# Patient Record
Sex: FEMALE | Race: WHITE | NOT HISPANIC OR LATINO | Employment: FULL TIME | URBAN - METROPOLITAN AREA
[De-identification: names, ages, dates, MRNs, and addresses within clinical notes are randomized per-mention and may not be internally consistent; named-entity substitution may affect disease eponyms.]

---

## 2018-05-18 ENCOUNTER — HOSPITAL ENCOUNTER (EMERGENCY)
Facility: HOSPITAL | Age: 28
Discharge: HOME | End: 2018-05-18
Attending: EMERGENCY MEDICINE | Admitting: EMERGENCY MEDICINE

## 2018-05-18 ENCOUNTER — APPOINTMENT (OUTPATIENT)
Dept: GENERAL RADIOLOGY | Facility: HOSPITAL | Age: 28
End: 2018-05-18

## 2018-05-18 VITALS
DIASTOLIC BLOOD PRESSURE: 77 MMHG | HEIGHT: 66 IN | RESPIRATION RATE: 20 BRPM | OXYGEN SATURATION: 97 % | HEART RATE: 96 BPM | SYSTOLIC BLOOD PRESSURE: 147 MMHG | TEMPERATURE: 97.9 F | WEIGHT: 243 LBS | BODY MASS INDEX: 39.05 KG/M2

## 2018-05-18 DIAGNOSIS — S16.1XXA STRAIN OF NECK MUSCLE, INITIAL ENCOUNTER: ICD-10-CM
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
## 2018-05-18 PROCEDURE — 71045 X-RAY EXAM CHEST 1 VIEW: CPT

## 2018-05-18 PROCEDURE — 72072 X-RAY EXAM THORAC SPINE 3VWS: CPT

## 2018-05-18 PROCEDURE — 72050 X-RAY EXAM NECK SPINE 4/5VWS: CPT

## 2018-05-18 PROCEDURE — 99283 EMERGENCY DEPT VISIT LOW MDM: CPT

## 2018-05-18 RX ORDER — CYCLOBENZAPRINE HCL 10 MG
10 TABLET ORAL 3 TIMES DAILY PRN
Qty: 30 TABLET | Refills: 0 | Status: ON HOLD | OUTPATIENT
Start: 2018-05-18 | End: 2020-02-19

## 2018-05-18 RX ORDER — CYCLOBENZAPRINE HCL 10 MG
10 TABLET ORAL ONCE
Status: COMPLETED | OUTPATIENT
Start: 2018-05-18 | End: 2018-05-18

## 2018-05-18 RX ORDER — IBUPROFEN 800 MG/1
800 TABLET ORAL ONCE
Status: COMPLETED | OUTPATIENT
Start: 2018-05-18 | End: 2018-05-18

## 2018-05-18 RX ADMIN — IBUPROFEN 800 MG: 800 TABLET ORAL at 19:19

## 2018-05-18 RX ADMIN — CYCLOBENZAPRINE 10 MG: 10 TABLET, FILM COATED ORAL at 19:19
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
## 2020-02-18 ENCOUNTER — HOSPITAL ENCOUNTER (EMERGENCY)
Facility: HOSPITAL | Age: 30
Discharge: PSYCHIATRIC OR SUBSTANCE USE FACILITY | End: 2020-02-19
Attending: EMERGENCY MEDICINE | Admitting: EMERGENCY MEDICINE

## 2020-02-18 DIAGNOSIS — R45.851 SUICIDAL THOUGHTS: Primary | ICD-10-CM

## 2020-02-18 DIAGNOSIS — Z86.59 HISTORY OF DEPRESSION: ICD-10-CM

## 2020-02-18 LAB
AMPHET+METHAMPHET UR QL: POSITIVE
BARBITURATES UR QL SCN: NEGATIVE
BENZODIAZ UR QL SCN: NEGATIVE
CANNABINOIDS SERPL QL: NEGATIVE
COCAINE UR QL: NEGATIVE
ETHANOL BLD-MCNC: <10 MG/DL (ref 0–10)
ETHANOL UR QL: <0.01 %
LITHIUM SERPL-SCNC: 0.1 MMOL/L (ref 0.6–1.2)
METHADONE UR QL SCN: NEGATIVE
OPIATES UR QL: NEGATIVE
OXYCODONE UR QL SCN: NEGATIVE
WHOLE BLOOD HOLD SPECIMEN: NORMAL
WHOLE BLOOD HOLD SPECIMEN: NORMAL

## 2020-02-18 PROCEDURE — 80307 DRUG TEST PRSMV CHEM ANLYZR: CPT | Performed by: EMERGENCY MEDICINE

## 2020-02-18 PROCEDURE — 80178 ASSAY OF LITHIUM: CPT | Performed by: EMERGENCY MEDICINE

## 2020-02-18 PROCEDURE — 84703 CHORIONIC GONADOTROPIN ASSAY: CPT | Performed by: SPECIALIST

## 2020-02-18 PROCEDURE — 90791 PSYCH DIAGNOSTIC EVALUATION: CPT

## 2020-02-18 PROCEDURE — 99283 EMERGENCY DEPT VISIT LOW MDM: CPT

## 2020-02-18 PROCEDURE — 84443 ASSAY THYROID STIM HORMONE: CPT | Performed by: SPECIALIST

## 2020-02-18 RX ORDER — VILAZODONE HYDROCHLORIDE 40 MG/1
40 TABLET ORAL DAILY
COMMUNITY
End: 2020-05-20

## 2020-02-18 RX ORDER — BUSPIRONE HYDROCHLORIDE 10 MG/1
10 TABLET ORAL 2 TIMES DAILY
COMMUNITY
End: 2020-02-21 | Stop reason: HOSPADM

## 2020-02-18 RX ORDER — LITHIUM CARBONATE 150 MG/1
150 CAPSULE ORAL
COMMUNITY
End: 2020-02-21 | Stop reason: HOSPADM

## 2020-02-18 RX ORDER — OMEPRAZOLE 40 MG/1
40 CAPSULE, DELAYED RELEASE ORAL DAILY
COMMUNITY
End: 2020-02-21 | Stop reason: HOSPADM

## 2020-02-19 ENCOUNTER — HOSPITAL ENCOUNTER (INPATIENT)
Facility: HOSPITAL | Age: 30
LOS: 2 days | Discharge: HOME | End: 2020-02-21
Attending: SPECIALIST | Admitting: SPECIALIST

## 2020-02-19 VITALS
BODY MASS INDEX: 42.59 KG/M2 | TEMPERATURE: 98.2 F | HEIGHT: 66 IN | WEIGHT: 265 LBS | DIASTOLIC BLOOD PRESSURE: 70 MMHG | OXYGEN SATURATION: 97 % | HEART RATE: 88 BPM | SYSTOLIC BLOOD PRESSURE: 129 MMHG | RESPIRATION RATE: 16 BRPM

## 2020-02-19 PROBLEM — F33.2 MAJOR DEPRESSIVE DISORDER, RECURRENT, SEVERE WITHOUT PSYCHOTIC FEATURES: Status: ACTIVE | Noted: 2020-02-19

## 2020-02-19 LAB
HCG SERPL QL: NEGATIVE
TSH SERPL DL<=0.05 MIU/L-ACNC: 3.21 UIU/ML (ref 0.27–4.2)

## 2020-02-19 RX ORDER — LITHIUM CARBONATE 150 MG/1
150 CAPSULE ORAL NIGHTLY
Status: DISCONTINUED | OUTPATIENT
Start: 2020-02-19 | End: 2020-02-19

## 2020-02-19 RX ORDER — ACETAMINOPHEN 325 MG/1
650 TABLET ORAL EVERY 4 HOURS PRN
Status: DISCONTINUED | OUTPATIENT
Start: 2020-02-19 | End: 2020-02-21 | Stop reason: HOSPADM

## 2020-02-19 RX ORDER — PANTOPRAZOLE SODIUM 40 MG/1
40 TABLET, DELAYED RELEASE ORAL
Status: DISCONTINUED | OUTPATIENT
Start: 2020-02-19 | End: 2020-02-21 | Stop reason: HOSPADM

## 2020-02-19 RX ORDER — VILAZODONE HYDROCHLORIDE 40 MG/1
40 TABLET ORAL DAILY
Status: DISCONTINUED | OUTPATIENT
Start: 2020-02-19 | End: 2020-02-21 | Stop reason: HOSPADM

## 2020-02-19 RX ORDER — LITHIUM CARBONATE 300 MG/1
300 CAPSULE ORAL 2 TIMES DAILY
Status: DISCONTINUED | OUTPATIENT
Start: 2020-02-19 | End: 2020-02-21 | Stop reason: HOSPADM

## 2020-02-19 RX ORDER — ALUMINA, MAGNESIA, AND SIMETHICONE 2400; 2400; 240 MG/30ML; MG/30ML; MG/30ML
15 SUSPENSION ORAL EVERY 6 HOURS PRN
Status: DISCONTINUED | OUTPATIENT
Start: 2020-02-19 | End: 2020-02-21 | Stop reason: HOSPADM

## 2020-02-19 RX ORDER — BUSPIRONE HYDROCHLORIDE 10 MG/1
10 TABLET ORAL 2 TIMES DAILY
Status: DISCONTINUED | OUTPATIENT
Start: 2020-02-19 | End: 2020-02-20

## 2020-02-19 RX ORDER — LEVOTHYROXINE SODIUM 88 UG/1
88 TABLET ORAL
Status: DISCONTINUED | OUTPATIENT
Start: 2020-02-19 | End: 2020-02-21 | Stop reason: HOSPADM

## 2020-02-19 RX ADMIN — VILAZODONE HYDROCHLORIDE 40 MG: 40 TABLET ORAL at 14:01

## 2020-02-19 RX ADMIN — LEVOTHYROXINE SODIUM 88 MCG: 88 TABLET ORAL at 14:01

## 2020-02-19 RX ADMIN — BUSPIRONE HYDROCHLORIDE 10 MG: 10 TABLET ORAL at 14:02

## 2020-02-19 RX ADMIN — BUSPIRONE HYDROCHLORIDE 10 MG: 10 TABLET ORAL at 21:28

## 2020-02-19 RX ADMIN — LITHIUM CARBONATE 300 MG: 300 CAPSULE, GELATIN COATED ORAL at 21:28

## 2020-02-19 RX ADMIN — LITHIUM CARBONATE 300 MG: 300 CAPSULE, GELATIN COATED ORAL at 14:07

## 2020-02-19 NOTE — H&P
"IDENTIFYING INFORMATION: The patient is a 29-year-old white female admitted with suicidal ideation.    CHIEF COMPLAINT: My psychiatrist told me I should come here    INFORMANT: Patient and chart    RELIABILITY: Good    HISTORY OF PRESENT ILLNESS: The patient is a 29-year-old white female admitted after she had confided suicidal action to her psychiatrist yesterday.  The patient reports no specific precipitant which had led to this suicidal thinking.  The patient reports history of 3 inpatient admissions as a teenager and does have a history of a previous suicide attempt.  The patient was reporting that she had researched how to end her life and was settling on an overdose as her best choice.  Patient states that she has been on multiple psychotropic medications and is most recently been started on lithium carbonate, albeit at a very low dose of 150 mg nightly.  She is also prescribed BuSpar and Viibryd.  The patient lives with her parents and older brother.  She has a bachelor's degree from the [employer] and works as an  at the \"[employer]\".  She reports that she is not entirely happy with her job.  She denies abuse of any psychoactive substances though her drug screen was positive for methamphetamine.  When seen today the patient is able to promise safety in the hospital but continues to endorse positive suicidal thinking.    PAST PSYCHIATRIC HISTORY: As above    PAST MEDICAL HISTORY: Significant for GERD and obesity as well as hypothyroidism    MEDICATIONS:   Current Facility-Administered Medications   Medication Dose Route Frequency Provider Last Rate Last Dose   • acetaminophen (TYLENOL) tablet 650 mg  650 mg Oral Q4H PRN Edward Casillas III, MD       • aluminum-magnesium hydroxide-simethicone (MAALOX MAX) 400-400-40 MG/5ML suspension 15 mL  15 mL Oral Q6H PRN Edward Casillas III, MD       • busPIRone (BUSPAR) tablet 10 mg  10 mg Oral BID Edward Casillas" Meme GARDINER MD       • levothyroxine (SYNTHROID, LEVOTHROID) tablet 88 mcg  88 mcg Oral Q AM Edward Casillas III, MD       • lithium carbonate capsule 300 mg  300 mg Oral BID Edward Casillas III, MD       • magnesium hydroxide (MILK OF MAGNESIA) suspension 2400 mg/10mL 10 mL  10 mL Oral Daily PRN Edward Casillas III, MD       • pantoprazole (PROTONIX) EC tablet 40 mg  40 mg Oral Q AM Edward Casillas III, MD       • vilazodone (VIIBRYD) tablet 40 mg  40 mg Oral Daily Edward Casillas III, MD             ALLERGIES: Codeine, DHEA, Imitrex, morphine    FAMILY HISTORY: The patient reports an extensive family history of depression on both sides of her family including issues with depression, eating disorders and substance abuse    SOCIAL HISTORY: The patient lives with her parents and older brother.  She is a graduate of [employer] with a degree in Onsite Care studies.  She denies abuse of psychoactive substances though her drug screen was positive for methamphetamine.    MENTAL STATUS EXAM: The patient is an obese white female appearing her stated age.  She is no apparent physical distress at the time of examination.  She is awake alert and oriented in all spheres.  Her mood is mildly dysphoric her affect great.  Speech is well coherent.  There are no gross deficits in memory or cognition noted.  Intelligence is judged to be the average range based on fund of knowledge, the patient is cooperative during interview.  She is currently endorsing positive suicidal action but is able to promise safety in the hospital.  She denies homicidal action.  She denies any psychotic symptoms.  Her judgment and insight appear to be intact.    ASSETS/LIABILITIES: Motivation for change    DIAGNOSTIC IMPRESSION: Major depressive sworder severe recurrent without psychotic features, hypothyroidism, GERD, obesity    PLAN: The patient remains hospitalized for safety and  stabilization.  As she is able to promise safety in the hospital we will reduce suicide precautions to low risk.  I will optimize the patient's lithium dose as her level on admission was 0.1 at the almost homeopathic dose which she had been previously prescribed.  We will continue Viibryd which the patient states she is tolerating well.  A family therapy session will be sought.  Estimated length of stay in the hospital 5 to 7 days.  The patient looks to be a good candidate for participation in the intensive outpatient program following discharge.

## 2020-02-19 NOTE — PLAN OF CARE
Problem: Patient Care Overview  Goal: Plan of Care Review  Outcome: Ongoing (interventions implemented as appropriate)  Flowsheets  Taken 2/19/2020 1846  Progress: no change  Outcome Summary: Patient tearful during the shift. She reports feeling hopeless and depressed with low energy. Patient has cooperated with all scheduled programming. Patient denies SI and reports feeling safe here. Will continue to monitor.  Taken 2/19/2020 0900  Plan of Care Reviewed With: patient  Patient Agreement with Plan of Care: agrees  Goal: Individualization and Mutuality  Outcome: Ongoing (interventions implemented as appropriate)  Goal: Discharge Needs Assessment  Outcome: Ongoing (interventions implemented as appropriate)  Goal: Interprofessional Rounds/Family Conf  Outcome: Ongoing (interventions implemented as appropriate)     Problem: Overarching Goals (Adult)  Goal: Adheres to Safety Considerations for Self and Others  Outcome: Ongoing (interventions implemented as appropriate)  Goal: Optimized Coping Skills in Response to Life Stressors  Outcome: Ongoing (interventions implemented as appropriate)  Goal: Develops/Participates in Therapeutic [place] to Support Successful Transition  Outcome: Ongoing (interventions implemented as appropriate)     Problem: Mood Impairment (Depressive Signs/Symptoms) (Adult)  Goal: Improved Mood Symptoms (Depressive Signs/Symptoms)  Outcome: Ongoing (interventions implemented as appropriate)     Problem: Feelings of Worthlessness, Hopelessness, Excessive Guilt (Depressive Signs/Symptoms) (Adult)  Goal: Enhanced Self-Esteem/Confidence (Depressive Signs/Symptoms)  Outcome: Ongoing (interventions implemented as appropriate)     Problem: Decreased Participation/Engagement (Depressive Signs/Symptoms) (Adult)  Goal: Increased Participation/Engagement (Depressive Signs/Symptoms)  Outcome: Ongoing (interventions implemented as appropriate)     Problem: Sleep Impairment (Depressive Signs/Symptoms)  (Adult)  Goal: Improved Sleep Hygiene (Depressive Signs/Symptoms)  Outcome: Ongoing (interventions implemented as appropriate)

## 2020-02-19 NOTE — ED PROVIDER NOTES
EMERGENCY DEPARTMENT ENCOUNTER    Room Number:  17/17  Date of encounter:  2/18/2020  PCP: Miriam Newman  Historian: patient      HPI:  Chief Complaint: suicidal ideation  A complete HPI/ROS/PMH/PSH/SH/FH are unobtainable due to: nothing    Context: Maame Wallace is a 29 y.o. female who presents to the ED c/o SI. She states her depression is getting worse. Patient was sent by her psychiatrist so she 'does not commit suicide.' She denies a current plan but states she has 'stocked up some pills.'. No homicidal ideation or auditory or visual hallucinations. Patient has a history of cutting and intentional overdose. No other complaints. No drug use, very little EtOH consumption.       PAST MEDICAL HISTORY  Active Ambulatory Problems     Diagnosis Date Noted   • Hemiplegic migraine, intractable 04/25/2016   • Fibromyalgia 04/29/2016     Resolved Ambulatory Problems     Diagnosis Date Noted   • No Resolved Ambulatory Problems     Past Medical History:   Diagnosis Date   • Anxiety    • Asthma    • Depression    • GERD (gastroesophageal reflux disease)    • Hypothyroidism    • Irritable bowel disease    • Migraines          PAST SURGICAL HISTORY  Past Surgical History:   Procedure Laterality Date   • APPENDECTOMY      laparoscopic   • CHOLECYSTECTOMY     • KNEE ARTHROSCOPY Left    • PATELLA FEMORAL LIGAMENT RECONSTRUCTION Left    • TONSILLECTOMY AND ADENOIDECTOMY           FAMILY HISTORY  No family history on file.      SOCIAL HISTORY  Social History     Socioeconomic History   • Marital status: Single     Spouse name: Not on file   • Number of children: Not on file   • Years of education: Not on file   • Highest education level: Not on file   Tobacco Use   • Smoking status: Never Smoker   Substance and Sexual Activity   • Alcohol use: Yes     Alcohol/week: 1.0 standard drinks     Types: 1 Cans of beer per week     Comment: social   • Drug use: No         ALLERGIES  Codeine; Dhea [nutritional supplements]; Imitrex  [sumatriptan]; and Morphine        REVIEW OF SYSTEMS  Review of Systems   Eyes: Negative.    Allergic/Immunologic: Negative.    Hematological: Negative.    Psychiatric/Behavioral: Positive for suicidal ideas. Negative for hallucinations and self-injury.   All other systems reviewed and are negative.       All other ROS negative except as documented in HPI      PHYSICAL EXAM    I have reviewed the triage vital signs and nursing notes.    ED Triage Vitals [02/18/20 1916]   Temp Heart Rate Resp BP SpO2   97.7 °F (36.5 °C) (!) 128 16 -- 98 %       General: Awake, alert, no acute distress  HEENT: Mucous membranes moist, atraumatic, normocephalic, EOMI  Neck: Full ROM  Pulm: Symmetric, non-labored, lungs CTAB  Cardiovascular: Regular rate and rhythm, normal S1/S2, intact distal pulses  GI: Soft, non-tender, non-distended, no rebound, no guarding, bowel sounds present  MSK: Full ROM, no deformity  Skin: Warm, dry  Neuro: Alert and oriented x 3, GCS 15, moving all extremities, no focal deficits  Psych: Calm, cooperative        LAB RESULTS  Recent Results (from the past 24 hour(s))   Ethanol    Collection Time: 02/18/20  7:37 PM   Result Value Ref Range    Ethanol <10 0 - 10 mg/dL    Ethanol % <0.010 %   Urine Drug Screen - Urine, Clean Catch    Collection Time: 02/18/20  7:37 PM   Result Value Ref Range    Amphet/Methamphet, Screen Positive (A) Negative    Barbiturates Screen, Urine Negative Negative    Benzodiazepine Screen, Urine Negative Negative    Cocaine Screen, Urine Negative Negative    Opiate Screen Negative Negative    THC, Screen, Urine Negative Negative    Methadone Screen, Urine Negative Negative    Oxycodone Screen, Urine Negative Negative   Lithium Level    Collection Time: 02/18/20  7:42 PM   Result Value Ref Range    Lithium 0.1 (L) 0.6 - 1.2 mmol/L   Light Blue Top    Collection Time: 02/18/20  7:42 PM   Result Value Ref Range    Extra Tube hold for add-on    Lavender Top    Collection Time: 02/18/20  7:42  PM   Result Value Ref Range    Extra Tube hold for add-on        Ordered the above labs and independently reviewed the results.      MEDICATIONS GIVEN IN ER    Medications - No data to display      PROGRESS, DATA ANALYSIS, CONSULTS, AND MEDICAL DECISION MAKING    All labs have been independently reviewed by me.  All radiology studies have been reviewed by me and discussed with radiologist dictating report.   EKG's independently reviewed by me.  Discussion below represents my analysis of pertinent findings related to patient's condition, differential diagnosis, treatment plan and final disposition.       Patient sent in for evaluation by her psychiatrist for suicidal thoughts, patient admits to hoarding pills with a potential plan to overdose.  History of overdose attempt in the past.  Patient reports fearing for her safety if she were to go home tonight.  [facility] has evaluated and will hospitalize to the psychiatry service.  --  1942. Labs including UDS and EtOH ordered.      1953. Call out to [facility].    2110. [facility] at bedside.     2150. Per [facility], patient will be discharged to Behavioral Health under the care of Dr Casillas, Psychiatry.     --  AS OF 11:27 PM VITALS:    BP - (!) 164/111  HR - (!) 128  TEMP - 97.7 °F (36.5 °C) (Tympanic)  02 SATS - 98%        DIAGNOSIS  Final diagnoses:   Suicidal thoughts   History of depression         DISPOSITION    DISCHARGE TO BEHAVIORAL HEALTH    --  Documentation assistance provided by cookie Morales for Dr. Bk Louise MD.  Information recorded by the scribe was done at my direction and has been verified and validated by me.       Imelda Morales  02/18/20 7973       Bk Louise MD  02/18/20 0641

## 2020-02-19 NOTE — PLAN OF CARE
Problem: Patient Care Overview  Goal: Discharge Needs Assessment  Outcome: Ongoing (interventions implemented as appropriate)  Flowsheets  Taken 2/19/2020 1135  Concerns Comments: Pt will return home.  Pt will participate in therapeutic groups/activities on the unit.  SW will explore outpt providers.  Taken 2/19/2020 1135  Transportation Concerns: car, none  Concerns to be Addressed: coping/stress;decision making;suicidal;mental health  Patient/Family Anticipated Services at Transition: mental health services  Patient/Family Anticipates Transition to: home with family

## 2020-02-19 NOTE — NURSING NOTE
Skin assessment:    Pt has multiple self inflicted abrasions on bilateral hips, upper thighs and axillary areas.  Some are older from a month ago per pt, and some are more recent.  Pt has a mole on second toe of left foot, 4 tattoos, one on left wrist, behind right ear and one on each ankle.

## 2020-02-19 NOTE — PLAN OF CARE
Problem: Patient Care Overview  Goal: Individualization and Mutuality  Outcome: Ongoing (interventions implemented as appropriate)  Flowsheets (Taken 2/19/2020 1013)  Patient Personal Strengths: family/social support; stable living environment; positive vocational history     Problem: Patient Care Overview  Goal: Interprofessional Rounds/Family Conf  Outcome: Ongoing (interventions implemented as appropriate)  Flowsheets (Taken 2/19/2020 1013)  Participants: ; pastoral care; pharmacy; psychiatrist; nursing; social work  Summary: Treatment team met to discuss pt's plan of care.  Pt will participate in therapeutic groups/activities on the unit.  SW will confirm outpt mental health providers. Progress & svcs needed will be assessed ongoing.     Problem: Mood Impairment (Depressive Signs/Symptoms) (Adult)  Goal: Improved Mood Symptoms (Depressive Signs/Symptoms)  Outcome: Ongoing (interventions implemented as appropriate)  Flowsheets (Taken 2/19/2020 1013)  Improved Mood Symptoms Time Frame for Action Step (STG): 4 days  Improved Mood Symptoms Action Step (STG) Outcome: making progress toward outcome     Problem: Feelings of Worthlessness, Hopelessness, Excessive Guilt (Depressive Signs/Symptoms) (Adult)  Goal: Enhanced Self-Esteem/Confidence (Depressive Signs/Symptoms)  Outcome: Ongoing (interventions implemented as appropriate)  Flowsheets (Taken 2/19/2020 1013)  Enhanced Self-Esteem/Confidence Time Frame for Action Step (STG): 4 days  Enhanced Self-Esteem/Confidence Action Step (STG) Outcome: making progress toward outcome     Problem: Decreased Participation/Engagement (Depressive Signs/Symptoms) (Adult)  Goal: Increased Participation/Engagement (Depressive Signs/Symptoms)  Outcome: Ongoing (interventions implemented as appropriate)  Flowsheets (Taken 2/19/2020 1013)  Increased Participation/Engagement Time Frame for Action Step (STG): 4 days  Increased Participation/Engagement Action Step (STG)  Outcome: making progress toward outcome     Problem: Sleep Impairment (Depressive Signs/Symptoms) (Adult)  Goal: Improved Sleep Hygiene (Depressive Signs/Symptoms)  Outcome: Ongoing (interventions implemented as appropriate)  Flowsheets (Taken 2/19/2020 1013)  Improved Sleep Hygiene Time Frame for Action Step (STG): 4 days  Improved Sleep Hygiene Action Step (STG) Outcome: making progress toward outcome    Patient/Guardian Signature: __________________________________            Psychiatrist Signature: ______________________________________             Therapist Signature: ________________________________________         Nurse Signature: ___________________________________________          Staff Signature: ____________________________________________            Staff Signature: ____________________________________________          Staff Signature: ____________________________________________          Staff Signature:

## 2020-02-19 NOTE — PROGRESS NOTES
Clinical Pharmacy Services: Medication History    Maame Wallace is a 29 y.o. female presenting to [facility] for Major depressive disorder, recurrent, severe without psychotic features (CMS/HCC) [F33.2]    She  has a past medical history of Anxiety, Asthma, Depression, Fibromyalgia, GERD (gastroesophageal reflux disease), Hypothyroidism, Irritable bowel disease, and Migraines.    Allergies as of 02/18/2020 - Reviewed 02/18/2020   Allergen Reaction Noted    Codeine Nausea And Vomiting and Rash 04/04/2016    Dhea [nutritional supplements] Other (See Comments) 04/25/2016    Imitrex [sumatriptan] Other (See Comments) 04/04/2016    Morphine Other (See Comments) 04/04/2016       Medication information was obtained from: patient   Pharmacy and Phone Number:     Prior to Admission Medications       Prescriptions Last Dose Informant Patient Reported? Taking?    busPIRone (BUSPAR) 10 MG tablet  Self Yes Yes    Take 10 mg by mouth 2 (Two) Times a Day. Breakfast and lunch    levothyroxine (SYNTHROID, LEVOTHROID) 88 MCG tablet  Self Yes Yes    Take 88 mcg by mouth daily.    lithium carbonate 150 MG capsule  Self Yes Yes    Take 150 mg by mouth every night at bedtime.    omeprazole (priLOSEC) 40 MG capsule  Self Yes Yes    Take 40 mg by mouth Daily.    vilazodone (VIIBRYD) 40 MG tablet tablet  Self Yes Yes    Take 40 mg by mouth Daily.                Medication notes:     This medication list is complete to the best of my knowledge as of 2/19/2020    Please call if questions.    Gypsy Boland, [place], BCPS  2/19/2020 1:32 PM

## 2020-02-19 NOTE — CONSULTS
Pt is a 29 y.o. white single female who lives with her parents. Pt is a bachelors degree cabral and works for the [employer] as an .    Pt came to the ED today with c/o of SI. Voiced she was referred by her psychiatrist after mentioning her thoughts of wanting to write a suicide note and looking up way to on Internet to hurt herself over the past week. Reported to writer that over the course of the week and weekend she have been looking up ways on the Internet to hurt herself and overdosing was the easier choose she found. Reports no recent added stress to life but she have just been having constant SI thoughts for the past 2-3 months that have worsened in severity. She recently had Lithium added to her psych regimen about a week ago and has been seeing her psychiatrist on a weekly bases due to the consant SI thoughts. States her depression has gotten worse over the past few months and she does no know why.     Currently, she is still voicing SI thoughts and does not feel safe being release from the hospital. Pt reports she has stock up pills at home that she might use to overdose. Current self mutilation on stomach and both upper arms. Voiced cuts are superficial and in different healing stages. Reports here in the past month the cutting have increased in severity. Voiced she have always had the hx but stop cutting when she was in college years ago.     No homicidal ideation or auditory or visual hallucinations reported. Previous SI attempt reported when she was in high school. Pt also have x3 hx of inpatient admissions around same time of SI attempt but was unsure of the year. States she was at [facility] and [facility] pediatric. Sees [facility] for psychiatric needs. Psychiatrist is Jen Jeronimo who she sees on a weekly base. Last appointment was today. Also sees Doreen (therapist) in this same office twice a month.    Reports occasional alcohol use and no illicit drug  use even though urine drug screen was positive for methamphetamine today. No trauma/abuse in hx. Sleeps has been poor but appetite regular.      Pt is currently on Lithium 150 mg at bedtime that was started a week ago. Also on Buspar 10 mg twice a day, Viibryd 40 mg  dally, Levothyroxine 88 mcg daily and omeprazole 40 mg daily.    Status discussed with Dr. Casillas who ordered inpatient admission with SI precaution and 1:1 sitter for safety reasons. He also ordered for continuation of routine medications and standing PRN's.    Plan reviewed/discused with Dr. Louise who agrees.      contacted for sitter purposes but none is available at this time. Pt. made aware that her tranfers to the unit from the ED might be tomorrow morning. Pt fine with this at this time.

## 2020-02-19 NOTE — PROGRESS NOTES
Continued Stay Note  [facility]     Patient Name: Maame Wallace  MRN: [id number]  Today's Date: 2/19/2020    Admit Date: 2/19/2020    Discharge Plan     Row Name 02/19/20 1133       Plan    Plan  Pt will return home.  Pt will participate in therapeutic groups/activities on the unit.  SW will explore outpt providers.    Plan Comments  SW met w/pt to discuxx tx & d/c planning.  Pt was able to verify demographic info as well as PCP, Dr. Miriam Newman.  JULIUS inquired about mental health providers; pt receives svcs from [facility], she sees Sally Tate for medication management & Doreen for mental health therapy.  SW inquired about a work note; pt stated that she would need one upon d/c.  JULIUS briefly discussed meds to beds w/pt.        Discharge Codes    No documentation.             VERONICA Villasenor

## 2020-02-20 VITALS
HEART RATE: 96 BPM | SYSTOLIC BLOOD PRESSURE: 117 MMHG | TEMPERATURE: 97.5 F | DIASTOLIC BLOOD PRESSURE: 68 MMHG | OXYGEN SATURATION: 100 % | RESPIRATION RATE: 18 BRPM

## 2020-02-20 PROBLEM — B37.31 VAGINAL YEAST INFECTION: Status: ACTIVE | Noted: 2020-02-20

## 2020-02-20 PROBLEM — E03.9 HYPOTHYROIDISM: Status: ACTIVE | Noted: 2020-02-20

## 2020-02-20 PROBLEM — K58.9 IRRITABLE BOWEL DISEASE: Status: ACTIVE | Noted: 2020-02-20

## 2020-02-20 PROBLEM — D72.829 LEUKOCYTOSIS: Status: ACTIVE | Noted: 2020-02-20

## 2020-02-20 PROBLEM — E87.1 HYPONATREMIA: Status: ACTIVE | Noted: 2020-02-20

## 2020-02-20 PROBLEM — E66.9 OBESITY (BMI 30-39.9): Status: ACTIVE | Noted: 2020-02-20

## 2020-02-20 LAB
ANION GAP SERPL CALCULATED.3IONS-SCNC: 11 MMOL/L (ref 5–15)
BASOPHILS # BLD AUTO: 0.08 10*3/MM3 (ref 0–0.2)
BASOPHILS NFR BLD AUTO: 0.7 % (ref 0–1.5)
BUN BLD-MCNC: 8 MG/DL (ref 6–20)
BUN/CREAT SERPL: 9.9 (ref 7–25)
CALCIUM SPEC-SCNC: 8.6 MG/DL (ref 8.6–10.5)
CHLORIDE SERPL-SCNC: 102 MMOL/L (ref 98–107)
CO2 SERPL-SCNC: 21 MMOL/L (ref 22–29)
CREAT BLD-MCNC: 0.81 MG/DL (ref 0.57–1)
DEPRECATED RDW RBC AUTO: 39.5 FL (ref 37–54)
EOSINOPHIL # BLD AUTO: 0.12 10*3/MM3 (ref 0–0.4)
EOSINOPHIL NFR BLD AUTO: 1 % (ref 0.3–6.2)
ERYTHROCYTE [DISTWIDTH] IN BLOOD BY AUTOMATED COUNT: 12.6 % (ref 12.3–15.4)
GFR SERPL CREATININE-BSD FRML MDRD: 85 ML/MIN/1.73
GLUCOSE BLD-MCNC: 132 MG/DL (ref 65–99)
HCT VFR BLD AUTO: 41.3 % (ref 34–46.6)
HGB BLD-MCNC: 13.8 G/DL (ref 12–15.9)
IMM GRANULOCYTES # BLD AUTO: 0.09 10*3/MM3 (ref 0–0.05)
IMM GRANULOCYTES NFR BLD AUTO: 0.8 % (ref 0–0.5)
LYMPHOCYTES # BLD AUTO: 2.93 10*3/MM3 (ref 0.7–3.1)
LYMPHOCYTES NFR BLD AUTO: 24.9 % (ref 19.6–45.3)
MCH RBC QN AUTO: 29.1 PG (ref 26.6–33)
MCHC RBC AUTO-ENTMCNC: 33.4 G/DL (ref 31.5–35.7)
MCV RBC AUTO: 86.9 FL (ref 79–97)
MONOCYTES # BLD AUTO: 0.54 10*3/MM3 (ref 0.1–0.9)
MONOCYTES NFR BLD AUTO: 4.6 % (ref 5–12)
NEUTROPHILS # BLD AUTO: 8.03 10*3/MM3 (ref 1.7–7)
NEUTROPHILS NFR BLD AUTO: 68 % (ref 42.7–76)
NRBC BLD AUTO-RTO: 0 /100 WBC (ref 0–0.2)
PLATELET # BLD AUTO: 350 10*3/MM3 (ref 140–450)
PMV BLD AUTO: 9.8 FL (ref 6–12)
POTASSIUM BLD-SCNC: 3.7 MMOL/L (ref 3.5–5.2)
RBC # BLD AUTO: 4.75 10*6/MM3 (ref 3.77–5.28)
SODIUM BLD-SCNC: 134 MMOL/L (ref 136–145)
WBC NRBC COR # BLD: 11.79 10*3/MM3 (ref 3.4–10.8)

## 2020-02-20 PROCEDURE — 80048 BASIC METABOLIC PNL TOTAL CA: CPT | Performed by: NURSE PRACTITIONER

## 2020-02-20 PROCEDURE — 85025 COMPLETE CBC W/AUTO DIFF WBC: CPT | Performed by: NURSE PRACTITIONER

## 2020-02-20 RX ORDER — TRAZODONE HYDROCHLORIDE 50 MG/1
50 TABLET ORAL NIGHTLY
Status: DISCONTINUED | OUTPATIENT
Start: 2020-02-20 | End: 2020-02-21 | Stop reason: HOSPADM

## 2020-02-20 RX ORDER — BUSPIRONE HYDROCHLORIDE 15 MG/1
15 TABLET ORAL 2 TIMES DAILY WITH MEALS
Status: DISCONTINUED | OUTPATIENT
Start: 2020-02-20 | End: 2020-02-21 | Stop reason: HOSPADM

## 2020-02-20 RX ORDER — BUSPIRONE HYDROCHLORIDE 15 MG/1
15 TABLET ORAL 2 TIMES DAILY
Status: DISCONTINUED | OUTPATIENT
Start: 2020-02-20 | End: 2020-02-20

## 2020-02-20 RX ADMIN — LITHIUM CARBONATE 300 MG: 300 CAPSULE, GELATIN COATED ORAL at 10:33

## 2020-02-20 RX ADMIN — BUSPIRONE HYDROCHLORIDE 15 MG: 15 TABLET ORAL at 18:21

## 2020-02-20 RX ADMIN — TRAZODONE HYDROCHLORIDE 50 MG: 50 TABLET ORAL at 20:26

## 2020-02-20 RX ADMIN — VILAZODONE HYDROCHLORIDE 40 MG: 40 TABLET ORAL at 10:32

## 2020-02-20 RX ADMIN — PANTOPRAZOLE SODIUM 40 MG: 40 TABLET, DELAYED RELEASE ORAL at 08:28

## 2020-02-20 RX ADMIN — BUSPIRONE HYDROCHLORIDE 10 MG: 10 TABLET ORAL at 10:32

## 2020-02-20 RX ADMIN — LEVOTHYROXINE SODIUM 88 MCG: 88 TABLET ORAL at 08:27

## 2020-02-20 RX ADMIN — LITHIUM CARBONATE 300 MG: 300 CAPSULE, GELATIN COATED ORAL at 20:26

## 2020-02-20 NOTE — PROGRESS NOTES
The patient complains of poor sleep and increasing anxiety.  I have explained to her the rationale for the increased dose of lithium today which the patient understands.  She continues to report some suicidal thinking.  I will increase her BuSpar to 15 mg twice daily and add PRN trazodone for sleep.

## 2020-02-20 NOTE — PLAN OF CARE
Problem: Patient Care Overview  Goal: Plan of Care Review  Outcome: Ongoing (interventions implemented as appropriate)  Flowsheets (Taken 2/20/2020 0704)  Progress: improving  Plan of Care Reviewed With: patient  Patient Agreement with Plan of Care: agrees  Note:   Pt was pleasant, cooperative, compliant with medications and care. Pt denied SI, HI, and hallucinations. Pt voiced anxiety 9/10 and depression 8/10. Pt had a flat affect, slept all night. Will continue to monitor behaviors, provide support and safe environment.

## 2020-02-20 NOTE — PROGRESS NOTES
I do not see where a consult was called in, but patient will be seen by someone from [facility] today.    Nohemi Rizzo, APRN

## 2020-02-20 NOTE — PLAN OF CARE
Problem: Patient Care Overview  Goal: Plan of Care Review  Outcome: Ongoing (interventions implemented as appropriate)  Flowsheets  Taken 2/20/2020 1823  Progress: improving  Outcome Summary: Pt pleasant, cooperative w/ care, attending all groups, and compliant w/ meds. Pt appears brighter this shift, accepting of inpt stay. Pt voices depression 9/10 and anxiety 8/10. Denies SI/HI, A/VH and pain to this RN. Pt out in milieu interacting w/ peers appropriately. No further issues reported at this time. Will continue to monitor and provide safe environment.  Taken 2/20/2020 1100  Plan of Care Reviewed With: patient  Patient Agreement with Plan of Care: agrees     Problem: Patient Care Overview  Goal: Individualization and Mutuality  Outcome: Ongoing (interventions implemented as appropriate)     Problem: Patient Care Overview  Goal: Discharge Needs Assessment  Outcome: Ongoing (interventions implemented as appropriate)     Problem: Patient Care Overview  Goal: Interprofessional Rounds/Family Conf  Outcome: Ongoing (interventions implemented as appropriate)     Problem: Overarching Goals (Adult)  Goal: Adheres to Safety Considerations for Self and Others  Outcome: Ongoing (interventions implemented as appropriate)  Flowsheets (Taken 2/20/2020 1823)  Adheres to Safety Considerations for Self and Others: making progress toward outcome     Problem: Overarching Goals (Adult)  Goal: Adheres to Safety Considerations for Self and Others  Intervention: Develop and Maintain Individualized Safety Plan  Flowsheets  Taken 2/20/2020 1800  Safety Measures: safety rounds completed  Taken 2/20/2020 1100  Q4 Suicidal Intent without Specific Plan: no  Previous Attempt to Harm Others: no  Q1 Wished to be Dead (Past Month): yes  Q5 Suicide Intent with Specific Plan: no  Q2 Suicidal Thoughts (Past Month): yes  Feels Like Hurting Others: no  Q6 Suicide Behavior (Lifetime): yes  Q3 Suicidal Thought Method : no (looking up ways to harm self  )  Level of Risk per Screen: high risk !  Within the past 3 Months?: yes     Problem: Overarching Goals (Adult)  Goal: Optimized Coping Skills in Response to Life Stressors  Outcome: Ongoing (interventions implemented as appropriate)  Flowsheets (Taken 2/20/2020 1823)  Optimized Coping Skills in Response to Life Stressors: making progress toward outcome     Problem: Overarching Goals (Adult)  Goal: Optimized Coping Skills in Response to Life Stressors  Intervention: Promote Effective Coping Strategies  Flowsheets (Taken 2/20/2020 1100)  Supportive Measures: active listening utilized;verbalization of feelings encouraged;self-care encouraged;relaxation techniques promoted;positive reinforcement provided;goal setting facilitated;decision-making supported     Problem: Overarching Goals (Adult)  Goal: Develops/Participates in Therapeutic [place] to Support Successful Transition  Outcome: Ongoing (interventions implemented as appropriate)  Flowsheets (Taken 2/20/2020 1823)  Develops/Participates in Therapeutic [place] to Support Successful Transition: making progress toward outcome     Problem: Overarching Goals (Adult)  Goal: Develops/Participates in Therapeutic [place] to Support Successful Transition  Intervention: Foster Therapeutic [place]  Flowsheets (Taken 2/20/2020 1100)  Trust Relationship/Rapport: care explained;choices provided;thoughts/feelings acknowledged;reassurance provided;questions answered     Problem: Overarching Goals (Adult)  Goal: Develops/Participates in Therapeutic [place] to Support Successful Transition  Intervention: Mutually Develop Transition Plan  Flowsheets (Taken 2/20/2020 1100)  Transition Support: crisis management plan promoted     Problem: Mood Impairment (Depressive Signs/Symptoms) (Adult)  Goal: Improved Mood Symptoms (Depressive Signs/Symptoms)  Outcome: Ongoing (interventions implemented as appropriate)  Flowsheets  Taken 2/20/2020 0611 by Mae Conte RN Improved  Mood Symptoms Action Step/Short Term Goal (STG) Established: 02/19/20  Taken 2/20/2020 1823 by Paulino Virk RN  Improved Mood Symptoms Time Frame for Action Step (STG): 3 days  Improved Mood Symptoms Action Step (STG) Outcome: making progress toward outcome     Problem: Feelings of Worthlessness, Hopelessness, Excessive Guilt (Depressive Signs/Symptoms) (Adult)  Goal: Enhanced Self-Esteem/Confidence (Depressive Signs/Symptoms)  Outcome: Ongoing (interventions implemented as appropriate)  Flowsheets  Taken 2/20/2020 0611 by Mae Conte RN  Enhanced Self-Esteem/Confidence Action Step/Short Term Goal (STG) Established: 02/19/20  Taken 2/20/2020 1823 by Paulino Virk RN  Enhanced Self-Esteem/Confidence Time Frame for Action Step (STG): 3 days  Enhanced Self-Esteem/Confidence Action Step (STG) Outcome: making progress toward outcome     Problem: Decreased Participation/Engagement (Depressive Signs/Symptoms) (Adult)  Goal: Increased Participation/Engagement (Depressive Signs/Symptoms)  Outcome: Ongoing (interventions implemented as appropriate)  Flowsheets  Taken 2/20/2020 0611 by Mae Conte RN  Increased Participation/Engagement Action Step/Short Term Goal (STG) Established: 02/19/20  Taken 2/20/2020 1823 by Paulino Virk RN  Increased Participation/Engagement Time Frame for Action Step (STG): 3 days  Increased Participation/Engagement Action Step (STG) Outcome: making progress toward outcome     Problem: Sleep Impairment (Depressive Signs/Symptoms) (Adult)  Goal: Improved Sleep Hygiene (Depressive Signs/Symptoms)  Outcome: Ongoing (interventions implemented as appropriate)  Flowsheets  Taken 2/20/2020 0611 by Mae Conte RN  Improved Sleep Hygiene Action Step/Short Term Goal (STG) Established: 02/19/20  Taken 2/20/2020 1823 by Paulino Virk RN  Improved Sleep Hygiene Time Frame for Action Step (STG): 3 days  Improved Sleep Hygiene Action Step (STG) Outcome: making progress toward  outcome     Problem: Suicidal Behavior (Adult)  Goal: Suicidal Behavior is Absent/Minimized/Managed  Outcome: Ongoing (interventions implemented as appropriate)  Flowsheets  Taken 2/20/2020 0611 by Mae Conte, RN  Action Step/Short Term Goal (STG) Established: 02/19/20  Taken 2/20/2020 1823 by Paulino Virk, KENDRICK  Suicidal Behavior Managed/Minimized Time Frame for Action Step (STG): 3 days  Suicidal Behavior Managed/Minimized Action Step (STG) Outcome: making progress toward outcome

## 2020-02-20 NOTE — CONSULTS
Consultation     Patient Name: Maame Wallace  Age/Sex: 29 y.o. female  : 1990  MRN: [id number]    Date of Admission: 2020  Date of Encounter Visit: 20  Encounter Provider: FIDE Roa  Place of Service: [facility]  Patient Care Team:  Miriam Newman as PCP - General    Subjective:     Consults  Reason for consultation: Medical evaluation contributing to current psychiatric illness.    Chief Complaint:   Suicidal ideations    History of Present Illness  Maame Wallace is a 29 y.o. female who was admitted to the Crisis Management Unit for further evaluation regarding depression with suicidal ideation.  We were asked to see and evaluate her medical issues as they may pertain to her current psychiatric illness. Patient has a history of hypothyroidism, GERD, borderline diabetes, fibromyalgia, depression, anxiety and IBS. Denies any recent illnesses, fever, chills, congestion, cough, N/V/D, palpitations and dizziness.  She is usually on lithium, buspar and Viibryd and denies any missed doses and reports she is taking as prescribed.     Recently she has had some itchy vaginal discharge, but denies any recent antibiotics or medications adjustments. Symptoms are similar to prior yeast infections that she gets about every 3 months. She has borderline diabetes and tries to watch her diet. Recently she has had a headache and nausea that she attributes to crying. She has IBS and mother had celiac disease. She had a full workup with negative celiac panel and normal colonoscopy. Denies any tobacco use, rarely drinks alcohol and denies any drug use.     Labs showed WBC 11.79, sodium 134, CO2 21. Recent TSH 3.210 and UDS positive for amphetamines likely from Viibryd.     Review of Systems   Constitutional: Negative for chills, fatigue and fever.   HENT: Negative for congestion and trouble swallowing.    Eyes: Negative for visual disturbance.   Respiratory: Negative for cough,  shortness of breath and wheezing.    Cardiovascular: Negative for chest pain, palpitations and leg swelling.   Gastrointestinal: Positive for nausea. Negative for abdominal pain, diarrhea and vomiting.   Genitourinary: Positive for vaginal discharge. Negative for difficulty urinating and dysuria.   Musculoskeletal: Negative for back pain.   Neurological: Positive for headaches. Negative for dizziness, syncope and weakness.   Psychiatric/Behavioral: Negative for confusion. The patient is not nervous/anxious.    All other systems reviewed and are negative.      Past Medical and Surgical History:  Past Medical History:   Diagnosis Date   • Anxiety    • Asthma    • Depression    • Fibromyalgia    • GERD (gastroesophageal reflux disease)    • Hypothyroidism    • Irritable bowel disease    • Migraines        Past Surgical History:   Procedure Laterality Date   • APPENDECTOMY      laparoscopic   • CHOLECYSTECTOMY     • KNEE ARTHROSCOPY Left    • PATELLA FEMORAL LIGAMENT RECONSTRUCTION Left    • TONSILLECTOMY AND ADENOIDECTOMY         Home Medications:   Medications Prior to Admission   Medication Sig Dispense Refill Last Dose   • busPIRone (BUSPAR) 10 MG tablet Take 10 mg by mouth 2 (Two) Times a Day. Breakfast and lunch      • levothyroxine (SYNTHROID, LEVOTHROID) 88 MCG tablet Take 88 mcg by mouth daily.   Taking   • lithium carbonate 150 MG capsule Take 150 mg by mouth every night at bedtime.      • omeprazole (priLOSEC) 40 MG capsule Take 40 mg by mouth Daily.      • vilazodone (VIIBRYD) 40 MG tablet tablet Take 40 mg by mouth Daily.          Inpatient Medications:  Scheduled Meds:  busPIRone 15 mg Oral BID With Meals   levothyroxine 88 mcg Oral Q AM   lithium carbonate 300 mg Oral BID   pantoprazole 40 mg Oral Q AM   traZODone 50 mg Oral Nightly   vilazodone 40 mg Oral Daily     Continuous Infusions:   PRN Meds:.•  acetaminophen  •  aluminum-magnesium hydroxide-simethicone  •  magnesium  hydroxide    Allergies:  Allergies   Allergen Reactions   • Codeine Nausea And Vomiting and Rash   • Dhea [Nutritional Supplements] Other (See Comments)     Severe headache   • Imitrex [Sumatriptan] Other (See Comments)     Intensified a migraine   • Morphine Other (See Comments)     Severe headache       Past Social History:  Social History     Socioeconomic History   • Marital status: Single     Spouse name: Not on file   • Number of children: Not on file   • Years of education: Not on file   • Highest education level: Not on file   Tobacco Use   • Smoking status: Never Smoker   Substance and Sexual Activity   • Alcohol use: Yes     Alcohol/week: 1.0 standard drinks     Types: 1 Cans of beer per week     Comment: social   • Drug use: No       Past Family History:  History reviewed. No pertinent family history.    Objective:   Temp:  [97.5 °F (36.4 °C)] 97.5 °F (36.4 °C)  Heart Rate:  [96] 96  Resp:  [18] 18  BP: (117)/(68) 117/68   SpO2:  [100 %] 100 %  on    Device (Oxygen Therapy): room air    Intake/Output Summary (Last 24 hours) at 2/20/2020 1547  Last data filed at 2/20/2020 0900  Gross per 24 hour   Intake 840 ml   Output --   Net 840 ml     There is no height or weight on file to calculate BMI.  There were no vitals filed for this visit.  Weight change:   Ventilator/Non-Invasive Ventilation Settings (From admission, onward)    None          Physical Exam   Constitutional: She is oriented to person, place, and time. She appears well-developed and well-nourished. No distress.   HENT:   Head: Normocephalic and atraumatic.   Eyes: Pupils are equal, round, and reactive to light. Conjunctivae are normal. No scleral icterus.   Neck: Neck supple. No tracheal deviation present. No thyromegaly present.   Cardiovascular: Normal rate, regular rhythm and normal heart sounds.  Occasional extrasystoles are present.   No murmur heard.  Pulmonary/Chest: Effort normal and breath sounds normal. She has no wheezes. She has no  rales.   Abdominal: Soft. Bowel sounds are normal. She exhibits no distension. There is no tenderness.   Musculoskeletal: She exhibits no edema.   Neurological: She is alert and oriented to person, place, and time.   Skin: Skin is warm and dry. She is not diaphoretic.   Psychiatric: Her behavior is normal.   anxious   Nursing note and vitals reviewed.       Lab Review:     Results from last 7 days   Lab Units 02/20/20  1031   SODIUM mmol/L 134*   POTASSIUM mmol/L 3.7   CHLORIDE mmol/L 102   CO2 mmol/L 21.0*   BUN mg/dL 8   CREATININE mg/dL 0.81   EGFR IF NONAFRICN AM mL/min/1.73 85   GLUCOSE mg/dL 132*   CALCIUM mg/dL 8.6     Estimated Creatinine Clearance: 137.7 mL/min (by C-G formula based on SCr of 0.81 mg/dL).          Results from last 7 days   Lab Units 02/20/20  1031   WBC 10*3/mm3 11.79*   HEMOGLOBIN g/dL 13.8   HEMATOCRIT % 41.3   PLATELETS 10*3/mm3 350   MCV fL 86.9   MCH pg 29.1   MCHC g/dL 33.4   RDW % 12.6   RDW-SD fl 39.5   MPV fL 9.8   NEUTROPHIL % % 68.0   LYMPHOCYTE % % 24.9   MONOCYTES % % 4.6*   EOSINOPHIL % % 1.0   BASOPHIL % % 0.7   IMM GRAN % % 0.8*   NEUTROS ABS 10*3/mm3 8.03*   LYMPHS ABS 10*3/mm3 2.93   MONOS ABS 10*3/mm3 0.54   EOS ABS 10*3/mm3 0.12   BASOS ABS 10*3/mm3 0.08   IMMATURE GRANS (ABS) 10*3/mm3 0.09*   NRBC /100 WBC 0.0                   Invalid input(s): LDLCALC      Results from last 7 days   Lab Units 02/18/20  1937   TSH uIU/mL 3.210                                       Imaging:  Imaging Results (Last 24 Hours)     ** No results found for the last 24 hours. **          EKG:  No orders to display       I reviewed the patient's new clinical results, lab tests and medications.     Problem List:     Active Hospital Problems    Diagnosis  POA   • Vaginal yeast infection [B37.3]  Unknown   • Leukocytosis [D72.829]  Unknown   • Hyponatremia [E87.1]  Unknown   • Hypothyroidism [E03.9]  Unknown   • Irritable bowel disease [K58.9]  Unknown   • Major depressive disorder, recurrent,  severe without psychotic features (CMS/HCC) [F33.2]  Yes      Resolved Hospital Problems   No resolved problems to display.       Assessment and Plan:     ·   Major depressive disorder, recurrent, severe without psychotic features (CMS/HCC)- management per psychiatry team. Recent lithium level low. UDS positive likely from viibryd.   ·   Vaginal yeast infection- mildly symptomatic will give monistat suppository PRN for any further vaginal itching with discharge.   ·   Leukocytosis- mild and likely reactive from recent anxiety events. No fever, cough, diarrhea or urinary complaints. Awaiting UA. Lungs clear.  ·   Hyponatremia- mild at 134. Likely from mild dehydration. Encouraged to drink fluids and some with solutes not just water.   ·   Hypothyroidism- TSH ok. Continue home dose levothyroxine 88  ·   Irritable bowel disease- currently stable. PRN meds for constipation  · Obesity- at risk for metabolic syndrome given high BMI (42) and borderline diabetes. Encouraged diet modifications    The patient seems medically stable at this time.  There are no medical issues which need to be addressed while she is under psychiatric care and may continue to follow up with her PCP as an outpatient. We will sign off, but please call if any issues.       FIDE Roa  [facility]  02/20/20  3:47 PM    Dictated utilizing Dragon dictation

## 2020-02-20 NOTE — PLAN OF CARE
Problem: Mood Impairment (Depressive Signs/Symptoms) (Adult)  Goal: Improved Mood Symptoms (Depressive Signs/Symptoms)  Outcome: Ongoing (interventions implemented as appropriate)  Flowsheets  Taken 2/20/2020 0611 by Mae Conte RN  Improved Mood Symptoms Action Step/Short Term Goal (STG) Established: 02/19/20  Improved Mood Symptoms Time Frame for Action Step (STG): 3 days  Taken 2/20/2020 0444 by Amanda Hilliard RN  Improved Mood Symptoms Action Step (STG) Outcome: making progress toward outcome     Problem: Feelings of Worthlessness, Hopelessness, Excessive Guilt (Depressive Signs/Symptoms) (Adult)  Goal: Enhanced Self-Esteem/Confidence (Depressive Signs/Symptoms)  Outcome: Ongoing (interventions implemented as appropriate)  Flowsheets  Taken 2/20/2020 0611 by Mae Conte RN  Enhanced Self-Esteem/Confidence Action Step/Short Term Goal (STG) Established: 02/19/20  Enhanced Self-Esteem/Confidence Time Frame for Action Step (STG): 3 days  Taken 2/20/2020 0444 by Amanda Hilliard RN  Enhanced Self-Esteem/Confidence Action Step (STG) Outcome: making progress toward outcome     Problem: Decreased Participation/Engagement (Depressive Signs/Symptoms) (Adult)  Goal: Increased Participation/Engagement (Depressive Signs/Symptoms)  Outcome: Ongoing (interventions implemented as appropriate)  Flowsheets  Taken 2/20/2020 0611 by Mae Conte RN  Increased Participation/Engagement Action Step/Short Term Goal (STG) Established: 02/19/20  Increased Participation/Engagement Time Frame for Action Step (STG): 3 days  Taken 2/20/2020 0444 by Amanda Hilliard RN  Increased Participation/Engagement Action Step (STG) Outcome: making progress toward outcome     Problem: Sleep Impairment (Depressive Signs/Symptoms) (Adult)  Goal: Improved Sleep Hygiene (Depressive Signs/Symptoms)  Outcome: Ongoing (interventions implemented as appropriate)  Flowsheets  Taken 2/20/2020 0611 by Mae Conte  KENDRICK Wallace  Improved Sleep Hygiene Action Step/Short Term Goal (STG) Established: 02/19/20  Improved Sleep Hygiene Time Frame for Action Step (STG): 3 days  Taken 2/20/2020 0444 by Amanda Hilliard RN  Improved Sleep Hygiene Action Step (STG) Outcome: making progress toward outcome     Problem: Suicidal Behavior (Adult)  Goal: Suicidal Behavior is Absent/Minimized/Managed  Outcome: Ongoing (interventions implemented as appropriate)  Flowsheets (Taken 2/20/2020 0611)  Action Step/Short Term Goal (STG) Established: 2/19/2020  Suicidal Behavior Managed/Minimized Time Frame for Action Step (STG): 3 days  Suicidal Behavior Managed/Minimized Action Step (STG) Outcome: making progress toward outcome

## 2020-02-21 RX ORDER — LITHIUM CARBONATE 300 MG/1
300 CAPSULE ORAL 2 TIMES DAILY
Qty: 60 CAPSULE | Refills: 1 | Status: SHIPPED | OUTPATIENT
Start: 2020-02-21 | End: 2020-07-17

## 2020-02-21 RX ORDER — BUSPIRONE HYDROCHLORIDE 15 MG/1
15 TABLET ORAL 2 TIMES DAILY WITH MEALS
Qty: 60 TABLET | Refills: 0 | Status: SHIPPED | OUTPATIENT
Start: 2020-02-21 | End: 2020-11-18

## 2020-02-21 RX ORDER — TRAZODONE HYDROCHLORIDE 50 MG/1
50 TABLET ORAL NIGHTLY
Qty: 30 TABLET | Refills: 0 | Status: SHIPPED | OUTPATIENT
Start: 2020-02-21 | End: 2022-07-12

## 2020-02-21 RX ADMIN — VILAZODONE HYDROCHLORIDE 40 MG: 40 TABLET ORAL at 09:25

## 2020-02-21 RX ADMIN — PANTOPRAZOLE SODIUM 40 MG: 40 TABLET, DELAYED RELEASE ORAL at 09:27

## 2020-02-21 RX ADMIN — LITHIUM CARBONATE 300 MG: 300 CAPSULE, GELATIN COATED ORAL at 09:24

## 2020-02-21 RX ADMIN — BUSPIRONE HYDROCHLORIDE 15 MG: 15 TABLET ORAL at 09:23

## 2020-02-21 RX ADMIN — LEVOTHYROXINE SODIUM 88 MCG: 88 TABLET ORAL at 09:23

## 2020-02-21 NOTE — PROGRESS NOTES
Continued Stay Note  [facility]     Patient Name: Maame Wallace  MRN: [id number]  Today's Date: 2/21/2020    Admit Date: 2/19/2020    Discharge Plan     Row Name 02/21/20 1514       Plan    Final Discharge Disposition Code  01 - home or self-care    Final Note  Pt was discharged per Dr. Casillas's orders. SW met w/pt to discuss follow-up care.  Pt has established mental health appts at [facility] w/appts set up.  SW emailed pt a work [email or web address].  Pt had car at [facility] and transported self home.        Discharge Codes    No documentation.       Expected Discharge Date and Time     Expected Discharge Date Expected Discharge Time    Feb 21, 2020  1:00 PM            VERONICA Villasenor

## 2020-02-21 NOTE — DISCHARGE SUMMARY
DATES OF ADMISSION: 2/19/2020-2/21/2020    REASON FOR ADMISSION: The patient is a 29-year-old white female admitted with increasing depression and self mutilatory impulses.    LABS: See chart    HOSPITAL COURSE: The patient was admitted to the crisis management unit on the recommendation of her outpatient psychiatric provider.  She was continued on previously prescribed BuSpar, Viibryd and lithium.  Lithium adjustment initiated but at a very conservative dose of 150 mg.  The patient's lithium level on admission was 0.1, and it was the feeling this physician that a more definitive dose of this medication was warranted.  Accordingly, the patient was begun on lithium carbonate 300 mg twice daily.  She tolerated the medication without complaint.  Trazodone 50 mg at at bedtime.  Insomnia was added and the patient's BuSpar increased to 15 mg twice daily given complaints of ongoing anxiety.  Otherwise the patient was bright cooperative and pleasant in her interactions with peers and staff.  By 2/21/2020 the patient denied suicidal action requested discharge.  It was our hope that she would be able to participate in the intensive outpatient program provided by this facility, though she was concerned that work demands may make this impossible.    FINAL DIAGNOSIS: Major depressive sworder recurrent moderate, hypothyroidism, irritable bowel syndrome    DISPOSITION ON DISCHARGE: A full listing of the patient's medications is provided below.  Follow-up will take place with community mental health resources in the patient's previous provider.  As noted previously, we hope the patient will be able to participate in our intensive outpatient program.  The patient is instructed to inform her physician of a need for a lithium level within the next 5 to 7 days.       Discharge Medications      New Medications      Instructions Start Date   traZODone 50 MG tablet  Commonly known as:  DESYREL   50 mg, Oral, Nightly         Changes to  Medications      Instructions Start Date   busPIRone 15 MG tablet  Commonly known as:  BUSPAR  What changed:    · medication strength  · how much to take  · when to take this  · additional instructions   15 mg, Oral, 2 Times Daily With Meals      lithium carbonate 300 MG capsule  What changed:    · medication strength  · how much to take  · when to take this   300 mg, Oral, 2 Times Daily         Continue These Medications      Instructions Start Date   levothyroxine 88 MCG tablet  Commonly known as:  SYNTHROID, LEVOTHROID   88 mcg, Oral, Daily      vilazodone 40 MG tablet tablet  Commonly known as:  VIIBRYD   40 mg, Oral, Daily         Stop These Medications    omeprazole 40 MG capsule  Commonly known as:  priLOSEC              PROGNOSIS: []

## 2020-02-21 NOTE — PLAN OF CARE
Problem: Patient Care Overview  Goal: Plan of Care Review  Outcome: Ongoing (interventions implemented as appropriate)  Flowsheets (Taken 2/21/2020 4957)  Progress: improving  Plan of Care Reviewed With: patient  Patient Agreement with Plan of Care: agrees  Note:   Pt was pleasant, cooperative, compliant with medications and care. Pt denied SI, HI, and hallucinations. Pt voiced anxiety 7/10 and depression 7/10. Pt slept all night. Will continue to monitor behaviors provide support and safe environment.

## 2020-03-17 ENCOUNTER — HOSPITAL ENCOUNTER (INPATIENT)
Facility: HOSPITAL | Age: 30
LOS: 2 days | Discharge: HOME | End: 2020-03-20
Attending: EMERGENCY MEDICINE | Admitting: HOSPITALIST

## 2020-03-17 DIAGNOSIS — T50.902A POLYSUBSTANCE OVERDOSE, INTENTIONAL SELF-HARM, INITIAL ENCOUNTER (HCC): Primary | ICD-10-CM

## 2020-03-17 DIAGNOSIS — G47.34 NOCTURNAL HYPOXIA: ICD-10-CM

## 2020-03-17 DIAGNOSIS — R45.851 SUICIDAL IDEATION: ICD-10-CM

## 2020-03-17 LAB
ALBUMIN SERPL-MCNC: 4.4 G/DL (ref 3.5–5.2)
ALBUMIN/GLOB SERPL: 1.5 G/DL
ALP SERPL-CCNC: 62 U/L (ref 39–117)
ALT SERPL W P-5'-P-CCNC: 35 U/L (ref 1–33)
AMPHET+METHAMPHET UR QL: POSITIVE
ANION GAP SERPL CALCULATED.3IONS-SCNC: 11.4 MMOL/L (ref 5–15)
APAP SERPL-MCNC: <5 MCG/ML (ref 10–30)
AST SERPL-CCNC: 27 U/L (ref 1–32)
BACTERIA UR QL AUTO: ABNORMAL /HPF
BARBITURATES UR QL SCN: NEGATIVE
BASOPHILS # BLD AUTO: 0.14 10*3/MM3 (ref 0–0.2)
BASOPHILS NFR BLD AUTO: 0.8 % (ref 0–1.5)
BENZODIAZ UR QL SCN: NEGATIVE
BILIRUB SERPL-MCNC: 0.4 MG/DL (ref 0.2–1.2)
BILIRUB UR QL STRIP: NEGATIVE
BUN BLD-MCNC: 6 MG/DL (ref 6–20)
BUN/CREAT SERPL: 6.7 (ref 7–25)
CALCIUM SPEC-SCNC: 9.6 MG/DL (ref 8.6–10.5)
CANNABINOIDS SERPL QL: NEGATIVE
CHLORIDE SERPL-SCNC: 104 MMOL/L (ref 98–107)
CLARITY UR: ABNORMAL
CO2 SERPL-SCNC: 22.6 MMOL/L (ref 22–29)
COCAINE UR QL: NEGATIVE
COLOR UR: ABNORMAL
CREAT BLD-MCNC: 0.89 MG/DL (ref 0.57–1)
DEPRECATED RDW RBC AUTO: 39 FL (ref 37–54)
EOSINOPHIL # BLD AUTO: 0.28 10*3/MM3 (ref 0–0.4)
EOSINOPHIL NFR BLD AUTO: 1.6 % (ref 0.3–6.2)
ERYTHROCYTE [DISTWIDTH] IN BLOOD BY AUTOMATED COUNT: 12.8 % (ref 12.3–15.4)
ETHANOL BLD-MCNC: <10 MG/DL (ref 0–10)
ETHANOL UR QL: <0.01 %
GFR SERPL CREATININE-BSD FRML MDRD: 76 ML/MIN/1.73
GLOBULIN UR ELPH-MCNC: 2.9 GM/DL
GLUCOSE BLD-MCNC: 96 MG/DL (ref 65–99)
GLUCOSE UR STRIP-MCNC: NEGATIVE MG/DL
HCG SERPL QL: NEGATIVE
HCT VFR BLD AUTO: 41.3 % (ref 34–46.6)
HGB BLD-MCNC: 13.7 G/DL (ref 12–15.9)
HGB UR QL STRIP.AUTO: ABNORMAL
HYALINE CASTS UR QL AUTO: ABNORMAL /LPF
IMM GRANULOCYTES # BLD AUTO: 0.13 10*3/MM3 (ref 0–0.05)
IMM GRANULOCYTES NFR BLD AUTO: 0.7 % (ref 0–0.5)
KETONES UR QL STRIP: ABNORMAL
LEUKOCYTE ESTERASE UR QL STRIP.AUTO: ABNORMAL
LITHIUM SERPL-SCNC: 1.5 MMOL/L (ref 0.6–1.2)
LYMPHOCYTES # BLD AUTO: 3.8 10*3/MM3 (ref 0.7–3.1)
LYMPHOCYTES NFR BLD AUTO: 21.9 % (ref 19.6–45.3)
MAGNESIUM SERPL-MCNC: 2 MG/DL (ref 1.6–2.6)
MCH RBC QN AUTO: 28.4 PG (ref 26.6–33)
MCHC RBC AUTO-ENTMCNC: 33.2 G/DL (ref 31.5–35.7)
MCV RBC AUTO: 85.7 FL (ref 79–97)
METHADONE UR QL SCN: NEGATIVE
MONOCYTES # BLD AUTO: 0.73 10*3/MM3 (ref 0.1–0.9)
MONOCYTES NFR BLD AUTO: 4.2 % (ref 5–12)
NEUTROPHILS # BLD AUTO: 12.29 10*3/MM3 (ref 1.7–7)
NEUTROPHILS NFR BLD AUTO: 70.8 % (ref 42.7–76)
NITRITE UR QL STRIP: NEGATIVE
NRBC BLD AUTO-RTO: 0 /100 WBC (ref 0–0.2)
OPIATES UR QL: NEGATIVE
OXYCODONE UR QL SCN: NEGATIVE
PH UR STRIP.AUTO: 6.5 [PH] (ref 5–8)
PLATELET # BLD AUTO: 354 10*3/MM3 (ref 140–450)
PMV BLD AUTO: 10 FL (ref 6–12)
POTASSIUM BLD-SCNC: 3.8 MMOL/L (ref 3.5–5.2)
PROT SERPL-MCNC: 7.3 G/DL (ref 6–8.5)
PROT UR QL STRIP: ABNORMAL
RBC # BLD AUTO: 4.82 10*6/MM3 (ref 3.77–5.28)
RBC # UR: ABNORMAL /HPF
REF LAB TEST METHOD: ABNORMAL
SALICYLATES SERPL-MCNC: <0.3 MG/DL
SODIUM BLD-SCNC: 138 MMOL/L (ref 136–145)
SP GR UR STRIP: 1.02 (ref 1–1.03)
SQUAMOUS #/AREA URNS HPF: ABNORMAL /HPF
UROBILINOGEN UR QL STRIP: ABNORMAL
WBC NRBC COR # BLD: 17.37 10*3/MM3 (ref 3.4–10.8)
WBC UR QL AUTO: ABNORMAL /HPF

## 2020-03-17 PROCEDURE — 85025 COMPLETE CBC W/AUTO DIFF WBC: CPT | Performed by: EMERGENCY MEDICINE

## 2020-03-17 PROCEDURE — 80053 COMPREHEN METABOLIC PANEL: CPT | Performed by: EMERGENCY MEDICINE

## 2020-03-17 PROCEDURE — 80307 DRUG TEST PRSMV CHEM ANLYZR: CPT | Performed by: EMERGENCY MEDICINE

## 2020-03-17 PROCEDURE — 93010 ELECTROCARDIOGRAM REPORT: CPT | Performed by: INTERNAL MEDICINE

## 2020-03-17 PROCEDURE — 81001 URINALYSIS AUTO W/SCOPE: CPT | Performed by: EMERGENCY MEDICINE

## 2020-03-17 PROCEDURE — 80178 ASSAY OF LITHIUM: CPT | Performed by: EMERGENCY MEDICINE

## 2020-03-17 PROCEDURE — 83735 ASSAY OF MAGNESIUM: CPT | Performed by: EMERGENCY MEDICINE

## 2020-03-17 PROCEDURE — 93005 ELECTROCARDIOGRAM TRACING: CPT | Performed by: EMERGENCY MEDICINE

## 2020-03-17 PROCEDURE — 99285 EMERGENCY DEPT VISIT HI MDM: CPT

## 2020-03-17 PROCEDURE — 84703 CHORIONIC GONADOTROPIN ASSAY: CPT | Performed by: EMERGENCY MEDICINE

## 2020-03-17 RX ORDER — SODIUM CHLORIDE 0.9 % (FLUSH) 0.9 %
10 SYRINGE (ML) INJECTION AS NEEDED
Status: DISCONTINUED | OUTPATIENT
Start: 2020-03-17 | End: 2020-03-20 | Stop reason: HOSPADM

## 2020-03-17 RX ORDER — OLANZAPINE 5 MG/1
5 TABLET ORAL NIGHTLY
COMMUNITY
End: 2020-03-20 | Stop reason: HOSPADM

## 2020-03-17 RX ADMIN — SODIUM CHLORIDE 1000 ML: 9 INJECTION, SOLUTION INTRAVENOUS at 23:25

## 2020-03-18 PROBLEM — E66.01 OBESITY, CLASS III, BMI 40-49.9 (MORBID OBESITY): Status: ACTIVE | Noted: 2020-03-18

## 2020-03-18 PROBLEM — T50.901A POLYSUBSTANCE OVERDOSE: Status: ACTIVE | Noted: 2020-03-18

## 2020-03-18 LAB
ANION GAP SERPL CALCULATED.3IONS-SCNC: 11 MMOL/L (ref 5–15)
BUN BLD-MCNC: 6 MG/DL (ref 6–20)
BUN/CREAT SERPL: 7.4 (ref 7–25)
CALCIUM SPEC-SCNC: 8.5 MG/DL (ref 8.6–10.5)
CHLORIDE SERPL-SCNC: 103 MMOL/L (ref 98–107)
CO2 SERPL-SCNC: 20 MMOL/L (ref 22–29)
CREAT BLD-MCNC: 0.81 MG/DL (ref 0.57–1)
DEPRECATED RDW RBC AUTO: 39 FL (ref 37–54)
ERYTHROCYTE [DISTWIDTH] IN BLOOD BY AUTOMATED COUNT: 12.9 % (ref 12.3–15.4)
GFR SERPL CREATININE-BSD FRML MDRD: 85 ML/MIN/1.73
GLUCOSE BLD-MCNC: 88 MG/DL (ref 65–99)
HCT VFR BLD AUTO: 37.1 % (ref 34–46.6)
HGB BLD-MCNC: 12.6 G/DL (ref 12–15.9)
LITHIUM SERPL-SCNC: 1.4 MMOL/L (ref 0.6–1.2)
MCH RBC QN AUTO: 28.7 PG (ref 26.6–33)
MCHC RBC AUTO-ENTMCNC: 34 G/DL (ref 31.5–35.7)
MCV RBC AUTO: 84.5 FL (ref 79–97)
PLATELET # BLD AUTO: 357 10*3/MM3 (ref 140–450)
PMV BLD AUTO: 10.2 FL (ref 6–12)
POTASSIUM BLD-SCNC: 3.7 MMOL/L (ref 3.5–5.2)
RBC # BLD AUTO: 4.39 10*6/MM3 (ref 3.77–5.28)
SODIUM BLD-SCNC: 134 MMOL/L (ref 136–145)
WBC NRBC COR # BLD: 16.16 10*3/MM3 (ref 3.4–10.8)

## 2020-03-18 PROCEDURE — 80178 ASSAY OF LITHIUM: CPT | Performed by: NURSE PRACTITIONER

## 2020-03-18 PROCEDURE — 93010 ELECTROCARDIOGRAM REPORT: CPT | Performed by: INTERNAL MEDICINE

## 2020-03-18 PROCEDURE — 93005 ELECTROCARDIOGRAM TRACING: CPT | Performed by: NURSE PRACTITIONER

## 2020-03-18 PROCEDURE — 85027 COMPLETE CBC AUTOMATED: CPT | Performed by: NURSE PRACTITIONER

## 2020-03-18 PROCEDURE — 80048 BASIC METABOLIC PNL TOTAL CA: CPT | Performed by: NURSE PRACTITIONER

## 2020-03-18 PROCEDURE — 25010000002 ONDANSETRON PER 1 MG: Performed by: NURSE PRACTITIONER

## 2020-03-18 PROCEDURE — 90791 PSYCH DIAGNOSTIC EVALUATION: CPT

## 2020-03-18 RX ORDER — SODIUM CHLORIDE 0.9 % (FLUSH) 0.9 %
10 SYRINGE (ML) INJECTION EVERY 12 HOURS SCHEDULED
Status: DISCONTINUED | OUTPATIENT
Start: 2020-03-18 | End: 2020-03-20 | Stop reason: HOSPADM

## 2020-03-18 RX ORDER — ALUMINA, MAGNESIA, AND SIMETHICONE 2400; 2400; 240 MG/30ML; MG/30ML; MG/30ML
15 SUSPENSION ORAL EVERY 6 HOURS PRN
Status: DISCONTINUED | OUTPATIENT
Start: 2020-03-18 | End: 2020-03-20 | Stop reason: HOSPADM

## 2020-03-18 RX ORDER — ONDANSETRON 4 MG/1
4 TABLET, FILM COATED ORAL EVERY 6 HOURS PRN
Status: DISCONTINUED | OUTPATIENT
Start: 2020-03-18 | End: 2020-03-20 | Stop reason: HOSPADM

## 2020-03-18 RX ORDER — LEVOTHYROXINE SODIUM 88 UG/1
88 TABLET ORAL
Status: DISCONTINUED | OUTPATIENT
Start: 2020-03-18 | End: 2020-03-20 | Stop reason: HOSPADM

## 2020-03-18 RX ORDER — ONDANSETRON 2 MG/ML
4 INJECTION INTRAMUSCULAR; INTRAVENOUS EVERY 6 HOURS PRN
Status: DISCONTINUED | OUTPATIENT
Start: 2020-03-18 | End: 2020-03-20 | Stop reason: HOSPADM

## 2020-03-18 RX ORDER — BISACODYL 5 MG/1
5 TABLET, DELAYED RELEASE ORAL DAILY PRN
Status: DISCONTINUED | OUTPATIENT
Start: 2020-03-18 | End: 2020-03-20 | Stop reason: HOSPADM

## 2020-03-18 RX ORDER — LITHIUM CARBONATE 300 MG/1
450 CAPSULE ORAL EVERY MORNING
COMMUNITY
End: 2020-03-20 | Stop reason: HOSPADM

## 2020-03-18 RX ORDER — SODIUM CHLORIDE 9 MG/ML
100 INJECTION, SOLUTION INTRAVENOUS CONTINUOUS
Status: DISCONTINUED | OUTPATIENT
Start: 2020-03-18 | End: 2020-03-18

## 2020-03-18 RX ORDER — SODIUM CHLORIDE 0.9 % (FLUSH) 0.9 %
10 SYRINGE (ML) INJECTION AS NEEDED
Status: DISCONTINUED | OUTPATIENT
Start: 2020-03-18 | End: 2020-03-20 | Stop reason: HOSPADM

## 2020-03-18 RX ORDER — NITROGLYCERIN 0.4 MG/1
0.4 TABLET SUBLINGUAL
Status: DISCONTINUED | OUTPATIENT
Start: 2020-03-18 | End: 2020-03-20 | Stop reason: HOSPADM

## 2020-03-18 RX ORDER — LITHIUM CARBONATE 300 MG/1
900 CAPSULE ORAL NIGHTLY
COMMUNITY
End: 2020-03-20 | Stop reason: HOSPADM

## 2020-03-18 RX ADMIN — SODIUM CHLORIDE, PRESERVATIVE FREE 10 ML: 5 INJECTION INTRAVENOUS at 03:40

## 2020-03-18 RX ADMIN — ONDANSETRON HYDROCHLORIDE 4 MG: 2 SOLUTION INTRAMUSCULAR; INTRAVENOUS at 15:01

## 2020-03-18 RX ADMIN — ONDANSETRON HYDROCHLORIDE 4 MG: 2 SOLUTION INTRAMUSCULAR; INTRAVENOUS at 04:03

## 2020-03-18 RX ADMIN — SODIUM CHLORIDE 100 ML/HR: 9 INJECTION, SOLUTION INTRAVENOUS at 03:39

## 2020-03-18 RX ADMIN — LEVOTHYROXINE SODIUM 88 MCG: 88 TABLET ORAL at 09:10

## 2020-03-18 RX ADMIN — SODIUM CHLORIDE 1000 ML: 9 INJECTION, SOLUTION INTRAVENOUS at 01:07

## 2020-03-18 RX ADMIN — ONDANSETRON HYDROCHLORIDE 4 MG: 2 SOLUTION INTRAMUSCULAR; INTRAVENOUS at 21:18

## 2020-03-18 RX ADMIN — SODIUM BICARBONATE: 84 INJECTION, SOLUTION INTRAVENOUS at 12:25

## 2020-03-18 NOTE — H&P
Patient Name:  Maame Wallace  YOB: 1990  MRN:  [id number]  Admit Date:  3/17/2020  Patient Care Team:  Miriam Newman as PCP - General      Subjective   History Present Illness     Chief Complaint   Patient presents with   • Drug Overdose       History of Present Illness   Ms. Wallace is a 29 y.o. non-smoker with a history of major depressive disorder, hypothyroidism, IBS, and polysubstance abuse that presents to [facility] complaining from a drug overdose. She states around 5pm, she took 100 25 mg  OTC sleep aid that contains benadryl and 10-15 lithium tablets. An hour later, she tried throwing up the pills and had several emesis. She told her friend of the incident who encouraged her to tell her parents. She was brought to the ED by her parents for evaluation.  She has been battling thoughts of suicide for the past several weeks. She states she has tried various medications and nothing seems to help her so she thought suicide would end her problems.  On assessment, she denies hallucinations and suicidal ideations.  She denies CP, SOA, fever, chills, diarrhea, constipation, and night sweats. Admits to dizziness and nausea.    Laboratory ED showed a sodium 134, calcium 8.5, 3 BC 16.16.  Urine drug screen is positive for amphetamine/methamphetamines.  Poison control has been contacted in the emergency department and she is being held here on a 72-hour hold.    Review of Systems   Constitutional: Negative for chills and fever.   HENT: Negative for congestion and rhinorrhea.    Eyes: Negative for photophobia and visual disturbance.   Respiratory: Negative for cough and shortness of breath.    Cardiovascular: Negative for chest pain and palpitations.   Gastrointestinal: Positive for nausea. Negative for constipation, diarrhea and vomiting.   Endocrine: Negative for cold intolerance and heat intolerance.   Genitourinary: Negative for difficulty urinating and dysuria.    Musculoskeletal: Negative for gait problem and joint swelling.   Skin: Negative for rash and wound.   Neurological: Positive for dizziness. Negative for headaches.   Psychiatric/Behavioral: Positive for sleep disturbance. Negative for suicidal ideas.        Personal History     Past Medical History:   Diagnosis Date   • Anxiety    • Asthma    • Depression    • Fibromyalgia    • GERD (gastroesophageal reflux disease)    • Hypothyroidism    • Irritable bowel disease    • Migraines      Past Surgical History:   Procedure Laterality Date   • APPENDECTOMY      laparoscopic   • CHOLECYSTECTOMY     • KNEE ARTHROSCOPY Left    • PATELLA FEMORAL LIGAMENT RECONSTRUCTION Left    • TONSILLECTOMY AND ADENOIDECTOMY       History reviewed. No pertinent family history.  Social History     Tobacco Use   • Smoking status: Never Smoker   • Smokeless tobacco: Never Used   Substance Use Topics   • Alcohol use: Yes     Alcohol/week: 1.0 standard drinks     Types: 1 Cans of beer per week     Comment: social   • Drug use: No     No current facility-administered medications on file prior to encounter.      Current Outpatient Medications on File Prior to Encounter   Medication Sig Dispense Refill   • busPIRone (BUSPAR) 15 MG tablet Take 1 tablet by mouth 2 (Two) Times a Day With Meals. Indications: Anxiety Disorder 60 tablet 0   • levothyroxine (SYNTHROID, LEVOTHROID) 88 MCG tablet Take 88 mcg by mouth daily.     • lithium carbonate 300 MG capsule Take 1 capsule by mouth 2 (Two) Times a Day. Indications: Depression 60 capsule 1   • lithium carbonate 300 MG capsule Take 900 mg by mouth Every Night.     • lithium carbonate 300 MG capsule Take 450 mg by mouth Every Morning.     • OLANZapine (zyPREXA) 5 MG tablet Take 5 mg by mouth Every Night.     • traZODone (DESYREL) 50 MG tablet Take 1 tablet by mouth Every Night. Indications: Trouble Sleeping 30 tablet 0   • vilazodone (VIIBRYD) 40 MG tablet tablet Take 40 mg by mouth Daily.        Allergies   Allergen Reactions   • Codeine Nausea And Vomiting and Rash   • Dhea [Nutritional Supplements] Other (See Comments)     Severe headache   • Imitrex [Sumatriptan] Other (See Comments)     Intensified a migraine   • Morphine Other (See Comments)     Severe headache       Objective    Objective     Vital Signs  Temp:  [98.2 °F (36.8 °C)-99.4 °F (37.4 °C)] 98.2 °F (36.8 °C)  Heart Rate:  [] 97  Resp:  [16-20] 20  BP: ()/() 150/78  SpO2:  [93 %-100 %] 98 %  on   ;   Device (Oxygen Therapy): room air  Body mass index is 45.05 kg/m².    Physical Exam   Constitutional: She is oriented to person, place, and time. She appears well-developed and well-nourished.   HENT:   Head: Normocephalic and atraumatic.   Eyes: Conjunctivae and EOM are normal. Right eye exhibits no discharge. Left eye exhibits no discharge. No scleral icterus.   Neck: Normal range of motion. Neck supple. No JVD present.   Cardiovascular: Intact distal pulses. Tachycardia present. Exam reveals no gallop and no friction rub.   No murmur heard.  Pulmonary/Chest: Effort normal and breath sounds normal. No respiratory distress. She has no wheezes. She has no rales.   Abdominal: Soft. Bowel sounds are normal. She exhibits no distension. There is no tenderness. There is no guarding.   Musculoskeletal: Normal range of motion. She exhibits no edema or deformity.   Neurological: She is alert and oriented to person, place, and time.   Skin: Skin is warm and dry. Capillary refill takes less than 2 seconds.   Psychiatric: She has a normal mood and affect. Her behavior is normal.       Results Review:  I reviewed the patient's new clinical results.  Discussed with ED provider.    Lab Results (last 24 hours)     Procedure Component Value Units Date/Time    CBC & Differential [[id number]] Collected:  03/17/20 2300    Specimen:  Blood Updated:  03/17/20 2316    Narrative:       The following orders were created for panel order CBC &  Differential.  Procedure                               Abnormality         Status                     ---------                               -----------         ------                     CBC Auto Differential[[id number]]        Abnormal            Final result                 Please view results for these tests on the individual orders.    Comprehensive Metabolic Panel [[id number]]  (Abnormal) Collected:  03/17/20 2300    Specimen:  Blood Updated:  03/17/20 2353     Glucose 96 mg/dL      BUN 6 mg/dL      Creatinine 0.89 mg/dL      Sodium 138 mmol/L      Potassium 3.8 mmol/L      Chloride 104 mmol/L      CO2 22.6 mmol/L      Calcium 9.6 mg/dL      Total Protein 7.3 g/dL      Albumin 4.40 g/dL      ALT (SGPT) 35 U/L      AST (SGOT) 27 U/L      Alkaline Phosphatase 62 U/L      Total Bilirubin 0.4 mg/dL      eGFR Non African Amer 76 mL/min/1.73      Globulin 2.9 gm/dL      A/G Ratio 1.5 g/dL      BUN/Creatinine Ratio 6.7     Anion Gap 11.4 mmol/L     Narrative:       GFR Normal >60  Chronic Kidney Disease <60  Kidney Failure <15      hCG, Serum, Qualitative [[id number]]  (Normal) Collected:  03/17/20 2300    Specimen:  Blood Updated:  03/17/20 2330     HCG Qualitative Negative    Magnesium [[id number]]  (Normal) Collected:  03/17/20 2300    Specimen:  Blood Updated:  03/17/20 2353     Magnesium 2.0 mg/dL     Acetaminophen Level [[id number]]  (Abnormal) Collected:  03/17/20 2300    Specimen:  Blood Updated:  03/17/20 2353     Acetaminophen <5.0 mcg/mL     Ethanol [[id number]] Collected:  03/17/20 2300    Specimen:  Blood Updated:  03/17/20 2353     Ethanol <10 mg/dL      Ethanol % <0.010 %     Salicylate Level [[id number]]  (Normal) Collected:  03/17/20 2300    Specimen:  Blood Updated:  03/17/20 2353     Salicylate <0.3 mg/dL     Narrative:       Therapeutic range for Salicylates:  3.0 - 10.0 mg/dL for antipyretic/analgesic conditions  15.0 - 30.0 mg/dL for anti-inflammatory conditions    Lithium Level [[id number]]   (Abnormal) Collected:  03/17/20 2300    Specimen:  Blood Updated:  03/17/20 2329     Lithium 1.5 mmol/L     CBC Auto Differential [[id number]]  (Abnormal) Collected:  03/17/20 2300    Specimen:  Blood Updated:  03/17/20 2316     WBC 17.37 10*3/mm3      RBC 4.82 10*6/mm3      Hemoglobin 13.7 g/dL      Hematocrit 41.3 %      MCV 85.7 fL      MCH 28.4 pg      MCHC 33.2 g/dL      RDW 12.8 %      RDW-SD 39.0 fl      MPV 10.0 fL      Platelets 354 10*3/mm3      Neutrophil % 70.8 %      Lymphocyte % 21.9 %      Monocyte % 4.2 %      Eosinophil % 1.6 %      Basophil % 0.8 %      Immature Grans % 0.7 %      Neutrophils, Absolute 12.29 10*3/mm3      Lymphocytes, Absolute 3.80 10*3/mm3      Monocytes, Absolute 0.73 10*3/mm3      Eosinophils, Absolute 0.28 10*3/mm3      Basophils, Absolute 0.14 10*3/mm3      Immature Grans, Absolute 0.13 10*3/mm3      nRBC 0.0 /100 WBC     Urine Drug Screen - Urine, Clean Catch [[id number]]  (Abnormal) Collected:  03/17/20 2316    Specimen:  Urine, Clean Catch Updated:  03/17/20 2357     Amphet/Methamphet, Screen Positive     Barbiturates Screen, Urine Negative     Benzodiazepine Screen, Urine Negative     Cocaine Screen, Urine Negative     Opiate Screen Negative     THC, Screen, Urine Negative     Methadone Screen, Urine Negative     Oxycodone Screen, Urine Negative    Narrative:       Negative Thresholds For Drugs Screened:     Amphetamines               500 ng/ml   Barbiturates               200 ng/ml   Benzodiazepines            100 ng/ml   Cocaine                    300 ng/ml   Methadone                  300 ng/ml   Opiates                    300 ng/ml   Oxycodone                  100 ng/ml   THC                        50 ng/ml    The Normal Value for all drugs tested is negative. This report includes final unconfirmed screening results to be used for medical treatment purposes only. Unconfirmed results must not be used for non-medical purposes such as employment or legal testing. Clinical  consideration should be applied to any drug of abuse test, particulary when unconfirmed results are used.    Urinalysis With Microscopic If Indicated (No Culture) - Urine, Clean Catch [[id number]]  (Abnormal) Collected:  03/17/20 2316    Specimen:  Urine, Clean Catch Updated:  03/17/20 2331     Color, UA Dark Yellow     Appearance, UA Cloudy     pH, UA 6.5     Specific Gravity, UA 1.020     Glucose, UA Negative     Ketones, UA Trace     Bilirubin, UA Negative     Blood, UA Moderate (2+)     Protein, UA 30 mg/dL (1+)     Leuk Esterase, UA Moderate (2+)     Nitrite, UA Negative     Urobilinogen, UA 0.2 E.U./dL    Urinalysis, Microscopic Only - Urine, Clean Catch [[id number]]  (Abnormal) Collected:  03/17/20 2316    Specimen:  Urine, Clean Catch Updated:  03/17/20 2357     RBC, UA 6-12 /HPF      WBC, UA 6-12 /HPF      Bacteria, UA 2+ /HPF      Squamous Epithelial Cells, UA 7-12 /HPF      Hyaline Casts, UA 3-6 /LPF      Methodology Manual Light Microscopy    Basic Metabolic Panel [[id number]]  (Abnormal) Collected:  03/18/20 0434    Specimen:  Blood Updated:  03/18/20 0610     Glucose 88 mg/dL      BUN 6 mg/dL      Creatinine 0.81 mg/dL      Sodium 134 mmol/L      Potassium 3.7 mmol/L      Chloride 103 mmol/L      CO2 20.0 mmol/L      Calcium 8.5 mg/dL      eGFR Non African Amer 85 mL/min/1.73      BUN/Creatinine Ratio 7.4     Anion Gap 11.0 mmol/L     Narrative:       GFR Normal >60  Chronic Kidney Disease <60  Kidney Failure <15      CBC (No Diff) [[id number]]  (Abnormal) Collected:  03/18/20 0434    Specimen:  Blood Updated:  03/18/20 0530     WBC 16.16 10*3/mm3      RBC 4.39 10*6/mm3      Hemoglobin 12.6 g/dL      Hematocrit 37.1 %      MCV 84.5 fL      MCH 28.7 pg      MCHC 34.0 g/dL      RDW 12.9 %      RDW-SD 39.0 fl      MPV 10.2 fL      Platelets 357 10*3/mm3     Lithium Level [[id number]] Collected:  03/18/20 0547    Specimen:  Blood Updated:  03/18/20 0614          Imaging Results (Last 24 Hours)     ** No results  found for the last 24 hours. **               ECG 12 Lead   Preliminary Result   HEART RATE= 117  bpm   RR Interval= 512  ms   AZ Interval= 157  ms   P Horizontal Axis= 27  deg   P Front Axis= 56  deg   QRSD Interval= 102  ms   QT Interval= 341  ms   QRS Axis= 18  deg   T Wave Axis= -5  deg   - BORDERLINE ECG -   Sinus tachycardia   Borderline T abnormalities, anterior leads   Borderline prolonged QT interval   Electronically Signed By:    Date and Time of Study: 2020-03-17 22:32:42      ECG 12 Lead    (Results Pending)        Assessment/Plan     Active Hospital Problems    Diagnosis  POA   • **Polysubstance overdose [T50.901A]  Yes   • Obesity, Class III, BMI 40-49.9 (morbid obesity) (CMS/[facility]) [E66.01]  Yes   • Hypothyroidism [E03.9]  Yes   • Irritable bowel disease [K58.9]  Yes   • Major depressive disorder, recurrent, severe without psychotic features (CMS/[facility]) [F33.2]  Yes   • Fibromyalgia [M79.7]  Yes      Resolved Hospital Problems   No resolved problems to display.       Ms. Wallace is a 29 y.o. non-smoker with a history of major depressive disorder who presents with polysubstance overdose due to depression.    Polysubstance overdose  -Patient has ingested an unknown amount of benadryl and lithium. She states she took 100 benadryl pills around 5pm. I am doubtful she took 100 benadryl pills as I expect her symptoms to be more severe. She is no distress, heart rate is slightly tachycardic at 109, and she does has some mild tremors on assessment. She is alert and oriented x4, no agitation, or drowsiness.   -No S.I. currently   -Will order post void residuals due to the risk of urinary retention from the antihistamine  -Lithium 1.5. Will hold her home dose.   -Will check AM lithium levels  -EKG in AM, Per poison control, if QRS on the EKG is greater then 100ms bicarb should be added to the IV fluids  -Access consult  -Continue 72 hour hold, sitter at bedside  -IV hydration     Major depressive disorder  -Psych  consult  -Will defer psych medication management to Psych      I discussed the patient's findings and my recommendations with patient and ED provider.    VTE Prophylaxis - SCDs.  Code Status - Full code.       FIDE Palomino  [facility]  03/18/20  06:38

## 2020-03-18 NOTE — PROGRESS NOTES
Discharge Planning Assessment  [facility]     Patient Name: Maame Wallace  MRN: [id number]  Today's Date: 3/18/2020    Admit Date: 3/17/2020    Discharge Needs Assessment     Row Name 03/18/20 0941       Living Environment    Lives With  parent(s);sibling(s)    Current Living Arrangements  home/apartment/condo    Primary Care Provided by  self    Provides Primary Care For  no one    Family Caregiver if Needed  parent(s)    Quality of Family Relationships  supportive    Able to Return to Prior Arrangements  yes       Transition Planning    Patient/Family Anticipates Transition to  home with family    Transportation Anticipated  family or friend will provide;car, drives self       Discharge Needs Assessment    Readmission Within the Last 30 Days  no previous admission in last 30 days    Concerns to be Addressed  denies needs/concerns at this time    Equipment Currently Used at Home  none    Provided Post Acute Provider List?  N/A        Discharge Plan     Row Name 03/18/20 0998       Plan    Plan  CCP will follow and assist if needed.     Plan Comments  Spoke with pt at bedside.  Facesheet and pharmacy info confirmed.  Pt did enroll in Meds to Bed.  Pt lives in a house with her parents and older brother, is IADLs and can drive.  No hx of DME or HH.  Her local pharmacy is [facility] in [place], she denies issues affording meds.             Demographic Summary     Row Name 03/18/20 9990       General Information    Admission Type  inpatient    Arrived From  home    Referral Source  admission list    Reason for Consult  discharge planning    Preferred Language  English        Functional Status     Row Name 03/18/20 0942       Functional Status    Usual Activity Tolerance  good    Current Activity Tolerance  good       Functional Status, IADL    Medications  independent    Meal Preparation  independent    Housekeeping  independent    Laundry  independent    Shopping  independent       Mental Status    General  Appearance WDL  WDL       Employment/    Employment Status  employed full time                Rylie Sanchez RN

## 2020-03-18 NOTE — CONSULTS
"PT is a 28 yo female seen on [facility] after intentionally overdosing on an OTC sleep aid and a \"handful\" of Lithium.  She is single, lives in a house with parents and brother.  She has a bachelor's degree and works at the [employer].  Upon approach, pt is lying in bed with 1:1 staff at bedside.  She has an appropriate affect and mood.  Pt is agreeable to interview.  Pt initially reports she was thinking about \"nothing really\" when she overdosed.  She states that she took the pills then \"went to bed and figured I wouldn't wake up\".  Pt reports that she is \"tired of working so hard at my mental health, since 7th or 8th grade and I just have nothing to give to the world\".    Pt was just admitted and discharged from [facility] on 2/21/2020.  When asked if she was still having suicidal thoughts when she was discharged she states \"yeah\" but states she had two really good days right after discharge and was hopeful, but the SI came back soon after.  Pt reports that she has been taking her medications as prescribed and seeing her therapist, Doreen Gómez and psychiatrist, Shannan Jeronimo, weekly since her discharge.  Pt reports a bipolar dx, 3 inpatient admissions for psych as a teenager and one admission as an adult ([facility]).  Pt denies SI \"not at this second\" but states the thoughts come and go.  She cannot guarantee safety outside hospital at this time.  There is an order for Dr Casillas to see, as well.  Access will follow.  "

## 2020-03-18 NOTE — PLAN OF CARE
Problem: Patient Care Overview  Goal: Plan of Care Review  Outcome: Ongoing (interventions implemented as appropriate)  Flowsheets  Taken 3/18/2020 1729  Progress: no change  Taken 3/18/2020 1448  Plan of Care Reviewed With: patient  Note:   Patient was calm and pleasant today. She had complaints of shakiness throughout that day. Gave Zofran for nausea this afternoon and it was effective. Had a hive-like reaction to heart monitor patches and they were replaced with ones for sensitive skin. Patient prefers paper tape as well. Checking post-void residuals every four hours.

## 2020-03-18 NOTE — ED NOTES
"Pt states this evening around 1700 \"I took 100 pills of over the counter sleeping medicine, like Benadryl, 25 mgs.\" Pt states she was trying to end her life and states \"I've been thinking about is for about a week that I would do it on Tuesday.\" States has struggled with SI for the past year off and on. Denies any new or increased \"triggers\" today. Pt states sees a therapist and psychiatrist weekly and was inpatient here for 3 days about a month ago. States hx of another suicide attempt \"in high school once.\" Pt currently calm and cooperative. Denies any other acute complaints. Denies pain or nausea. NAD, respirations even and unlabored.     Almita Marcum, RN  03/17/20 2230    Pt states \"also I'm on lithium and I took a handful of those, about 10-15\" at the same time as the Benadryl. Pt denies any ETOH or drug use.     Almita Marcum, RN  03/17/20 0516    "

## 2020-03-18 NOTE — ED PROVIDER NOTES
EMERGENCY DEPARTMENT ENCOUNTER    CHIEF COMPLAINT  Chief Complaint: SI  History given by: Patient  History limited by: None  Room Number: S501/1  PMD: TrentRoniMiriam    HPI:  Pt is a 29 y.o. female who presents with SI after taking approx. 100, 25 mg Benadryl and approx. 10-15, 300 mg Lithium tablets at 1700 today. She also reports having 2-3 episodes of vomiting approx. 1 hour after ingesting the medication. She last attempted to hurt herself when she was in high school by taking left over Narcotics. She denies any additional drug or alcohol use tonight. Hx of anxiety and depression.    Duration: Occurred at approx. 1700 today  Intensity/Severity: Moderate  Progression: Unchanged  Associated Symptoms: None  Previous Episodes: She last attempted to hurt herself when she was in high school by taking left over Narcotics.  Treatment before arrival: She reports taking approx. 100, 25 mg Benadryl and approx. 10-15, 300 mg [place] tablets    PAST MEDICAL HISTORY  Active Ambulatory Problems     Diagnosis Date Noted   • Hemiplegic migraine, intractable 04/25/2016   • Fibromyalgia 04/29/2016   • Major depressive disorder, recurrent, severe without psychotic features (CMS/HCC) 02/19/2020   • Vaginal yeast infection 02/20/2020   • Leukocytosis 02/20/2020   • Hyponatremia 02/20/2020   • Hypothyroidism 02/20/2020   • Irritable bowel disease 02/20/2020   • Obesity (BMI 30-39.9) 02/20/2020     Resolved Ambulatory Problems     Diagnosis Date Noted   • No Resolved Ambulatory Problems     Past Medical History:   Diagnosis Date   • Anxiety    • Asthma    • Depression    • GERD (gastroesophageal reflux disease)    • Migraines        PAST SURGICAL HISTORY  Past Surgical History:   Procedure Laterality Date   • APPENDECTOMY      laparoscopic   • CHOLECYSTECTOMY     • KNEE ARTHROSCOPY Left    • PATELLA FEMORAL LIGAMENT RECONSTRUCTION Left    • TONSILLECTOMY AND ADENOIDECTOMY         FAMILY HISTORY  History reviewed. No pertinent family  history.    SOCIAL HISTORY  Social History     Socioeconomic History   • Marital status: Single     Spouse name: Not on file   • Number of children: Not on file   • Years of education: Not on file   • Highest education level: Not on file   Tobacco Use   • Smoking status: Never Smoker   • Smokeless tobacco: Never Used   Substance and Sexual Activity   • Alcohol use: Yes     Alcohol/week: 1.0 standard drinks     Types: 1 Cans of beer per week     Comment: social   • Drug use: No   • Sexual activity: Defer       ALLERGIES  Codeine; Dhea [nutritional supplements]; Imitrex [sumatriptan]; and Morphine    REVIEW OF SYSTEMS  Review of Systems   Constitutional: Negative for fever.   HENT: Negative for sore throat.    Eyes: Negative.    Respiratory: Negative for cough and shortness of breath.    Cardiovascular: Negative for chest pain.   Gastrointestinal: Negative for abdominal pain, diarrhea and vomiting.   Genitourinary: Negative for dysuria.   Musculoskeletal: Negative for neck pain.   Skin: Negative for rash.   Allergic/Immunologic: Negative.    Neurological: Negative for weakness, numbness and headaches.   Hematological: Negative.    Psychiatric/Behavioral: Positive for suicidal ideas.   All other systems reviewed and are negative.      PHYSICAL EXAM  ED Triage Vitals   Temp Heart Rate Resp BP SpO2   03/17/20 2215 03/17/20 2215 03/17/20 2215 03/17/20 2233 03/17/20 2215   99.4 °F (37.4 °C) (!) 143 18 129/94 98 %      Temp src Heart Rate Source Patient Position BP Location FiO2 (%)   03/17/20 2215 03/17/20 2215 -- -- --   Tympanic Monitor          Physical Exam   Constitutional: She is oriented to person, place, and time. No distress.   HENT:   Head: Normocephalic and atraumatic.   Eyes: Pupils are equal, round, and reactive to light. EOM are normal.   Neck: Normal range of motion. Neck supple.   Cardiovascular: Regular rhythm and normal heart sounds. Tachycardia present.   Pulmonary/Chest: Effort normal and breath sounds  normal. No respiratory distress.   Abdominal: Soft. There is no tenderness. There is no rebound and no guarding.   Musculoskeletal: Normal range of motion. She exhibits no edema.   Neurological: She is alert and oriented to person, place, and time. She has normal sensation and normal strength.   Skin: Skin is warm and dry. No rash noted.   Psychiatric: Mood and affect normal.   Nursing note and vitals reviewed.      LAB RESULTS  Lab Results (last 24 hours)     Procedure Component Value Units Date/Time    CBC & Differential [[id number]] Collected:  03/17/20 2300    Specimen:  Blood Updated:  03/17/20 2316    Narrative:       The following orders were created for panel order CBC & Differential.  Procedure                               Abnormality         Status                     ---------                               -----------         ------                     CBC Auto Differential[[id number]]        Abnormal            Final result                 Please view results for these tests on the individual orders.    Comprehensive Metabolic Panel [[id number]]  (Abnormal) Collected:  03/17/20 2300    Specimen:  Blood Updated:  03/17/20 2353     Glucose 96 mg/dL      BUN 6 mg/dL      Creatinine 0.89 mg/dL      Sodium 138 mmol/L      Potassium 3.8 mmol/L      Chloride 104 mmol/L      CO2 22.6 mmol/L      Calcium 9.6 mg/dL      Total Protein 7.3 g/dL      Albumin 4.40 g/dL      ALT (SGPT) 35 U/L      AST (SGOT) 27 U/L      Alkaline Phosphatase 62 U/L      Total Bilirubin 0.4 mg/dL      eGFR Non African Amer 76 mL/min/1.73      Globulin 2.9 gm/dL      A/G Ratio 1.5 g/dL      BUN/Creatinine Ratio 6.7     Anion Gap 11.4 mmol/L     Narrative:       GFR Normal >60  Chronic Kidney Disease <60  Kidney Failure <15      hCG, Serum, Qualitative [[id number]]  (Normal) Collected:  03/17/20 2300    Specimen:  Blood Updated:  03/17/20 2330     HCG Qualitative Negative    Magnesium [[id number]]  (Normal) Collected:  03/17/20 2300     Specimen:  Blood Updated:  03/17/20 2353     Magnesium 2.0 mg/dL     Acetaminophen Level [[id number]]  (Abnormal) Collected:  03/17/20 2300    Specimen:  Blood Updated:  03/17/20 2353     Acetaminophen <5.0 mcg/mL     Ethanol [[id number]] Collected:  03/17/20 2300    Specimen:  Blood Updated:  03/17/20 2353     Ethanol <10 mg/dL      Ethanol % <0.010 %     Salicylate Level [[id number]]  (Normal) Collected:  03/17/20 2300    Specimen:  Blood Updated:  03/17/20 2353     Salicylate <0.3 mg/dL     Narrative:       Therapeutic range for Salicylates:  3.0 - 10.0 mg/dL for antipyretic/analgesic conditions  15.0 - 30.0 mg/dL for anti-inflammatory conditions    Lithium Level [[id number]]  (Abnormal) Collected:  03/17/20 2300    Specimen:  Blood Updated:  03/17/20 2329     Lithium 1.5 mmol/L     CBC Auto Differential [[id number]]  (Abnormal) Collected:  03/17/20 2300    Specimen:  Blood Updated:  03/17/20 2316     WBC 17.37 10*3/mm3      RBC 4.82 10*6/mm3      Hemoglobin 13.7 g/dL      Hematocrit 41.3 %      MCV 85.7 fL      MCH 28.4 pg      MCHC 33.2 g/dL      RDW 12.8 %      RDW-SD 39.0 fl      MPV 10.0 fL      Platelets 354 10*3/mm3      Neutrophil % 70.8 %      Lymphocyte % 21.9 %      Monocyte % 4.2 %      Eosinophil % 1.6 %      Basophil % 0.8 %      Immature Grans % 0.7 %      Neutrophils, Absolute 12.29 10*3/mm3      Lymphocytes, Absolute 3.80 10*3/mm3      Monocytes, Absolute 0.73 10*3/mm3      Eosinophils, Absolute 0.28 10*3/mm3      Basophils, Absolute 0.14 10*3/mm3      Immature Grans, Absolute 0.13 10*3/mm3      nRBC 0.0 /100 WBC     Urine Drug Screen - Urine, Clean Catch [[id number]]  (Abnormal) Collected:  03/17/20 2316    Specimen:  Urine, Clean Catch Updated:  03/17/20 2357     Amphet/Methamphet, Screen Positive     Barbiturates Screen, Urine Negative     Benzodiazepine Screen, Urine Negative     Cocaine Screen, Urine Negative     Opiate Screen Negative     THC, Screen, Urine Negative     Methadone Screen, Urine  Negative     Oxycodone Screen, Urine Negative    Narrative:       Negative Thresholds For Drugs Screened:     Amphetamines               500 ng/ml   Barbiturates               200 ng/ml   Benzodiazepines            100 ng/ml   Cocaine                    300 ng/ml   Methadone                  300 ng/ml   Opiates                    300 ng/ml   Oxycodone                  100 ng/ml   THC                        50 ng/ml    The Normal Value for all drugs tested is negative. This report includes final unconfirmed screening results to be used for medical treatment purposes only. Unconfirmed results must not be used for non-medical purposes such as employment or legal testing. Clinical consideration should be applied to any drug of abuse test, particulary when unconfirmed results are used.    Urinalysis With Microscopic If Indicated (No Culture) - Urine, Clean Catch [[id number]]  (Abnormal) Collected:  03/17/20 2316    Specimen:  Urine, Clean Catch Updated:  03/17/20 2331     Color, UA Dark Yellow     Appearance, UA Cloudy     pH, UA 6.5     Specific Gravity, UA 1.020     Glucose, UA Negative     Ketones, UA Trace     Bilirubin, UA Negative     Blood, UA Moderate (2+)     Protein, UA 30 mg/dL (1+)     Leuk Esterase, UA Moderate (2+)     Nitrite, UA Negative     Urobilinogen, UA 0.2 E.U./dL    Urinalysis, Microscopic Only - Urine, Clean Catch [[id number]]  (Abnormal) Collected:  03/17/20 2316    Specimen:  Urine, Clean Catch Updated:  03/17/20 2357     RBC, UA 6-12 /HPF      WBC, UA 6-12 /HPF      Bacteria, UA 2+ /HPF      Squamous Epithelial Cells, UA 7-12 /HPF      Hyaline Casts, UA 3-6 /LPF      Methodology Manual Light Microscopy    Basic Metabolic Panel [[id number]] Collected:  03/18/20 0434    Specimen:  Blood Updated:  03/18/20 0525    CBC (No Diff) [[id number]]  (Abnormal) Collected:  03/18/20 0434    Specimen:  Blood Updated:  03/18/20 0530     WBC 16.16 10*3/mm3      RBC 4.39 10*6/mm3      Hemoglobin 12.6 g/dL       Hematocrit 37.1 %      MCV 84.5 fL      MCH 28.7 pg      MCHC 34.0 g/dL      RDW 12.9 %      RDW-SD 39.0 fl      MPV 10.2 fL      Platelets 357 10*3/mm3           I ordered the above labs and reviewed the results.    PROCEDURES  Procedures    EKG          EKG time: 2232  Rhythm/Rate: Sinus tachycardia rate 117  P waves and PA: Nml P, Nml LIZ  QRS, axis: Nml QRS and axis   ST and T waves: Nml ST and T waves     Interpreted Contemporaneously by me, independently viewed  Similar when compared to prior 1/22/2019    PROGRESS AND CONSULTS     2229 Ordered acetaminophen level, lithium level, ethanol level, UDS, UA, EKG, and labs for further evaluation.    2317 Pt placed on legal 72 hour hold. Suicide precautions are in place and a member of staff is constantly monitoring patient at bedside.    0019 Ordered IVF for hydration.    0020 Discussed pt with Poison Control who states that whole bowel irrigation is not advised at this time. Lithium levels will need to be rechecked every 6 hours to reassess for clearance.    0021 Placed call to [facility] for admission.    0044 Rechecked with pt, who is resting comfortably, and informed her of the results of her work up and that we will need to admit her to monitor her lithium levels, then she will be able to be evaluated by [facility] once she is medically cleared. Pt understands and agrees with the plan, all questions answered.    0053 Discussed pt with FIDE Sellers, on call for Dr. Bautista, [facility], who agrees to admit.    MEDICAL DECISION MAKING  Results were reviewed/discussed with the patient and they were also made aware of online access. Pt also made aware that some labs, such as cultures, will not be resulted during ER visit and follow up with PMD is necessary.     MDM  Number of Diagnoses or Management Options  Polysubstance overdose, intentional self-harm, initial encounter (CMS/[facility]):   Suicidal ideation:      Amount and/or Complexity of Data Reviewed  Clinical lab tests:  ordered and reviewed  Tests in the medicine section of CPT®: ordered and reviewed (See procedure notes for EKG.)  Decide to obtain previous medical records or to obtain history from someone other than the patient: yes  Discuss the patient with other providers: yes (FIDE Sellers, on call for Dr. Bautista [facility].)    Patient Progress  Patient progress: stable         DIAGNOSIS  Final diagnoses:   Polysubstance overdose, intentional self-harm, initial encounter (CMS/[facility])   Suicidal ideation       DISPOSITION  ADMISSION TO TELEMETRY    Discussed treatment plan and reason for admission with pt/family and admitting physician.  Pt/family voiced understanding of the plan for admission for further testing/treatment as needed.     Latest Documented Vital Signs:  As of 05:47  BP- 150/78 HR- 97 Temp- 98.2 °F (36.8 °C) (Oral) O2 sat- 98%    --  Documentation assistance provided by cookie Schulz MD for Dr. Schulz.  Information recorded by the scribe was done at my direction and has been verified and validated by me.     Miriam Marroquin  03/18/20 0146       Jay Schulz MD  03/18/20 9505

## 2020-03-18 NOTE — ED NOTES
"Pt to ED via PV from home stating \"I took a bunch of OTC sleeping pills around 5pm and I think I need to be seen for possible overdose.\" Pt ambulated into the ED unassisted and is currently alert and oriented. She appears anxious.      Adry Grimm RN  03/17/20 3017    "

## 2020-03-18 NOTE — PLAN OF CARE
Problem: Suicide Risk (Adult)  Goal: Identify Related Risk Factors and Signs and Symptoms  Outcome: Ongoing (interventions implemented as appropriate)

## 2020-03-18 NOTE — NURSING NOTE
Poison control called to check vital signs, orientation status and labs. All information was given to her. She requested that a lithium level and EKG be rechecked and if the QRS on the EKG is greater then 100ms to add bicarb to the IV fluids. This information was relayed to Mel ELIZALDE

## 2020-03-18 NOTE — ED NOTES
Parents asked to step outside of room for RN's assessment per pt's request. Parents now back at bedside per pt's permission.     Almita Marcum, RN  03/17/20 3493

## 2020-03-19 LAB
ANION GAP SERPL CALCULATED.3IONS-SCNC: 10.4 MMOL/L (ref 5–15)
BUN BLD-MCNC: 9 MG/DL (ref 6–20)
BUN/CREAT SERPL: 9.8 (ref 7–25)
CALCIUM SPEC-SCNC: 8.4 MG/DL (ref 8.6–10.5)
CHLORIDE SERPL-SCNC: 104 MMOL/L (ref 98–107)
CO2 SERPL-SCNC: 22.6 MMOL/L (ref 22–29)
CREAT BLD-MCNC: 0.92 MG/DL (ref 0.57–1)
DEPRECATED RDW RBC AUTO: 40.3 FL (ref 37–54)
ERYTHROCYTE [DISTWIDTH] IN BLOOD BY AUTOMATED COUNT: 12.9 % (ref 12.3–15.4)
GFR SERPL CREATININE-BSD FRML MDRD: 73 ML/MIN/1.73
GLUCOSE BLD-MCNC: 87 MG/DL (ref 65–99)
HCT VFR BLD AUTO: 33.6 % (ref 34–46.6)
HGB BLD-MCNC: 11.5 G/DL (ref 12–15.9)
LITHIUM SERPL-SCNC: 0.8 MMOL/L (ref 0.6–1.2)
MCH RBC QN AUTO: 29.5 PG (ref 26.6–33)
MCHC RBC AUTO-ENTMCNC: 34.2 G/DL (ref 31.5–35.7)
MCV RBC AUTO: 86.2 FL (ref 79–97)
PLATELET # BLD AUTO: 301 10*3/MM3 (ref 140–450)
PMV BLD AUTO: 10.2 FL (ref 6–12)
POTASSIUM BLD-SCNC: 3.6 MMOL/L (ref 3.5–5.2)
PROCALCITONIN SERPL-MCNC: 0.11 NG/ML (ref 0.1–0.25)
RBC # BLD AUTO: 3.9 10*6/MM3 (ref 3.77–5.28)
SODIUM BLD-SCNC: 137 MMOL/L (ref 136–145)
WBC NRBC COR # BLD: 14.69 10*3/MM3 (ref 3.4–10.8)

## 2020-03-19 PROCEDURE — 99253 IP/OBS CNSLTJ NEW/EST LOW 45: CPT | Performed by: INTERNAL MEDICINE

## 2020-03-19 PROCEDURE — 25010000002 ONDANSETRON PER 1 MG: Performed by: NURSE PRACTITIONER

## 2020-03-19 PROCEDURE — 80048 BASIC METABOLIC PNL TOTAL CA: CPT | Performed by: HOSPITALIST

## 2020-03-19 PROCEDURE — 80178 ASSAY OF LITHIUM: CPT | Performed by: HOSPITALIST

## 2020-03-19 PROCEDURE — 93005 ELECTROCARDIOGRAM TRACING: CPT | Performed by: HOSPITALIST

## 2020-03-19 PROCEDURE — 84145 PROCALCITONIN (PCT): CPT | Performed by: HOSPITALIST

## 2020-03-19 PROCEDURE — 93010 ELECTROCARDIOGRAM REPORT: CPT | Performed by: INTERNAL MEDICINE

## 2020-03-19 PROCEDURE — 85027 COMPLETE CBC AUTOMATED: CPT | Performed by: HOSPITALIST

## 2020-03-19 RX ORDER — ACETAMINOPHEN 325 MG/1
650 TABLET ORAL EVERY 6 HOURS PRN
Status: DISCONTINUED | OUTPATIENT
Start: 2020-03-19 | End: 2020-03-20 | Stop reason: HOSPADM

## 2020-03-19 RX ADMIN — SODIUM CHLORIDE, PRESERVATIVE FREE 10 ML: 5 INJECTION INTRAVENOUS at 08:55

## 2020-03-19 RX ADMIN — ACETAMINOPHEN 650 MG: 325 TABLET, FILM COATED ORAL at 19:19

## 2020-03-19 RX ADMIN — SODIUM BICARBONATE: 84 INJECTION, SOLUTION INTRAVENOUS at 01:44

## 2020-03-19 RX ADMIN — SODIUM CHLORIDE, PRESERVATIVE FREE 10 ML: 5 INJECTION INTRAVENOUS at 19:19

## 2020-03-19 RX ADMIN — ONDANSETRON HYDROCHLORIDE 4 MG: 2 SOLUTION INTRAMUSCULAR; INTRAVENOUS at 08:54

## 2020-03-19 RX ADMIN — LEVOTHYROXINE SODIUM 88 MCG: 88 TABLET ORAL at 06:07

## 2020-03-19 RX ADMIN — ACETAMINOPHEN 650 MG: 325 TABLET, FILM COATED ORAL at 06:07

## 2020-03-19 NOTE — CONSULTS
Patient Name: Maame Wallace  :1990  29 y.o.    Date of Admission: 3/17/2020  Date of Consultation:  20  Encounter Provider: Lise Lowry MD  Place of Service: [facility] CARDIOLOGY  Referring Provider: Ezekiel Bautista MD  Patient Care Team:  Miriam Newman as PCP - General      Chief complaint: drug overdose     History of Present Illness:     This is a 29-year-old female with a history of depression, hypothyroidism, ureteral bowel syndrome and polysubstance abuse.  She has history of palpitations and was evaluated by [facility] heart specialist in 2016 with a normal echocardiogram and unremarkable 30-day event recorder.  At that time she was on beta-blockers and was advised to have an evaluation for obstructive sleep apnea.  Not sure that ever occurred.    She presented emergency department on 3/17/2020 with a drug overdose taking 100 tablets of 25 mg over-the-counter sleep aids that contained Benadryl.  She also had 10-15 lithium tablets.  She tried to throw them up an hour after she took them.  She told her friend about it and shoulder parents are brought to the emergency department.  She had been having suicidal ideation several weeks prior to her presentation.  In the emergency department, her sodium was slightly low at 134, calcium 8.5, white count 16.  Her urine drug screen was positive for amphetamines and methamphetamines.  Her ECG showed borderline prolonged QT interval but was otherwise normal.  Her potassium and magnesium were normal.  Her CBC was normal.    We have been asked to see her for prolonged QT interval.  She was on lithium, trazodone and Zyprexa at home.    She has a history of palpitations which have been evaluated in the past.  She has no chest pain or pressure but has some short windedness when she is anxious.  She is had no dizziness or syncope.  She has no prior history of congestive heart failure, rheumatic fever, stroke,  diabetes mellitus or renal insufficiency.  She is never had cancer or blood clots.  At this time, she feels well.      ECHO 6/30/2016  Conclusions:  The study quality is fair.   There is normal left ventricular systolic function.  The EF 4ch was calculated at 56%.  Normal left ventricular diastolic filling is observed.  Mitral valve tissue Doppler E/E' ratio consistent with normal left  atrial pressures.  There is inadequate tricuspid insufficiency to calculate accurate right  ventricular systolic pressure.    Past Medical History:   Diagnosis Date   • Anxiety    • Asthma    • Depression    • Fibromyalgia    • GERD (gastroesophageal reflux disease)    • Hypothyroidism    • Irritable bowel disease    • Migraines        Past Surgical History:   Procedure Laterality Date   • APPENDECTOMY      laparoscopic   • CHOLECYSTECTOMY     • KNEE ARTHROSCOPY Left    • PATELLA FEMORAL LIGAMENT RECONSTRUCTION Left    • TONSILLECTOMY AND ADENOIDECTOMY           Prior to Admission medications    Medication Sig Start Date End Date Taking? Authorizing Provider   busPIRone (BUSPAR) 15 MG tablet Take 1 tablet by mouth 2 (Two) Times a Day With Meals. Indications: Anxiety Disorder 2/21/20  Yes Edward Casillas III, MD   levothyroxine (SYNTHROID, LEVOTHROID) 88 MCG tablet Take 88 mcg by mouth daily.   Yes Gregorio Thompson MD   lithium carbonate 300 MG capsule Take 1 capsule by mouth 2 (Two) Times a Day. Indications: Depression 2/21/20  Yes Edward Casillas III, MD   lithium carbonate 300 MG capsule Take 900 mg by mouth Every Night.   Yes Gregorio Thompson MD   lithium carbonate 300 MG capsule Take 450 mg by mouth Every Morning.   Yes Gregorio Thompson MD   OLANZapine (zyPREXA) 5 MG tablet Take 5 mg by mouth Every Night.   Yes Gregorio Thompson MD   traZODone (DESYREL) 50 MG tablet Take 1 tablet by mouth Every Night. Indications: Trouble Sleeping 2/21/20  Yes Edward Casillas III, MD    vilazodone (VIIBRYD) 40 MG tablet tablet Take 40 mg by mouth Daily.   Yes Provider, Historical, MD       Allergies   Allergen Reactions   • Codeine Nausea And Vomiting and Rash   • Dhea [Nutritional Supplements] Other (See Comments)     Severe headache   • Imitrex [Sumatriptan] Other (See Comments)     Intensified a migraine   • Morphine Other (See Comments)     Severe headache       Social History     Socioeconomic History   • Marital status: Single     Spouse name: Not on file   • Number of children: Not on file   • Years of education: Not on file   • Highest education level: Not on file   Tobacco Use   • Smoking status: Never Smoker   • Smokeless tobacco: Never Used   Substance and Sexual Activity   • Alcohol use: Yes     Alcohol/week: 1.0 standard drinks     Types: 1 Cans of beer per week     Comment: social   • Drug use: No   • Sexual activity: Defer       History reviewed. No pertinent family history.    REVIEW OF SYSTEMS:   All systems reviewed.  Pertinent positives identified in HPI.  All other systems are negative.      Objective:     Vitals:    03/19/20 0625 03/19/20 1048 03/19/20 1131 03/19/20 1510   BP: 107/53 112/70  114/76   BP Location: Left arm Left arm  Left arm   Patient Position: Sitting Lying  Lying   Pulse: 99 91  90   Resp: 18 18  18   Temp:   98.5 °F (36.9 °C) 98.3 °F (36.8 °C)   TempSrc:   Oral Oral   SpO2: 93% 91%  94%   Weight:       Height:         Body mass index is 45.05 kg/m².    General Appearance:    Alert, cooperative, in no acute distress, obese   Head:    Normocephalic, without obvious abnormality, atraumatic   Eyes:            Lids and lashes normal, conjunctivae and sclerae normal, no icterus, no pallor, corneas clear, PERRLA   Ears:    Ears appear intact with no abnormalities noted   Throat:   No oral lesions, no thrush, oral mucosa moist   Neck:   No adenopathy, supple, trachea midline, no thyromegaly, no carotid bruit, no JVD   Back:     No kyphosis present, no scoliosis  present, no skin lesions, erythema or scars, no tenderness to palpation, range of motion normal   Lungs:     Clear to auscultation, respirations regular, even and unlabored    Heart:    Regular rhythm and normal rate, normal S1 and S2, no murmur, no gallop, no rub, no click   Chest Wall:    No abnormalities observed   Abdomen:     Normal bowel sounds, no masses, no organomegaly, soft, nontender, nondistended, no guarding, no rebound  tenderness   Extremities:   Moves all extremities well, no edema, no cyanosis, no redness   Pulses:   Pulses palpable and equal bilaterally. Normal radial, carotid,dorsalis pedis and posterior tibial pulses bilaterally.    Skin:  Psychiatric:   No bleeding, bruising or rash    Alert and oriented x 3, normal mood and affect   Lab Review:     Results from last 7 days   Lab Units 03/19/20  0440  03/17/20  2300   SODIUM mmol/L 137   < > 138   POTASSIUM mmol/L 3.6   < > 3.8   CHLORIDE mmol/L 104   < > 104   CO2 mmol/L 22.6   < > 22.6   BUN mg/dL 9   < > 6   CREATININE mg/dL 0.92   < > 0.89   CALCIUM mg/dL 8.4*   < > 9.6   BILIRUBIN mg/dL  --   --  0.4   ALK PHOS U/L  --   --  62   ALT (SGPT) U/L  --   --  35*   AST (SGOT) U/L  --   --  27   GLUCOSE mg/dL 87   < > 96    < > = values in this interval not displayed.         Results from last 7 days   Lab Units 03/19/20  0440   WBC 10*3/mm3 14.69*   HEMOGLOBIN g/dL 11.5*   HEMATOCRIT % 33.6*   PLATELETS 10*3/mm3 301         Results from last 7 days   Lab Units 03/17/20  2300   MAGNESIUM mg/dL 2.0                           I personally viewed and interpreted the patient's EKG/Telemetry data.        Assessment and Plan:       1. S/p benadryl and lithium overdose.  Resolved  2. ECG read as Long QT.  lithium, trazodone and zyprexa do not prolong QT.  Her QT is not prolonged, as it is not >1/2 of her R-R interval.      Normal EKG, normal cardiac status, no further work-up needed,  Will sign off.      Lise Lowry,  MD  03/19/20  15:56

## 2020-03-19 NOTE — NURSING NOTE
Discussed case with Dr. Nogueira and Dr. Casillas.  Dr. Nogueira reported that the patient was medically stable for discharge to [facility].  Dr. Casillas states that patient must be afebrile for 24 hours prior to admission to [facility] due to current COVID precautions and the nature of [facility] and patient proximity.   To be reassessed tomorrow.   Sitter remains in place

## 2020-03-19 NOTE — CONSULTS
"IDENTIFYING INFORMATION: The patient is a 29-year-old white female admitted on 3/17/2020 following a polypharmacy ingestion    CHIEF COMPLAINT: I have been thinking about her longtime    INFORMANT: Patient and chart    RELIABILITY: Fair    HISTORY OF PRESENT ILLNESS: The patient is a 29-year-old white female recently discharged from the crisis management unit.  She was admitted on 3/17/2020 following an ingestion of Benadryl and lithium and what she reports was a genuine suicide attempt.  When seen today the patient reports that she is \"been thinking about it a lot\".  She is agreeable to transfer to the crisis management unit once medically stable.  For more complete history of present illness please refer to previous dictated notes    PAST PSYCHIATRIC HISTORY: Reviewed no changes    PAST MEDICAL HISTORY: Reviewed no changes    MEDICATIONS:   Medications Prior to Admission   Medication Sig Dispense Refill Last Dose   • busPIRone (BUSPAR) 15 MG tablet Take 1 tablet by mouth 2 (Two) Times a Day With Meals. Indications: Anxiety Disorder 60 tablet 0    • levothyroxine (SYNTHROID, LEVOTHROID) 88 MCG tablet Take 88 mcg by mouth daily.   Taking   • lithium carbonate 300 MG capsule Take 1 capsule by mouth 2 (Two) Times a Day. Indications: Depression 60 capsule 1    • lithium carbonate 300 MG capsule Take 900 mg by mouth Every Night.      • lithium carbonate 300 MG capsule Take 450 mg by mouth Every Morning.      • OLANZapine (zyPREXA) 5 MG tablet Take 5 mg by mouth Every Night.      • traZODone (DESYREL) 50 MG tablet Take 1 tablet by mouth Every Night. Indications: Trouble Sleeping 30 tablet 0    • vilazodone (VIIBRYD) 40 MG tablet tablet Take 40 mg by mouth Daily.            ALLERGIES: Codeine, DHEA, Imitrex, morphine    FAMILY HISTORY: Reviewed no changes    SOCIAL HISTORY: Reviewed no changes    MENTAL STATUS EXAM: The patient is an obese white female appearing her stated age.  She is no apparent physical distress at the " time of examination.  She is awake alert and oriented in all spheres.  Her mood is pleasant if mildly dysphoric.  Her affect is congruent.  Speech is well and coherent.  There are no deficits memory cognition noted.  Intelligence is judged to be in the average range based on fund of knowledge, the patient is cooperative throughout interview.  She is currently denying suicidal homicidal ideation or psychotic features.  Her judgment insight appear to be at baseline.    ASSETS/LIABILITIES: To be assessed    DIAGNOSTIC IMPRESSION: Bipolar disorder depressed phase, status post polypharmacy ingestion, obesity once the patient is medically stable, we can consider transfer to the CMU given the seriousness of her ingestion.  At this point, the patient is running a temperature and her blood pressure is elevated at 150/95.  Until the sessions have been addressed, she will need to remain in the main hospital.  In the meantime I will recommend that she continue on suicide precautions with a sitter.    PLAN:  []

## 2020-03-19 NOTE — PROGRESS NOTES
LOS: 1 day     Name: Maame Wallace  Age/Sex: 29 y.o. female  :  1990        PCP: Miriam Newman  Chief Complaint   Patient presents with   • Drug Overdose      Subjective   Patient states she is feeling fine today.  Had trouble sleeping overnight because there was other people in the room and was at the end of the hallway with a lot of noise.  She endorses no chest pain palpitation shortness of breath fevers chills nausea vomiting or other new symptoms or complaints today.    General: No Fever or Chills, Cardiac: No Chest Pain or Palpitations, Resp: No Cough or SOA, GI: No Nausea, Vomiting, or Diarrhea and Other: No bleeding      levothyroxine 88 mcg Oral Q AM   sodium chloride 10 mL Intravenous Q12H       custom IV infusion builder  Last Rate: 75 mL/hr at 20 0144       Objective   Vital Signs  Temp:  [98.5 °F (36.9 °C)-100 °F (37.8 °C)] 98.5 °F (36.9 °C)  Heart Rate:  [] 91  Resp:  [18-20] 18  BP: ()/(50-72) 112/70  Body mass index is 45.05 kg/m².    Intake/Output Summary (Last 24 hours) at 3/19/2020 1225  Last data filed at 3/19/2020 0819  Gross per 24 hour   Intake 1190 ml   Output --   Net 1190 ml       Physical Exam   Constitutional: She is oriented to person, place, and time. She appears well-developed and well-nourished.   HENT:   Head: Normocephalic and atraumatic.   Cardiovascular: Normal rate and regular rhythm.   Pulmonary/Chest: Effort normal and breath sounds normal.   Neurological: She is alert and oriented to person, place, and time.   Skin: Capillary refill takes less than 2 seconds.   Psychiatric: She has a normal mood and affect. Her behavior is normal.   Nursing note and vitals reviewed.        Results Review:       I reviewed the patient's new clinical results.  Results from last 7 days   Lab Units 20  0440 20  0434 20  2300   WBC 10*3/mm3 14.69* 16.16* 17.37*   HEMOGLOBIN g/dL 11.5* 12.6 13.7   PLATELETS 10*3/mm3 301 357 354     Results from  last 7 days   Lab Units 03/19/20  0440 03/18/20  0434 03/17/20  2300   SODIUM mmol/L 137 134* 138   POTASSIUM mmol/L 3.6 3.7 3.8   CHLORIDE mmol/L 104 103 104   CO2 mmol/L 22.6 20.0* 22.6   BUN mg/dL 9 6 6   CREATININE mg/dL 0.92 0.81 0.89   CALCIUM mg/dL 8.4* 8.5* 9.6   MAGNESIUM mg/dL  --   --  2.0   Estimated Creatinine Clearance: 125.3 mL/min (by C-G formula based on SCr of 0.92 mg/dL).      Assessment/Plan     Polysubstance overdose    Fibromyalgia    Major depressive disorder, recurrent, severe without psychotic features (CMS/HCC)    Hypothyroidism    Irritable bowel disease    Obesity, Class III, BMI 40-49.9 (morbid obesity) (CMS/[facility])      PLAN  This is a 29-year-old female that presents to the hospital after an overdose on Benadryl containing sleep aid and lithium  1.  Benadryl and lithium overdose: Lithium levels are within normal limits today.  The reaction to the Benadryl is likely passed at this point.  I did discuss the case with poison control who agreed.  Mainly monitoring the QRS is what we do with Benadryl levels.  The QRS was elevated yesterday at 115 and so she was placed on bicarb containing fluids and hydrated overnight and at this point her QRS is back down to around 100 today.  Poison control feels that this is stable and no need for further monitoring of the QRS with regards to the Benadryl.  Probably okay to resume lithium tomorrow.  Had been awaiting psychiatry input they have declared that she is not medically stable from their perspective for discharge to the psychiatric unit today.  2.  QTC prolongation: The psychiatric team is concerned about her QTC being long.  Her QTC has been borderline long on all 3 EKGs.  Is a little longer today but she is had no signs of an arrhythmia and her electrolytes are within normal limits.  I will ask cardiology to weigh in on this and to clear whether or not she needs further work-up or evaluation of this QTc prolongation  3.  Mild temperature elevation  with leukocytosis: T-max has been 100 the patient endorses no febrile symptoms no symptoms of infection at this time her white count was elevated on admission and is trending down.  I would recommend holding off on any antibiotic therapy or any further work-up unless infection symptoms are endorsed.  4.  Hypoxia: She has 1 documented low O2 saturation around 88%.  She is been on room air  Throughout the day and was 96% on room air when I was in the room.  She was not hypoxic at all throughout the day yesterday.  She is endorsing no respiratory symptoms currently no plan for work-up unless she is persistently hypoxic.  It is entirely possible given her obesity she might have a touch of obstructive sleep apnea or even some obesity hypoventilation syndrome but again this is not worked up in the inpatient setting.  I will asked the nurse to do a walking oximetry if O2 saturations do not drop with exercise no further work-up will be completed at this time.  5.  Elevated blood pressure without a history of hypertension: It is documented in the psychiatry note that the patient's blood pressure is elevated at 150/78.  This was actually done 24 hours ago.  Since that blood pressure her pressures have been stable around 115/80.  She is not hypertensive today no plans to initiate any blood pressure medications    Disposition  Hopefully we can get her to the CMU tomorrow.  Has been recommended that she continue a one-to-one sitter here in the hospital      Leroy Nogueira MD  [facility]  03/19/20  12:25

## 2020-03-19 NOTE — PLAN OF CARE
Problem: Patient Care Overview  Goal: Plan of Care Review  Outcome: Ongoing (interventions implemented as appropriate)  Flowsheets (Taken 3/19/2020 0421)  Progress: improving  Plan of Care Reviewed With: patient  Outcome Summary: VSS. Patient c/o nausea overnight that was treated with PRN Zofran. Patient slept on and off throughout the night. Patient had to be placed on 2L oxygen while sleeping d/t desating to 87%-88%. EKG and lab work for the morning. Possible tx to CMU soon. Sitter remains at the bedside.   Problem: Suicide Risk (Adult)  Goal: Identify Related Risk Factors and Signs and Symptoms  Outcome: Ongoing (interventions implemented as appropriate)     Problem: Suicide Risk (Adult)  Goal: Strength-Based Wellness/Recovery  Outcome: Ongoing (interventions implemented as appropriate)     Problem: Suicide Risk (Adult)  Goal: Physical Safety  Outcome: Ongoing (interventions implemented as appropriate)

## 2020-03-20 ENCOUNTER — APPOINTMENT (OUTPATIENT)
Dept: GENERAL RADIOLOGY | Facility: HOSPITAL | Age: 30
End: 2020-03-20

## 2020-03-20 VITALS
HEART RATE: 89 BPM | TEMPERATURE: 97.8 F | SYSTOLIC BLOOD PRESSURE: 108 MMHG | RESPIRATION RATE: 18 BRPM | OXYGEN SATURATION: 95 % | BODY MASS INDEX: 44.86 KG/M2 | WEIGHT: 279.1 LBS | DIASTOLIC BLOOD PRESSURE: 69 MMHG | HEIGHT: 66 IN

## 2020-03-20 LAB
BASOPHILS # BLD AUTO: 0.1 10*3/MM3 (ref 0–0.2)
BASOPHILS NFR BLD AUTO: 0.8 % (ref 0–1.5)
DEPRECATED RDW RBC AUTO: 39.6 FL (ref 37–54)
EOSINOPHIL # BLD AUTO: 0.33 10*3/MM3 (ref 0–0.4)
EOSINOPHIL NFR BLD AUTO: 2.6 % (ref 0.3–6.2)
ERYTHROCYTE [DISTWIDTH] IN BLOOD BY AUTOMATED COUNT: 12.7 % (ref 12.3–15.4)
HCT VFR BLD AUTO: 36.4 % (ref 34–46.6)
HGB BLD-MCNC: 11.9 G/DL (ref 12–15.9)
IMM GRANULOCYTES # BLD AUTO: 0.06 10*3/MM3 (ref 0–0.05)
IMM GRANULOCYTES NFR BLD AUTO: 0.5 % (ref 0–0.5)
LYMPHOCYTES # BLD AUTO: 2.96 10*3/MM3 (ref 0.7–3.1)
LYMPHOCYTES NFR BLD AUTO: 23.1 % (ref 19.6–45.3)
MCH RBC QN AUTO: 28.4 PG (ref 26.6–33)
MCHC RBC AUTO-ENTMCNC: 32.7 G/DL (ref 31.5–35.7)
MCV RBC AUTO: 86.9 FL (ref 79–97)
MONOCYTES # BLD AUTO: 0.89 10*3/MM3 (ref 0.1–0.9)
MONOCYTES NFR BLD AUTO: 7 % (ref 5–12)
NEUTROPHILS # BLD AUTO: 8.45 10*3/MM3 (ref 1.7–7)
NEUTROPHILS NFR BLD AUTO: 66 % (ref 42.7–76)
NRBC BLD AUTO-RTO: 0 /100 WBC (ref 0–0.2)
PLATELET # BLD AUTO: 299 10*3/MM3 (ref 140–450)
PMV BLD AUTO: 9.5 FL (ref 6–12)
RBC # BLD AUTO: 4.19 10*6/MM3 (ref 3.77–5.28)
WBC NRBC COR # BLD: 12.79 10*3/MM3 (ref 3.4–10.8)

## 2020-03-20 PROCEDURE — 71045 X-RAY EXAM CHEST 1 VIEW: CPT

## 2020-03-20 PROCEDURE — 85025 COMPLETE CBC W/AUTO DIFF WBC: CPT | Performed by: HOSPITALIST

## 2020-03-20 RX ORDER — LITHIUM CARBONATE 300 MG/1
300 CAPSULE ORAL 2 TIMES DAILY WITH MEALS
Status: DISCONTINUED | OUTPATIENT
Start: 2020-03-20 | End: 2020-03-20 | Stop reason: HOSPADM

## 2020-03-20 RX ORDER — BUSPIRONE HYDROCHLORIDE 15 MG/1
15 TABLET ORAL EVERY 12 HOURS SCHEDULED
Status: DISCONTINUED | OUTPATIENT
Start: 2020-03-20 | End: 2020-03-20 | Stop reason: HOSPADM

## 2020-03-20 RX ORDER — VILAZODONE HYDROCHLORIDE 40 MG/1
40 TABLET ORAL DAILY
Status: DISCONTINUED | OUTPATIENT
Start: 2020-03-20 | End: 2020-03-20 | Stop reason: HOSPADM

## 2020-03-20 RX ADMIN — LEVOTHYROXINE SODIUM 88 MCG: 88 TABLET ORAL at 05:45

## 2020-03-20 RX ADMIN — SODIUM CHLORIDE, PRESERVATIVE FREE 10 ML: 5 INJECTION INTRAVENOUS at 09:39

## 2020-03-20 RX ADMIN — BUSPIRONE HYDROCHLORIDE 15 MG: 15 TABLET ORAL at 12:50

## 2020-03-20 RX ADMIN — VILAZODONE HYDROCHLORIDE 40 MG: 40 TABLET ORAL at 12:50

## 2020-03-20 RX ADMIN — LITHIUM CARBONATE 300 MG: 300 CAPSULE, GELATIN COATED ORAL at 12:50

## 2020-03-20 RX ADMIN — ACETAMINOPHEN 650 MG: 325 TABLET, FILM COATED ORAL at 06:31

## 2020-03-20 NOTE — PLAN OF CARE
Problem: Patient Care Overview  Goal: Plan of Care Review  Outcome: Ongoing (interventions implemented as appropriate)  Flowsheets (Taken 3/20/2020 9559)  Progress: improving  Plan of Care Reviewed With: patient  Outcome Summary: VSS. No temperature overnight. Patient was placed on 2L oxygen while sleeping d/t desating into the 80's. No new complaints. Will continue to monitor.     Problem: Suicide Risk (Adult)  Goal: Identify Related Risk Factors and Signs and Symptoms  Outcome: Ongoing (interventions implemented as appropriate)

## 2020-03-20 NOTE — DISCHARGE SUMMARY
Patient Name: Maame Wallace  : 1990  MRN: [id number]    Date of Admission: 3/17/2020  Date of Discharge:  3/20/2020  Primary Care Physician: Miriam Newman      Chief Complaint:   Drug Overdose      Discharge Diagnoses     Active Hospital Problems    Diagnosis  POA   • **Polysubstance overdose [T50.901A]  Yes   • Obesity, Class III, BMI 40-49.9 (morbid obesity) (CMS/HCC) [E66.01]  Yes   • Hypothyroidism [E03.9]  Yes   • Irritable bowel disease [K58.9]  Yes   • Major depressive disorder, recurrent, severe without psychotic features (CMS/HCC) [F33.2]  Yes   • Fibromyalgia [M79.7]  Yes      Resolved Hospital Problems   No resolved problems to display.        Hospital Course     This is a 29-year-old female that presents to the hospital after an overdose on Benadryl containing sleep aid and lithium  1.  Benadryl and lithium overdose: Lithium levels are within normal limits today.  The reaction to the Benadryl is likely passed at this point.  I did discuss the case with poison control who agreed.  Mainly monitoring the QRS is what we do with Benadryl levels.  The QRS was elevated yesterday at 115 and so she was placed on bicarb containing fluids and hydrated overnight and at this point her QRS is back down to around 100 today.  Poison control feels that this is stable and no need for further monitoring of the QRS with regards to the Benadryl.  Probably okay to resume lithium tomorrow.  Had been awaiting psychiatry input they have declared that she is not medically stable from their perspective for discharge to the psychiatric unit today.  2.  QTC prolongation: The psychiatric team is concerned about her QTC being long.  Her QTC has been borderline long on all 3 EKGs.  Is a little longer today but she is had no signs of an arrhythmia and her electrolytes are within normal limits.  I will ask cardiology to weigh in on this and to clear whether or not she needs further work-up or evaluation of this QTc  prolongation  3.  Mild temperature elevation with leukocytosis: T-max has been 100 the patient endorses no febrile symptoms no symptoms of infection at this time her white count was elevated on admission and is trending down.  I would recommend holding off on any antibiotic therapy or any further work-up unless infection symptoms are endorsed.  4.  Hypoxia: She has 1 documented low O2 saturation around 88%.  She is been on room air  Throughout the day and was 96% on room air when I was in the room.  She was not hypoxic at all throughout the day yesterday.  She is endorsing no respiratory symptoms currently no plan for work-up unless she is persistently hypoxic.  It is entirely possible given her obesity she might have a touch of obstructive sleep apnea or even some obesity hypoventilation syndrome but again this is not worked up in the inpatient setting.  I will asked the nurse to do a walking oximetry if O2 saturations do not drop with exercise no further work-up will be completed at this time.  5.  Elevated blood pressure without a history of hypertension: It is documented in the psychiatry note that the patient's blood pressure is elevated at 150/78.  This was actually done 24 hours ago.  Since that blood pressure her pressures have been stable around 115/80.  She is not hypertensive today no plans to initiate any blood pressure medications    Day of Discharge     Patient states she is feeling fine today.  Had trouble sleeping overnight because there was other people in the room and was at the end of the hallway with a lot of noise.  She endorses no chest pain palpitation shortness of breath fevers chills nausea vomiting or other new symptoms or complaints today    Physical Exam:  Temp:  [97.8 °F (36.6 °C)-98.2 °F (36.8 °C)] 97.8 °F (36.6 °C)  Heart Rate:  [89-99] 89  Resp:  [18] 18  BP: (108-117)/(61-82) 108/69  Body mass index is 45.05 kg/m².  Physical Exam  See exam from earlier in the day  Consultants      Consult Orders (all) (From admission, onward)     Start     Ordered    03/19/20 1227  Inpatient Cardiology Consult  Once     Specialty:  Cardiology  Provider:  Zafar Richmond MD    03/19/20 1227    03/18/20 0705  Inpatient Psychiatrist Consult  Once     Specialty:  Psychiatry  Provider:  Edward Casillas III, MD    03/18/20 0705    03/18/20 0101  Inpatient Access Center Consult  Once     Comments:  Attempted suicide   Provider:  (Not yet assigned)    03/18/20 0100    03/18/20 0022  [facility] (on-call MD unless specified) Details  Once     Specialty:  Hospitalist  Provider:  (Not yet assigned)    03/18/20 0021              Procedures     Imaging Results (All)     Procedure Component Value Units Date/Time    XR Chest 1 View [[id number]] Collected:  03/20/20 1026     Updated:  03/20/20 1032    Narrative:       Portable chest radiograph     HISTORY:Hypoxia     TECHNIQUE: Single AP portable radiograph of the chest     COMPARISON:Chest radiograph 05/18/2019       Impression:       FINDINGS AND IMPRESSION:  Asymmetric pulmonary opacification is present within the left base  representing atelectasis versus early pneumonia in the appropriate  clinical context and correlation with patient history is recommended. No  pleural effusion or pneumothorax is seen. The heart is at the upper  limits of normal to mildly enlarged..     This report was finalized on 3/20/2020 10:29 AM by Dr. Teja Tatum M.D.             Pertinent Labs     Results from last 7 days   Lab Units 03/20/20  1104 03/19/20  0440 03/18/20  0434 03/17/20  2300   WBC 10*3/mm3 12.79* 14.69* 16.16* 17.37*   HEMOGLOBIN g/dL 11.9* 11.5* 12.6 13.7   PLATELETS 10*3/mm3 299 301 357 354     Results from last 7 days   Lab Units 03/19/20  0440 03/18/20  0434 03/17/20  2300   SODIUM mmol/L 137 134* 138   POTASSIUM mmol/L 3.6 3.7 3.8   CHLORIDE mmol/L 104 103 104   CO2 mmol/L 22.6 20.0* 22.6   BUN mg/dL 9 6 6   CREATININE mg/dL 0.92 0.81 0.89   GLUCOSE mg/dL 87 88 96    Estimated Creatinine Clearance: 125.3 mL/min (by C-G formula based on SCr of 0.92 mg/dL).  Results from last 7 days   Lab Units 03/17/20  2300   ALBUMIN g/dL 4.40   BILIRUBIN mg/dL 0.4   ALK PHOS U/L 62   AST (SGOT) U/L 27   ALT (SGPT) U/L 35*     Results from last 7 days   Lab Units 03/19/20  0440 03/18/20  0434 03/17/20  2300   CALCIUM mg/dL 8.4* 8.5* 9.6   ALBUMIN g/dL  --   --  4.40   MAGNESIUM mg/dL  --   --  2.0               Invalid input(s): LDLCALC        Test Results Pending at Discharge       Discharge Details        Discharge Medications      Changes to Medications      Instructions Start Date   lithium carbonate 300 MG capsule  What changed:  Another medication with the same name was removed. Continue taking this medication, and follow the directions you see here.  Notes to patient:  Next dose due: 3/20 8pm   300 mg, Oral, 2 Times Daily         Continue These Medications      Instructions Start Date   busPIRone 15 MG tablet  Commonly known as:  BUSPAR  Notes to patient:  Next dose due: 3/20 8pm   15 mg, Oral, 2 Times Daily With Meals      levothyroxine 88 MCG tablet  Commonly known as:  SYNTHROID, LEVOTHROID  Notes to patient:  Next dose due: 3/21 6am   88 mcg, Oral, Daily      traZODone 50 MG tablet  Commonly known as:  DESYREL  Notes to patient:  Next dose due: 3/20 8pm   50 mg, Oral, Nightly      vilazodone 40 MG tablet tablet  Commonly known as:  VIIBRYD  Notes to patient:  Next dose due: 3/21 8am   40 mg, Oral, Daily         Stop These Medications    OLANZapine 5 MG tablet  Commonly known as:  zyPREXA            Allergies   Allergen Reactions   • Codeine Nausea And Vomiting and Rash   • Dhea [Nutritional Supplements] Other (See Comments)     Severe headache   • Imitrex [Sumatriptan] Other (See Comments)     Intensified a migraine   • Morphine Other (See Comments)     Severe headache         Discharge Disposition:  Home or Self Care    Discharge Diet:  No active diet order      Discharge Activity:        CODE STATUS:    Code Status and Medical Interventions:   Ordered at: 03/18/20 0059     Code Status:    CPR     Medical Interventions (Level of Support Prior to Arrest):    Full       Future Appointments   Date Time Provider Department Center   4/15/2020  3:00 PM Morris Zayas MD [facility] None     Additional Instructions for the Follow-ups that You Need to Schedule     Discharge Follow-up with PCP   As directed       Currently Documented PCP:    Miriam Newman    PCP Phone Number:    [phone]     Follow Up Details:  4-6 weeks         Discharge Follow-up with Specified Provider: sleep medicine; 1 Month   As directed      To:  sleep medicine    Follow Up:  1 Month           Follow-up Information     Piter Newman MD .    Specialties:  Pulmonary Disease, Sleep Medicine, Hospice and Palliative Medicine  Contact information:  [address] [place] [address] [phone]             Miriam Newman .    Why:  4-6 weeks  Contact information:  [address] [place] [id number] [phone]             Miriam Newman .    Contact information:  [address] [place] [address]  [phone]                   Additional Instructions for the Follow-ups that You Need to Schedule     Discharge Follow-up with PCP   As directed       Currently Documented PCP:    Miriam Newman    PCP Phone Number:    [phone]     Follow Up Details:  4-6 weeks         Discharge Follow-up with Specified Provider: sleep medicine; 1 Month   As directed      To:  sleep medicine    Follow Up:  1 Month           Time Spent on Discharge:  Greater than 30 minutes      Leroy Nogueira MD  [facility]  03/20/20  5:48 PM

## 2020-03-20 NOTE — PROGRESS NOTES
"     LOS: 2 days     Name: Maame Wallace  Age/Sex: 29 y.o. female  :  1990        PCP: Miriam Newman  Chief Complaint   Patient presents with   • Drug Overdose      Subjective   Patient states she is feeling fine today.  Still not sleeping great but also didn't get some of her meds,  She continues to deny any cough SOA fevers chills nausea vomiting or other new symptoms, \"No, I feel great\"  Denies SI presently and overall her mood is more positive.    General: No Fever or Chills, Cardiac: No Chest Pain or Palpitations, Resp: No Cough or SOA, GI: No Nausea, Vomiting, or Diarrhea and Other: No bleeding      busPIRone 15 mg Oral Q12H   levothyroxine 88 mcg Oral Q AM   lithium carbonate 300 mg Oral BID With Meals   sodium chloride 10 mL Intravenous Q12H   vilazodone 40 mg Oral Daily          Objective   Vital Signs  Temp:  [97.8 °F (36.6 °C)-98.5 °F (36.9 °C)] 97.8 °F (36.6 °C)  Heart Rate:  [89-99] 89  Resp:  [18] 18  BP: (108-117)/(61-82) 108/69  Body mass index is 45.05 kg/m².    Intake/Output Summary (Last 24 hours) at 3/20/2020 1053  Last data filed at 3/19/2020 1800  Gross per 24 hour   Intake 480 ml   Output --   Net 480 ml       Physical Exam   Constitutional: She is oriented to person, place, and time. She appears well-developed and well-nourished.   HENT:   Head: Normocephalic and atraumatic.   Eyes: EOM are normal.   Cardiovascular: Normal rate and regular rhythm.   Pulmonary/Chest: Effort normal and breath sounds normal.   Excellent air movement in all lobes no crackles or wheezing   Abdominal: Soft. She exhibits no distension.   Neurological: She is alert and oriented to person, place, and time.   Skin: Skin is warm and dry. Capillary refill takes less than 2 seconds.   Psychiatric: She has a normal mood and affect. Her behavior is normal.   Nursing note and vitals reviewed.        Results Review:       I reviewed the patient's new clinical results.  Results from last 7 days   Lab Units " 03/19/20 0440 03/18/20  0434 03/17/20  2300   WBC 10*3/mm3 14.69* 16.16* 17.37*   HEMOGLOBIN g/dL 11.5* 12.6 13.7   PLATELETS 10*3/mm3 301 357 354     Results from last 7 days   Lab Units 03/19/20 0440 03/18/20  0434 03/17/20  2300   SODIUM mmol/L 137 134* 138   POTASSIUM mmol/L 3.6 3.7 3.8   CHLORIDE mmol/L 104 103 104   CO2 mmol/L 22.6 20.0* 22.6   BUN mg/dL 9 6 6   CREATININE mg/dL 0.92 0.81 0.89   CALCIUM mg/dL 8.4* 8.5* 9.6   MAGNESIUM mg/dL  --   --  2.0   Estimated Creatinine Clearance: 125.3 mL/min (by C-G formula based on SCr of 0.92 mg/dL).      Assessment/Plan     Polysubstance overdose    Fibromyalgia    Major depressive disorder, recurrent, severe without psychotic features (CMS/[facility])    Hypothyroidism    Irritable bowel disease    Obesity, Class III, BMI 40-49.9 (morbid obesity) (CMS/[facility])      PLAN  This is a 29-year-old female that presents to the hospital after an overdose on Benadryl containing sleep aid and lithium  1.  Benadryl and lithium overdose: Lithium levels are within normal limits today.  The reaction to the Benadryl is likely passed at this point.  I did discuss the case with poison control who agreed.  Mainly monitoring the QRS is what we do with Benadryl toxicity.  The QRS was elevated yesterday at 115 and so she was placed on bicarb containing fluids and hydrated overnight and at this point her QRS is back down to around 100 today.  Poison control feels that this is stable and no need for further monitoring of the QRS with regards to the Benadryl.  Probably okay to resume lithium tomorrow.  Had been awaiting psychiatry input they have declared that she is not medically stable from their perspective for discharge to the psychiatric unit today.  2.  QTC prolongation: The psychiatric team is concerned about her QTC being long.  Her QTC has been borderline long on all 3 EKGs but doesn't appear that way on my eval.  Discussed with Dr Lowry who agreed  3.  Mild temperature elevation  with leukocytosis: T-max has been 100 the patient endorses no febrile symptoms no symptoms of infection at this time her white count was elevated on admission and is trending down.  Procalitonin within normal limits.  CXR appears more related to atelectasis, especially in the absence of respiratory symptoms, as she is not as expanded as on prior evaluations.  I would recommend holding off on any antibiotic therapy or any further work-up unless infection symptoms are endorsed.  4.  Nocturnal Hypoxia: no drop with ambulation or exertion, discussed with pulm/sleep medicine and referred to outpatient sleep study.  If she has untreated sleep apnea this could certainly make it difficult manage her depressive symptoms.    5.  Elevated blood pressure without a history of hypertension: It is documented in the psychiatry note that the patient's blood pressure is elevated at 150/78.  This was actually done on admission and probably related to the effects of the benadryl and lithium overdose.  Since that blood pressure her pressures have been stable around 115/80.  She is not hypertensive today no plans to initiate any blood pressure medications    Disposition  Hopefully we can get her out today, whether to IOP or [facility] depending on the psychiatrist opinion.  She continues to contract for safety with me and remains in good spirits.  She prefers IOP to Inpatient psych.  She is willing to let Mom handle meds for a few weeks and call her therapist if her feels begin to change      Leroy Nogueira MD  [facility]  03/20/20  10:53

## 2020-03-20 NOTE — NURSING NOTE
[facility] follow up:    Pt does not want to come to [facility] but wants to follow up with her psychiatrist and go to an IOP.  Spoke with Dr Casillas, who is agreeable to this plan.  Spoke with pt's mom, Verónica Wallace, who pt resides with.  Mom is going to lock up all of pt's medications and dispense them as due.  Provided pt with information for [facility].  [facility] will sign off as pt is being discharged.

## 2020-04-15 ENCOUNTER — APPOINTMENT (OUTPATIENT)
Dept: SLEEP MEDICINE | Facility: HOSPITAL | Age: 30
End: 2020-04-15

## 2020-05-14 ENCOUNTER — HOSPITAL ENCOUNTER (EMERGENCY)
Facility: HOSPITAL | Age: 30
Discharge: HOME | End: 2020-05-14
Attending: EMERGENCY MEDICINE | Admitting: EMERGENCY MEDICINE

## 2020-05-14 VITALS
HEART RATE: 84 BPM | RESPIRATION RATE: 18 BRPM | TEMPERATURE: 97.4 F | HEIGHT: 66 IN | BODY MASS INDEX: 45.05 KG/M2 | OXYGEN SATURATION: 97 % | SYSTOLIC BLOOD PRESSURE: 131 MMHG | DIASTOLIC BLOOD PRESSURE: 77 MMHG

## 2020-05-14 DIAGNOSIS — Z86.59 HISTORY OF ANXIETY: ICD-10-CM

## 2020-05-14 DIAGNOSIS — Z86.59 HISTORY OF DEPRESSION: ICD-10-CM

## 2020-05-14 DIAGNOSIS — R45.851 SUICIDAL THOUGHTS: Primary | ICD-10-CM

## 2020-05-14 LAB
AMPHET+METHAMPHET UR QL: NEGATIVE
BARBITURATES UR QL SCN: NEGATIVE
BENZODIAZ UR QL SCN: NEGATIVE
CANNABINOIDS SERPL QL: NEGATIVE
COCAINE UR QL: NEGATIVE
ETHANOL BLD-MCNC: <10 MG/DL (ref 0–10)
ETHANOL UR QL: <0.01 %
METHADONE UR QL SCN: NEGATIVE
OPIATES UR QL: NEGATIVE
OXYCODONE UR QL SCN: NEGATIVE

## 2020-05-14 PROCEDURE — 80307 DRUG TEST PRSMV CHEM ANLYZR: CPT | Performed by: EMERGENCY MEDICINE

## 2020-05-14 PROCEDURE — 36415 COLL VENOUS BLD VENIPUNCTURE: CPT

## 2020-05-14 PROCEDURE — 90791 PSYCH DIAGNOSTIC EVALUATION: CPT

## 2020-05-14 PROCEDURE — 99283 EMERGENCY DEPT VISIT LOW MDM: CPT

## 2020-05-14 NOTE — CONSULTS
"Reports she called [facility] today seeking IOP and was advised to come through ED.     Reports that her therapist recommended IOP d/t her \"having some suicidal thoughts\".     Denies any self harm behaviors since 3/18/20. Reports she has been having SI on and off for \"at least about 6 months\".     Asked what has prevented her from harming herself since March, \"I'm not sure, it was probably continual appointments with my therapist and psychiatrist\". On f/u reports that she has also thought about effect her suicide would have on \"family and friends\".     Denies having acted on these thoughts in any other way, such as writing a suicide note, making detailed plans, or attempting to gain access to lethal means since March.     Reports she sees Adriana Mccann, therapist with [facility], psychiatrist is with them also, Shannan Jeronimo. Sees therapist weekly and psychiatrist every 1 to 1 1/2 weeks.     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     Asked about stressors, reports that living at home with parents can be stressful, and doesn't lover her job, but no specific recent stressors. Confirms struggling with depression since 7th or 8th grade.     Reports hx of cutting to relieve stress, MRE February, starting at age 15 or 16.      Denies illicit drug use. Reports she doesn't know why she has had recent positive amphetamine screens, denies ADD diagnosis or treatment. Reports she drinks about one drink a week or less.     Reports having difficulty falling and staying asleep, sleeping 5-7 hours daily, doesn't feel rested. Reports she was awake from 4 to 6:30 last night.    Reports she does feel safe to go home at nights.     Agrees to safety plan to tell family, call 911 or return to ED if she starts developing detailed ideations, or intention of acting on SI.      I called mom, Kvng Wallace, [phone]. Mom confirms that pt's therapist, " psychiatrist, and mom herself have been trying to get pt to do IOP for some time for her depression. Mom indicates she is glad pt is seeking this level of care. Mom denies concerns about pt's safety, and reports she feels safe with IOP level of care.     Plan to d/c with referral to IOP program, Dr. Louise agrees with plan.

## 2020-05-14 NOTE — ED PROVIDER NOTES
EMERGENCY DEPARTMENT ENCOUNTER    Room Number:  09/09  Date of encounter:  5/14/2020  PCP: Miriam Newman  Historian: Patient      HPI:  Chief Complaint: Suicidal thoughts  A complete HPI/ROS/PMH/PSH/SH/FH are unobtainable due to: None    Context: Maame Wallace is a 29 y.o. female who presents to the ED after being referred by her therapist and for psychiatric evaluation for hopeful placement into the IOP program here due to several months of persistent suicidal thoughts without plan or escalation.  Patient states that the thoughts are not getting any better, but have not gotten any worse.  Is currently seeing a psychiatrist and they have been changing some medications around.  She denies any HI, auditory or visual hallucinations.  Denies any specific self-harm plan.  Denies any drug or alcohol use currently.  No other acute concerns or complaints, denies any recent illnesses.      MEDICAL RECORD REVIEW    Discharged March 20 of 2020 after being admitted for polysubstance overdose    PAST MEDICAL HISTORY  Active Ambulatory Problems     Diagnosis Date Noted   • Hemiplegic migraine, intractable 04/25/2016   • Fibromyalgia 04/29/2016   • Major depressive disorder, recurrent, severe without psychotic features (CMS/[facility]) 02/19/2020   • Vaginal yeast infection 02/20/2020   • Leukocytosis 02/20/2020   • Hyponatremia 02/20/2020   • Hypothyroidism 02/20/2020   • Irritable bowel disease 02/20/2020   • Obesity (BMI 30-39.9) 02/20/2020   • Polysubstance overdose 03/18/2020   • Obesity, Class III, BMI 40-49.9 (morbid obesity) (CMS/[facility]) 03/18/2020     Resolved Ambulatory Problems     Diagnosis Date Noted   • No Resolved Ambulatory Problems     Past Medical History:   Diagnosis Date   • Anxiety    • Asthma    • Depression    • GERD (gastroesophageal reflux disease)    • Migraines          PAST SURGICAL HISTORY  Past Surgical History:   Procedure Laterality Date   • APPENDECTOMY      laparoscopic   • CHOLECYSTECTOMY     • KNEE  ARTHROSCOPY Left    • PATELLA FEMORAL LIGAMENT RECONSTRUCTION Left    • TONSILLECTOMY AND ADENOIDECTOMY           FAMILY HISTORY  No family history on file.      SOCIAL HISTORY  Social History     Socioeconomic History   • Marital status: Single     Spouse name: Not on file   • Number of children: Not on file   • Years of education: Not on file   • Highest education level: Not on file   Tobacco Use   • Smoking status: Never Smoker   • Smokeless tobacco: Never Used   Substance and Sexual Activity   • Alcohol use: Yes     Alcohol/week: 1.0 standard drinks     Types: 1 Cans of beer per week     Comment: social   • Drug use: No   • Sexual activity: Defer         ALLERGIES  Codeine; Dhea [nutritional supplements]; Imitrex [sumatriptan]; and Morphine        REVIEW OF SYSTEMS  Review of Systems     All systems reviewed and negative except for those discussed in HPI.       PHYSICAL EXAM    I have reviewed the triage vital signs and nursing notes.    ED Triage Vitals   Temp Heart Rate Resp BP SpO2   05/14/20 1818 05/14/20 1820 05/14/20 1818 -- --   97.4 °F (36.3 °C) (!) 135 16        Temp src Heart Rate Source Patient Position BP Location FiO2 (%)   05/14/20 1818 -- -- -- --   Tympanic           Physical Exam  General: Awake, alert, no acute distress, nontoxic, nondiaphoretic  HEENT: Mucous membranes moist, atraumatic, normocephalic, EOMI  Neck: Full ROM  Pulm: Symmetric, non-labored, lungs CTAB  Cardiovascular: Regular rate and rhythm, normal S1/S2, intact distal pulses  MSK: Full ROM, no deformity  Skin: Warm, dry  Neuro: Alert and oriented x 3, GCS 15, moving all extremities, no focal deficits  Psych: Calm, cooperative      Surgical mask, surgical cap, and gloves used during this encounter. Patient in surgical mask.      LAB RESULTS  Recent Results (from the past 24 hour(s))   Urine Drug Screen - Urine, Clean Catch    Collection Time: 05/14/20  6:42 PM   Result Value Ref Range    Amphet/Methamphet, Screen Negative  Negative    Barbiturates Screen, Urine Negative Negative    Benzodiazepine Screen, Urine Negative Negative    Cocaine Screen, Urine Negative Negative    Opiate Screen Negative Negative    THC, Screen, Urine Negative Negative    Methadone Screen, Urine Negative Negative    Oxycodone Screen, Urine Negative Negative   Ethanol    Collection Time: 05/14/20  6:59 PM   Result Value Ref Range    Ethanol <10 0 - 10 mg/dL    Ethanol % <0.010 %       Ordered the above labs and independently reviewed the results.        RADIOLOGY  No Radiology Exams Resulted Within Past 24 Hours        PROCEDURES    Procedures      MEDICATIONS GIVEN IN ER    Medications - No data to display      PROGRESS, DATA ANALYSIS, CONSULTS, AND MEDICAL DECISION MAKING    All labs have been independently reviewed by me.  All radiology studies have been reviewed by me and discussed with radiologist dictating the report.   EKG's independently viewed and interpreted by me.  Discussion below represents my analysis of pertinent findings related to patient's condition, differential diagnosis, treatment plan and final disposition.        ED Course as of May 14 2200   Thu May 14, 2020   2026 [facility] has evaluate the patient and agrees IOP seems to be the best option moving forward.  Plan at this time will be for discharge home, patient has contracted for safety, and I do not feel like she is an acute self-harm threat tonight.  I did advise following up with the program and her psychiatrist as discussed, and returning to the emerge department for worsening symptoms as needed including but not limited to imminent thoughts/plans of self-harm or harm to others.    [DC]      ED Course User Index  [DC] Bk Louise MD       AS OF 22:00 VITALS:    BP - 131/77  HR - 84  TEMP - 97.4 °F (36.3 °C) (Tympanic)  02 SATS - 97%        DIAGNOSIS  Final diagnoses:   Suicidal thoughts   History of depression   History of anxiety         DISPOSITION  DISCHARGE    Patient discharged  in stable condition.    Reviewed implications of results, diagnosis, meds, responsibility to follow up, warning signs and symptoms of possible worsening, potential complications and reasons to return to ER.    Patient/Family voiced understanding of above instructions.    Discussed plan for discharge, as there is no emergent indication for admission. Patient referred to primary care provider for BP management due to today's BP. Pt/family is agreeable and understands need for follow up and repeat testing.  Pt is aware that discharge does not mean that nothing is wrong but it indicates no emergency is present that requires admission and they must continue care with follow-up as given below or physician of their choice.     FOLLOW-UP   Emergency Department  [address] [facility] [address] [phone]    As needed, If symptoms worsen    Miriam Newman  [address] [facility] [address] [phone]    Schedule an appointment as soon as possible for a visit   As needed         Medication List      No changes were made to your prescriptions during this visit.                  Bk Louise MD  05/14/20 6084

## 2020-05-14 NOTE — ED TRIAGE NOTES
Patient presents to er via private vehicle from office.  Selin Mccann therapist referred patient over to er for psych evaluation.  Denies SI/HI.  Patient was placed in face mask during first look triage.  Patient was wearing a face mask throughout encounter.  I wore personal protective equipment throughout the encounter.  Hand hygiene was performed before and after patient encounter.

## 2020-05-15 NOTE — DISCHARGE INSTRUCTIONS
Follow-up with the IOP program as discussed with the mental health team today, follow-up with your psychiatrist and primary care providers as needed.  Return to the emerge department for worsening symptoms as needed including not limited to imminent thoughts or concerns for self-harm or harm to others.

## 2020-05-18 ENCOUNTER — OFFICE VISIT (OUTPATIENT)
Dept: PSYCHIATRY | Facility: HOSPITAL | Age: 30
End: 2020-05-18

## 2020-05-18 DIAGNOSIS — F33.1 MAJOR DEPRESSIVE DISORDER, RECURRENT EPISODE, MODERATE (HCC): Primary | ICD-10-CM

## 2020-05-18 PROCEDURE — S9480 INTENSIVE OUTPATIENT PSYCHIA: HCPCS | Performed by: COUNSELOR

## 2020-05-18 NOTE — PROGRESS NOTES
"Group therapy   Pt was new to the group today.  Shared of a long hx of depression, anxiety and SI.   Stated she  had an attempt in 3/2020.  Pt could not identify any current stressors leading to increase in SI but stated her OP therapist wanted her to come here \" to have some extra eyes on me\".  Was attentive, but quiet, until she was called upon.  "

## 2020-05-18 NOTE — PROGRESS NOTES
[id number] Psych IOP Class     Class topic: thought distortions     Class participation: pt shared some personal experience with topic but did not go into detail; pt reported that she journals about this topic so it was not particularly helpful.

## 2020-05-18 NOTE — PROGRESS NOTES
Wrap-up  Mood at 8 with 10 being the worse and found the class on cognitive distortions to be most helpful today.    Feeling sad or empty   Trouble with concentration, memory, or making decisions  Loss of interest in things you usually like to do  Easily distracted  An increase/decrease in normal appetite  Changes in normal sleep patterns (increase, decrease, or broken hoours of sleep)  Feelings of worthlessness  Feelings of increased guilt  Increased nervous feelings/anxiety  Racing thoughts     No current SI     Has appropriate goals for later today.

## 2020-05-19 ENCOUNTER — OFFICE VISIT (OUTPATIENT)
Dept: PSYCHIATRY | Facility: HOSPITAL | Age: 30
End: 2020-05-19

## 2020-05-19 DIAGNOSIS — F33.1 MAJOR DEPRESSIVE DISORDER, RECURRENT EPISODE, MODERATE (HCC): Primary | ICD-10-CM

## 2020-05-19 PROCEDURE — S9480 INTENSIVE OUTPATIENT PSYCHIA: HCPCS | Performed by: COUNSELOR

## 2020-05-19 NOTE — TREATMENT PLAN
Multi-Disciplinary Problems (from Behavioral Health Treatment Plan)    Active Problems     Problem: Anxiety  Start Date: 05/18/20    Problem Details:  The patient self-scales this problem as a 8 with 10 being the worst.      Goal Priority Start Date Expected End Date End Date    Patient will develop and implement behavioral and cognitive strategies to reduce anxiety and irrational fears. -- 05/18/20 -- --    Goal Details:  Progress toward goal:  Not appropriate to rate progress toward goal since this is the initial treatment plan.      Goal Intervention Frequency Start Date End Date    Help patient explore past emotional issues in relation to present anxiety. Weekly 05/18/20 --    Intervention Details:  Duration of treatment until until discharged.      Goal Intervention Frequency Start Date End Date    Help patient develop an awareness of their cognitive and physical responses to anxiety. Weekly 05/18/20 --    Intervention Details:  Duration of treatment until until discharged.            Problem: Depression  Start Date: 05/18/20    Problem Details:  The patient self-scales this problem as a 9 with 10 being the worst.      Goal Priority Start Date Expected End Date End Date    Patient will demonstrate the ability to initiate new constructive life skills outside of sessions on a consistent basis. -- 05/18/20 -- --    Goal Details:  Progress toward goal:  Not appropriate to rate progress toward goal since this is the initial treatment plan.      Goal Intervention Frequency Start Date End Date    Assist patient in setting attainable activities of daily living goals. PRN 05/18/20 --    Goal Intervention Frequency Start Date End Date    Provide education about depression Weekly 05/18/20 --    Intervention Details:  Duration of treatment until until discharged.      Goal Intervention Frequency Start Date End Date    Assist patient in developing healthy coping strategies. Weekly 05/18/20 --    Intervention Details:   Duration of treatment until until discharged.                         I have discussed and reviewed this treatment plan with the patient.  It has been printed for signatures.

## 2020-05-19 NOTE — PROGRESS NOTES
"Group therapy [id number]  Shared of difficulty finding ziyad in life.  Discussed fleeting moments with her 14-mon old nephew \"but not often\".   Stated the highpoint of her day yesterday was taking a 10 min walk.  Encouraged pt to repeat this again today after she works from home for a few hours.  Pt was quiet, but attentive, as others shared today.  "

## 2020-05-19 NOTE — PROGRESS NOTES
Wrap-up  Mood at 8-9 with 10 being the worse and found art therapy to be most helpful today    Feeling sad or empty   Trouble with concentration, memory, or making decisions  Fatigue or loss of energy  Loss of interest in things you usually like to do  Easily distracted  An increase/decrease in normal appetite  Changes in normal sleep patterns (increase, decrease, or broken hoours of sleep)  Feelings of worthlessness  Feelings of increased guilt  Increased nervous feelings/anxiety  Racing thoughts     No SI    Appropriate goals for later today.

## 2020-05-19 NOTE — TREATMENT PLAN
I have discussed and reviewed this treatment plan with the patient.  It has been printed for signatures.

## 2020-05-19 NOTE — PROGRESS NOTES
Other     Information/activity     [id number] Art Therapy - POSIES - creating an image of a flower garden as a metaphor to represent six different aspects of self - physical, occupational,         social,intellectual,emotional and spiritual     Instructor Edward Dumont, LPAT     12:59     Patient Response Good participation

## 2020-05-20 NOTE — PROGRESS NOTES
"Treatment team met to review plan  Pt has attended 2 days of IOP dealing with depression, anxiety and sporadic SI.  Fairly quiet unless approached directly.  Identifies moving back home a year ago after a roommate moved back home as stressful but reports most of her symptoms have been ongoing \"for years\".  Has OP providers in place.  "

## 2020-05-22 ENCOUNTER — APPOINTMENT (OUTPATIENT)
Dept: PSYCHIATRY | Facility: HOSPITAL | Age: 30
End: 2020-05-22

## 2020-05-26 ENCOUNTER — APPOINTMENT (OUTPATIENT)
Dept: PSYCHIATRY | Facility: HOSPITAL | Age: 30
End: 2020-05-26

## 2020-05-27 ENCOUNTER — APPOINTMENT (OUTPATIENT)
Dept: PSYCHIATRY | Facility: HOSPITAL | Age: 30
End: 2020-05-27

## 2020-05-28 ENCOUNTER — OFFICE VISIT (OUTPATIENT)
Dept: PSYCHIATRY | Facility: HOSPITAL | Age: 30
End: 2020-05-28

## 2020-05-28 ENCOUNTER — APPOINTMENT (OUTPATIENT)
Dept: PSYCHIATRY | Facility: HOSPITAL | Age: 30
End: 2020-05-28

## 2020-05-28 DIAGNOSIS — F33.1 MAJOR DEPRESSIVE DISORDER, RECURRENT EPISODE, MODERATE (HCC): Primary | ICD-10-CM

## 2020-05-28 PROCEDURE — S9480 INTENSIVE OUTPATIENT PSYCHIA: HCPCS

## 2020-05-28 NOTE — PROGRESS NOTES
"Wrap Up:  Mood at 8 with 10 being the worse and reports \"the feelings wheel and steps for self care\" were most helpful today.  Pt reports following symptoms:    Feeling sad or empty   Trouble with concentration, memory, or making decisions  Fatigue or loss of energy  Loss of interest in things you usually like to do  Easily tearing up/crying  Increased Irritabliliy  Easily distracted  An increase/decrease in normal appetite  Feelings of worthlessness  Feelings of increased guilt  Increased nervous feelings/anxiety  Racing thoughts     Pt reports she has thoughts of self harm but no plans or intention \"it is just always in the back of my mind.\"  Pt has appropriate goals for the day and plans to return tomorrow.         "

## 2020-05-28 NOTE — PROGRESS NOTES
Teaching Record:  Information / activity:  Co-dependency Family roles in high stress systems    Good participation  [id number]      Nicolasa Lyn, LCSW

## 2020-05-28 NOTE — PROGRESS NOTES
Group Note:  Pt shared that she met with her therapist and psychiatrist at [facility] yesterday and they told her she needed to complete IOP.  Pt shared hx of her depression and reports the next plan is for her to begin TMS or ECT if IOP is not helpful.  She is also considering going to part time hours at her job to help decrease stress.  She reports having a good relationship with her parents, whom she lives with, and states she has been more open with them this week about her depression.

## 2020-05-29 ENCOUNTER — OFFICE VISIT (OUTPATIENT)
Dept: PSYCHIATRY | Facility: HOSPITAL | Age: 30
End: 2020-05-29

## 2020-05-29 ENCOUNTER — APPOINTMENT (OUTPATIENT)
Dept: PSYCHIATRY | Facility: HOSPITAL | Age: 30
End: 2020-05-29

## 2020-05-29 DIAGNOSIS — F33.1 MAJOR DEPRESSIVE DISORDER, RECURRENT EPISODE, MODERATE (HCC): Primary | ICD-10-CM

## 2020-05-29 PROCEDURE — S9480 INTENSIVE OUTPATIENT PSYCHIA: HCPCS

## 2020-05-29 NOTE — PROGRESS NOTES
"Mental Health Awareness Class   ([id number])  Information/activity    \"The Anatomy of Trust\"    Instructor Zonia Martins, [facility] [place]     11:33     Patient Response Good participation  "

## 2020-05-29 NOTE — PROGRESS NOTES
Group Note:  Pt shared feeling high anxiety and stress regarding work.  Reports she has been coming home crying everyday and feeling overwhelmed.  She and her mom are discussing options of working part time vs not working right now.  Pt shared this will affect her health insurance and also worried that without structure her depression may become worse.  Pt was open to feedback about creating a schedule for herself at home and talking to supervisor about options.  She continues to struggle with lack of motivation, fatigue, and anxiety.  She went for a walk and took a bath last night to practice self care.

## 2020-05-29 NOTE — PROGRESS NOTES
"Wrap Up:  Mood at 9 with 10 being the worse and reports \"talking about my issues and getting feedback\" was most helpful today.  Pt reports following symptoms:    Feeling sad or empty   Trouble with concentration, memory, or making decisions  Fatigue or loss of energy  Loss of interest in things you usually like to do  Easily tearing up/crying  Easily distracted  An increase/decrease in normal appetite  Feelings of worthlessness  Feelings of increased guilt  Increased nervous feelings/anxiety  Racing thoughts     Pt reports having thoughts of self harm but no plan or intention of acting on them.  She has appropriate goals for the day.  Would like a referral for new therapist at d/c.     "

## 2020-06-01 ENCOUNTER — OFFICE VISIT (OUTPATIENT)
Dept: PSYCHIATRY | Facility: HOSPITAL | Age: 30
End: 2020-06-01

## 2020-06-01 ENCOUNTER — APPOINTMENT (OUTPATIENT)
Dept: PSYCHIATRY | Facility: HOSPITAL | Age: 30
End: 2020-06-01

## 2020-06-01 DIAGNOSIS — F33.1 MAJOR DEPRESSIVE DISORDER, RECURRENT EPISODE, MODERATE (HCC): Primary | ICD-10-CM

## 2020-06-01 PROCEDURE — S9480 INTENSIVE OUTPATIENT PSYCHIA: HCPCS | Performed by: COUNSELOR

## 2020-06-01 NOTE — PROGRESS NOTES
"[id number] Psych IOP Class     Class topic: Dr. Jessy Toussaint RAMÍREZ talk, \"The Gift of Unpleasant Feelings\" with discussion after.     Class participation: pt engaged and processed thoughts on video and difficulty staying present at times.     "

## 2020-06-01 NOTE — PROGRESS NOTES
"Group therapy   Shared of an \"okay weekend\".  States most of the time, she remains depressed and anxious but only has fleeting moments when \"I try to convince myself I am okay.\"  Spent time with family and with young nephew this weekend.  Remained quiet, but attentive, as others shared today.  "

## 2020-06-01 NOTE — PROGRESS NOTES
Wrap-up  Mood at 8 with 10 being the worse and found the class on emotions to be most helpful today.    Feeling sad or empty   Trouble with concentration, memory, or making decisions  Fatigue or loss of energy  Loss of interest in things you usually like to do  Increased Irritabliliy  Easily distracted  Feelings of worthlessness  Feelings of increased guilt  Increased nervous feelings/anxiety  Racing thoughts     Has vague SI put no plan or intent to act on this.    Appropriate goals for later today.

## 2020-06-02 ENCOUNTER — OFFICE VISIT (OUTPATIENT)
Dept: PSYCHIATRY | Facility: HOSPITAL | Age: 30
End: 2020-06-02

## 2020-06-02 ENCOUNTER — APPOINTMENT (OUTPATIENT)
Dept: PSYCHIATRY | Facility: HOSPITAL | Age: 30
End: 2020-06-02

## 2020-06-02 DIAGNOSIS — F33.1 MAJOR DEPRESSIVE DISORDER, RECURRENT EPISODE, MODERATE (HCC): Primary | ICD-10-CM

## 2020-06-02 PROCEDURE — S9480 INTENSIVE OUTPATIENT PSYCHIA: HCPCS | Performed by: COUNSELOR

## 2020-06-02 NOTE — PROGRESS NOTES
"Group therapy [id number]  Pt shared details of environment at work which causes stress.  Pt has plan to go part-time when she qualifies for FMLA in July.  Also reported she wants to see a Voc Counselor to pursue a job utilizing her talents and skills with her current degree as this job \"is boring \".   Pt remained attentive but quiet as others shared.  "

## 2020-06-02 NOTE — PROGRESS NOTES
Mental Health Awareness Class   (10:30am-11:30)  Information/activity     Stress Management    Instructor Ekaterina Valente LCSW     14:13     Patient Response Moderate participation  Quiet but attentive

## 2020-06-02 NOTE — PROGRESS NOTES
Wrap-up  Mood at 8  with 10 being the worse and found class on stress management to be most helpful today    Feeling sad or empty   Trouble with concentration, memory, or making decisions  Fatigue or loss of energy  Loss of interest in things you usually like to do  Easily distracted  An increase/decrease in normal appetite  Feelings of worthlessness  Feelings of increased guilt  Increased nervous feelings/anxiety  Racing thoughts     No SI    Appropriate goals for later today.

## 2020-06-03 ENCOUNTER — APPOINTMENT (OUTPATIENT)
Dept: PSYCHIATRY | Facility: HOSPITAL | Age: 30
End: 2020-06-03

## 2020-06-03 ENCOUNTER — OFFICE VISIT (OUTPATIENT)
Dept: PSYCHIATRY | Facility: HOSPITAL | Age: 30
End: 2020-06-03

## 2020-06-03 DIAGNOSIS — F33.1 MAJOR DEPRESSIVE DISORDER, RECURRENT EPISODE, MODERATE (HCC): Primary | ICD-10-CM

## 2020-06-03 PROCEDURE — S9480 INTENSIVE OUTPATIENT PSYCHIA: HCPCS | Performed by: COUNSELOR

## 2020-06-03 NOTE — PROGRESS NOTES
Group therapy [id number]  Pt shared of difficult day after IOP yesterday--had severe panic.  Pt did report she was able to do some physical exercises which helped her to feel somewhat calmer and then went to bed and rested.  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  Pt remained quiet as peers shared today.

## 2020-06-03 NOTE — PROGRESS NOTES
Treatment team met:  Pt returned to IOP this week after having been here only two days a couple of weeks ago.  Continues to describe depressed mood and numerous depressive symptoms.  Has an appointment soon with OP medical provider to see if meds can be adjusted as pt has felt no relief from current regime.  Rather quiet in classes and groups unless directly approached.  Remains vague as to current stressors.  No current SI but does report this intermittently.  Has OP providers in place.

## 2020-06-03 NOTE — PROGRESS NOTES
Other     Information/activity     [id number] Art Therapy - Change of Perspective task using markers on paper and processing    Instructor Edward Dumont, [facility]     13:41     Patient Response Good participation  Pt shared openly during processing and related to peers.

## 2020-06-03 NOTE — PROGRESS NOTES
Wrap-up  Mood at 9 with 10 being the worse and found art therapy to be most helpful today    Feeling sad or empty   Trouble with concentration, memory, or making decisions  Fatigue or loss of energy  Loss of interest in things you usually like to do  Increased Irritabliliy  Easily distracted  An increase/decrease in normal appetite  Feelings of worthlessness  Feelings of increased guilt  Increased nervous feelings/anxiety  Racing thoughts     Vague SI put no plan or intent to act on this    Has appropriate goals for later today.

## 2020-06-04 ENCOUNTER — OFFICE VISIT (OUTPATIENT)
Dept: PSYCHIATRY | Facility: HOSPITAL | Age: 30
End: 2020-06-04

## 2020-06-04 ENCOUNTER — APPOINTMENT (OUTPATIENT)
Dept: PSYCHIATRY | Facility: HOSPITAL | Age: 30
End: 2020-06-04

## 2020-06-04 DIAGNOSIS — F33.1 MAJOR DEPRESSIVE DISORDER, RECURRENT EPISODE, MODERATE (HCC): Primary | ICD-10-CM

## 2020-06-04 PROCEDURE — S9480 INTENSIVE OUTPATIENT PSYCHIA: HCPCS

## 2020-06-04 NOTE — PROGRESS NOTES
"Group Note:  Pt shared of feeling defeated and \"tired of being sick and tired of working so hard to get well.\"  Pt does not feel like she is improving, although states she likes coming to [facility] and is achieving the small goals she sets each day.  She is very much looking forward to her beach trip in  and her psych testing in late July.  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   "

## 2020-06-04 NOTE — PROGRESS NOTES
Teaching Record:  Information / activity:  Alcohol Education, Process Addictions, Heroin and Opiates Education and Marijuana Education    Good participation  [id number]      Nicolasa Lyn LCSW

## 2020-06-04 NOTE — PROGRESS NOTES
Wrap Up:  Mood at 8 with 10 being the worse and reports group therapy was most helpful today.  She reports following symptoms:    Feeling sad or empty   Trouble with concentration, memory, or making decisions  Fatigue or loss of energy  Loss of interest in things you usually like to do  Easily tearing up/crying  Increased Irritabliliy  Easily distracted  Feelings of worthlessness  Feelings of increased guilt  Increased nervous feelings/anxiety  Racing thoughts     Pt reports having thoughts of self harm but no plan or intent to act on them.  She feels safe at home.  Pt has appropriate goals for the day.

## 2020-06-05 ENCOUNTER — APPOINTMENT (OUTPATIENT)
Dept: PSYCHIATRY | Facility: HOSPITAL | Age: 30
End: 2020-06-05

## 2020-06-05 ENCOUNTER — OFFICE VISIT (OUTPATIENT)
Dept: PSYCHIATRY | Facility: HOSPITAL | Age: 30
End: 2020-06-05

## 2020-06-05 DIAGNOSIS — F33.1 MAJOR DEPRESSIVE DISORDER, RECURRENT EPISODE, MODERATE (HCC): Primary | ICD-10-CM

## 2020-06-05 PROCEDURE — S9480 INTENSIVE OUTPATIENT PSYCHIA: HCPCS

## 2020-06-05 NOTE — PROGRESS NOTES
[id number] Psych IOP Class    Class topic: comparing passive/aggressive/assertive communication     Class participation: good; pt actively engaged and shared insight into topic.

## 2020-06-05 NOTE — PROGRESS NOTES
"Group Note:  Pt reports poor sleep last night and feeling \"like I don't care\" today.  Pt continues to struggle with SI with no plan and is frustrated by her lack of progress.  She sees her psychiatrist on Monday and plans to ask about alternative treatments such as TMS or ECT.  She was able to go outside to walk yesterday and enjoyed spending time with her 14mo nephew.    "

## 2020-06-05 NOTE — PROGRESS NOTES
"Wrap Up:  Mood at 9 with 10 being the worse and reports group therapy was most helpful today.  Pt reports following symptoms:    Feeling sad or empty   Trouble with concentration, memory, or making decisions  Fatigue or loss of energy  Loss of interest in things you usually like to do  Easily distracted  Changes in normal sleep patterns (increase, decrease, or broken hoours of sleep)  Feelings of worthlessness  Feelings of increased guilt  Increased nervous feelings/anxiety  Racing thoughts     Pt continues to have thoughts of self harm but no plan and states she feels safe at home.  She has appropriate goals for the day and would like a class on \"how to combat racing/negative thoughts.\"    "

## 2020-06-08 ENCOUNTER — OFFICE VISIT (OUTPATIENT)
Dept: PSYCHIATRY | Facility: HOSPITAL | Age: 30
End: 2020-06-08

## 2020-06-08 ENCOUNTER — APPOINTMENT (OUTPATIENT)
Dept: PSYCHIATRY | Facility: HOSPITAL | Age: 30
End: 2020-06-08

## 2020-06-08 DIAGNOSIS — F33.1 MAJOR DEPRESSIVE DISORDER, RECURRENT EPISODE, MODERATE (HCC): Primary | ICD-10-CM

## 2020-06-08 PROCEDURE — S9480 INTENSIVE OUTPATIENT PSYCHIA: HCPCS | Performed by: COUNSELOR

## 2020-06-08 NOTE — PROGRESS NOTES
"Group therapy [id number]  Pt shared of frustration of \"doing everything I can but not feeling much better\".  Describes taking a walk, spending time with family and time working on her plants this weekend.  Pt also found a job online and completed an application.  Has an appointment later today with APRN and hopes to discuss a medication adjustment or other types of therapy.  Pt was attentive as others shared today.  "

## 2020-06-08 NOTE — PROGRESS NOTES
Other     Information/activity     Mindfulness-handouts, discussion and practice    Instructor Sushma Edmond, [facility]     11:57     Patient Response Good participation

## 2020-06-08 NOTE — PROGRESS NOTES
Wrap-up  Mood at 8 with 10 being the worse and found Mindfulness Class as most helpful today    Feeling sad or empty   Trouble with concentration, memory, or making decisions  Fatigue or loss of energy  Loss of interest in things you usually like to do  Easily distracted  Feelings of worthlessness  Feelings of increased guilt  Increased nervous feelings/anxiety  Racing thoughts     Vague SI but no plan or intent and feels safe at home    Appropriate goals for later today

## 2020-06-09 ENCOUNTER — APPOINTMENT (OUTPATIENT)
Dept: PSYCHIATRY | Facility: HOSPITAL | Age: 30
End: 2020-06-09

## 2020-06-09 ENCOUNTER — OFFICE VISIT (OUTPATIENT)
Dept: PSYCHIATRY | Facility: HOSPITAL | Age: 30
End: 2020-06-09

## 2020-06-09 ENCOUNTER — OFFICE VISIT (OUTPATIENT)
Dept: GASTROENTEROLOGY | Facility: CLINIC | Age: 30
End: 2020-06-09

## 2020-06-09 VITALS
HEART RATE: 115 BPM | OXYGEN SATURATION: 97 % | SYSTOLIC BLOOD PRESSURE: 122 MMHG | RESPIRATION RATE: 16 BRPM | WEIGHT: 265.4 LBS | BODY MASS INDEX: 42.65 KG/M2 | HEIGHT: 66 IN | DIASTOLIC BLOOD PRESSURE: 70 MMHG | TEMPERATURE: 98.2 F

## 2020-06-09 DIAGNOSIS — E66.01 OBESITY, CLASS III, BMI 40-49.9 (MORBID OBESITY) (HCC): ICD-10-CM

## 2020-06-09 DIAGNOSIS — F33.1 MAJOR DEPRESSIVE DISORDER, RECURRENT EPISODE, MODERATE (HCC): Primary | ICD-10-CM

## 2020-06-09 DIAGNOSIS — K58.0 IRRITABLE BOWEL SYNDROME WITH DIARRHEA: ICD-10-CM

## 2020-06-09 DIAGNOSIS — R74.8 ELEVATED LIVER ENZYMES: Primary | ICD-10-CM

## 2020-06-09 PROCEDURE — 99204 OFFICE O/P NEW MOD 45 MIN: CPT | Performed by: INTERNAL MEDICINE

## 2020-06-09 PROCEDURE — S9480 INTENSIVE OUTPATIENT PSYCHIA: HCPCS | Performed by: COUNSELOR

## 2020-06-09 RX ORDER — DICYCLOMINE HYDROCHLORIDE 10 MG/1
10 CAPSULE ORAL 3 TIMES DAILY PRN
Qty: 90 CAPSULE | Refills: 5 | Status: SHIPPED | OUTPATIENT
Start: 2020-06-09 | End: 2021-04-08 | Stop reason: HOSPADM

## 2020-06-09 RX ORDER — CYANOCOBALAMIN (VITAMIN B-12) 500 MCG
500 TABLET ORAL DAILY
Status: ON HOLD | COMMUNITY
End: 2021-04-28

## 2020-06-09 NOTE — PROGRESS NOTES
Wrap-up  Mood at 8 with 10 being the worse and found group therapy to be most helpful today    Feeling sad or empty   Trouble with concentration, memory, or making decisions  Fatigue or loss of energy  Loss of interest in things you usually like to do  Easily distracted  Feelings of worthlessness  Feelings of increased guilt  Increased nervous feelings/anxiety  Racing thoughts     Vague SI but no plan or intent    Has appropriate goals for later today

## 2020-06-09 NOTE — PROGRESS NOTES
Elevated Hepatic Enzymes      HPI  Patient for consultation.  She is here for elevated liver enzymes.  She has a modestly elevated ALT and AST level.  Her bilirubin is normal.  She has no personal or family history of liver disease.  She denies any right upper quadrant pain.  She did have a cholecystectomy in the past for a calculus cholecystitis.  She denies any significant alcohol use or any new medications.  She is obese and weight is stable.  She denies any jaundice or pruritus.  She does have fatigue but this seems to be multifactorial.  She is also been evaluated for slightly high white blood cell count.  She denies any history of mononucleosis.  She had elevated liver enzymes back in March as well but the numbers have worsened slightly.    She also complains of irritable bowel symptoms with persistent diarrhea after meals.  She describes cramping and urgency.  The diarrhea is nonbloody.  No excessive bloating or belching.  Symptoms are worse after greasy meal.  This some ongoing for years and symptoms are worsening slightly.  She had a previous work-up with EGD and colonoscopy which were negative.    Review of Systems   Constitutional: Negative for appetite change, chills, diaphoresis, fatigue, fever and unexpected weight change.   HENT: Negative for dental problem, ear pain, mouth sores, rhinorrhea, sore throat and voice change.    Eyes: Negative for pain, redness and visual disturbance.   Respiratory: Negative for cough, chest tightness and wheezing.    Cardiovascular: Negative for chest pain, palpitations and leg swelling.   Endocrine: Negative for cold intolerance, heat intolerance, polydipsia, polyphagia and polyuria.   Genitourinary: Negative for dysuria, frequency, hematuria and urgency.   Musculoskeletal: Positive for neck pain. Negative for arthralgias, back pain, joint swelling and myalgias.   Skin: Negative for rash.   Allergic/Immunologic: Negative for environmental allergies, food allergies and  immunocompromised state.   Neurological: Negative for dizziness, seizures, weakness, numbness and headaches.   Hematological: Does not bruise/bleed easily.   Psychiatric/Behavioral: Negative for sleep disturbance. The patient is not nervous/anxious.         I have reviewed and confirmed the accuracy of the HPI and ROS as documented by the FIDE Funes MD     Problem List:    Patient Active Problem List   Diagnosis   • Hemiplegic migraine, intractable   • Fibromyalgia   • Major depressive disorder, recurrent, severe without psychotic features (CMS/[facility])   • Vaginal yeast infection   • Leukocytosis   • Hyponatremia   • Hypothyroidism   • Irritable bowel disease   • Obesity (BMI 30-39.9)   • Polysubstance overdose   • Obesity, Class III, BMI 40-49.9 (morbid obesity) (CMS/[facility])       Medical History:    Past Medical History:   Diagnosis Date   • Anxiety    • Asthma    • Depression    • Fibromyalgia    • GERD (gastroesophageal reflux disease)    • Hypothyroidism    • Irritable bowel disease    • Migraines         Social History:    Social History     Socioeconomic History   • Marital status: Single     Spouse name: Not on file   • Number of children: Not on file   • Years of education: Not on file   • Highest education level: Not on file   Tobacco Use   • Smoking status: Never Smoker   • Smokeless tobacco: Never Used   Substance and Sexual Activity   • Alcohol use: Yes     Alcohol/week: 1.0 standard drinks     Types: 1 Cans of beer per week     Comment: social   • Drug use: No   • Sexual activity: Defer       Family History:   Family History   Problem Relation Age of Onset   • Colon cancer Neg Hx    • Colon polyps Neg Hx        Surgical History:   Past Surgical History:   Procedure Laterality Date   • APPENDECTOMY      laparoscopic   • CHOLECYSTECTOMY     • KNEE ARTHROSCOPY Left    • PATELLA FEMORAL LIGAMENT RECONSTRUCTION Left    • TONSILLECTOMY AND ADENOIDECTOMY           Current Outpatient Medications:   •   Brexpiprazole (Rexulti) 2 MG tablet, Take  by mouth., Disp: , Rfl:   •  busPIRone (BUSPAR) 15 MG tablet, Take 1 tablet by mouth 2 (Two) Times a Day With Meals. Indications: Anxiety Disorder, Disp: 60 tablet, Rfl: 0  •  fluticasone (FLONASE) 50 MCG/ACT nasal spray, 2 sprays into the nostril(s) as directed by provider Daily., Disp: , Rfl:   •  fluticasone (Flovent HFA) 110 MCG/ACT inhaler, Flovent  mcg/actuation aerosol inhaler  TAKE 1 PUFF BY MOUTH TWICE A DAY, Disp: , Rfl:   •  levothyroxine (SYNTHROID, LEVOTHROID) 88 MCG tablet, Take 88 mcg by mouth daily., Disp: , Rfl:   •  lithium carbonate 300 MG capsule, Take 1 capsule by mouth 2 (Two) Times a Day. Indications: Depression, Disp: 60 capsule, Rfl: 1  •  traZODone (DESYREL) 50 MG tablet, Take 1 tablet by mouth Every Night. Indications: Trouble Sleeping, Disp: 30 tablet, Rfl: 0  •  Vitamin D, Cholecalciferol, (CHOLECALCIFEROL) 10 MCG (400 UNIT) tablet, Take 2,000 Units by mouth Daily., Disp: , Rfl:   •  Cyanocobalamin (VITAMIN B 12) 500 MCG tablet, , Disp: , Rfl:   •  predniSONE (DELTASONE) 10 MG (21) tablet pack, Take  by mouth Daily. Use as directed on package, Disp: 21 tablet, Rfl: 0  •  thiamine (VITAMIN B-1) 100 MG tablet, Take 100 mg by mouth Daily., Disp: , Rfl:     Allergies:   Allergies   Allergen Reactions   • Codeine Nausea And Vomiting and Rash   • Dhea [Nutritional Supplements] Other (See Comments)     Severe headache   • Imitrex [Sumatriptan] Other (See Comments)     Intensified a migraine   • Morphine Other (See Comments)     Severe headache        The following portions of the patient's history were reviewed and updated as appropriate: allergies, current medications, past family history, past medical history, past social history, past surgical history and problem list.    Vitals:    06/09/20 1503   BP: 122/70   Pulse: 115   Resp: 16   Temp: 98.2 °F (36.8 °C)   SpO2: 97%         06/09/20  1503   Weight: 120 kg (265 lb 6.4 oz)     Body mass  index is 42.84 kg/m².      PHYSICAL EXAM:  Physical Exam   Constitutional: She appears well-developed.   HENT:   Nose: Nose normal. No nasal deformity.   Eyes: No scleral icterus.   Neck: No tracheal deviation present.   Pulmonary/Chest: Effort normal and breath sounds normal. No respiratory distress.   Abdominal: Soft. Normal appearance and bowel sounds are normal. She exhibits no shifting dullness and no distension. There is no hepatosplenomegaly. There is no tenderness. There is no rigidity, no rebound and no guarding. No hernia.   Lymphadenopathy:   No periumbilical lymphadenopathy   Neurological: She is alert.   Skin: Skin is warm. No cyanosis.   Psychiatric: She has a normal mood and affect. Her behavior is normal.   Vitals reviewed.          Assessment/ Plan  Maame was seen today for elevated hepatic enzymes.    Diagnoses and all orders for this visit:    Obesity, Class III, BMI 40-49.9 (morbid obesity) (CMS/HCC)    Elevated liver enzymes    Irritable bowel syndrome with diarrhea         No follow-ups on file.    There are no Patient Instructions on file for this visit.        Discussion:  Patient has worsening liver enzymes.  We will check liver ultrasound as well as full serologic and metabolic work-up.  We will go ahead and start her on milk thistle for likely fatty liver disease.  We will make further recommendations pending results of lab work and ultrasound.    As for her IBS D, this seems to be worsening and we will add Bentyl.  If no relief, may consider Xifaxan and or repeat endoscopy.    She will follow-up in 4 weeks for further management.

## 2020-06-09 NOTE — PATIENT INSTRUCTIONS
Fatty Liver Disease    Fatty liver disease occurs when too much fat has built up in your liver cells. Fatty liver disease is also called hepatic steatosis or steatohepatitis. The liver removes harmful substances from your bloodstream and produces fluids that your body needs. It also helps your body use and store energy from the food you eat.  In many cases, fatty liver disease does not cause symptoms or problems. It is often diagnosed when tests are being done for other reasons. However, over time, fatty liver can cause inflammation that may lead to more serious liver problems, such as scarring of the liver (cirrhosis) and liver failure.  Fatty liver is associated with insulin resistance, increased body fat, high blood pressure (hypertension), and high cholesterol. These are features of metabolic syndrome and increase your risk for stroke, diabetes, and heart disease.  What are the causes?  This condition may be caused by:  · Drinking too much alcohol.  · Poor nutrition.  · Obesity.  · Cushing's syndrome.  · Diabetes.  · High cholesterol.  · Certain drugs.  · Poisons.  · Some viral infections.  · Pregnancy.  What increases the risk?  You are more likely to develop this condition if you:  · Abuse alcohol.  · Are overweight.  · Have diabetes.  · Have hepatitis.  · Have a high triglyceride level.  · Are pregnant.  What are the signs or symptoms?  Fatty liver disease often does not cause symptoms. If symptoms do develop, they can include:  · Fatigue.  · Weakness.  · Weight loss.  · Confusion.  · Abdominal pain.  · Nausea and vomiting.  · Yellowing of your skin and the white parts of your eyes (jaundice).  · Itchy skin.  How is this diagnosed?  This condition may be diagnosed by:  · A physical exam and medical history.  · Blood tests.  · Imaging tests, such as an ultrasound, CT scan, or MRI.  · A liver biopsy. A small sample of liver tissue is removed using a needle. The sample is then looked at under a microscope.  How  is this treated?  Fatty liver disease is often caused by other health conditions. Treatment for fatty liver may involve medicines and lifestyle changes to manage conditions such as:  · Alcoholism.  · High cholesterol.  · Diabetes.  · Being overweight or obese.  Follow these instructions at home:    · Do not drink alcohol. If you have trouble quitting, ask your health care provider how to safely quit with the help of medicine or a supervised program. This is important to keep your condition from getting worse.  · Eat a healthy diet as told by your health care provider. Ask your health care provider about working with a diet and nutrition specialist (dietitian) to develop an eating plan.  · Exercise regularly. This can help you lose weight and control your cholesterol and diabetes. Talk to your health care provider about an exercise plan and which activities are best for you.  · Take over-the-counter and prescription medicines only as told by your health care provider.  · Keep all follow-up visits as told by your health care provider. This is important.  Contact a health care provider if:  You have trouble controlling your:  · Blood sugar. This is especially important if you have diabetes.  · Cholesterol.  · Drinking of alcohol.  Get help right away if:  · You have abdominal pain.  · You have jaundice.  · You have nausea and vomiting.  · You vomit blood or material that looks like coffee grounds.  · You have stools that are black, tar-like, or bloody.  Summary  · Fatty liver disease develops when too much fat builds up in the cells of your liver.  · Fatty liver disease often causes no symptoms or problems. However, over time, fatty liver can cause inflammation that may lead to more serious liver problems, such as scarring of the liver (cirrhosis).  · You are more likely to develop this condition if you abuse alcohol, are pregnant, are overweight, have diabetes, have hepatitis, or have high triglyceride  levels.  · Contact your health care provider if you have trouble controlling your weight, blood sugar, cholesterol, or drinking of alcohol.  This information is not intended to replace advice given to you by your health care provider. Make sure you discuss any questions you have with your health care provider.  Document Released: 02/02/2007 Document Revised: 11/30/2018 Document Reviewed: 09/26/2018  Elsevier Patient Education © 2020 [employer]

## 2020-06-09 NOTE — PROGRESS NOTES
"Group therapy [id number]  Pt discussed feeling disappointed with visit with her APRN yesterday-\"she told me I should go to [facility] for another eval even though I am being honest here in IOP\".  APRN did not want to adjust meds at this time.  Pt continues to report high anxiety, numerous depressive symptoms and intermittent SI although she clearly states \"I am looking forward to my trip to the beach at I have no intention to act on my thoughts.\"  Pt received support from other group members for being here, trying her coping skills and being honest.  "

## 2020-06-10 ENCOUNTER — OFFICE VISIT (OUTPATIENT)
Dept: PSYCHIATRY | Facility: HOSPITAL | Age: 30
End: 2020-06-10

## 2020-06-10 DIAGNOSIS — F33.1 MAJOR DEPRESSIVE DISORDER, RECURRENT EPISODE, MODERATE (HCC): Primary | ICD-10-CM

## 2020-06-10 LAB
A1AT SERPL-MCNC: 104 MG/DL (ref 100–188)
ACTIN IGG SERPL-ACNC: 4 UNITS (ref 0–19)
ALBUMIN SERPL-MCNC: 4.3 G/DL (ref 3.5–5.2)
ALBUMIN/GLOB SERPL: 1.5 G/DL
ALP SERPL-CCNC: 62 U/L (ref 39–117)
ALT SERPL-CCNC: 51 U/L (ref 1–33)
ANA SER QL: NEGATIVE
AST SERPL-CCNC: 35 U/L (ref 1–32)
BILIRUB SERPL-MCNC: 0.3 MG/DL (ref 0.2–1.2)
BUN SERPL-MCNC: 6 MG/DL (ref 6–20)
BUN/CREAT SERPL: 6.3 (ref 7–25)
CALCIUM SERPL-MCNC: 9.7 MG/DL (ref 8.6–10.5)
CERULOPLASMIN SERPL-MCNC: 23.8 MG/DL (ref 19–39)
CHLORIDE SERPL-SCNC: 106 MMOL/L (ref 98–107)
CMV IGG SERPL IA-ACNC: <0.6 U/ML (ref 0–0.59)
CMV IGM SERPL IA-ACNC: <30 AU/ML (ref 0–29.9)
CO2 SERPL-SCNC: 20.9 MMOL/L (ref 22–29)
CREAT SERPL-MCNC: 0.96 MG/DL (ref 0.57–1)
ENDOMYSIUM IGA SER QL: NEGATIVE
FERRITIN SERPL-MCNC: 48.3 NG/ML (ref 13–150)
FOLATE SERPL-MCNC: 14.1 NG/ML (ref 4.78–24.2)
GGT SERPL-CCNC: 25 U/L (ref 5–36)
GLOBULIN SER CALC-MCNC: 2.8 GM/DL
GLUCOSE SERPL-MCNC: 118 MG/DL (ref 65–99)
HAV IGM SERPL QL IA: NEGATIVE
HBV CORE IGM SERPL QL IA: NEGATIVE
HBV SURFACE AG SERPL QL IA: NEGATIVE
HCV AB S/CO SERPL IA: <0.1 S/CO RATIO (ref 0–0.9)
IGA SERPL-MCNC: 296 MG/DL (ref 87–352)
INR PPP: 0.94 (ref 0.9–1.1)
IRON SATN MFR SERPL: 17 % (ref 20–50)
IRON SERPL-MCNC: 63 MCG/DL (ref 37–145)
MITOCHONDRIA M2 IGG SER-ACNC: <20 UNITS (ref 0–20)
POTASSIUM SERPL-SCNC: 3.5 MMOL/L (ref 3.5–5.2)
PROT SERPL-MCNC: 7.1 G/DL (ref 6–8.5)
PROTHROMBIN TIME: 12.3 SECONDS (ref 11.7–14.2)
SODIUM SERPL-SCNC: 138 MMOL/L (ref 136–145)
TIBC SERPL-MCNC: 381 MCG/DL
TTG IGA SER-ACNC: <2 U/ML (ref 0–3)
UIBC SERPL-MCNC: 318 MCG/DL (ref 112–346)

## 2020-06-10 PROCEDURE — S9480 INTENSIVE OUTPATIENT PSYCHIA: HCPCS | Performed by: COUNSELOR

## 2020-06-10 NOTE — PROGRESS NOTES
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   States she believes she is ready and knows it will just be for a week until vacation.  Pt was attentive as peers shared today.

## 2020-06-10 NOTE — PROGRESS NOTES
Mental Health Awareness Class   (10:30am-11:30am)  Information/activity     Self Esteem    Instructor Ekaterina Valente LCSW     15:29     Patient Response Good participation

## 2020-06-10 NOTE — PROGRESS NOTES
Wrap-up  Mood at 8-9 with 10 being the worse and found art therapy and listening to others as most helpful today.    Feeling sad or empty   Trouble with concentration, memory, or making decisions  Fatigue or loss of energy  Loss of interest in things you usually like to do  Increased Irritabliliy  Easily distracted  An increase/decrease in normal appetite  Feelings of worthlessness  Feelings of increased guilt  Increased nervous feelings/anxiety  Racing thoughts     Continues with vague SI but no plan or intent to act on this.    Goals for later today:  To work in the office for 6 hours and clean living space at home.

## 2020-06-10 NOTE — PROGRESS NOTES
Treatment plan review:    Pt continues in IOP dealing with numerous depressive symptoms.  Sees her ARNP on a weekly basis while in IOP but d/t trial of several medications, the practitioner does not want to adjust meds at this time.  Pt continues with vague SI but has forward thinking and clearly states she has no intent or plan to act on this.  Pt is reaching out to family, listens to music,  Journals and works with plants as positive coping skills. Engaging more with peers in IOP.  Has OP providers in place.

## 2020-06-10 NOTE — PROGRESS NOTES
Other     Information/activity     [id number] Art Therapy - The Wall therapeutic story, creating an image using markers on paper, and processing.    Instructor ALAN Cole     15:04     Patient Response Good participation

## 2020-06-11 ENCOUNTER — OFFICE VISIT (OUTPATIENT)
Dept: PSYCHIATRY | Facility: HOSPITAL | Age: 30
End: 2020-06-11

## 2020-06-11 DIAGNOSIS — F33.1 MAJOR DEPRESSIVE DISORDER, RECURRENT EPISODE, MODERATE (HCC): Primary | ICD-10-CM

## 2020-06-11 PROCEDURE — S9480 INTENSIVE OUTPATIENT PSYCHIA: HCPCS

## 2020-06-11 NOTE — PROGRESS NOTES
"Group Note:  Pt discussed having increased suicidal thoughts today.  She reports her psychiatrist told her that they were disappointed she did not go to [facility] and receive another psychiatric evaluation, and also accused her of not being truthful in her psych eval at [facility].  Pt states she was not entirely truthful in her eval because she did not want to be admitted, but that she has been forthcoming in [facility].  Pt shared that she is nervous about discharging from [facility] and how she will cope after her [place] vacation in two weeks.  Pt states her safety plan \"was coming here to [facility]\" and is receptive to working on a safety plan in group tomorrow.   "

## 2020-06-11 NOTE — PROGRESS NOTES
Wrap Up:  Mood at 8-9 with 10 being the worse and reports group therapy was most helpful today.  Pt reports following depressive symptoms:    Feeling sad or empty   Trouble with concentration, memory, or making decisions  Fatigue or loss of energy  Loss of interest in things you usually like to do  Easily distracted  An increase/decrease in normal appetite  Changes in normal sleep patterns (increase, decrease, or broken hoours of sleep)  Feelings of worthlessness  Feelings of increased guilt  Increased nervous feelings/anxiety  Racing thoughts     Pt continues to have suicidal thoughts but reports no plan to act on these thoughts.  Pt has appropriate goals for the afternoon and will return for her last IOP day tomorrow.

## 2020-06-11 NOTE — PROGRESS NOTES
Teaching Record: 0900-1000  Information / activity:  Marijuana Education and Neurochemistry of [facility]    Moderate participation      Nicolasa Lyn, LCSW

## 2020-06-12 ENCOUNTER — OFFICE VISIT (OUTPATIENT)
Dept: PSYCHIATRY | Facility: HOSPITAL | Age: 30
End: 2020-06-12

## 2020-06-12 DIAGNOSIS — F33.1 MAJOR DEPRESSIVE DISORDER, RECURRENT EPISODE, MODERATE (HCC): Primary | ICD-10-CM

## 2020-06-12 PROCEDURE — S9480 INTENSIVE OUTPATIENT PSYCHIA: HCPCS

## 2020-06-12 NOTE — PROGRESS NOTES
Discharge Summary    Maame attended  14 Sessions         Registration Date 05/18/2020  Discharge Date  6/12/2020       Summary of Treatment and Progress towards goals:   Jaja began IOP upon the recommendation of her therapist due to chronic suicidal thoughts.  Jaja reports having ruminating thoughts of suicide daily, and one attempt in March 2020.  She was engaged in group therapy, related well to peers, and was eager to learn and practice new coping strategies.  She began walking every evening, listens to music or podcasts when she has racing thoughts, reaches out to her mom and friends, and created a safety plan while in IOP.  She reports she also has good support at work.  At time of discharge she reports improved sleep, fewer depressive symptoms, but continues to have daily thoughts of suicide with no intent.  She is interested in exploring alternative treatments for her depression after IOP such as ECT or TMS.      Diagnostic Impression:  Major Depressive Disorder, recurrent, moderate    Aftercare Plan:  Jaja has an appointment in July at [facility] for neuropsych testing.  She has requested a referral for new psychiatrist and therapist as she does not connect well with current providers.  Referral was sent to [facility] for med management and outpatient therapy.      Current Medications:  Rexulti 2mg daily  Trazadone 50mg nightly  Lithium 300mg BID            Zonia Martins LCSW

## 2020-06-12 NOTE — PROGRESS NOTES
"Wrap Up:  Mood at 8 with 10 being the worse and reports \"everything\" was helpful today.  Pt reports following symptoms:    Feeling sad or empty   Trouble with concentration, memory, or making decisions  Fatigue or loss of energy  Loss of interest in things you usually like to do  Easily distracted  Changes in normal sleep patterns (increase, decrease, or broken hoours of sleep)  Feelings of worthlessness  Feelings of increased guilt  Increased nervous feelings/anxiety  Racing thoughts     Pt reports still having thoughts of self harm but no intent to act on them.  She reports she thinks about taking another overdose but does want to act on these thoughts.  She has completed a safety plan that she agrees to share with her mom and two close friends.  Additionally, her mother keeps all of her medications in a lock box and only dispenses what pt needs.  Pt verbalizes feeling safe with these measures.      Pt has appropriate goals for the day and referrals have been sent for f/u care.     "

## 2020-06-12 NOTE — PROGRESS NOTES
Mental Health Awareness Class   ([id number])  Information/activity     SAFETY PLANNING     Instructor Zonia Martins LCSW     14:29     Patient Response Good participation

## 2020-06-12 NOTE — PROGRESS NOTES
Group Therapy:  Pt shared of feeling tired today due to poor sleep last night.  She is feeling both ready and anxious about discharging from [facility] today.  She shared that she continues to have ruminating thoughts about self harm, but does not feel like she will act on those thoughts.  She is looking forward to her beach vacation in a week, but does have concerns about her SI returning after the beach.  Pt was receptive to creating an additional safety plan for the weekend she returns from the beach.  Discussed scheduling phone calls with close friends to check in, setting small goals each day, and sharing her safety plan with her support network.

## 2020-06-14 ENCOUNTER — RESULTS ENCOUNTER (OUTPATIENT)
Dept: GASTROENTEROLOGY | Facility: CLINIC | Age: 30
End: 2020-06-14

## 2020-06-14 DIAGNOSIS — R74.8 ELEVATED LIVER ENZYMES: ICD-10-CM

## 2020-06-15 ENCOUNTER — APPOINTMENT (OUTPATIENT)
Dept: PSYCHIATRY | Facility: HOSPITAL | Age: 30
End: 2020-06-15

## 2020-06-16 ENCOUNTER — APPOINTMENT (OUTPATIENT)
Dept: PSYCHIATRY | Facility: HOSPITAL | Age: 30
End: 2020-06-16

## 2020-06-17 ENCOUNTER — APPOINTMENT (OUTPATIENT)
Dept: PSYCHIATRY | Facility: HOSPITAL | Age: 30
End: 2020-06-17

## 2020-06-18 ENCOUNTER — APPOINTMENT (OUTPATIENT)
Dept: PSYCHIATRY | Facility: HOSPITAL | Age: 30
End: 2020-06-18

## 2020-06-19 ENCOUNTER — APPOINTMENT (OUTPATIENT)
Dept: PSYCHIATRY | Facility: HOSPITAL | Age: 30
End: 2020-06-19

## 2020-06-22 ENCOUNTER — APPOINTMENT (OUTPATIENT)
Dept: PSYCHIATRY | Facility: HOSPITAL | Age: 30
End: 2020-06-22

## 2020-06-23 ENCOUNTER — TELEPHONE (OUTPATIENT)
Dept: GASTROENTEROLOGY | Facility: CLINIC | Age: 30
End: 2020-06-23

## 2020-06-23 ENCOUNTER — APPOINTMENT (OUTPATIENT)
Dept: PSYCHIATRY | Facility: HOSPITAL | Age: 30
End: 2020-06-23

## 2020-06-24 ENCOUNTER — APPOINTMENT (OUTPATIENT)
Dept: PSYCHIATRY | Facility: HOSPITAL | Age: 30
End: 2020-06-24

## 2020-06-25 ENCOUNTER — APPOINTMENT (OUTPATIENT)
Dept: PSYCHIATRY | Facility: HOSPITAL | Age: 30
End: 2020-06-25

## 2020-06-26 ENCOUNTER — APPOINTMENT (OUTPATIENT)
Dept: PSYCHIATRY | Facility: HOSPITAL | Age: 30
End: 2020-06-26

## 2020-06-29 ENCOUNTER — APPOINTMENT (OUTPATIENT)
Dept: PSYCHIATRY | Facility: HOSPITAL | Age: 30
End: 2020-06-29

## 2020-06-30 ENCOUNTER — APPOINTMENT (OUTPATIENT)
Dept: PSYCHIATRY | Facility: HOSPITAL | Age: 30
End: 2020-06-30

## 2020-07-01 ENCOUNTER — HOSPITAL ENCOUNTER (OUTPATIENT)
Dept: ULTRASOUND IMAGING | Facility: HOSPITAL | Age: 30
Discharge: HOME | End: 2020-07-01
Admitting: INTERNAL MEDICINE

## 2020-07-01 ENCOUNTER — APPOINTMENT (OUTPATIENT)
Dept: ULTRASOUND IMAGING | Facility: HOSPITAL | Age: 30
End: 2020-07-01

## 2020-07-01 DIAGNOSIS — R74.8 ELEVATED LIVER ENZYMES: ICD-10-CM

## 2020-07-01 PROCEDURE — 76705 ECHO EXAM OF ABDOMEN: CPT

## 2020-07-17 ENCOUNTER — OFFICE VISIT (OUTPATIENT)
Dept: GASTROENTEROLOGY | Facility: CLINIC | Age: 30
End: 2020-07-17

## 2020-07-17 VITALS
OXYGEN SATURATION: 98 % | WEIGHT: 266 LBS | HEIGHT: 66 IN | HEART RATE: 91 BPM | TEMPERATURE: 97.7 F | SYSTOLIC BLOOD PRESSURE: 138 MMHG | BODY MASS INDEX: 42.75 KG/M2 | DIASTOLIC BLOOD PRESSURE: 76 MMHG

## 2020-07-17 DIAGNOSIS — K76.0 FATTY LIVER: Primary | ICD-10-CM

## 2020-07-17 DIAGNOSIS — K58.0 IRRITABLE BOWEL SYNDROME WITH DIARRHEA: ICD-10-CM

## 2020-07-17 PROCEDURE — 99214 OFFICE O/P EST MOD 30 MIN: CPT | Performed by: NURSE PRACTITIONER

## 2020-07-17 NOTE — PROGRESS NOTES
Chief Complaint   Patient presents with   • Liver Follow-up   • Imaging Only     Discuss results from US        HPI  Patient is a 29-year-old female who presents today for follow-up.    She was seen in the office June 2020 elevated liver enzymes, irritable bowel syndrome with diarrhea.    She had a right upper quadrant ultrasound performed that showed diffuse hepatic steatosis.  Serologic work-up to evaluate liver enzyme elevation was negative.    She was given prescription for dicyclomine for irritable bowel syndrome with diarrhea.    Patient presents today for follow-up to review results.     Patient did find dicyclomine helpful for IBS D.  She has been using this on an as-needed basis.  Overall she feels her IBS symptoms are controlled at this time.    She has noted some intermittent right upper quadrant pain.  This is described as cramping.  It generally occurs when she is walking or exercising.  It resolves after exercise.  She otherwise denies abdominal pain.  Denies nausea, vomiting, or upper GI symptoms.    Her mother has a history of fatty liver.        Review of Systems   Constitutional: Negative for appetite change, chills, diaphoresis, fatigue, fever and unexpected weight change.   HENT: Negative for dental problem, ear pain, mouth sores, rhinorrhea, sore throat and voice change.    Eyes: Negative for pain, redness and visual disturbance.   Respiratory: Negative for cough, chest tightness and wheezing.    Cardiovascular: Negative for chest pain, palpitations and leg swelling.   Endocrine: Negative for cold intolerance, heat intolerance, polydipsia, polyphagia and polyuria.   Genitourinary: Negative for dysuria, frequency, hematuria and urgency.   Musculoskeletal: Positive for arthralgias. Negative for back pain, joint swelling, myalgias and neck pain.   Skin: Negative for rash.   Allergic/Immunologic: Positive for environmental allergies. Negative for food allergies and immunocompromised state.    Neurological: Positive for headaches. Negative for dizziness, seizures, weakness and numbness.   Hematological: Does not bruise/bleed easily.   Psychiatric/Behavioral: Positive for sleep disturbance. The patient is nervous/anxious.         Problem List:    Patient Active Problem List   Diagnosis   • Hemiplegic migraine, intractable   • Fibromyalgia   • Major depressive disorder, recurrent, severe without psychotic features (CMS/[facility])   • Vaginal yeast infection   • Leukocytosis   • Hyponatremia   • Hypothyroidism   • Irritable bowel disease   • Obesity (BMI 30-39.9)   • Polysubstance overdose   • Obesity, Class III, BMI 40-49.9 (morbid obesity) (CMS/[facility])       Medical History:    Past Medical History:   Diagnosis Date   • Anxiety    • Asthma    • Depression    • Fibromyalgia    • GERD (gastroesophageal reflux disease)    • Hypothyroidism    • Irritable bowel disease    • Migraines         Social History:    Social History     Socioeconomic History   • Marital status: Single     Spouse name: Not on file   • Number of children: Not on file   • Years of education: Not on file   • Highest education level: Not on file   Tobacco Use   • Smoking status: Never Smoker   • Smokeless tobacco: Never Used   Substance and Sexual Activity   • Alcohol use: Yes     Alcohol/week: 1.0 standard drinks     Types: 1 Cans of beer per week     Comment: social   • Drug use: No   • Sexual activity: Defer       Family History:   Family History   Problem Relation Age of Onset   • Colon cancer Neg Hx    • Colon polyps Neg Hx        Surgical History:   Past Surgical History:   Procedure Laterality Date   • APPENDECTOMY      laparoscopic   • CHOLECYSTECTOMY     • KNEE ARTHROSCOPY Left    • PATELLA FEMORAL LIGAMENT RECONSTRUCTION Left    • TONSILLECTOMY AND ADENOIDECTOMY           Current Outpatient Medications:   •  Brexpiprazole (Rexulti) 2 MG tablet, Take  by mouth., Disp: , Rfl:   •  busPIRone (BUSPAR) 15 MG tablet, Take 1 tablet by mouth 2  (Two) Times a Day With Meals. Indications: Anxiety Disorder, Disp: 60 tablet, Rfl: 0  •  Cyanocobalamin (VITAMIN B 12) 500 MCG tablet, , Disp: , Rfl:   •  dicyclomine (BENTYL) 10 MG capsule, Take 1 capsule by mouth 3 (Three) Times a Day As Needed (abdominal pain/diarrhea)., Disp: 90 capsule, Rfl: 5  •  fluticasone (FLONASE) 50 MCG/ACT nasal spray, 2 sprays into the nostril(s) as directed by provider Daily., Disp: , Rfl:   •  fluticasone (Flovent HFA) 110 MCG/ACT inhaler, Flovent  mcg/actuation aerosol inhaler  TAKE 1 PUFF BY MOUTH TWICE A DAY, Disp: , Rfl:   •  levothyroxine (SYNTHROID, LEVOTHROID) 88 MCG tablet, Take 88 mcg by mouth daily., Disp: , Rfl:   •  Milk Thistle 250 MG capsule, Take  by mouth., Disp: , Rfl:   •  thiamine (VITAMIN B-1) 100 MG tablet, Take 100 mg by mouth Daily., Disp: , Rfl:   •  traZODone (DESYREL) 50 MG tablet, Take 1 tablet by mouth Every Night. Indications: Trouble Sleeping, Disp: 30 tablet, Rfl: 0  •  Vitamin D, Cholecalciferol, (CHOLECALCIFEROL) 10 MCG (400 UNIT) tablet, Take 2,000 Units by mouth Daily., Disp: , Rfl:   •  Vortioxetine HBr (Trintellix) 10 MG tablet, Take 10 mg by mouth Daily., Disp: , Rfl:     Allergies:  Codeine; Dhea [nutritional supplements]; Imitrex [sumatriptan]; and Morphine    The following portions of the patient's history were reviewed and updated as appropriate: allergies, current medications, past family history, past medical history, past social history, past surgical history and problem list.    Vitals:    07/17/20 1446   BP: 138/76   Pulse: 91   Temp: 97.7 °F (36.5 °C)   SpO2: 98%         07/17/20  1446   Weight: 121 kg (266 lb)     Body mass index is 42.95 kg/m².    Physical Exam   Constitutional: She is oriented to person, place, and time. She appears well-developed and well-nourished. No distress.   HENT:   Head: Normocephalic and atraumatic.   Pulmonary/Chest: Effort normal. No respiratory distress.   Abdominal: Soft. Normal appearance and  bowel sounds are normal. She exhibits no distension. There is no hepatosplenomegaly. There is no tenderness.   Neurological: She is alert and oriented to person, place, and time.   Skin: Skin is dry. No pallor.   Psychiatric: She has a normal mood and affect. Thought content normal.   Vitals reviewed.        Assessment/ Plan  Maame was seen today for liver follow-up and imaging only.    Diagnoses and all orders for this visit:    Fatty liver    Irritable bowel syndrome with diarrhea         Return in about 6 months (around 1/17/2021).    Patient Instructions   For fatty liver, weight loss is recommended. Recommend following a low fat and low sugar diet. Recommend management of diabetes and elevated cholesterol with primary care provider if indicated. Regular exercise is recommended. Alcohol avoidance is recommended.    For fatty liver, continue taking milk thistle daily, available over the counter.    For IBS-D, may continue to use dicyclomine as needed.  Contact the office if symptom worsens.    Follow up in 6 months. Call for any new or worsening symptoms or failure to improve.         Discussion:  Discussed new diagnosis of fatty liver at today's office visit.  Discussed with patient that this is felt to be the cause of liver enzyme elevation as serologic work-up was negative.  We discussed that for some patients, over time this can progress to cirrhosis of the liver if it goes unaddressed.  Reviewed dietary and lifestyle modifications to help with fatty liver as outlined above.  Weight loss was encouraged.    IBS-D symptoms currently are controlled.  Will continue to monitor.  If symptoms worsen, we could consider treatment with Xifaxan.

## 2020-07-17 NOTE — PATIENT INSTRUCTIONS
For fatty liver, weight loss is recommended. Recommend following a low fat and low sugar diet. Recommend management of diabetes and elevated cholesterol with primary care provider if indicated. Regular exercise is recommended. Alcohol avoidance is recommended.    For fatty liver, continue taking milk thistle daily, available over the counter.    For IBS-D, may continue to use dicyclomine as needed.  Contact the office if symptom worsens.    Follow up in 6 months. Call for any new or worsening symptoms or failure to improve.

## 2020-11-18 ENCOUNTER — PREP FOR SURGERY (OUTPATIENT)
Dept: OTHER | Facility: HOSPITAL | Age: 30
End: 2020-11-18

## 2020-11-18 ENCOUNTER — OFFICE VISIT (OUTPATIENT)
Dept: GASTROENTEROLOGY | Facility: CLINIC | Age: 30
End: 2020-11-18

## 2020-11-18 DIAGNOSIS — K76.0 FATTY LIVER: ICD-10-CM

## 2020-11-18 DIAGNOSIS — R11.2 NAUSEA AND VOMITING, INTRACTABILITY OF VOMITING NOT SPECIFIED, UNSPECIFIED VOMITING TYPE: Primary | ICD-10-CM

## 2020-11-18 DIAGNOSIS — K58.0 IRRITABLE BOWEL SYNDROME WITH DIARRHEA: ICD-10-CM

## 2020-11-18 PROCEDURE — 99442 PR PHYS/QHP TELEPHONE EVALUATION 11-20 MIN: CPT | Performed by: NURSE PRACTITIONER

## 2020-11-18 RX ORDER — LITHIUM CARBONATE 300 MG/1
300 CAPSULE ORAL 2 TIMES DAILY
COMMUNITY
Start: 2020-11-13 | End: 2021-04-08 | Stop reason: HOSPADM

## 2020-11-18 RX ORDER — ALPRAZOLAM 0.25 MG/1
0.25 TABLET ORAL EVERY 8 HOURS PRN
COMMUNITY
End: 2021-04-08 | Stop reason: HOSPADM

## 2020-11-18 RX ORDER — CARIPRAZINE 1.5 MG/1
1.5 CAPSULE, GELATIN COATED ORAL DAILY
COMMUNITY
Start: 2020-11-17 | End: 2021-04-08 | Stop reason: HOSPADM

## 2020-11-18 RX ORDER — ONDANSETRON 4 MG/1
TABLET, ORALLY DISINTEGRATING ORAL
Qty: 20 TABLET | Refills: 1 | Status: SHIPPED | OUTPATIENT
Start: 2020-11-18 | End: 2021-02-16 | Stop reason: SDUPTHER

## 2020-11-18 RX ORDER — OMEPRAZOLE 40 MG/1
40 CAPSULE, DELAYED RELEASE ORAL
COMMUNITY
Start: 2020-11-09 | End: 2022-07-12

## 2020-11-18 NOTE — PROGRESS NOTES
Chief Complaint   Patient presents with   • Follow-up     6 month liver function   • Nausea     1-2 months      HPI  Patient is a 30 year old female who presents today for follow up.    She has a history of fatty liver and IBS-D.     She has a history of elevated liver enzymes, serologic work-up to evaluate this was negative.  Most recent LFTs with AST 35 and ALT 51 June 2020.  She had a liver ultrasound July 2020 which was consistent steatosis.    Patient today with complaint of nausea and vomiting.  This began 1 to 2 months ago.  She reports the nausea has been severe.  She reports she has vomiting around 3-4 times per week.  Emesis is acidic when it occurs.  She has a history of GERD and continues on omeprazole for this and feels her reflux symptoms are controlled.  She denies dysphagia or abdominal pain.    For IBS-D, she continues on dicyclomine.  Reports good control of times with use of this medication.  She is generally having a daily bowel movement.  Denies any dark tarry stools or blood in the stools.  Reports occasional issues with hemorrhoids.    For fatty liver, she has begun exercising and has been working on weight loss.  She had recent lab work with her primary care provider.  She reports her liver enzymes remain mildly elevated improved.  She has been taking milk thistle.    You have chosen to receive care through a telephone visit today. Do you consent to use a telephone visit for your medical care today? Yes      Review of Systems   Constitutional: Negative for fever.   Respiratory: Negative for cough.    Gastrointestinal: Positive for nausea and vomiting. Negative for abdominal pain.       Problem List:    Patient Active Problem List   Diagnosis   • Hemiplegic migraine, intractable   • Fibromyalgia   • Major depressive disorder, recurrent, severe without psychotic features (CMS/HCC)   • Vaginal yeast infection   • Leukocytosis   • Hyponatremia   • Hypothyroidism   • Irritable bowel disease   •  Obesity (BMI 30-39.9)   • Polysubstance overdose   • Obesity, Class III, BMI 40-49.9 (morbid obesity) (CMS/[facility])       Medical History:    Past Medical History:   Diagnosis Date   • Anxiety    • Asthma    • Depression    • Fibromyalgia    • GERD (gastroesophageal reflux disease)    • Hypothyroidism    • Irritable bowel disease    • Migraines         Social History:    Social History     Socioeconomic History   • Marital status: Single     Spouse name: Not on file   • Number of children: Not on file   • Years of education: Not on file   • Highest education level: Not on file   Tobacco Use   • Smoking status: Never Smoker   • Smokeless tobacco: Never Used   Substance and Sexual Activity   • Alcohol use: Yes     Alcohol/week: 1.0 standard drinks     Types: 1 Cans of beer per week     Comment: social   • Drug use: No   • Sexual activity: Defer       Family History:   Family History   Problem Relation Age of Onset   • Irritable bowel syndrome Mother    • Crohn's disease Brother    • Colon cancer Neg Hx    • Colon polyps Neg Hx    • Ulcerative colitis Neg Hx        Surgical History:   Past Surgical History:   Procedure Laterality Date   • APPENDECTOMY      laparoscopic   • CHOLECYSTECTOMY     • KNEE ARTHROSCOPY Left    • PATELLA FEMORAL LIGAMENT RECONSTRUCTION Left    • TONSILLECTOMY AND ADENOIDECTOMY           Current Outpatient Medications:   •  Cyanocobalamin (VITAMIN B 12) 500 MCG tablet, , Disp: , Rfl:   •  dicyclomine (BENTYL) 10 MG capsule, Take 1 capsule by mouth 3 (Three) Times a Day As Needed (abdominal pain/diarrhea)., Disp: 90 capsule, Rfl: 5  •  fluticasone (FLONASE) 50 MCG/ACT nasal spray, 2 sprays into the nostril(s) as directed by provider Daily., Disp: , Rfl:   •  levothyroxine (SYNTHROID, LEVOTHROID) 88 MCG tablet, Take 88 mcg by mouth daily., Disp: , Rfl:   •  Milk Thistle 250 MG capsule, Take  by mouth., Disp: , Rfl:   •  traZODone (DESYREL) 50 MG tablet, Take 1 tablet by mouth Every Night. Indications:  Trouble Sleeping, Disp: 30 tablet, Rfl: 0  •  Vitamin D, Cholecalciferol, (CHOLECALCIFEROL) 10 MCG (400 UNIT) tablet, Take 2,000 Units by mouth Daily., Disp: , Rfl:   •  Vortioxetine HBr (Trintellix) 10 MG tablet, Take 10 mg by mouth Daily., Disp: , Rfl:   •  ALPRAZolam (Xanax) 0.25 MG tablet, Take 0.25 mg by mouth As Needed., Disp: , Rfl:   •  lithium carbonate 300 MG capsule, Take 300 mg by mouth 2 (Two) Times a Day., Disp: , Rfl:   •  omeprazole (priLOSEC) 40 MG capsule, Take 40 mg by mouth Daily., Disp: , Rfl:   •  ondansetron ODT (ZOFRAN-ODT) 4 MG disintegrating tablet, Dissolve 1 tablet by mouth every 6 hours PRN nausea, vomiting, Disp: 20 tablet, Rfl: 1  •  Vraylar 1.5 MG capsule capsule, Take 1.5 mg by mouth Daily., Disp: , Rfl:     Allergies:  Codeine, Dhea [nutritional supplements], Imitrex [sumatriptan], and Morphine    The following portions of the patient's history were reviewed and updated as appropriate: allergies, current medications, past family history, past medical history, past social history, past surgical history and problem list.    Assessment/ Plan  Diagnoses and all orders for this visit:    1. Nausea and vomiting, intractability of vomiting not specified, unspecified vomiting type (Primary)    2. Fatty liver    3. Irritable bowel syndrome with diarrhea    Other orders  -     ondansetron ODT (ZOFRAN-ODT) 4 MG disintegrating tablet; Dissolve 1 tablet by mouth every 6 hours PRN nausea, vomiting  Dispense: 20 tablet; Refill: 1         No follow-ups on file.    Patient Instructions   Schedule EGD for further evaluation of nausea and vomiting.    For nausea and vomiting, may use ondansetron as needed. Prescription sent to pharmacy.    For fatty liver, weight loss is recommended. Recommend following a low fat and low sugar diet. Recommend management of diabetes and elevated cholesterol with primary care provider if indicated. Regular exercise is recommended. Alcohol avoidance is  recommended.    For fatty liver, continue taking milk thistle daily, available over the counter.    Obtain copy of recent labs for review from PCP.    For IBS-D, continue dicyclomine as needed for diarrhea and abdominal cramping.    Follow-up after testing complete.  Call for any new or worsening symptoms.      Discussion:  We will schedule EGD for further evaluation of new complaint of nausea and vomiting.  If EGD is unremarkable and symptoms persist, we can study to evaluate for gastroparesis which we discussed today.    IBS-D symptoms are stable on dicyclomine and will continue current treatment.     Reinforced dietary and lifestyle modifications to help with fatty liver during today's visit.    This visit was completed as a telephone visit due to COVID-19 pandemic. This visit has been rescheduled as a phone visit to comply with patient safety concerns in accordance with CDC recommendations. Total time of discussion was 13 minutes.

## 2020-11-18 NOTE — PATIENT INSTRUCTIONS
Schedule EGD for further evaluation of nausea and vomiting.    For nausea and vomiting, may use ondansetron as needed. Prescription sent to pharmacy.    For fatty liver, weight loss is recommended. Recommend following a low fat and low sugar diet. Recommend management of diabetes and elevated cholesterol with primary care provider if indicated. Regular exercise is recommended. Alcohol avoidance is recommended.    For fatty liver, continue taking milk thistle daily, available over the counter.    Obtain copy of recent labs for review from PCP.    For IBS-D, continue dicyclomine as needed for diarrhea and abdominal cramping.    Follow-up after testing complete.  Call for any new or worsening symptoms.

## 2020-11-30 ENCOUNTER — TELEPHONE (OUTPATIENT)
Dept: GASTROENTEROLOGY | Facility: CLINIC | Age: 30
End: 2020-11-30

## 2020-12-04 ENCOUNTER — APPOINTMENT (OUTPATIENT)
Dept: GENERAL RADIOLOGY | Facility: HOSPITAL | Age: 30
End: 2020-12-04

## 2020-12-04 ENCOUNTER — HOSPITAL ENCOUNTER (EMERGENCY)
Facility: HOSPITAL | Age: 30
Discharge: HOME | End: 2020-12-04
Attending: EMERGENCY MEDICINE | Admitting: EMERGENCY MEDICINE

## 2020-12-04 VITALS
RESPIRATION RATE: 17 BRPM | HEART RATE: 70 BPM | DIASTOLIC BLOOD PRESSURE: 81 MMHG | OXYGEN SATURATION: 98 % | SYSTOLIC BLOOD PRESSURE: 114 MMHG | TEMPERATURE: 97.4 F

## 2020-12-04 DIAGNOSIS — U07.1 COVID-19 VIRUS INFECTION: Primary | ICD-10-CM

## 2020-12-04 DIAGNOSIS — R07.89 CHEST WALL PAIN: ICD-10-CM

## 2020-12-04 LAB
ALBUMIN SERPL-MCNC: 4.2 G/DL (ref 3.5–5.2)
ALBUMIN/GLOB SERPL: 1.4 G/DL
ALP SERPL-CCNC: 61 U/L (ref 39–117)
ALT SERPL W P-5'-P-CCNC: 37 U/L (ref 1–33)
ANION GAP SERPL CALCULATED.3IONS-SCNC: 9.4 MMOL/L (ref 5–15)
AST SERPL-CCNC: 29 U/L (ref 1–32)
BASOPHILS # BLD AUTO: 0.06 10*3/MM3 (ref 0–0.2)
BASOPHILS NFR BLD AUTO: 0.8 % (ref 0–1.5)
BILIRUB SERPL-MCNC: 0.2 MG/DL (ref 0–1.2)
BUN SERPL-MCNC: 8 MG/DL (ref 6–20)
BUN/CREAT SERPL: 11 (ref 7–25)
CALCIUM SPEC-SCNC: 8.8 MG/DL (ref 8.6–10.5)
CHLORIDE SERPL-SCNC: 108 MMOL/L (ref 98–107)
CO2 SERPL-SCNC: 20.6 MMOL/L (ref 22–29)
CREAT SERPL-MCNC: 0.73 MG/DL (ref 0.57–1)
D DIMER PPP FEU-MCNC: 0.33 MCGFEU/ML (ref 0–0.49)
DEPRECATED RDW RBC AUTO: 40.2 FL (ref 37–54)
EOSINOPHIL # BLD AUTO: 0.11 10*3/MM3 (ref 0–0.4)
EOSINOPHIL NFR BLD AUTO: 1.5 % (ref 0.3–6.2)
ERYTHROCYTE [DISTWIDTH] IN BLOOD BY AUTOMATED COUNT: 13.2 % (ref 12.3–15.4)
GFR SERPL CREATININE-BSD FRML MDRD: 95 ML/MIN/1.73
GLOBULIN UR ELPH-MCNC: 3.1 GM/DL
GLUCOSE SERPL-MCNC: 100 MG/DL (ref 65–99)
HCG SERPL QL: NEGATIVE
HCT VFR BLD AUTO: 44.3 % (ref 34–46.6)
HGB BLD-MCNC: 14.6 G/DL (ref 12–15.9)
IMM GRANULOCYTES # BLD AUTO: 0.02 10*3/MM3 (ref 0–0.05)
IMM GRANULOCYTES NFR BLD AUTO: 0.3 % (ref 0–0.5)
INR PPP: 0.94 (ref 0.9–1.1)
LYMPHOCYTES # BLD AUTO: 2.39 10*3/MM3 (ref 0.7–3.1)
LYMPHOCYTES NFR BLD AUTO: 33.5 % (ref 19.6–45.3)
MAGNESIUM SERPL-MCNC: 2.2 MG/DL (ref 1.6–2.6)
MCH RBC QN AUTO: 27.7 PG (ref 26.6–33)
MCHC RBC AUTO-ENTMCNC: 33 G/DL (ref 31.5–35.7)
MCV RBC AUTO: 83.9 FL (ref 79–97)
MONOCYTES # BLD AUTO: 0.37 10*3/MM3 (ref 0.1–0.9)
MONOCYTES NFR BLD AUTO: 5.2 % (ref 5–12)
NEUTROPHILS NFR BLD AUTO: 4.18 10*3/MM3 (ref 1.7–7)
NEUTROPHILS NFR BLD AUTO: 58.7 % (ref 42.7–76)
NRBC BLD AUTO-RTO: 0 /100 WBC (ref 0–0.2)
NT-PROBNP SERPL-MCNC: 20.5 PG/ML (ref 0–450)
PLATELET # BLD AUTO: 259 10*3/MM3 (ref 140–450)
PMV BLD AUTO: 9.8 FL (ref 6–12)
POTASSIUM SERPL-SCNC: 4.1 MMOL/L (ref 3.5–5.2)
PROT SERPL-MCNC: 7.3 G/DL (ref 6–8.5)
PROTHROMBIN TIME: 12.4 SECONDS (ref 11.7–14.2)
QT INTERVAL: 392 MS
RBC # BLD AUTO: 5.28 10*6/MM3 (ref 3.77–5.28)
SODIUM SERPL-SCNC: 138 MMOL/L (ref 136–145)
TROPONIN T SERPL-MCNC: <0.01 NG/ML (ref 0–0.03)
WBC # BLD AUTO: 7.13 10*3/MM3 (ref 3.4–10.8)

## 2020-12-04 PROCEDURE — 93005 ELECTROCARDIOGRAM TRACING: CPT

## 2020-12-04 PROCEDURE — 84484 ASSAY OF TROPONIN QUANT: CPT | Performed by: EMERGENCY MEDICINE

## 2020-12-04 PROCEDURE — 80053 COMPREHEN METABOLIC PANEL: CPT | Performed by: EMERGENCY MEDICINE

## 2020-12-04 PROCEDURE — 96374 THER/PROPH/DIAG INJ IV PUSH: CPT

## 2020-12-04 PROCEDURE — 83880 ASSAY OF NATRIURETIC PEPTIDE: CPT | Performed by: EMERGENCY MEDICINE

## 2020-12-04 PROCEDURE — 71045 X-RAY EXAM CHEST 1 VIEW: CPT

## 2020-12-04 PROCEDURE — 85025 COMPLETE CBC W/AUTO DIFF WBC: CPT | Performed by: EMERGENCY MEDICINE

## 2020-12-04 PROCEDURE — 93010 ELECTROCARDIOGRAM REPORT: CPT | Performed by: INTERNAL MEDICINE

## 2020-12-04 PROCEDURE — 99284 EMERGENCY DEPT VISIT MOD MDM: CPT

## 2020-12-04 PROCEDURE — 83735 ASSAY OF MAGNESIUM: CPT | Performed by: EMERGENCY MEDICINE

## 2020-12-04 PROCEDURE — 84703 CHORIONIC GONADOTROPIN ASSAY: CPT | Performed by: EMERGENCY MEDICINE

## 2020-12-04 PROCEDURE — 25010000002 KETOROLAC TROMETHAMINE PER 15 MG: Performed by: EMERGENCY MEDICINE

## 2020-12-04 PROCEDURE — 85610 PROTHROMBIN TIME: CPT | Performed by: EMERGENCY MEDICINE

## 2020-12-04 PROCEDURE — 85379 FIBRIN DEGRADATION QUANT: CPT | Performed by: EMERGENCY MEDICINE

## 2020-12-04 PROCEDURE — 93005 ELECTROCARDIOGRAM TRACING: CPT | Performed by: EMERGENCY MEDICINE

## 2020-12-04 RX ORDER — KETOROLAC TROMETHAMINE 15 MG/ML
15 INJECTION, SOLUTION INTRAMUSCULAR; INTRAVENOUS ONCE
Status: COMPLETED | OUTPATIENT
Start: 2020-12-04 | End: 2020-12-04

## 2020-12-04 RX ORDER — SODIUM CHLORIDE 0.9 % (FLUSH) 0.9 %
10 SYRINGE (ML) INJECTION AS NEEDED
Status: DISCONTINUED | OUTPATIENT
Start: 2020-12-04 | End: 2020-12-04 | Stop reason: HOSPADM

## 2020-12-04 RX ADMIN — KETOROLAC TROMETHAMINE 15 MG: 15 INJECTION, SOLUTION INTRAMUSCULAR; INTRAVENOUS at 11:48

## 2020-12-04 NOTE — DISCHARGE INSTRUCTIONS
Take Tylenol or Motrin for pain.  Make sure you drink plenty of fluids.  Check your pulse ox at least 3 times a day.  Check in the morning afternoon and evening.  Check if you are short of breath.  Return if worsening of symptoms, worsening of shortness of breath, worsening of pain, any concerns.

## 2020-12-04 NOTE — ED PROVIDER NOTES
EMERGENCY DEPARTMENT ENCOUNTER    Room Number:  22/22  Date of encounter:  12/4/2020  PCP: Miriam Newman  Historian: Patient      HPI:  Chief Complaint: Covid infection with some shortness of breath, and chest pain  A complete HPI/ROS/PMH/PSH/SH/FH are unobtainable due to: Not applicable  Context: Maame Wallace is a 30 y.o. female who presents to the ED c/o patient symptoms started 5 days ago.  There is multiple family members with ill contacts that have had positive Covid.  Patient had a test on November 30 that came back positive for Covid infection.  Patient symptoms started with a little bit of sore throat a mild cough and some generalized weakness and fatigue.  She has had low-grade temperatures.  She is never really checked her temperature.  She does not feel that it was high.  She has had a cough that is been mainly dry.  She is developed over the past 24 to 48 hours some right-sided pain that she describes as dull and sharp.  The pain is worse with coughing and with breathing and moving.  She has had no vomiting but has had a small amount of diarrhea.  She is tolerating p.o. liquids well.  She has had a change in her taste and smell.  She does have a history of asthma.  Denies any history of other lung problems.  She also denies any history of heart problems.        Previous Episodes: No  Current Symptoms: See above    MEDICAL HISTORY REVIEWED        PAST MEDICAL HISTORY  Active Ambulatory Problems     Diagnosis Date Noted   • Hemiplegic migraine, intractable 04/25/2016   • Fibromyalgia 04/29/2016   • Major depressive disorder, recurrent, severe without psychotic features (CMS/[facility]) 02/19/2020   • Vaginal yeast infection 02/20/2020   • Leukocytosis 02/20/2020   • Hyponatremia 02/20/2020   • Hypothyroidism 02/20/2020   • Irritable bowel disease 02/20/2020   • Obesity (BMI 30-39.9) 02/20/2020   • Polysubstance overdose 03/18/2020   • Obesity, Class III, BMI 40-49.9 (morbid obesity) (CMS/[facility]) 03/18/2020      Resolved Ambulatory Problems     Diagnosis Date Noted   • No Resolved Ambulatory Problems     Past Medical History:   Diagnosis Date   • Anxiety    • Asthma    • Depression    • GERD (gastroesophageal reflux disease)    • Migraines          PAST SURGICAL HISTORY  Past Surgical History:   Procedure Laterality Date   • APPENDECTOMY      laparoscopic   • CHOLECYSTECTOMY     • KNEE ARTHROSCOPY Left    • PATELLA FEMORAL LIGAMENT RECONSTRUCTION Left    • TONSILLECTOMY AND ADENOIDECTOMY           FAMILY HISTORY  Family History   Problem Relation Age of Onset   • Irritable bowel syndrome Mother    • Crohn's disease Brother    • Colon cancer Neg Hx    • Colon polyps Neg Hx    • Ulcerative colitis Neg Hx          SOCIAL HISTORY  Social History     Socioeconomic History   • Marital status: Single     Spouse name: Not on file   • Number of children: Not on file   • Years of education: Not on file   • Highest education level: Not on file   Tobacco Use   • Smoking status: Never Smoker   • Smokeless tobacco: Never Used   Substance and Sexual Activity   • Alcohol use: Yes     Alcohol/week: 1.0 standard drinks     Types: 1 Cans of beer per week     Comment: social   • Drug use: No   • Sexual activity: Defer         ALLERGIES  Codeine, Dhea [nutritional supplements], Imitrex [sumatriptan], and Morphine        REVIEW OF SYSTEMS  Review of Systems     All systems reviewed and negative except for those discussed in HPI.       PHYSICAL EXAM    I have reviewed the triage vital signs and nursing notes.    ED Triage Vitals   Temp Heart Rate Resp BP SpO2   12/04/20 1012 12/04/20 1012 12/04/20 1012 12/04/20 1023 12/04/20 1012   97.4 °F (36.3 °C) (!) 135 18 151/91 97 %      Temp src Heart Rate Source Patient Position BP Location FiO2 (%)   12/04/20 1012 12/04/20 1012 -- -- --   Tympanic Monitor          GENERAL: Pleasant female no acute distress.Vital signs on my initial evaluation O2 sat is 98% on room air with normal heart rate and  normal blood pressure on my exam.  She is not tachycardic on my exam.  HENT: nares patent  Head/neck/ face are symmetric without gross deformity, signs of trauma, or swelling  EYES: no scleral icterus, no conjunctival pallor.  NECK: Supple, no meningismus  CV: regular rhythm, regular rate with intact distal pulses.  No murmur or rub.  Patient's location of pain is in the right aspect of her chest.  It is above her breast in the upper portion of her chest it is reproducible with palpation and moving.  RESPIRATORY: normal effort and no respiratory distress.  Clear to auscultation bilaterally.  Do not appreciate any wheeze on my exam  ABDOMEN: soft and non-tender.  Obese  MUSCULOSKELETAL: no deformity.  No edema.  Intact distal pulses  NEURO: alert and appropriate, moves all extremities, follows commands.  Alert and oriented x3.  No focal motor or sensory changes.  SKIN: warm, dry    Vital signs and nursing notes reviewed.        LAB RESULTS  Recent Results (from the past 24 hour(s))   ECG 12 Lead    Collection Time: 12/04/20 10:16 AM   Result Value Ref Range    QT Interval 392 ms   Comprehensive Metabolic Panel    Collection Time: 12/04/20 11:37 AM    Specimen: Blood   Result Value Ref Range    Glucose 100 (H) 65 - 99 mg/dL    BUN 8 6 - 20 mg/dL    Creatinine 0.73 0.57 - 1.00 mg/dL    Sodium 138 136 - 145 mmol/L    Potassium 4.1 3.5 - 5.2 mmol/L    Chloride 108 (H) 98 - 107 mmol/L    CO2 20.6 (L) 22.0 - 29.0 mmol/L    Calcium 8.8 8.6 - 10.5 mg/dL    Total Protein 7.3 6.0 - 8.5 g/dL    Albumin 4.20 3.50 - 5.20 g/dL    ALT (SGPT) 37 (H) 1 - 33 U/L    AST (SGOT) 29 1 - 32 U/L    Alkaline Phosphatase 61 39 - 117 U/L    Total Bilirubin 0.2 0.0 - 1.2 mg/dL    eGFR Non African Amer 95 >60 mL/min/1.73    Globulin 3.1 gm/dL    A/G Ratio 1.4 g/dL    BUN/Creatinine Ratio 11.0 7.0 - 25.0    Anion Gap 9.4 5.0 - 15.0 mmol/L   BNP    Collection Time: 12/04/20 11:37 AM    Specimen: Blood   Result Value Ref Range    proBNP 20.5 0.0 -  450.0 pg/mL   Troponin    Collection Time: 12/04/20 11:37 AM    Specimen: Blood   Result Value Ref Range    Troponin T <0.010 0.000 - 0.030 ng/mL   D-dimer, Quantitative    Collection Time: 12/04/20 11:37 AM    Specimen: Blood   Result Value Ref Range    D-Dimer, Quantitative 0.33 0.00 - 0.49 MCGFEU/mL   Protime-INR    Collection Time: 12/04/20 11:37 AM    Specimen: Blood   Result Value Ref Range    Protime 12.4 11.7 - 14.2 Seconds    INR 0.94 0.90 - 1.10   hCG, Serum, Qualitative    Collection Time: 12/04/20 11:37 AM    Specimen: Blood   Result Value Ref Range    HCG Qualitative Negative Negative   Magnesium    Collection Time: 12/04/20 11:37 AM    Specimen: Blood   Result Value Ref Range    Magnesium 2.2 1.6 - 2.6 mg/dL   CBC Auto Differential    Collection Time: 12/04/20 11:37 AM    Specimen: Blood   Result Value Ref Range    WBC 7.13 3.40 - 10.80 10*3/mm3    RBC 5.28 3.77 - 5.28 10*6/mm3    Hemoglobin 14.6 12.0 - 15.9 g/dL    Hematocrit 44.3 34.0 - 46.6 %    MCV 83.9 79.0 - 97.0 fL    MCH 27.7 26.6 - 33.0 pg    MCHC 33.0 31.5 - 35.7 g/dL    RDW 13.2 12.3 - 15.4 %    RDW-SD 40.2 37.0 - 54.0 fl    MPV 9.8 6.0 - 12.0 fL    Platelets 259 140 - 450 10*3/mm3    Neutrophil % 58.7 42.7 - 76.0 %    Lymphocyte % 33.5 19.6 - 45.3 %    Monocyte % 5.2 5.0 - 12.0 %    Eosinophil % 1.5 0.3 - 6.2 %    Basophil % 0.8 0.0 - 1.5 %    Immature Grans % 0.3 0.0 - 0.5 %    Neutrophils, Absolute 4.18 1.70 - 7.00 10*3/mm3    Lymphocytes, Absolute 2.39 0.70 - 3.10 10*3/mm3    Monocytes, Absolute 0.37 0.10 - 0.90 10*3/mm3    Eosinophils, Absolute 0.11 0.00 - 0.40 10*3/mm3    Basophils, Absolute 0.06 0.00 - 0.20 10*3/mm3    Immature Grans, Absolute 0.02 0.00 - 0.05 10*3/mm3    nRBC 0.0 0.0 - 0.2 /100 WBC       Ordered the above labs and independently reviewed the results.        RADIOLOGY  Xr Chest 1 View    Result Date: 12/4/2020  XR CHEST 1 VIEW-  HISTORY: 30-year-old female with chest pain and shortness of breath.  FINDINGS: There are  small patchy airspace opacities at the right midlung field which may represent pneumonia. These were not present on a previous chest radiograph from 03/20/2020. There is no evidence for CHF. Follow-up is recommended.          I ordered the above noted radiological studies. Reviewed by me and discussed with radiologist.  See dictation for official radiology interpretation.      PROCEDURES    Procedures      MEDICATIONS GIVEN IN ER    Medications   sodium chloride 0.9 % flush 10 mL (has no administration in time range)   ketorolac (TORADOL) injection 15 mg (15 mg Intravenous Given 12/4/20 1148)         PROGRESS, DATA ANALYSIS, CONSULTS, AND MEDICAL DECISION MAKING    I think this patient likely is suffering effects of the Covid infection.  She is very stable in appearance.  We will can check a dimer on her but clinically have a low index patient that this is a pulmonary embolism.  But she does have Covid infection so I think we need to rule that out.  And she is very stable.  All questions answered at this time  We are currently under a pandemic from the COVID19 infection.  The patient presented to the emergency department by ambulance or personal vehicle. I followed the current protocols required by Infection Control at [facility] in my evaluation and treatment of the patient. The patient was wearing a face mask during my evaluation and throughout my encounter. During my whole encounter with this patient I used appropriate personal protective equipment.  This equipment consisted of eye protection, facemask, gown, and gloves.  I applied this equipment before entering the room.      All labs have been independently reviewed by me.  All radiology studies have been reviewed by me and discussed with radiologist dictating the report.   EKG's independently viewed and interpreted by me.  Discussion below represents my analysis of pertinent findings related to patient's condition, differential diagnosis,  treatment plan and final disposition.      ED Course as of Dec 04 1407   Fri Dec 04, 2020   1258 EKG readingEKG was done at 1016It is a rate of 82 and it is normal sinus rhythmThere is some mild intraventricular conduction delayNormal axisSome nonspecific T wave changes in several leadsQT looks normalCompared to an EKG on 3/19/2020 and looks fairly similar.  Do not see any acute ischemic process    [MM]   1404 I talked with the patient informed her the results of the lab work as well as the chest x-ray.  Has very subtle potential beginnings of pneumonia.  This also may be artifact.  Regardless this patient has a Covid infection.  Her O2 sat is 98% she has a normal heart rate in the 70s and a normal blood pressure.  Patient wants to go home.  I think she is totally safe to go home.  She has a pulse oximeter at home.  I instructed her to return if she has worsening of the symptoms and if her oxygen saturation gets below 92%.  She agrees with this plan.  She is tolerating p.o. well.  All questions answered.    [MM]      ED Course User Index  [MM] Solomon Solorio MD       AS OF 14:07 EST VITALS:    BP - 102/66  HR - (!) 135  TEMP - 97.4 °F (36.3 °C) (Tympanic)  02 SATS - 97%        DIAGNOSIS  Final diagnoses:   COVID-19 virus infection   Chest wall pain from Covid infection         DISPOSITION  DISCHARGE    Patient discharged in stable condition.    Reviewed implications of results, diagnosis, meds, responsibility to follow up, warning signs and symptoms of possible worsening, potential complications and reasons to return to ER, including worsening of symptoms, worsening of shortness of breath, not tolerating liquids or solids, or any concerns.    Patient/Family voiced understanding of above instructions.    Discussed plan for discharge, as there is no emergent indication for admission. Pt/family is agreeable and understands need for follow up and repeat testing.  Pt is aware that discharge does not mean that nothing is  wrong but it indicates no emergency is present that requires admission and they must continue care with follow-up as given below or physician of their choice.     FOLLOW-UP  Miriam Newman[address] [place] [id number]  [phone]    In 1 week  Return if pain worsens, If symptoms worsen, shortness of breath, any concerns         Medication List      No changes were made to your prescriptions during this visit.                  Solomon Solorio MD  12/04/20 5338

## 2020-12-04 NOTE — ED TRIAGE NOTES
Pt was tested positive for COVID 11/30/2020 reports anterior chest pain yesterday. Pt reports mild SOA and cough. Pt has Asthma. Patient masked in first look triage. I was wearing mask and goggles.

## 2020-12-04 NOTE — ED NOTES
Pt noted to have mask on when this RN entered the room.  This RN wore appropriate PPE throughout our encounter. Hand hygiene performed upon entering and exiting room.       Geeta Busby RN  12/04/20 2648

## 2020-12-14 ENCOUNTER — APPOINTMENT (OUTPATIENT)
Dept: GENERAL RADIOLOGY | Facility: HOSPITAL | Age: 30
End: 2020-12-14

## 2020-12-14 PROCEDURE — 71046 X-RAY EXAM CHEST 2 VIEWS: CPT | Performed by: FAMILY MEDICINE

## 2020-12-16 ENCOUNTER — LAB REQUISITION (OUTPATIENT)
Dept: LAB | Facility: HOSPITAL | Age: 30
End: 2020-12-16

## 2020-12-16 DIAGNOSIS — Z00.00 ENCOUNTER FOR GENERAL ADULT MEDICAL EXAMINATION WITHOUT ABNORMAL FINDINGS: ICD-10-CM

## 2020-12-18 PROCEDURE — U0004 COV-19 TEST NON-CDC HGH THRU: HCPCS | Performed by: INTERNAL MEDICINE

## 2020-12-19 LAB — SARS-COV-2 RNA RESP QL NAA+PROBE: DETECTED

## 2020-12-21 ENCOUNTER — OUTSIDE FACILITY SERVICE (OUTPATIENT)
Dept: GASTROENTEROLOGY | Facility: CLINIC | Age: 30
End: 2020-12-21

## 2020-12-21 ENCOUNTER — LAB REQUISITION (OUTPATIENT)
Dept: LAB | Facility: HOSPITAL | Age: 30
End: 2020-12-21

## 2020-12-21 DIAGNOSIS — R10.9 ABDOMINAL PAIN, UNSPECIFIED ABDOMINAL LOCATION: Primary | ICD-10-CM

## 2020-12-21 DIAGNOSIS — R11.2 NAUSEA WITH VOMITING, UNSPECIFIED: ICD-10-CM

## 2020-12-21 PROCEDURE — 88305 TISSUE EXAM BY PATHOLOGIST: CPT | Performed by: INTERNAL MEDICINE

## 2020-12-21 PROCEDURE — 43239 EGD BIOPSY SINGLE/MULTIPLE: CPT | Performed by: INTERNAL MEDICINE

## 2020-12-22 LAB
LAB AP CASE REPORT: NORMAL
LAB AP CLINICAL INFORMATION: NORMAL
PATH REPORT.FINAL DX SPEC: NORMAL
PATH REPORT.GROSS SPEC: NORMAL

## 2021-01-14 ENCOUNTER — TELEPHONE (OUTPATIENT)
Dept: GASTROENTEROLOGY | Facility: CLINIC | Age: 31
End: 2021-01-14

## 2021-01-14 NOTE — TELEPHONE ENCOUNTER
Spoke to patient in regards to her overdue labs. The lab order was sent in the mail today for the patient to get those labs completed.   Lianet

## 2021-01-18 ENCOUNTER — OFFICE VISIT (OUTPATIENT)
Dept: GASTROENTEROLOGY | Facility: CLINIC | Age: 31
End: 2021-01-18

## 2021-01-18 DIAGNOSIS — K58.0 IRRITABLE BOWEL SYNDROME WITH DIARRHEA: Chronic | ICD-10-CM

## 2021-01-18 DIAGNOSIS — R11.0 NAUSEA: ICD-10-CM

## 2021-01-18 DIAGNOSIS — K31.89 RETAINED FOOD IN STOMACH: Primary | ICD-10-CM

## 2021-01-18 DIAGNOSIS — K76.0 FATTY LIVER: Chronic | ICD-10-CM

## 2021-01-18 PROCEDURE — 99443 PR PHYS/QHP TELEPHONE EVALUATION 21-30 MIN: CPT | Performed by: NURSE PRACTITIONER

## 2021-01-18 NOTE — PATIENT INSTRUCTIONS
Continue omeprazole 40 mg daily.    For worsening reflux, start Pepcid 20 mg in the evening.    Suspicion for gastroparesis as discussed.  Gastroparesis is a disorder in which the stomach takes too long to empty its food contents into the small intestine.  This can result in symptoms of acid reflux, lack of appetite, nausea, vomiting, abdominal discomfort, bloating or fullness.  The purpose of the diet for gastroparesis is to reduce symptoms and maintain adequate nutrition.  The symptoms of gastroparesis may be mild or severe depending on the person and symptoms can come and go.  Changing your eating habits can help control gastroparesis.  General diet guidelines  1.  Eat small frequent meals.  Many people find that frequent small meals 5-6 meals each day produce fewer symptoms than large meals.  2.  Liquids are often better tolerated than solids with active symptoms.  Begin with a liquid or puréed diet which may be better tolerated.  Liquids can pass through the stomach more easily and quickly than solids.  Try sipping on liquids throughout the day.  Liquid nutritional supplements may help achieve adequate calories and protein.  Advance your diet to soft foods as symptoms improve.  Also remember to chew your food well.  3.  Reduce fat intake.  Fat naturally slows digestion however it is better tolerated if present in liquid form when symptoms are active.  Avoid all fried, fatty or greasy foods if they cause active symptoms.  4.  Reduce fiber intake.  Fiber also slows digestion.  Avoid all raw vegetables and fruits and whole grain products if symptoms are active or fiber has been known to cause your symptoms.  5.  Hyperglycemia (high blood sugar).  Keep your blood sugar under control as drastic changes in blood sugar can impair gastric emptying.    Proceed with gastric emptying study as scheduled.    Continue milk thistle for fatty liver.  Also recommend weight loss, following low-fat low sugar diet.    Follow-up  visit after gastric emptying study.    Please contact the office with any questions or concerns or if may be of any additional assistance prior to gastric emptying study.

## 2021-01-18 NOTE — PROGRESS NOTES
Chief Complaint   Patient presents with   • Follow-up     discuss recent EGD, nausea             History of Present Illness  Patient presents today for follow-up after EGD December 21, 2020, summarized below.  She has a history of fatty liver and IBS.    Given complaints of nausea and vomiting EGD was performed.  She presents today via telephone to discuss results.    Symptoms started November 2020.  1 week prior to diagnosis of COVID-19 she noticed occasional nausea and episodes of vomiting that would come and go, this was infrequent but present.  After she was diagnosed with Covid she has had persistent nausea and repeated episodes of vomiting on a regular basis.    She continues to experience nausea.  This is present daily.  She is taking Zofran every other day.  She has not had episodes of vomiting for the last 7 to 10 days.  She is not sure why the change or why she is no longer having vomiting.  She has been eating more smoothies and a bland diet as she still does not have her sense of taste back from Covid 19 viral infection.    She does not have a history of gastroparesis.    She is noticing worse reflux daily.  This is present in the morning in the evening.  No specific food triggers.  She is on omeprazole 40 mg daily.    Her psychiatric medications were altered approximately 4 to 5 months ago but she has been on stable doses since that time.    For IBS that she continues on dicyclomine as needed with good control of symptoms.  She typically has 1 bowel movement daily.  She denies melena or hematochezia.  She can have issues with hemorrhoids occasionally.    Previous work-up for elevated liver enzymes include ultrasound July 2020 consistent with steatosis.  Most recent labs December 4, 2020 with mildly elevated ALT otherwise AST and alkaline phosphatase normal.  She denies yellowing of the skin, mental status changes, pruritus or increasing abdominal girth.  She is working on exercise and increased physical  "activity. She continues on milk thistle.    She reports her weight at her scope December 2020 was 250 pounds and that she has lost approximately 20 pounds intentionally.    Review of Systems   HENT:        Loss of taste and smell   Gastrointestinal: Positive for nausea.         Result Review :      Comprehensive Metabolic Panel (12/04/2020 11:37)  Tissue Pathology Exam (12/21/2020 12:40)    December 21, 2020 EGD.  LA Grade A esophagitis, biopsies with benign squamous mucosa with no intestinal metaplasia or significant inflammation.  2 cm hiatal hernia  Random stomach biopsies with mild chronic inflammation, nonspecific, chronic gastritis, no intestinal metaplasia or H. pylori excessive fluid and small chunks of food debris found in the gastric body.      Physical Exam - telephone        Assessment and Plan      Diagnoses and all orders for this visit:    1. Retained food in stomach (Primary)    2. Nausea    3. Fatty liver    4. Irritable bowel syndrome with diarrhea       EGD findings with excessive fluid and food debris in the gastric body raise question of decreased motility or gastroparesis.  EGD December 21, 2020, summarized above.  Given his upcoming gastric emptying study is normal, strong suspicion for gastroparesis that was exacerbated by viral infection, recent COVID-19 infection as discussed.  Gastric emptying study is represents the gastric emptying that occurred on the date of the test and this may be \"a good day\".  Again if it is normal or abnormal, dietary modifications are the main treatment at this time.  Also discussed possible side effects of trazodone and lithium as they both have a side effect of nausea and vomiting however this is less likely and more mild gastroparesis exacerbated by viral infection although could be multifactorial.    For fatty liver, continue milk thistle.    For fatty liver, weight loss is strongly recommended. Recommend following a low fat and low sugar diet. Recommend " management of diabetes and elevated cholesterol with primary care provider if indicated. Regular exercise is recommended. Alcohol avoidance is recommended.    Discussed the importance of dietary modifications for gastroparesis and acid reflux including soft bland diet, smaller more frequent meals, maintaining upright position and avoiding foods high in fat or high in fiber.    Patient verbalized agreement and understanding with the above plan.  Have scheduled a follow-up visit via telephone after upcoming gastric emptying study results to review and make additional recommendations.      Follow Up   No follow-ups on file.     Patient Instructions   Continue omeprazole 40 mg daily.    For worsening reflux, start Pepcid 20 mg in the evening.    Suspicion for gastroparesis as discussed.  Gastroparesis is a disorder in which the stomach takes too long to empty its food contents into the small intestine.  This can result in symptoms of acid reflux, lack of appetite, nausea, vomiting, abdominal discomfort, bloating or fullness.  The purpose of the diet for gastroparesis is to reduce symptoms and maintain adequate nutrition.  The symptoms of gastroparesis may be mild or severe depending on the person and symptoms can come and go.  Changing your eating habits can help control gastroparesis.  General diet guidelines  1.  Eat small frequent meals.  Many people find that frequent small meals 5-6 meals each day produce fewer symptoms than large meals.  2.  Liquids are often better tolerated than solids with active symptoms.  Begin with a liquid or puréed diet which may be better tolerated.  Liquids can pass through the stomach more easily and quickly than solids.  Try sipping on liquids throughout the day.  Liquid nutritional supplements may help achieve adequate calories and protein.  Advance your diet to soft foods as symptoms improve.  Also remember to chew your food well.  3.  Reduce fat intake.  Fat naturally slows  digestion however it is better tolerated if present in liquid form when symptoms are active.  Avoid all fried, fatty or greasy foods if they cause active symptoms.  4.  Reduce fiber intake.  Fiber also slows digestion.  Avoid all raw vegetables and fruits and whole grain products if symptoms are active or fiber has been known to cause your symptoms.  5.  Hyperglycemia (high blood sugar).  Keep your blood sugar under control as drastic changes in blood sugar can impair gastric emptying.    Proceed with gastric emptying study as scheduled.    Continue milk thistle for fatty liver.  Also recommend weight loss, following low-fat low sugar diet.    Follow-up visit after gastric emptying study.    Please contact the office with any questions or concerns or if may be of any additional assistance prior to gastric emptying study.     You have chosen to receive care through a telephone visit. Do you consent to use a telephone visit for your medical care today? Yes    This visit has been rescheduled as a phone visit to comply with patient safety concerns in accordance with CDC recommendations. Total time of discussion was 22 minutes.

## 2021-02-01 ENCOUNTER — HOSPITAL ENCOUNTER (OUTPATIENT)
Dept: NUCLEAR MEDICINE | Facility: HOSPITAL | Age: 31
Discharge: HOME | End: 2021-02-01

## 2021-02-01 DIAGNOSIS — R10.9 ABDOMINAL PAIN, UNSPECIFIED ABDOMINAL LOCATION: ICD-10-CM

## 2021-02-01 PROCEDURE — A9541 TC99M SULFUR COLLOID: HCPCS | Performed by: INTERNAL MEDICINE

## 2021-02-01 PROCEDURE — 78264 GASTRIC EMPTYING IMG STUDY: CPT

## 2021-02-01 PROCEDURE — 0 TECHNETIUM SULFUR COLLOID: Performed by: INTERNAL MEDICINE

## 2021-02-01 RX ADMIN — TECHNETIUM TC 99M SULFUR COLLOID 1 DOSE: KIT at 07:28

## 2021-02-16 ENCOUNTER — OFFICE VISIT (OUTPATIENT)
Dept: GASTROENTEROLOGY | Facility: CLINIC | Age: 31
End: 2021-02-16

## 2021-02-16 DIAGNOSIS — K58.0 IRRITABLE BOWEL SYNDROME WITH DIARRHEA: ICD-10-CM

## 2021-02-16 DIAGNOSIS — K31.89 RETAINED FOOD IN STOMACH: ICD-10-CM

## 2021-02-16 DIAGNOSIS — R11.2 NAUSEA AND VOMITING, INTRACTABILITY OF VOMITING NOT SPECIFIED, UNSPECIFIED VOMITING TYPE: Primary | ICD-10-CM

## 2021-02-16 DIAGNOSIS — K21.9 GASTROESOPHAGEAL REFLUX DISEASE WITHOUT ESOPHAGITIS: ICD-10-CM

## 2021-02-16 PROCEDURE — 99443 PR PHYS/QHP TELEPHONE EVALUATION 21-30 MIN: CPT | Performed by: NURSE PRACTITIONER

## 2021-02-16 RX ORDER — ONDANSETRON 4 MG/1
TABLET, ORALLY DISINTEGRATING ORAL
Qty: 30 TABLET | Refills: 3 | Status: SHIPPED | OUTPATIENT
Start: 2021-02-16 | End: 2021-04-08 | Stop reason: HOSPADM

## 2021-02-16 NOTE — PATIENT INSTRUCTIONS
Reviewed recent gastric emptying study, normal.  Given retained food on EGD, suspect delayed stomach emptying probably related to viral infection as we discussed.  Recommend smaller more frequent meals, soft foods low in fat and fiber.    For worsening nausea, start Zofran once daily after lunch or before dinner to see if we can prevent episodes of nausea.  If you have nausea or vomiting, okay to take a second dose of Zofran as needed for symptom control.    For worsening acid reflux, start Pepcid in the morning and the evening and continue omeprazole in the morning at this time.    Please call the office in 1 week with an update.  If you are still having symptoms despite scheduled Zofran and the addition of Pepcid, to consider the addition of sucralfate crushed as well as change in acid medication to pantoprazole twice daily.

## 2021-04-02 ENCOUNTER — HOSPITAL ENCOUNTER (OUTPATIENT)
Facility: HOSPITAL | Age: 31
Setting detail: OBSERVATION
Discharge: PSYCHIATRIC OR SUBSTANCE USE FACILITY | End: 2021-04-08
Attending: EMERGENCY MEDICINE | Admitting: HOSPITALIST

## 2021-04-02 DIAGNOSIS — T50.902A SUICIDE ATTEMPT BY DRUG INGESTION, INITIAL ENCOUNTER (HCC): ICD-10-CM

## 2021-04-02 DIAGNOSIS — R79.89 ELEVATED LITHIUM LEVEL: ICD-10-CM

## 2021-04-02 DIAGNOSIS — T50.902A POLYSUBSTANCE OVERDOSE, INTENTIONAL SELF-HARM, INITIAL ENCOUNTER (HCC): Primary | ICD-10-CM

## 2021-04-02 LAB
ALBUMIN SERPL-MCNC: 4.2 G/DL (ref 3.5–5.2)
ALBUMIN/GLOB SERPL: 2 G/DL
ALP SERPL-CCNC: 60 U/L (ref 39–117)
ALT SERPL W P-5'-P-CCNC: 19 U/L (ref 1–33)
AMPHET+METHAMPHET UR QL: NEGATIVE
ANION GAP SERPL CALCULATED.3IONS-SCNC: 10.4 MMOL/L (ref 5–15)
APAP SERPL-MCNC: <5 MCG/ML (ref 0–30)
APAP SERPL-MCNC: <5 MCG/ML (ref 0–30)
AST SERPL-CCNC: 17 U/L (ref 1–32)
BARBITURATES UR QL SCN: NEGATIVE
BASOPHILS # BLD AUTO: 0.1 10*3/MM3 (ref 0–0.2)
BASOPHILS NFR BLD AUTO: 0.9 % (ref 0–1.5)
BENZODIAZ UR QL SCN: NEGATIVE
BILIRUB SERPL-MCNC: 0.3 MG/DL (ref 0–1.2)
BUN SERPL-MCNC: 6 MG/DL (ref 6–20)
BUN/CREAT SERPL: 7.6 (ref 7–25)
CALCIUM SPEC-SCNC: 8.6 MG/DL (ref 8.6–10.5)
CANNABINOIDS SERPL QL: NEGATIVE
CHLORIDE SERPL-SCNC: 104 MMOL/L (ref 98–107)
CO2 SERPL-SCNC: 23.6 MMOL/L (ref 22–29)
COCAINE UR QL: NEGATIVE
CREAT SERPL-MCNC: 0.79 MG/DL (ref 0.57–1)
D DIMER PPP FEU-MCNC: <0.27 MCGFEU/ML (ref 0–0.49)
DEPRECATED RDW RBC AUTO: 41.7 FL (ref 37–54)
EOSINOPHIL # BLD AUTO: 0.11 10*3/MM3 (ref 0–0.4)
EOSINOPHIL NFR BLD AUTO: 1 % (ref 0.3–6.2)
ERYTHROCYTE [DISTWIDTH] IN BLOOD BY AUTOMATED COUNT: 13.4 % (ref 12.3–15.4)
ETHANOL BLD-MCNC: <10 MG/DL (ref 0–10)
ETHANOL UR QL: <0.01 %
FERRITIN SERPL-MCNC: 28.8 NG/ML (ref 13–150)
GFR SERPL CREATININE-BSD FRML MDRD: 87 ML/MIN/1.73
GLOBULIN UR ELPH-MCNC: 2.1 GM/DL
GLUCOSE BLDC GLUCOMTR-MCNC: 79 MG/DL (ref 70–130)
GLUCOSE SERPL-MCNC: 102 MG/DL (ref 65–99)
HCG SERPL QL: NEGATIVE
HCT VFR BLD AUTO: 43 % (ref 34–46.6)
HGB BLD-MCNC: 14.2 G/DL (ref 12–15.9)
IMM GRANULOCYTES # BLD AUTO: 0.05 10*3/MM3 (ref 0–0.05)
IMM GRANULOCYTES NFR BLD AUTO: 0.5 % (ref 0–0.5)
LDH SERPL-CCNC: 168 U/L (ref 135–214)
LITHIUM SERPL-SCNC: 0.8 MMOL/L (ref 0.6–1.2)
LITHIUM SERPL-SCNC: 1.7 MMOL/L (ref 0.6–1.2)
LITHIUM SERPL-SCNC: 2.9 MMOL/L (ref 0.6–1.2)
LYMPHOCYTES # BLD AUTO: 2.84 10*3/MM3 (ref 0.7–3.1)
LYMPHOCYTES NFR BLD AUTO: 26 % (ref 19.6–45.3)
MCH RBC QN AUTO: 28.6 PG (ref 26.6–33)
MCHC RBC AUTO-ENTMCNC: 33 G/DL (ref 31.5–35.7)
MCV RBC AUTO: 86.5 FL (ref 79–97)
METHADONE UR QL SCN: POSITIVE
MONOCYTES # BLD AUTO: 0.55 10*3/MM3 (ref 0.1–0.9)
MONOCYTES NFR BLD AUTO: 5 % (ref 5–12)
NEUTROPHILS NFR BLD AUTO: 66.6 % (ref 42.7–76)
NEUTROPHILS NFR BLD AUTO: 7.29 10*3/MM3 (ref 1.7–7)
NRBC BLD AUTO-RTO: 0 /100 WBC (ref 0–0.2)
OPIATES UR QL: NEGATIVE
OXYCODONE UR QL SCN: NEGATIVE
PLATELET # BLD AUTO: 331 10*3/MM3 (ref 140–450)
PMV BLD AUTO: 9.7 FL (ref 6–12)
POTASSIUM SERPL-SCNC: 4.2 MMOL/L (ref 3.5–5.2)
PROCALCITONIN SERPL-MCNC: 0.08 NG/ML (ref 0–0.25)
PROT SERPL-MCNC: 6.3 G/DL (ref 6–8.5)
QT INTERVAL: 397 MS
RBC # BLD AUTO: 4.97 10*6/MM3 (ref 3.77–5.28)
SALICYLATES SERPL-MCNC: <0.3 MG/DL
SARS-COV-2 RNA RESP QL NAA+PROBE: DETECTED
SODIUM SERPL-SCNC: 138 MMOL/L (ref 136–145)
WBC # BLD AUTO: 10.94 10*3/MM3 (ref 3.4–10.8)

## 2021-04-02 PROCEDURE — 96372 THER/PROPH/DIAG INJ SC/IM: CPT

## 2021-04-02 PROCEDURE — 80178 ASSAY OF LITHIUM: CPT | Performed by: NURSE PRACTITIONER

## 2021-04-02 PROCEDURE — 25010000002 ONDANSETRON PER 1 MG: Performed by: INTERNAL MEDICINE

## 2021-04-02 PROCEDURE — 80307 DRUG TEST PRSMV CHEM ANLYZR: CPT | Performed by: NURSE PRACTITIONER

## 2021-04-02 PROCEDURE — 80179 DRUG ASSAY SALICYLATE: CPT | Performed by: NURSE PRACTITIONER

## 2021-04-02 PROCEDURE — 82077 ASSAY SPEC XCP UR&BREATH IA: CPT | Performed by: NURSE PRACTITIONER

## 2021-04-02 PROCEDURE — G0378 HOSPITAL OBSERVATION PER HR: HCPCS

## 2021-04-02 PROCEDURE — 83615 LACTATE (LD) (LDH) ENZYME: CPT | Performed by: INTERNAL MEDICINE

## 2021-04-02 PROCEDURE — 85379 FIBRIN DEGRADATION QUANT: CPT | Performed by: INTERNAL MEDICINE

## 2021-04-02 PROCEDURE — 80143 DRUG ASSAY ACETAMINOPHEN: CPT | Performed by: NURSE PRACTITIONER

## 2021-04-02 PROCEDURE — 93010 ELECTROCARDIOGRAM REPORT: CPT | Performed by: INTERNAL MEDICINE

## 2021-04-02 PROCEDURE — 82962 GLUCOSE BLOOD TEST: CPT

## 2021-04-02 PROCEDURE — 84145 PROCALCITONIN (PCT): CPT | Performed by: INTERNAL MEDICINE

## 2021-04-02 PROCEDURE — 96361 HYDRATE IV INFUSION ADD-ON: CPT

## 2021-04-02 PROCEDURE — C9803 HOPD COVID-19 SPEC COLLECT: HCPCS

## 2021-04-02 PROCEDURE — 36415 COLL VENOUS BLD VENIPUNCTURE: CPT | Performed by: NURSE PRACTITIONER

## 2021-04-02 PROCEDURE — U0003 INFECTIOUS AGENT DETECTION BY NUCLEIC ACID (DNA OR RNA); SEVERE ACUTE RESPIRATORY SYNDROME CORONAVIRUS 2 (SARS-COV-2) (CORONAVIRUS DISEASE [COVID-19]), AMPLIFIED PROBE TECHNIQUE, MAKING USE OF HIGH THROUGHPUT TECHNOLOGIES AS DESCRIBED BY CMS-2020-01-R: HCPCS | Performed by: NURSE PRACTITIONER

## 2021-04-02 PROCEDURE — 99285 EMERGENCY DEPT VISIT HI MDM: CPT

## 2021-04-02 PROCEDURE — 93005 ELECTROCARDIOGRAM TRACING: CPT | Performed by: NURSE PRACTITIONER

## 2021-04-02 PROCEDURE — 96374 THER/PROPH/DIAG INJ IV PUSH: CPT

## 2021-04-02 PROCEDURE — 85025 COMPLETE CBC W/AUTO DIFF WBC: CPT | Performed by: NURSE PRACTITIONER

## 2021-04-02 PROCEDURE — 25010000002 ENOXAPARIN PER 10 MG: Performed by: INTERNAL MEDICINE

## 2021-04-02 PROCEDURE — 80053 COMPREHEN METABOLIC PANEL: CPT | Performed by: NURSE PRACTITIONER

## 2021-04-02 PROCEDURE — 80178 ASSAY OF LITHIUM: CPT | Performed by: INTERNAL MEDICINE

## 2021-04-02 PROCEDURE — 82728 ASSAY OF FERRITIN: CPT | Performed by: INTERNAL MEDICINE

## 2021-04-02 PROCEDURE — 84703 CHORIONIC GONADOTROPIN ASSAY: CPT | Performed by: NURSE PRACTITIONER

## 2021-04-02 RX ORDER — LEVOTHYROXINE SODIUM 88 UG/1
88 TABLET ORAL
Status: DISCONTINUED | OUTPATIENT
Start: 2021-04-03 | End: 2021-04-08 | Stop reason: HOSPADM

## 2021-04-02 RX ORDER — CHOLECALCIFEROL (VITAMIN D3) 125 MCG
500 CAPSULE ORAL DAILY
Status: DISCONTINUED | OUTPATIENT
Start: 2021-04-02 | End: 2021-04-08 | Stop reason: HOSPADM

## 2021-04-02 RX ORDER — SODIUM CHLORIDE 9 MG/ML
125 INJECTION, SOLUTION INTRAVENOUS CONTINUOUS
Status: DISCONTINUED | OUTPATIENT
Start: 2021-04-02 | End: 2021-04-05

## 2021-04-02 RX ORDER — NITROGLYCERIN 0.4 MG/1
0.4 TABLET SUBLINGUAL
Status: DISCONTINUED | OUTPATIENT
Start: 2021-04-02 | End: 2021-04-08 | Stop reason: HOSPADM

## 2021-04-02 RX ORDER — FLUTICASONE PROPIONATE 50 MCG
2 SPRAY, SUSPENSION (ML) NASAL DAILY
Status: DISCONTINUED | OUTPATIENT
Start: 2021-04-02 | End: 2021-04-08 | Stop reason: HOSPADM

## 2021-04-02 RX ORDER — UREA 10 %
3 LOTION (ML) TOPICAL NIGHTLY PRN
Status: DISCONTINUED | OUTPATIENT
Start: 2021-04-02 | End: 2021-04-08 | Stop reason: HOSPADM

## 2021-04-02 RX ORDER — MELATONIN
2000 DAILY
Status: DISCONTINUED | OUTPATIENT
Start: 2021-04-02 | End: 2021-04-08 | Stop reason: HOSPADM

## 2021-04-02 RX ORDER — SODIUM CHLORIDE 0.9 % (FLUSH) 0.9 %
10 SYRINGE (ML) INJECTION AS NEEDED
Status: DISCONTINUED | OUTPATIENT
Start: 2021-04-02 | End: 2021-04-08 | Stop reason: HOSPADM

## 2021-04-02 RX ORDER — PANTOPRAZOLE SODIUM 40 MG/1
40 TABLET, DELAYED RELEASE ORAL EVERY MORNING
Status: DISCONTINUED | OUTPATIENT
Start: 2021-04-03 | End: 2021-04-08 | Stop reason: HOSPADM

## 2021-04-02 RX ORDER — ACETAMINOPHEN 325 MG/1
650 TABLET ORAL EVERY 4 HOURS PRN
Status: DISCONTINUED | OUTPATIENT
Start: 2021-04-02 | End: 2021-04-08 | Stop reason: HOSPADM

## 2021-04-02 RX ORDER — ONDANSETRON 2 MG/ML
4 INJECTION INTRAMUSCULAR; INTRAVENOUS EVERY 6 HOURS PRN
Status: DISCONTINUED | OUTPATIENT
Start: 2021-04-02 | End: 2021-04-08 | Stop reason: HOSPADM

## 2021-04-02 RX ORDER — ONDANSETRON 4 MG/1
4 TABLET, FILM COATED ORAL EVERY 6 HOURS PRN
Status: DISCONTINUED | OUTPATIENT
Start: 2021-04-02 | End: 2021-04-08 | Stop reason: HOSPADM

## 2021-04-02 RX ORDER — ACTIVATED CHARCOAL 25 G
50 SUSPENSION, RECONSTITUTED, ORAL (EA) ORAL ONCE
Status: COMPLETED | OUTPATIENT
Start: 2021-04-02 | End: 2021-04-02

## 2021-04-02 RX ORDER — TRAZODONE HYDROCHLORIDE 50 MG/1
50 TABLET ORAL NIGHTLY
Status: DISCONTINUED | OUTPATIENT
Start: 2021-04-02 | End: 2021-04-08 | Stop reason: HOSPADM

## 2021-04-02 RX ORDER — ALBUTEROL SULFATE 90 UG/1
2 AEROSOL, METERED RESPIRATORY (INHALATION) EVERY 6 HOURS PRN
Status: DISCONTINUED | OUTPATIENT
Start: 2021-04-02 | End: 2021-04-08 | Stop reason: HOSPADM

## 2021-04-02 RX ADMIN — SODIUM CHLORIDE 125 ML/HR: 9 INJECTION, SOLUTION INTRAVENOUS at 20:10

## 2021-04-02 RX ADMIN — ONDANSETRON 4 MG: 2 INJECTION INTRAMUSCULAR; INTRAVENOUS at 23:19

## 2021-04-02 RX ADMIN — Medication 500 MCG: at 23:09

## 2021-04-02 RX ADMIN — ENOXAPARIN SODIUM 40 MG: 40 INJECTION SUBCUTANEOUS at 21:08

## 2021-04-02 RX ADMIN — TRAZODONE HYDROCHLORIDE 50 MG: 50 TABLET ORAL at 23:10

## 2021-04-02 RX ADMIN — SODIUM CHLORIDE 1000 ML: 9 INJECTION, SOLUTION INTRAVENOUS at 08:32

## 2021-04-02 RX ADMIN — Medication 2000 UNITS: at 23:09

## 2021-04-02 RX ADMIN — ACTIVATED CHARCOAL 50000 MG: 25 PELLET ORAL at 08:24

## 2021-04-02 NOTE — ED PROVIDER NOTES
EMERGENCY DEPARTMENT ENCOUNTER    Room Number:  01/01  Date of encounter:  4/2/2021  PCP: Miriam Newman  Historian: Patient      PPE    Patient was placed in face mask in first look. Patient was wearing facemask when I entered the room and throughout our encounter. I wore full protective equipment throughout this patient encounter including a face mask, and gloves. Hand hygiene was performed before donning protective equipment and after removal when leaving the room.        HPI:  Chief Complaint: Overdose  A complete HPI/ROS/PMH/PSH/SH/FH are unobtainable due to: Nothing    Context: Maame Wallace is a 30 y.o. female who arrives to the ED via private vehicle from home with a friend.  Patient presents with c/o taking large amounts of lithium, trazodone, Xanax and over-the-counter sleep aid prior to arrival in an attempt to harm herself.  Patient states that she took approximately 30 to 5300 mg lithium, 3050 mg trazodone, 20 0.25 mg Xanax and 30-50 over-the-counter sleep aids.  He states she took these within the last 1 to 2 hours.  She states that she has been thinking about harming herself, a friend was actually going to bring her to the hospital prior to her taking this medication secondary to her depression and thoughts of SI.  She states that when she realized her friend was going to take her she decided to take the medications also.  She states she has had previous attempts in the past the last 1 being a year ago where she attempted to overdose at that time.  Patient also complains of dry mouth, feeling tired.  Patient states she just does not want to live anymore, she has no ziyad in anything that she does at this point.  Lives at home with her parents.  She does have a job.  Patient denies chest pain, shortness of breath, nausea, vomiting.  Patient states that nothing makes the symptoms better and nothing worsens symptoms.  Menstrual period was 2 weeks ago.  She states that the medication she took for her  own, she does drink socially she denies smoking or any other drug use.        PAST MEDICAL HISTORY  Active Ambulatory Problems     Diagnosis Date Noted   • Hemiplegic migraine, intractable 04/25/2016   • Fibromyalgia 04/29/2016   • Major depressive disorder, recurrent, severe without psychotic features (CMS/[facility]) 02/19/2020   • Vaginal yeast infection 02/20/2020   • Leukocytosis 02/20/2020   • Hyponatremia 02/20/2020   • Hypothyroidism 02/20/2020   • Irritable bowel disease 02/20/2020   • Obesity (BMI 30-39.9) 02/20/2020   • Polysubstance overdose 03/18/2020   • Obesity, Class III, BMI 40-49.9 (morbid obesity) (CMS/[facility]) 03/18/2020   • Fatty liver 01/18/2021     Resolved Ambulatory Problems     Diagnosis Date Noted   • No Resolved Ambulatory Problems     Past Medical History:   Diagnosis Date   • Anxiety    • Asthma    • Depression    • GERD (gastroesophageal reflux disease)    • Migraines          PAST SURGICAL HISTORY  Past Surgical History:   Procedure Laterality Date   • APPENDECTOMY      laparoscopic   • CHOLECYSTECTOMY     • KNEE ARTHROSCOPY Left    • PATELLA FEMORAL LIGAMENT RECONSTRUCTION Left    • TONSILLECTOMY AND ADENOIDECTOMY           FAMILY HISTORY  Family History   Problem Relation Age of Onset   • Irritable bowel syndrome Mother    • Crohn's disease Brother    • Colon cancer Neg Hx    • Colon polyps Neg Hx    • Ulcerative colitis Neg Hx          SOCIAL HISTORY  Social History     Socioeconomic History   • Marital status: Single     Spouse name: Not on file   • Number of children: Not on file   • Years of education: Not on file   • Highest education level: Not on file   Tobacco Use   • Smoking status: Never Smoker   • Smokeless tobacco: Never Used   Substance and Sexual Activity   • Alcohol use: Yes     Alcohol/week: 1.0 standard drinks     Types: 1 Cans of beer per week     Comment: rare   • Drug use: No   • Sexual activity: Defer         ALLERGIES  Codeine, Dhea [nutritional supplements], Imitrex  [sumatriptan], and Morphine        REVIEW OF SYSTEMS  Review of Systems     All systems reviewed and negative except for those discussed in HPI.        PHYSICAL EXAM    ED Triage Vitals [04/02/21 0723]   Temp Heart Rate Resp BP SpO2   98.7 °F (37.1 °C) (!) 143 18 (!) 143/102 94 %       Physical Exam  GENERAL: Well appearing, non-toxic appearing, not distressed  HENT: normocephalic, atraumatic, dry mucous membranes  EYES: no scleral icterus, PERRL  CV: regular rhythm, tachycardic, no murmur  RESPIRATORY: normal effort, CTAB  ABDOMEN: soft, nontender  MUSCULOSKELETAL: no deformity  NEURO: alert, moves all extremities, follows commands, mental status normal/baseline  SKIN: warm, dry, no rash   Psych: Not affect, depressed mood, positive SI  Nursing notes and vital signs reviewed      LAB RESULTS  Recent Results (from the past 24 hour(s))   hCG, Serum, Qualitative    Collection Time: 04/02/21  8:12 AM    Specimen: Blood   Result Value Ref Range    HCG Qualitative Negative Negative   Acetaminophen Level    Collection Time: 04/02/21  8:12 AM    Specimen: Blood   Result Value Ref Range    Acetaminophen <5.0 0.0 - 30.0 mcg/mL   Ethanol    Collection Time: 04/02/21  8:12 AM    Specimen: Blood   Result Value Ref Range    Ethanol <10 0 - 10 mg/dL    Ethanol % <0.010 %   Salicylate Level    Collection Time: 04/02/21  8:12 AM    Specimen: Blood   Result Value Ref Range    Salicylate <0.3 <=30.0 mg/dL   Lithium Level    Collection Time: 04/02/21  8:12 AM    Specimen: Blood   Result Value Ref Range    Lithium 0.8 0.6 - 1.2 mmol/L   CBC Auto Differential    Collection Time: 04/02/21  8:12 AM    Specimen: Blood   Result Value Ref Range    WBC 10.94 (H) 3.40 - 10.80 10*3/mm3    RBC 4.97 3.77 - 5.28 10*6/mm3    Hemoglobin 14.2 12.0 - 15.9 g/dL    Hematocrit 43.0 34.0 - 46.6 %    MCV 86.5 79.0 - 97.0 fL    MCH 28.6 26.6 - 33.0 pg    MCHC 33.0 31.5 - 35.7 g/dL    RDW 13.4 12.3 - 15.4 %    RDW-SD 41.7 37.0 - 54.0 fl    MPV 9.7 6.0 - 12.0  fL    Platelets 331 140 - 450 10*3/mm3    Neutrophil % 66.6 42.7 - 76.0 %    Lymphocyte % 26.0 19.6 - 45.3 %    Monocyte % 5.0 5.0 - 12.0 %    Eosinophil % 1.0 0.3 - 6.2 %    Basophil % 0.9 0.0 - 1.5 %    Immature Grans % 0.5 0.0 - 0.5 %    Neutrophils, Absolute 7.29 (H) 1.70 - 7.00 10*3/mm3    Lymphocytes, Absolute 2.84 0.70 - 3.10 10*3/mm3    Monocytes, Absolute 0.55 0.10 - 0.90 10*3/mm3    Eosinophils, Absolute 0.11 0.00 - 0.40 10*3/mm3    Basophils, Absolute 0.10 0.00 - 0.20 10*3/mm3    Immature Grans, Absolute 0.05 0.00 - 0.05 10*3/mm3    nRBC 0.0 0.0 - 0.2 /100 WBC   Comprehensive Metabolic Panel    Collection Time: 04/02/21  8:46 AM    Specimen: Blood   Result Value Ref Range    Glucose 102 (H) 65 - 99 mg/dL    BUN 6 6 - 20 mg/dL    Creatinine 0.79 0.57 - 1.00 mg/dL    Sodium 138 136 - 145 mmol/L    Potassium 4.2 3.5 - 5.2 mmol/L    Chloride 104 98 - 107 mmol/L    CO2 23.6 22.0 - 29.0 mmol/L    Calcium 8.6 8.6 - 10.5 mg/dL    Total Protein 6.3 6.0 - 8.5 g/dL    Albumin 4.20 3.50 - 5.20 g/dL    ALT (SGPT) 19 1 - 33 U/L    AST (SGOT) 17 1 - 32 U/L    Alkaline Phosphatase 60 39 - 117 U/L    Total Bilirubin 0.3 0.0 - 1.2 mg/dL    eGFR Non African Amer 87 >60 mL/min/1.73    Globulin 2.1 gm/dL    A/G Ratio 2.0 g/dL    BUN/Creatinine Ratio 7.6 7.0 - 25.0    Anion Gap 10.4 5.0 - 15.0 mmol/L   Urine Drug Screen - Urine, Clean Catch    Collection Time: 04/02/21  8:46 AM    Specimen: Urine, Clean Catch   Result Value Ref Range    Amphet/Methamphet, Screen Negative Negative    Barbiturates Screen, Urine Negative Negative    Benzodiazepine Screen, Urine Negative Negative    Cocaine Screen, Urine Negative Negative    Opiate Screen Negative Negative    THC, Screen, Urine Negative Negative    Methadone Screen, Urine Positive (A) Negative    Oxycodone Screen, Urine Negative Negative   COVID-19, [facility] IN-HOUSE CEPHEID, NP SWAB IN TRANSPORT MEDIA 8-12 HR TAT - Swab, Nasopharynx    Collection Time: 04/02/21  8:46 AM    Specimen:  Nasopharynx; Swab   Result Value Ref Range    COVID19 Detected (C) Not Detected - Ref. Range   ECG 12 Lead    Collection Time: 04/02/21  8:52 AM   Result Value Ref Range    QT Interval 397 ms   Acetaminophen Level    Collection Time: 04/02/21 11:14 AM    Specimen: Blood   Result Value Ref Range    Acetaminophen <5.0 0.0 - 30.0 mcg/mL   Lithium Level    Collection Time: 04/02/21 12:56 PM    Specimen: Blood   Result Value Ref Range    Lithium 1.7 (H) 0.6 - 1.2 mmol/L       Ordered the above labs and independently reviewed the results.      RADIOLOGY  No Radiology Exams Resulted Within Past 24 Hours    I ordered the above noted radiological studies and viewed the images on the PACS system.       EKG      Independently viewed by me and interpreted by Dr Amos         MEDICAL RECORD REVIEW  Medical records reviewed in [facility], patient was admitted to the hospital March 17, 2020 through March 20, 2020 for drug overdose at that time.  This is a 29-year-old female that presents to the hospital after an overdose on Benadryl containing sleep aid and lithium  1.  Benadryl and lithium overdose: Lithium levels are within normal limits today.  The reaction to the Benadryl is likely passed at this point.  I did discuss the case with poison control who agreed.  Mainly monitoring the QRS is what we do with Benadryl levels.  The QRS was elevated yesterday at 115 and so she was placed on bicarb containing fluids and hydrated overnight and at this point her QRS is back down to around 100 today.  Poison control feels that this is stable and no need for further monitoring of the QRS with regards to the Benadryl.  Probably okay to resume lithium tomorrow.  Had been awaiting psychiatry input they have declared that she is not medically stable from their perspective for discharge to the psychiatric unit today.  2.  QTC prolongation: The psychiatric team is concerned about her QTC being long.  Her QTC has been borderline long on all 3 EKGs.   Is a little longer today but she is had no signs of an arrhythmia and her electrolytes are within normal limits.  I will ask cardiology to weigh in on this and to clear whether or not she needs further work-up or evaluation of this QTc prolongation  3.  Mild temperature elevation with leukocytosis: T-max has been 100 the patient endorses no febrile symptoms no symptoms of infection at this time her white count was elevated on admission and is trending down.  I would recommend holding off on any antibiotic therapy or any further work-up unless infection symptoms are endorsed.  4.  Hypoxia: She has 1 documented low O2 saturation around 88%.  She is been on room air  Throughout the day and was 96% on room air when I was in the room.  She was not hypoxic at all throughout the day yesterday.  She is endorsing no respiratory symptoms currently no plan for work-up unless she is persistently hypoxic.  It is entirely possible given her obesity she might have a touch of obstructive sleep apnea or even some obesity hypoventilation syndrome but again this is not worked up in the inpatient setting.  I will asked the nurse to do a walking oximetry if O2 saturations do not drop with exercise no further work-up will be completed at this time.  5.  Elevated blood pressure without a history of hypertension: It is documented in the psychiatry note that the patient's blood pressure is elevated at 150/78.  This was actually done 24 hours ago.  Since that blood pressure her pressures have been stable around 115/80.  She is not hypertensive today no plans to initiate any blood pressure medications    PROCEDURES    Critical Care  Performed by: Isabel Medina APRN  Authorized by: Elliot Amos MD     Critical care provider statement:     Critical care time (minutes):  30    Critical care was necessary to treat or prevent imminent or life-threatening deterioration of the following conditions:  Toxidrome    Critical care was  time spent personally by me on the following activities:  Ordering and performing treatments and interventions, ordering and review of laboratory studies, discussions with consultants, pulse oximetry, re-evaluation of patient's condition, evaluation of patient's response to treatment, examination of patient, review of old charts and obtaining history from patient or surrogate            DIFFERENTIAL DIAGNOSIS  Differential diagnosis include but are not limited to the following: Depression, suicidal ideations, polysubstance overdose      PROGRESS, DATA ANALYSIS, CONSULTS, AND MEDICAL DECISION MAKING        ED Course as of Apr 02 1554 Fri Apr 02, 2021   3372 Discussed with Ofelia from the poison control regarding patient presentation to the ER.  Discussed with her that she took lithium, trazodone, Xanax and over-the-counter sleep aid.  Ofelia recommends that we monitor patient closely, she will need a Tylenol level now in 4 hours postingestion, lithium level now with serial repeats every 2 hours.  She also recommends giving patient 50 g of oral charcoal at this time.    [MS]   0805 Patient is awake and talking and able to provide information at this time.  Will check labs, give charcoal as recommended per poison control.  And will monitor patient closely.    [MS]   0822 Heart Rate: 99 [MS]   0822 BP: 140/85 [MS]   0839 Patient drank charcoal without difficulty, current heart rate is 123, states she is starting to feel a little drowsy.  We will continue to monitor closely.    [MS]   0846 Reviewed pt's history and workup with Dr. Amos.  After a bedside evaluation, they agree with the plan of care.          [MS]   0857 BP: 121/79 [MS]   0858 Heart Rate: 98 [MS]   0858 SpO2: 96 % [MS]   1025 Ofelia with poison control called checking in on patient.    [MS]   1041 COVID19(!!): Detected [MS]   1204 I was able to get blood draw from right wrist for repeat Lithium level.      [MS]   1303 Lab called and stated that the  blood sent for lithium was not significant amount.  Lab has been able to collect her lithium level.  There was a delay in the results secondary to difficulty obtaining blood.    [MS]   1351 Lithium(!): 1.7 [MS]   1406 Consult Note    Discussed care with Dr Hunter  Reviewed patient's history, exam, results and need for admission secondary to polysubstance overdose, lithium toxicity, depression and suicidal attempt  Dr. Hunter accepts the patient to be admitted to telemetry observation bed.        [MS]   1533 Poison control called to check on patient for second time, they recommend with the bump in her lithium to recheck her next one at 5 PM.'s order was placed.    [MS]   1533 BP: 106/57 [MS]   1533 Heart Rate: 78 [MS]   1533 SpO2: 94 % [MS]   1533 Patient resting comfortably, sitter remains outside her door, no respiratory distress or other complaints at this time.    [MS]   1553 Updated Dr. Hunter on conversation with the poison control and that a repeat lithium level was placed 5 PM tonight.  She will follow the results.    [MS]      ED Course User Index  [MS] Isabel Medina, APRVINITA           MEDICATIONS GIVEN IN ED    Medications   sodium chloride 0.9 % flush 10 mL (has no administration in time range)   sodium chloride 0.9 % bolus 1,000 mL (0 mL Intravenous Stopped 4/2/21 1203)   EZ Magi 50,000 mg (50,000 mg Oral Given 4/2/21 0824)           COURSE & MEDICAL DECISION MAKING  Any/All labs and Any/All Imaging studies that were ordered were reviewed and are noted above.  Results were reviewed/discussed with the patient and they were also made aware of online access.    Pt also made aware that some labs, such as cultures, will not be resulted during ER visit and follow up with PMD is necessary.        Isabel Medina APRN  04/02/21 2370

## 2021-04-02 NOTE — ED TRIAGE NOTES
Pt states that over the last few hours she took an over dose of Xanax, trazadone, sleeping aids, and lithium. States that she thinks that she took 30-50 of each pill but is not sure. Pt states that she did this in an attempt to kill herself. Patient masked at arrival and triage staff wore all appropriate PPE during entire encounter with patient.

## 2021-04-02 NOTE — ED NOTES
Patient was placed in face mask during first look triage.  Patient was wearing a face mask throughout encounter.  I wore personal protective equipment; including mask, goggles, gloves, gown, face shield, N95/CAPR throughout the encounter.  Hand hygiene was performed before and after patient encounter.      Fatimah Srinivasan, RN  Resident  04/02/21 4913

## 2021-04-02 NOTE — ED NOTES
Patient was placed in face mask during first look triage.  Patient was wearing a face mask throughout encounter.  I wore all personal protective equipment including mask, goggles, and gloves throughout the encounter.  Hand hygiene was performed before and after patient encounter.        Fatimah Srinivasan, KENDRICK  Resident  04/02/21 1116

## 2021-04-02 NOTE — H&P
HISTORY AND PHYSICAL   [facility]        Patient Identification:  Name: Maame Wallace  Age: 30 y.o.  Sex: female  :  1990  MRN: [id number]                     Primary Care Physician: Miriam Newman    Chief Complaint:  30 year old female who presented to the emergency room after she took an overdose of lithium and likely several other medications; she has made a prior attempt last year and came in with a similar presentation; she had covid two to three months ago and has just completed her two vaccines; she denies acute illness;     History of Present Illness:   As above    Past Medical History:  Past Medical History:   Diagnosis Date   • Anxiety    • Asthma    • Depression    • Fibromyalgia    • GERD (gastroesophageal reflux disease)    • Hypothyroidism    • Irritable bowel disease    • Migraines      Past Surgical History:  Past Surgical History:   Procedure Laterality Date   • APPENDECTOMY      laparoscopic   • CHOLECYSTECTOMY     • KNEE ARTHROSCOPY Left    • PATELLA FEMORAL LIGAMENT RECONSTRUCTION Left    • TONSILLECTOMY AND ADENOIDECTOMY        Home Meds:  Medications Prior to Admission   Medication Sig Dispense Refill Last Dose   • albuterol sulfate  (90 Base) MCG/ACT inhaler Inhale 2 puffs Every 6 (Six) Hours As Needed for Wheezing or Shortness of Air. 18 g 0    • ALPRAZolam (Xanax) 0.25 MG tablet Take 0.25 mg by mouth As Needed.      • Cyanocobalamin (VITAMIN B 12) 500 MCG tablet       • dicyclomine (BENTYL) 10 MG capsule Take 1 capsule by mouth 3 (Three) Times a Day As Needed (abdominal pain/diarrhea). 90 capsule 5    • fluticasone (FLONASE) 50 MCG/ACT nasal spray 2 sprays into the nostril(s) as directed by provider Daily.      • levothyroxine (SYNTHROID, LEVOTHROID) 88 MCG tablet Take 88 mcg by mouth daily.      • lithium carbonate 300 MG capsule Take 300 mg by mouth 2 (Two) Times a Day.      • Milk Thistle 250 MG capsule Take  by mouth.      • omeprazole (priLOSEC) 40 MG  capsule Take 40 mg by mouth Daily.      • ondansetron ODT (ZOFRAN-ODT) 4 MG disintegrating tablet Dissolve 1 tablet by mouth every 6 hours PRN nausea, vomiting 30 tablet 3    • traZODone (DESYREL) 50 MG tablet Take 1 tablet by mouth Every Night. Indications: Trouble Sleeping 30 tablet 0    • Vitamin D, Cholecalciferol, (CHOLECALCIFEROL) 10 MCG (400 UNIT) tablet Take 2,000 Units by mouth Daily.      • Vortioxetine HBr (Trintellix) 10 MG tablet Take 10 mg by mouth Daily.      • Vraylar 1.5 MG capsule capsule Take 1.5 mg by mouth Daily.          Allergies:  Allergies   Allergen Reactions   • Codeine Nausea And Vomiting and Rash   • Dhea [Nutritional Supplements] Other (See Comments)     Severe headache   • Imitrex [Sumatriptan] Other (See Comments)     Intensified a migraine   • Morphine Other (See Comments)     Severe headache     Immunizations:    There is no immunization history on file for this patient.  Social History:   Social History     Social History Narrative   • Not on file     Social History     Socioeconomic History   • Marital status: Single     Spouse name: Not on file   • Number of children: Not on file   • Years of education: Not on file   • Highest education level: Not on file   Tobacco Use   • Smoking status: Never Smoker   • Smokeless tobacco: Never Used   Substance and Sexual Activity   • Alcohol use: Yes     Alcohol/week: 1.0 standard drinks     Types: 1 Cans of beer per week     Comment: rare   • Drug use: No   • Sexual activity: Defer       Family History:  Family History   Problem Relation Age of Onset   • Irritable bowel syndrome Mother    • Crohn's disease Brother    • Colon cancer Neg Hx    • Colon polyps Neg Hx    • Ulcerative colitis Neg Hx         Review of Systems  See history of present illness and past medical history.  Patient denies headache, dizziness, syncope, falls, trauma, change in vision, change in hearing, change in taste, changes in weight, changes in appetite, focal  "weakness, numbness, or paresthesia.  Patient denies chest pain, palpitations, dyspnea, orthopnea, PND, cough, sinus pressure, rhinorrhea, epistaxis, hemoptysis, nausea, vomiting,hematemesis, diarrhea, constipation or hematchezia.  Denies cold or heat intolerance, polydipsia, polyuria, polyphagia. Denies hematuria, pyuria, dysuria, hesitancy, frequency or urgency. Denies consumption of raw and under cooked meats foods or change in water source.  Denies fever, chills, sweats, night sweats.  Denies missing any routine medications. Remainder of ROS is negative.    Objective:  T Max 24 hrs: Temp (24hrs), Av.4 °F (36.9 °C), Min:98.1 °F (36.7 °C), Max:98.7 °F (37.1 °C)    Vitals Ranges:   Temp:  [98.1 °F (36.7 °C)-98.7 °F (37.1 °C)] 98.1 °F (36.7 °C)  Heart Rate:  [] 77  Resp:  [18] 18  BP: (101-143)/() 121/67      Exam:  /67 (BP Location: Right arm, Patient Position: Lying)   Pulse 77   Temp 98.1 °F (36.7 °C) (Oral)   Resp 18   Ht 167.6 cm (66\")   Wt 113 kg (250 lb)   LMP 2021 (Approximate)   SpO2 100%   BMI 40.35 kg/m²     General Appearance:    Alert, cooperative, no distress, appears stated age   Head:    Normocephalic, without obvious abnormality, atraumatic   Eyes:    PERRL, conjunctivae/corneas clear, EOM's intact, both eyes   Ears:    Normal external ear canals, both ears   Nose:   Nares normal, septum midline, mucosa normal, no drainage    or sinus tenderness   Throat:   Lips, mucosa, and tongue normal   Neck:   Supple, symmetrical, trachea midline, no adenopathy;     thyroid:  no enlargement/tenderness/nodules; no carotid    bruit or JVD   Back:     Symmetric, no curvature, ROM normal, no CVA tenderness   Lungs:     Decreased breath sounds bilaterally, respirations unlabored   Chest Wall:    No tenderness or deformity    Heart:    Regular rate and rhythm, S1 and S2 normal, no murmur, rub   or gallop   Abdomen:     Soft, nontender, bowel sounds active all four quadrants,     no " masses, no hepatomegaly, no splenomegaly   Extremities:   Extremities normal, atraumatic, no cyanosis or edema   Pulses:   2+ and symmetric all extremities   Skin:   Skin color, texture, turgor normal, no rashes or lesions   Lymph nodes:   Cervical, supraclavicular, and axillary nodes normal   Neurologic:   CNII-XII intact, normal strength, sensation intact throughout      .    Data Review:  Labs in chart were reviewed.  WBC   Date Value Ref Range Status   04/02/2021 10.94 (H) 3.40 - 10.80 10*3/mm3 Final     Hemoglobin   Date Value Ref Range Status   04/02/2021 14.2 12.0 - 15.9 g/dL Final     Hematocrit   Date Value Ref Range Status   04/02/2021 43.0 34.0 - 46.6 % Final     Platelets   Date Value Ref Range Status   04/02/2021 331 140 - 450 10*3/mm3 Final     Sodium   Date Value Ref Range Status   04/02/2021 138 136 - 145 mmol/L Final     Potassium   Date Value Ref Range Status   04/02/2021 4.2 3.5 - 5.2 mmol/L Final     Comment:     Slight hemolysis detected by analyzer. Results may be affected.     Chloride   Date Value Ref Range Status   04/02/2021 104 98 - 107 mmol/L Final     CO2   Date Value Ref Range Status   04/02/2021 23.6 22.0 - 29.0 mmol/L Final     BUN   Date Value Ref Range Status   04/02/2021 6 6 - 20 mg/dL Final     Creatinine   Date Value Ref Range Status   04/02/2021 0.79 0.57 - 1.00 mg/dL Final     Glucose   Date Value Ref Range Status   04/02/2021 102 (H) 65 - 99 mg/dL Final     Calcium   Date Value Ref Range Status   04/02/2021 8.6 8.6 - 10.5 mg/dL Final     AST (SGOT)   Date Value Ref Range Status   04/02/2021 17 1 - 32 U/L Final     ALT (SGPT)   Date Value Ref Range Status   04/02/2021 19 1 - 33 U/L Final     Alkaline Phosphatase   Date Value Ref Range Status   04/02/2021 60 39 - 117 U/L Final     No results found for: APTT, INR             Imaging Results (All)     None        Patient Active Problem List   Diagnosis Code   • Hemiplegic migraine, intractable G43.419   • Fibromyalgia M79.7   •  Major depressive disorder, recurrent, severe without psychotic features (CMS/HCC) F33.2   • Vaginal yeast infection B37.3   • Leukocytosis D72.829   • Hyponatremia E87.1   • Hypothyroidism E03.9   • Irritable bowel disease K58.9   • Obesity (BMI 30-39.9) E66.9   • Polysubstance overdose T50.901A   • Obesity, Class III, BMI 40-49.9 (morbid obesity) (CMS/HCC) E66.01   • Fatty liver K76.0       Assessment:    Polysubstance overdose  obesity  Hypothyroidism  Asthma  covid  Lithium toxicity    Plan:  Will monitor overnight  72 hour hold initiated by the ED  Consult access center  Repeat lithium level is up  Will need to repeat it again  Monitor for arrhythmias  Will need to remain in isolation for now due to being covid positive  D.w patient   Airam Hunter MD  4/2/2021  19:51 EDT

## 2021-04-02 NOTE — PROGRESS NOTES
"Pharmacy Consult - Lovenox    Maame Wallace has been consulted for pharmacy to dose enoxaparin for VTE prophylaxis per Dr. Hunter's request.     Relevant clinical data and objective history reviewed:  30 y.o. female 167.6 cm (66\") 113 kg (250 lb)    Home Anticoagulation: none    Past Medical History:   Diagnosis Date    Anxiety     Asthma     Depression     Fibromyalgia     GERD (gastroesophageal reflux disease)     Hypothyroidism     Irritable bowel disease     Migraines      is allergic to codeine, dhea [nutritional supplements], imitrex [sumatriptan], and morphine.    Lab Results   Component Value Date    WBC 10.94 (H) 04/02/2021    HGB 14.2 04/02/2021    HCT 43.0 04/02/2021    MCV 86.5 04/02/2021     04/02/2021     Lab Results   Component Value Date    GLUCOSE 102 (H) 04/02/2021    CALCIUM 8.6 04/02/2021     04/02/2021    K 4.2 04/02/2021    CO2 23.6 04/02/2021     04/02/2021    BUN 6 04/02/2021    CREATININE 0.79 04/02/2021    EGFRIFAFRI 84 06/09/2020    EGFRIFNONA 87 04/02/2021    BCR 7.6 04/02/2021    ANIONGAP 10.4 04/02/2021       Estimated Creatinine Clearance: 135.2 mL/min (by C-G formula based on SCr of 0.79 mg/dL).    Active Inpatient Anticoagulation Orders: none    Assessment/Plan    1) Will start patient on Lovenox 40 mg subcutaneous every 12 hours for BMI of 40.35.     2) Will monitor patient's renal function every 24 hours, platelets at least every 3 days, and adjust as needed.      Thank you for allowing us to participate in this patient's care.      Agnes Reynoso, PharmD, BCPS  04/02/21 17:51 EDT    "

## 2021-04-02 NOTE — ED PROVIDER NOTES
Pt presents to the ED complaining of an overdose at approximately 6 AM this morning.  The patient states she took lithium, trazodone, Xanax and an over-the-counter sleep pill in an attempt to kill herself this morning.  She states she took between 30-50 of each medication.  The patient has attempted self-harm in the past.    On exam, pt is A&Ox3. NAD  PERRL, moist mucous membranes.  Normocephalic and atraumatic  Heart is RRR. Lungs are CTAB.   Abd is soft, nontender, nondistended, bowel sounds positive.   No pedal edema.  No calf tenderness.  Nonfocal neuro exam      I agree with midlevel plan to check labs, give the patient IV fluids and charcoal and consult poison control    PPE  Patient was placed in face mask in first look. Patient was wearing facemask when I entered the room and throughout our encounter. I wore full protective equipment throughout this patient encounter including a N95 face mask, eye shield, gown and gloves. Hand hygiene was performed before donning protective equipment and after removal when leaving the room.    Poison control agrees with the charcoal is advised as to check labs, levels and repeat levels of the Tylenol and lithium.    EKG    EKG time: 852  Rhythm/Rate: Normal sinus rhythm at 88  Borderline prolonged QT interval  No Acute Ischemia  Non-Specific ST-T changes    Changed compared to prior on 12/4/2020-her QT interval is slightly longer now.    Interpreted Contemporaneously by me.  Independently viewed by me    The patient's repeat lithium level is elevated at 1.7.  We discussed this with poison control again.  They recommended admitting the patient to the hospital with a repeat lithium level at 5 PM.  We have discussed the case with Dr. Salazar from [facility] who will admit the patient to a telemetry bed and will follow up on the patient's 5 PM lithium level.  Currently the patient is sleepy but easily arousable with stable vital signs.  Of note is the patient is Covid positive and will  need to go to an isolation floor.  The BLAINE and I have discussed this patient's history, physical exam, and treatment plan.  I have reviewed the documentation and personally had a face to face interaction with the patient. I affirm the documentation and agree with the treatment and plan.  The attached note describes my personal findings.           Elliot Amos MD  04/02/21 [id number]

## 2021-04-03 PROBLEM — T50.901A POLYSUBSTANCE OVERDOSE: Status: ACTIVE | Noted: 2021-04-03

## 2021-04-03 PROBLEM — T50.902A SUICIDE ATTEMPT BY DRUG INGESTION: Status: ACTIVE | Noted: 2020-03-18

## 2021-04-03 PROBLEM — R79.89 ELEVATED LITHIUM LEVEL: Status: ACTIVE | Noted: 2021-04-03

## 2021-04-03 PROBLEM — U07.1 COVID-19 VIRUS DETECTED: Status: ACTIVE | Noted: 2021-04-03

## 2021-04-03 LAB
ALBUMIN SERPL-MCNC: 3.8 G/DL (ref 3.5–5.2)
ALBUMIN/GLOB SERPL: 1.7 G/DL
ALP SERPL-CCNC: 49 U/L (ref 39–117)
ALT SERPL W P-5'-P-CCNC: 12 U/L (ref 1–33)
ANION GAP SERPL CALCULATED.3IONS-SCNC: 8 MMOL/L (ref 5–15)
AST SERPL-CCNC: 16 U/L (ref 1–32)
BASOPHILS # BLD AUTO: 0.06 10*3/MM3 (ref 0–0.2)
BASOPHILS NFR BLD AUTO: 0.6 % (ref 0–1.5)
BILIRUB SERPL-MCNC: 0.4 MG/DL (ref 0–1.2)
BUN SERPL-MCNC: 5 MG/DL (ref 6–20)
BUN/CREAT SERPL: 5.3 (ref 7–25)
CALCIUM SPEC-SCNC: 8.4 MG/DL (ref 8.6–10.5)
CHLORIDE SERPL-SCNC: 107 MMOL/L (ref 98–107)
CK SERPL-CCNC: 31 U/L (ref 20–180)
CO2 SERPL-SCNC: 23 MMOL/L (ref 22–29)
CREAT SERPL-MCNC: 0.95 MG/DL (ref 0.57–1)
CRP SERPL-MCNC: <0.3 MG/DL (ref 0–0.5)
D DIMER PPP FEU-MCNC: 0.28 MCGFEU/ML (ref 0–0.49)
DEPRECATED RDW RBC AUTO: 39.7 FL (ref 37–54)
EOSINOPHIL # BLD AUTO: 0.2 10*3/MM3 (ref 0–0.4)
EOSINOPHIL NFR BLD AUTO: 1.9 % (ref 0.3–6.2)
ERYTHROCYTE [DISTWIDTH] IN BLOOD BY AUTOMATED COUNT: 13 % (ref 12.3–15.4)
FERRITIN SERPL-MCNC: 30.3 NG/ML (ref 13–150)
FIBRINOGEN PPP-MCNC: 296 MG/DL (ref 219–464)
GFR SERPL CREATININE-BSD FRML MDRD: 70 ML/MIN/1.73
GLOBULIN UR ELPH-MCNC: 2.3 GM/DL
GLUCOSE BLDC GLUCOMTR-MCNC: 84 MG/DL (ref 70–130)
GLUCOSE BLDC GLUCOMTR-MCNC: 91 MG/DL (ref 70–130)
GLUCOSE SERPL-MCNC: 109 MG/DL (ref 65–99)
HCT VFR BLD AUTO: 38.4 % (ref 34–46.6)
HGB BLD-MCNC: 12.5 G/DL (ref 12–15.9)
IMM GRANULOCYTES # BLD AUTO: 0.04 10*3/MM3 (ref 0–0.05)
IMM GRANULOCYTES NFR BLD AUTO: 0.4 % (ref 0–0.5)
LDH SERPL-CCNC: 121 U/L (ref 135–214)
LITHIUM SERPL-SCNC: 2.3 MMOL/L (ref 0.6–1.2)
LITHIUM SERPL-SCNC: 3.5 MMOL/L (ref 0.6–1.2)
LYMPHOCYTES # BLD AUTO: 2.97 10*3/MM3 (ref 0.7–3.1)
LYMPHOCYTES NFR BLD AUTO: 28.3 % (ref 19.6–45.3)
MCH RBC QN AUTO: 27.6 PG (ref 26.6–33)
MCHC RBC AUTO-ENTMCNC: 32.6 G/DL (ref 31.5–35.7)
MCV RBC AUTO: 84.8 FL (ref 79–97)
MONOCYTES # BLD AUTO: 0.59 10*3/MM3 (ref 0.1–0.9)
MONOCYTES NFR BLD AUTO: 5.6 % (ref 5–12)
NEUTROPHILS NFR BLD AUTO: 6.62 10*3/MM3 (ref 1.7–7)
NEUTROPHILS NFR BLD AUTO: 63.2 % (ref 42.7–76)
NRBC BLD AUTO-RTO: 0 /100 WBC (ref 0–0.2)
PLATELET # BLD AUTO: 309 10*3/MM3 (ref 140–450)
PMV BLD AUTO: 9.7 FL (ref 6–12)
POTASSIUM SERPL-SCNC: 3.7 MMOL/L (ref 3.5–5.2)
PROT SERPL-MCNC: 6.1 G/DL (ref 6–8.5)
RBC # BLD AUTO: 4.53 10*6/MM3 (ref 3.77–5.28)
SODIUM SERPL-SCNC: 138 MMOL/L (ref 136–145)
WBC # BLD AUTO: 10.48 10*3/MM3 (ref 3.4–10.8)

## 2021-04-03 PROCEDURE — 96361 HYDRATE IV INFUSION ADD-ON: CPT

## 2021-04-03 PROCEDURE — 86140 C-REACTIVE PROTEIN: CPT | Performed by: INTERNAL MEDICINE

## 2021-04-03 PROCEDURE — 80178 ASSAY OF LITHIUM: CPT | Performed by: INTERNAL MEDICINE

## 2021-04-03 PROCEDURE — 36415 COLL VENOUS BLD VENIPUNCTURE: CPT | Performed by: INTERNAL MEDICINE

## 2021-04-03 PROCEDURE — 85379 FIBRIN DEGRADATION QUANT: CPT | Performed by: INTERNAL MEDICINE

## 2021-04-03 PROCEDURE — 82728 ASSAY OF FERRITIN: CPT | Performed by: INTERNAL MEDICINE

## 2021-04-03 PROCEDURE — G0378 HOSPITAL OBSERVATION PER HR: HCPCS

## 2021-04-03 PROCEDURE — 82962 GLUCOSE BLOOD TEST: CPT

## 2021-04-03 PROCEDURE — 25010000002 ENOXAPARIN PER 10 MG: Performed by: INTERNAL MEDICINE

## 2021-04-03 PROCEDURE — 96372 THER/PROPH/DIAG INJ SC/IM: CPT

## 2021-04-03 PROCEDURE — 82550 ASSAY OF CK (CPK): CPT | Performed by: INTERNAL MEDICINE

## 2021-04-03 PROCEDURE — 85384 FIBRINOGEN ACTIVITY: CPT | Performed by: INTERNAL MEDICINE

## 2021-04-03 PROCEDURE — 80053 COMPREHEN METABOLIC PANEL: CPT | Performed by: INTERNAL MEDICINE

## 2021-04-03 PROCEDURE — 85025 COMPLETE CBC W/AUTO DIFF WBC: CPT | Performed by: INTERNAL MEDICINE

## 2021-04-03 PROCEDURE — 83615 LACTATE (LD) (LDH) ENZYME: CPT | Performed by: INTERNAL MEDICINE

## 2021-04-03 PROCEDURE — 90791 PSYCH DIAGNOSTIC EVALUATION: CPT | Performed by: SOCIAL WORKER

## 2021-04-03 RX ADMIN — ENOXAPARIN SODIUM 40 MG: 40 INJECTION SUBCUTANEOUS at 20:35

## 2021-04-03 RX ADMIN — PANTOPRAZOLE SODIUM 40 MG: 40 TABLET, DELAYED RELEASE ORAL at 08:45

## 2021-04-03 RX ADMIN — SODIUM CHLORIDE 125 ML/HR: 9 INJECTION, SOLUTION INTRAVENOUS at 04:55

## 2021-04-03 RX ADMIN — ACETAMINOPHEN 650 MG: 325 TABLET, FILM COATED ORAL at 04:40

## 2021-04-03 RX ADMIN — ENOXAPARIN SODIUM 40 MG: 40 INJECTION SUBCUTANEOUS at 08:45

## 2021-04-03 RX ADMIN — ACETAMINOPHEN 650 MG: 325 TABLET, FILM COATED ORAL at 21:02

## 2021-04-03 RX ADMIN — LEVOTHYROXINE SODIUM 88 MCG: 0.09 TABLET ORAL at 08:45

## 2021-04-03 NOTE — CONSULTS
"[facility] evaluated 30-year-old female for intentional overdose.  Patient took an overdose of her lithium and couple of other medicines. Patient is currently in CCU and tested COVID positive so evaluation was done by phone.  Pt is expected to be moved to a medical bed by the end of the day.  Patient came through the ED and was put on a 72-hour hold.  Patient was previously on the [facility] in February 2020 and then took an overdose again on March 18 of 2020.  Patient was in the [facility] program from May to June for about a month last year.  Patient also has had 3 inpatient stays in a psychiatric hospital as a teenager.  Patient has a diagnosis of bipolar disorder and sees Dr. Duy Aiken, psychiatrist and Selin Mccann LCSW.  Patient states she sees her therapist every other week.  Patient denies any psychosis. Patient states she continues to feel suicidal and states she has had those thoughts for over a year.  Patient will need to continue with a sitter.  Patient states nothing seemed to trigger her deliberate overdose.  Patient states she had one of her friends heading over to her house to take her to the ED because of her suicidal thoughts and that is when patient decided to go ahead and take her overdose.  Patient's parents had taken control of her medicines after her overdose last March but patient states she took control back a few weeks ago.    Patient states her sleep has not been good but was a little bit better recently and her appetite \"comes and goes\" and states she is lost 20 pounds in 8 weeks.  Patient states she has a history of cutting herself but has not cut in the last 2 months.  Patient states that couple of her coping skills include talking to her friends and going for walks.  Patient states her therapist is working with her on creating more routine and using mindfulness meditation which patient states helps at times.  Patient denies any use of substances.    Currently patient " tested positive for COVID-19 but had it at the end of last year and has had the vaccinations since.  Patient states she is going to be talking to the infectious disease doctor to see if she needs to be in isolation which she is currently.  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  Patient states she likes her job.  Patient is single with no children.    Access had nurse put in consult for psychiatrist to come see patient and Access will continue to follow.  Access worked with patient over the phone to fill out a Safety Plan and the nurse was able to have her sign it and give her copy.

## 2021-04-03 NOTE — PROGRESS NOTES
"DAILY PROGRESS NOTE  [facility]    Patient Identification:  Name: Maame Wallace  Age: 30 y.o.  Sex: female  :  1990  MRN: [id number]         Primary Care Physician: Miriam Newman    Subjective:  Interval History: She is very depressed.    Objective:    Scheduled Meds:cholecalciferol, 2,000 Units, Oral, Daily  enoxaparin, 40 mg, Subcutaneous, Q12H  fluticasone, 2 spray, Nasal, Daily  levothyroxine, 88 mcg, Oral, Q AM  pantoprazole, 40 mg, Oral, QAM  traZODone, 50 mg, Oral, Nightly  vitamin B-12, 500 mcg, Oral, Daily      Continuous Infusions:Pharmacy to Dose enoxaparin (LOVENOX),   sodium chloride, 125 mL/hr, Last Rate: 125 mL/hr (21 0455)        Vital signs in last 24 hours:  Temp:  [98.1 °F (36.7 °C)-99.1 °F (37.3 °C)] 98.6 °F (37 °C)  Heart Rate:  [77-85] 77  Resp:  [18-20] 20  BP: (105-132)/(46-71) 117/70    Intake/Output:    Intake/Output Summary (Last 24 hours) at 4/3/2021 1552  Last data filed at 4/3/2021 0923  Gross per 24 hour   Intake 1733 ml   Output 4100 ml   Net -2367 ml       Exam:  /70   Pulse 77   Temp 98.6 °F (37 °C) (Oral)   Resp 20   Ht 167.6 cm (66\")   Wt 113 kg (250 lb)   LMP 2021 (Approximate)   SpO2 100%   BMI 40.35 kg/m²     General Appearance:    Alert, cooperative, no distress   Head:    Normocephalic, without obvious abnormality, atraumatic   Eyes:       Throat:   Lips, tongue, gums normal   Neck:   Supple, symmetrical, trachea midline, no JVD   Lungs:     Clear to auscultation bilaterally, respirations unlabored   Chest Wall:    No tenderness or deformity    Heart:    Regular rate and rhythm, S1 and S2 normal, no murmur,no  Rub or gallop   Abdomen:     Soft, nontender, bowel sounds active, no masses, no organomegaly    Extremities:   Extremities normal, atraumatic, no cyanosis or edema   Pulses:      Skin:   Skin is warm and dry,  no rashes or palpable lesions   Neurologic:   no focal deficits noted      Lab Results (last 72 hours)  "    Procedure Component Value Units Date/Time    Lithium Level [[id number]]  (Abnormal) Collected: 04/03/21 1058    Specimen: Blood Updated: 04/03/21 1201     Lithium 2.3 mmol/L     C-reactive Protein [[id number]]  (Normal) Collected: 04/03/21 1058    Specimen: Blood Updated: 04/03/21 1150     C-Reactive Protein <0.30 mg/dL     Ferritin [[id number]]  (Normal) Collected: 04/03/21 1058    Specimen: Blood Updated: 04/03/21 1142     Ferritin 30.30 ng/mL     Narrative:      Results may be falsely decreased if patient taking Biotin.      Comprehensive Metabolic Panel [[id number]]  (Abnormal) Collected: 04/03/21 1058    Specimen: Blood Updated: 04/03/21 1138     Glucose 109 mg/dL      BUN 5 mg/dL      Creatinine 0.95 mg/dL      Sodium 138 mmol/L      Potassium 3.7 mmol/L      Chloride 107 mmol/L      CO2 23.0 mmol/L      Calcium 8.4 mg/dL      Total Protein 6.1 g/dL      Albumin 3.80 g/dL      ALT (SGPT) 12 U/L      AST (SGOT) 16 U/L      Alkaline Phosphatase 49 U/L      Total Bilirubin 0.4 mg/dL      eGFR Non African Amer 70 mL/min/1.73      Globulin 2.3 gm/dL      A/G Ratio 1.7 g/dL      BUN/Creatinine Ratio 5.3     Anion Gap 8.0 mmol/L     Narrative:      GFR Normal >60  Chronic Kidney Disease <60  Kidney Failure <15      Lactate Dehydrogenase [[id number]]  (Abnormal) Collected: 04/03/21 1058    Specimen: Blood Updated: 04/03/21 1138      U/L     CK [[id number]]  (Normal) Collected: 04/03/21 1058    Specimen: Blood Updated: 04/03/21 1138     Creatine Kinase 31 U/L     D-dimer, Quantitative [[id number]]  (Normal) Collected: 04/03/21 1058    Specimen: Blood Updated: 04/03/21 1132     D-Dimer, Quantitative 0.28 MCGFEU/mL     Narrative:      The Stago D-Dimer test used in conjunction with a clinical pretest probability (PTP) assessment model, has been approved by the FDA to rule out the presence of venous thromboembolism (VTE) in outpatients suspected of deep venous thrombosis (DVT) or pulmonary embolism (PE). The cut-off  for negative predictive value is <0.50 MCGFEU/mL.    Fibrinogen [[id number]]  (Normal) Collected: 04/03/21 1058    Specimen: Blood Updated: 04/03/21 1132     Fibrinogen 296 mg/dL     CBC & Differential [[id number]]  (Normal) Collected: 04/03/21 1058    Specimen: Blood Updated: 04/03/21 1111    Narrative:      The following orders were created for panel order CBC & Differential.  Procedure                               Abnormality         Status                     ---------                               -----------         ------                     CBC Auto Differential[[id number]]        Normal              Final result                 Please view results for these tests on the individual orders.    CBC Auto Differential [[id number]]  (Normal) Collected: 04/03/21 1058    Specimen: Blood Updated: 04/03/21 1111     WBC 10.48 10*3/mm3      RBC 4.53 10*6/mm3      Hemoglobin 12.5 g/dL      Hematocrit 38.4 %      MCV 84.8 fL      MCH 27.6 pg      MCHC 32.6 g/dL      RDW 13.0 %      RDW-SD 39.7 fl      MPV 9.7 fL      Platelets 309 10*3/mm3      Neutrophil % 63.2 %      Lymphocyte % 28.3 %      Monocyte % 5.6 %      Eosinophil % 1.9 %      Basophil % 0.6 %      Immature Grans % 0.4 %      Neutrophils, Absolute 6.62 10*3/mm3      Lymphocytes, Absolute 2.97 10*3/mm3      Monocytes, Absolute 0.59 10*3/mm3      Eosinophils, Absolute 0.20 10*3/mm3      Basophils, Absolute 0.06 10*3/mm3      Immature Grans, Absolute 0.04 10*3/mm3      nRBC 0.0 /100 WBC     POC Glucose Once [[id number]]  (Normal) Collected: 04/03/21 0422    Specimen: Blood Updated: 04/03/21 0423     Glucose 91 mg/dL     POC Glucose Once [[id number]]  (Normal) Collected: 04/03/21 0008    Specimen: Blood Updated: 04/03/21 0009     Glucose 84 mg/dL     Lithium Level [[id number]]  (Abnormal) Collected: 04/02/21 2321    Specimen: Blood Updated: 04/03/21 0007     Lithium 3.5 mmol/L     Ferritin [[id number]]  (Normal) Collected: 04/02/21 2139    Specimen: Blood Updated:  "04/02/21 2220     Ferritin 28.80 ng/mL     Narrative:      Results may be falsely decreased if patient taking Biotin.      Procalcitonin [[id number]]  (Normal) Collected: 04/02/21 2139    Specimen: Blood Updated: 04/02/21 2220     Procalcitonin 0.08 ng/mL     Narrative:      As a Marker for Sepsis (Non-Neonates):   1. <0.5 ng/mL represents a low risk of severe sepsis and/or septic shock.  1. >2 ng/mL represents a high risk of severe sepsis and/or septic shock.    As a Marker for Lower Respiratory Tract Infections that require antibiotic therapy:  PCT on Admission     Antibiotic Therapy             6-12 Hrs later  > 0.5                Strongly Recommended            >0.25 - <0.5         Recommended  0.1 - 0.25           Discouraged                   Remeasure/reassess PCT  <0.1                 Strongly Discouraged          Remeasure/reassess PCT      As 28 day mortality risk marker: \"Change in Procalcitonin Result\" (> 80 % or <=80 %) if Day 0 (or Day 1) and Day 4 values are available. Refer to [email or web address]   Change in PCT <=80 %   A decrease of PCT levels below or equal to 80 % defines a positive change in PCT test result representing a higher risk for 28-day all-cause mortality of patients diagnosed with severe sepsis or septic shock.  Change in PCT > 80 %   A decrease of PCT levels of more than 80 % defines a negative change in PCT result representing a lower risk for 28-day all-cause mortality of patients diagnosed with severe sepsis or septic shock.                Results may be falsely decreased if patient taking Biotin.     Lactate Dehydrogenase [[id number]]  (Normal) Collected: 04/02/21 2139    Specimen: Blood Updated: 04/02/21 2215      U/L      Comment: Specimen hemolyzed.  Results may be affected.       D-dimer, Quantitative [[id number]]  (Normal) Collected: 04/02/21 2139    Specimen: Blood Updated: 04/02/21 2158     D-Dimer, Quantitative <0.27 MCGFEU/mL     Narrative:      The " Stago D-Dimer test used in conjunction with a clinical pretest probability (PTP) assessment model, has been approved by the FDA to rule out the presence of venous thromboembolism (VTE) in outpatients suspected of deep venous thrombosis (DVT) or pulmonary embolism (PE). The cut-off for negative predictive value is <0.50 MCGFEU/mL.    POC Glucose Once [[id number]]  (Normal) Collected: 04/02/21 2045    Specimen: Blood Updated: 04/02/21 2047     Glucose 79 mg/dL     Lithium Level [[id number]]  (Abnormal) Collected: 04/02/21 1714    Specimen: Blood Updated: 04/02/21 1751     Lithium 2.9 mmol/L     Lithium Level [[id number]]  (Abnormal) Collected: 04/02/21 1256    Specimen: Blood Updated: 04/02/21 1324     Lithium 1.7 mmol/L     Acetaminophen Level [[id number]]  (Normal) Collected: 04/02/21 1114    Specimen: Blood Updated: 04/02/21 1153     Acetaminophen <5.0 mcg/mL     COVID PRE-OP / PRE-PROCEDURE SCREENING ORDER (NO ISOLATION) - Swab, Nasopharynx [[id number]]  (Abnormal) Collected: 04/02/21 0846    Specimen: Swab from Nasopharynx Updated: 04/02/21 1039    Narrative:      The following orders were created for panel order COVID PRE-OP / PRE-PROCEDURE SCREENING ORDER (NO ISOLATION) - Swab, Nasopharynx.  Procedure                               Abnormality         Status                     ---------                               -----------         ------                     COVID-19,[facility]...[[id number]]  Abnormal            Final result                 Please view results for these tests on the individual orders.    COVID-19,[facility] CEPHEID, NP SWAB IN TRANSPORT MEDIA 8-12 HR TAT - Swab, Nasopharynx [[id number]]  (Abnormal) Collected: 04/02/21 0846    Specimen: Swab from Nasopharynx Updated: 04/02/21 1039     COVID19 Detected    Narrative:      Fact sheet for providers: https://www.fda.gov/media/199621/download     Fact sheet for patients: https://www.fda.gov/media/515717/download    Urine Drug Screen - Urine,  Clean Catch [[id number]]  (Abnormal) Collected: 04/02/21 0846    Specimen: Urine, Clean Catch Updated: 04/02/21 0928     Amphet/Methamphet, Screen Negative     Barbiturates Screen, Urine Negative     Benzodiazepine Screen, Urine Negative     Cocaine Screen, Urine Negative     Opiate Screen Negative     THC, Screen, Urine Negative     Methadone Screen, Urine Positive     Oxycodone Screen, Urine Negative    Narrative:      Negative Thresholds Per Drugs Screened:    Amphetamines                 500 ng/ml  Barbiturates                 200 ng/ml  Benzodiazepines              100 ng/ml  Cocaine                      300 ng/ml  Methadone                    300 ng/ml  Opiates                      300 ng/ml  Oxycodone                    100 ng/ml  THC                           50 ng/ml    The Normal Value for all drugs tested is negative. This report includes final unconfirmed screening results to be used for medical treatment purposes only. Unconfirmed results must not be used for non-medical purposes such as employment or legal testing. Clinical consideration should be applied to any drug of abuse test, particularly when unconfirmed results are used.            Comprehensive Metabolic Panel [[id number]]  (Abnormal) Collected: 04/02/21 0846    Specimen: Blood Updated: 04/02/21 0912     Glucose 102 mg/dL      BUN 6 mg/dL      Creatinine 0.79 mg/dL      Sodium 138 mmol/L      Potassium 4.2 mmol/L      Comment: Slight hemolysis detected by analyzer. Results may be affected.        Chloride 104 mmol/L      CO2 23.6 mmol/L      Calcium 8.6 mg/dL      Total Protein 6.3 g/dL      Albumin 4.20 g/dL      ALT (SGPT) 19 U/L      AST (SGOT) 17 U/L      Alkaline Phosphatase 60 U/L      Total Bilirubin 0.3 mg/dL      eGFR Non African Amer 87 mL/min/1.73      Globulin 2.1 gm/dL      A/G Ratio 2.0 g/dL      BUN/Creatinine Ratio 7.6     Anion Gap 10.4 mmol/L     Narrative:      GFR Normal >60  Chronic Kidney Disease <60  Kidney Failure <15       Acetaminophen Level [[id number]]  (Normal) Collected: 04/02/21 0812    Specimen: Blood Updated: 04/02/21 0837     Acetaminophen <5.0 mcg/mL     Ethanol [[id number]] Collected: 04/02/21 0812    Specimen: Blood Updated: 04/02/21 0837     Ethanol <10 mg/dL      Ethanol % <0.010 %     Salicylate Level [[id number]]  (Normal) Collected: 04/02/21 0812    Specimen: Blood Updated: 04/02/21 0837     Salicylate <0.3 mg/dL     Narrative:      Therapeutic range for Salicylates:  3.0 - 10.0 mg/dL for antipyretic/analgesic conditions  15.0 - 30.0 mg/dL for anti-inflammatory conditions    hCG, Serum, Qualitative [[id number]]  (Normal) Collected: 04/02/21 0812    Specimen: Blood Updated: 04/02/21 0829     HCG Qualitative Negative    Lithium Level [[id number]]  (Normal) Collected: 04/02/21 0812    Specimen: Blood Updated: 04/02/21 0828     Lithium 0.8 mmol/L     CBC & Differential [[id number]]  (Abnormal) Collected: 04/02/21 0812    Specimen: Blood Updated: 04/02/21 0821    Narrative:      The following orders were created for panel order CBC & Differential.  Procedure                               Abnormality         Status                     ---------                               -----------         ------                     CBC Auto Differential[[id number]]        Abnormal            Final result                 Please view results for these tests on the individual orders.    CBC Auto Differential [[id number]]  (Abnormal) Collected: 04/02/21 0812    Specimen: Blood Updated: 04/02/21 0821     WBC 10.94 10*3/mm3      RBC 4.97 10*6/mm3      Hemoglobin 14.2 g/dL      Hematocrit 43.0 %      MCV 86.5 fL      MCH 28.6 pg      MCHC 33.0 g/dL      RDW 13.4 %      RDW-SD 41.7 fl      MPV 9.7 fL      Platelets 331 10*3/mm3      Neutrophil % 66.6 %      Lymphocyte % 26.0 %      Monocyte % 5.0 %      Eosinophil % 1.0 %      Basophil % 0.9 %      Immature Grans % 0.5 %      Neutrophils, Absolute 7.29 10*3/mm3      Lymphocytes, Absolute 2.84  10*3/mm3      Monocytes, Absolute 0.55 10*3/mm3      Eosinophils, Absolute 0.11 10*3/mm3      Basophils, Absolute 0.10 10*3/mm3      Immature Grans, Absolute 0.05 10*3/mm3      nRBC 0.0 /100 WBC         Data Review:  Results from last 7 days   Lab Units 04/03/21  1058 04/02/21  0846   SODIUM mmol/L 138 138   POTASSIUM mmol/L 3.7 4.2   CHLORIDE mmol/L 107 104   CO2 mmol/L 23.0 23.6   BUN mg/dL 5* 6   CREATININE mg/dL 0.95 0.79   GLUCOSE mg/dL 109* 102*   CALCIUM mg/dL 8.4* 8.6     Results from last 7 days   Lab Units 04/03/21  1058 04/02/21  0812   WBC 10*3/mm3 10.48 10.94*   HEMOGLOBIN g/dL 12.5 14.2   HEMATOCRIT % 38.4 43.0   PLATELETS 10*3/mm3 309 331             Lab Results   Lab Value Date/Time    TROPONINT <0.010 12/04/2020 1137         Results from last 7 days   Lab Units 04/03/21  1058 04/02/21  0846   ALK PHOS U/L 49 60   BILIRUBIN mg/dL 0.4 0.3   ALT (SGPT) U/L 12 19   AST (SGOT) U/L 16 17             Glucose   Date/Time Value Ref Range Status   04/03/2021 0422 91 70 - 130 mg/dL Final   04/03/2021 0008 84 70 - 130 mg/dL Final   04/02/2021 2045 79 70 - 130 mg/dL Final           Past Medical History:   Diagnosis Date   • Anxiety    • Asthma    • Depression    • Fibromyalgia    • GERD (gastroesophageal reflux disease)    • Hypothyroidism    • Irritable bowel disease    • Migraines        Assessment:  Active Hospital Problems    Diagnosis  POA   • Polysubstance overdose [T50.901A]  Yes      Resolved Hospital Problems   No resolved problems to display.       Plan:  Suicide precaution, 72-hour hold, as per psychiatry consult.  Patient is COVID-19 positive but is already had Covid in December and has been vaccinated and I doubt that patient really has active infection.  Will deferral isolation precautions to infection control.    Farhad Arrieta MD  4/3/2021  15:52 EDT

## 2021-04-03 NOTE — PLAN OF CARE
"Goal Outcome Evaluation:         Pt remains on 72 hour hold [facility] to evaluate today \"even if evaluated over the phone.\" per . Pt remains alert and oriented times 4 has voided thus far 1700cc of urine had one episode of charcoal colored emesis 600 cc pt  cooperative. Pt remains on IVF  pt is a difficult lab stick and am labs will be collected soon .  Has co head ache tylenol given once also zofran given. Sitter remains at bedside. Pt sinus rhythm 70-90 no co chest painor  soa.  bp 116/55 113/64   Poison control center following closely. Pt remains tele overflow and could transition  to step down if md agrees.   "

## 2021-04-03 NOTE — SIGNIFICANT NOTE
"Sitter at bedside pt on 72 hour hold pt reports had been planning event for \"awhile.\" denies specific event or issue that lead to taking overdose, reports safe environment at home. Denies ETOH or street drug abuse.   "

## 2021-04-03 NOTE — NURSING NOTE
Notified by  after sticking pt twice she was unable to obtain lithium level ordered. Asked  to send another tech when available and she reports she will.

## 2021-04-03 NOTE — NURSING NOTE
Spoke with [facility] they are aware pt is in the [facility]cu and plan to evaluate her today possible by phone interview.

## 2021-04-04 LAB
ALBUMIN SERPL-MCNC: 3.8 G/DL (ref 3.5–5.2)
ALBUMIN/GLOB SERPL: 1.6 G/DL
ALP SERPL-CCNC: 52 U/L (ref 39–117)
ALT SERPL W P-5'-P-CCNC: 13 U/L (ref 1–33)
ANION GAP SERPL CALCULATED.3IONS-SCNC: 8.2 MMOL/L (ref 5–15)
AST SERPL-CCNC: 18 U/L (ref 1–32)
BASOPHILS # BLD AUTO: 0.08 10*3/MM3 (ref 0–0.2)
BASOPHILS NFR BLD AUTO: 0.6 % (ref 0–1.5)
BILIRUB SERPL-MCNC: 0.4 MG/DL (ref 0–1.2)
BUN SERPL-MCNC: 6 MG/DL (ref 6–20)
BUN/CREAT SERPL: 6.7 (ref 7–25)
CALCIUM SPEC-SCNC: 8.3 MG/DL (ref 8.6–10.5)
CHLORIDE SERPL-SCNC: 106 MMOL/L (ref 98–107)
CK SERPL-CCNC: 38 U/L (ref 20–180)
CO2 SERPL-SCNC: 22.8 MMOL/L (ref 22–29)
CREAT SERPL-MCNC: 0.89 MG/DL (ref 0.57–1)
CRP SERPL-MCNC: <0.3 MG/DL (ref 0–0.5)
DEPRECATED RDW RBC AUTO: 41.3 FL (ref 37–54)
EOSINOPHIL # BLD AUTO: 0.32 10*3/MM3 (ref 0–0.4)
EOSINOPHIL NFR BLD AUTO: 2.6 % (ref 0.3–6.2)
ERYTHROCYTE [DISTWIDTH] IN BLOOD BY AUTOMATED COUNT: 12.9 % (ref 12.3–15.4)
FERRITIN SERPL-MCNC: 37.2 NG/ML (ref 13–150)
GFR SERPL CREATININE-BSD FRML MDRD: 76 ML/MIN/1.73
GLOBULIN UR ELPH-MCNC: 2.4 GM/DL
GLUCOSE SERPL-MCNC: 101 MG/DL (ref 65–99)
HCT VFR BLD AUTO: 41.1 % (ref 34–46.6)
HGB BLD-MCNC: 13.4 G/DL (ref 12–15.9)
IMM GRANULOCYTES # BLD AUTO: 0.04 10*3/MM3 (ref 0–0.05)
IMM GRANULOCYTES NFR BLD AUTO: 0.3 % (ref 0–0.5)
LDH SERPL-CCNC: 203 U/L (ref 135–214)
LYMPHOCYTES # BLD AUTO: 2.73 10*3/MM3 (ref 0.7–3.1)
LYMPHOCYTES NFR BLD AUTO: 21.9 % (ref 19.6–45.3)
MCH RBC QN AUTO: 28.6 PG (ref 26.6–33)
MCHC RBC AUTO-ENTMCNC: 32.6 G/DL (ref 31.5–35.7)
MCV RBC AUTO: 87.6 FL (ref 79–97)
MONOCYTES # BLD AUTO: 0.83 10*3/MM3 (ref 0.1–0.9)
MONOCYTES NFR BLD AUTO: 6.7 % (ref 5–12)
NEUTROPHILS NFR BLD AUTO: 67.9 % (ref 42.7–76)
NEUTROPHILS NFR BLD AUTO: 8.46 10*3/MM3 (ref 1.7–7)
NRBC BLD AUTO-RTO: 0 /100 WBC (ref 0–0.2)
PLATELET # BLD AUTO: 286 10*3/MM3 (ref 140–450)
PMV BLD AUTO: 9.8 FL (ref 6–12)
POTASSIUM SERPL-SCNC: 4 MMOL/L (ref 3.5–5.2)
PROT SERPL-MCNC: 6.2 G/DL (ref 6–8.5)
RBC # BLD AUTO: 4.69 10*6/MM3 (ref 3.77–5.28)
SODIUM SERPL-SCNC: 137 MMOL/L (ref 136–145)
TSH SERPL DL<=0.05 MIU/L-ACNC: 3.13 UIU/ML (ref 0.27–4.2)
WBC # BLD AUTO: 12.46 10*3/MM3 (ref 3.4–10.8)

## 2021-04-04 PROCEDURE — 83615 LACTATE (LD) (LDH) ENZYME: CPT | Performed by: HOSPITALIST

## 2021-04-04 PROCEDURE — 82728 ASSAY OF FERRITIN: CPT | Performed by: INTERNAL MEDICINE

## 2021-04-04 PROCEDURE — 80053 COMPREHEN METABOLIC PANEL: CPT | Performed by: INTERNAL MEDICINE

## 2021-04-04 PROCEDURE — 86140 C-REACTIVE PROTEIN: CPT | Performed by: INTERNAL MEDICINE

## 2021-04-04 PROCEDURE — 84443 ASSAY THYROID STIM HORMONE: CPT | Performed by: HOSPITALIST

## 2021-04-04 PROCEDURE — 25010000002 ENOXAPARIN PER 10 MG: Performed by: INTERNAL MEDICINE

## 2021-04-04 PROCEDURE — G0378 HOSPITAL OBSERVATION PER HR: HCPCS

## 2021-04-04 PROCEDURE — 82550 ASSAY OF CK (CPK): CPT | Performed by: INTERNAL MEDICINE

## 2021-04-04 PROCEDURE — 96372 THER/PROPH/DIAG INJ SC/IM: CPT

## 2021-04-04 PROCEDURE — 85025 COMPLETE CBC W/AUTO DIFF WBC: CPT | Performed by: INTERNAL MEDICINE

## 2021-04-04 RX ORDER — IBUPROFEN 200 MG
200 TABLET ORAL EVERY 4 HOURS PRN
Status: DISCONTINUED | OUTPATIENT
Start: 2021-04-04 | End: 2021-04-08 | Stop reason: HOSPADM

## 2021-04-04 RX ADMIN — TRAZODONE HYDROCHLORIDE 50 MG: 50 TABLET ORAL at 02:04

## 2021-04-04 RX ADMIN — LEVOTHYROXINE SODIUM 88 MCG: 0.09 TABLET ORAL at 09:44

## 2021-04-04 RX ADMIN — PANTOPRAZOLE SODIUM 40 MG: 40 TABLET, DELAYED RELEASE ORAL at 03:34

## 2021-04-04 RX ADMIN — FLUTICASONE PROPIONATE 2 SPRAY: 50 SPRAY, METERED NASAL at 08:41

## 2021-04-04 RX ADMIN — IBUPROFEN 200 MG: 200 TABLET, FILM COATED ORAL at 20:25

## 2021-04-04 RX ADMIN — ACETAMINOPHEN 650 MG: 325 TABLET, FILM COATED ORAL at 03:34

## 2021-04-04 RX ADMIN — Medication 500 MCG: at 08:41

## 2021-04-04 RX ADMIN — ACETAMINOPHEN 650 MG: 325 TABLET, FILM COATED ORAL at 09:56

## 2021-04-04 RX ADMIN — ENOXAPARIN SODIUM 40 MG: 40 INJECTION SUBCUTANEOUS at 09:44

## 2021-04-04 RX ADMIN — ACETAMINOPHEN 650 MG: 325 TABLET, FILM COATED ORAL at 15:22

## 2021-04-04 RX ADMIN — TRAZODONE HYDROCHLORIDE 50 MG: 50 TABLET ORAL at 20:25

## 2021-04-04 RX ADMIN — Medication 2000 UNITS: at 08:41

## 2021-04-04 NOTE — PROGRESS NOTES
"DAILY PROGRESS NOTE  [facility]    Patient Identification:  Name: Maame Wallace  Age: 30 y.o.  Sex: female  :  1990  MRN: [id number]         Primary Care Physician: Miriam Newman    Subjective:  Interval History: She is very depressed.    Objective:    Scheduled Meds:cholecalciferol, 2,000 Units, Oral, Daily  enoxaparin, 40 mg, Subcutaneous, Q12H  fluticasone, 2 spray, Nasal, Daily  levothyroxine, 88 mcg, Oral, Q AM  pantoprazole, 40 mg, Oral, QAM  traZODone, 50 mg, Oral, Nightly  vitamin B-12, 500 mcg, Oral, Daily      Continuous Infusions:Pharmacy to Dose enoxaparin (LOVENOX),   sodium chloride, 125 mL/hr, Last Rate: 125 mL/hr (21 0455)        Vital signs in last 24 hours:  Temp:  [97.6 °F (36.4 °C)-99.4 °F (37.4 °C)] 97.6 °F (36.4 °C)  Heart Rate:  [61-75] 75  BP: (100-133)/(50-70) 100/50    Intake/Output:    Intake/Output Summary (Last 24 hours) at 2021 1156  Last data filed at 2021 0800  Gross per 24 hour   Intake 480 ml   Output 2400 ml   Net -1920 ml       Exam:  /50   Pulse 75   Temp 97.6 °F (36.4 °C) (Oral)   Resp 20   Ht 167.6 cm (66\")   Wt 113 kg (250 lb)   LMP 2021 (Approximate)   SpO2 100%   BMI 40.35 kg/m²     General Appearance:    Alert, cooperative, no distress   Head:    Normocephalic, without obvious abnormality, atraumatic   Eyes:       Throat:   Lips, tongue, gums normal   Neck:   Supple, symmetrical, trachea midline, no JVD   Lungs:     Clear to auscultation bilaterally, respirations unlabored   Chest Wall:    No tenderness or deformity    Heart:    Regular rate and rhythm, S1 and S2 normal, no murmur,no  Rub or gallop   Abdomen:     Soft, nontender, bowel sounds active, no masses, no organomegaly    Extremities:   Extremities normal, atraumatic, no cyanosis or edema   Pulses:      Skin:   Skin is warm and dry,  no rashes or palpable lesions   Neurologic:   no focal deficits noted      Lab Results (last 72 hours)     Procedure " Component Value Units Date/Time    Lithium Level [[id number]]  (Abnormal) Collected: 04/03/21 1058    Specimen: Blood Updated: 04/03/21 1201     Lithium 2.3 mmol/L     C-reactive Protein [[id number]]  (Normal) Collected: 04/03/21 1058    Specimen: Blood Updated: 04/03/21 1150     C-Reactive Protein <0.30 mg/dL     Ferritin [[id number]]  (Normal) Collected: 04/03/21 1058    Specimen: Blood Updated: 04/03/21 1142     Ferritin 30.30 ng/mL     Narrative:      Results may be falsely decreased if patient taking Biotin.      Comprehensive Metabolic Panel [[id number]]  (Abnormal) Collected: 04/03/21 1058    Specimen: Blood Updated: 04/03/21 1138     Glucose 109 mg/dL      BUN 5 mg/dL      Creatinine 0.95 mg/dL      Sodium 138 mmol/L      Potassium 3.7 mmol/L      Chloride 107 mmol/L      CO2 23.0 mmol/L      Calcium 8.4 mg/dL      Total Protein 6.1 g/dL      Albumin 3.80 g/dL      ALT (SGPT) 12 U/L      AST (SGOT) 16 U/L      Alkaline Phosphatase 49 U/L      Total Bilirubin 0.4 mg/dL      eGFR Non African Amer 70 mL/min/1.73      Globulin 2.3 gm/dL      A/G Ratio 1.7 g/dL      BUN/Creatinine Ratio 5.3     Anion Gap 8.0 mmol/L     Narrative:      GFR Normal >60  Chronic Kidney Disease <60  Kidney Failure <15      Lactate Dehydrogenase [[id number]]  (Abnormal) Collected: 04/03/21 1058    Specimen: Blood Updated: 04/03/21 1138      U/L     CK [[id number]]  (Normal) Collected: 04/03/21 1058    Specimen: Blood Updated: 04/03/21 1138     Creatine Kinase 31 U/L     D-dimer, Quantitative [[id number]]  (Normal) Collected: 04/03/21 1058    Specimen: Blood Updated: 04/03/21 1132     D-Dimer, Quantitative 0.28 MCGFEU/mL     Narrative:      The [place] D-Dimer test used in conjunction with a clinical pretest probability (PTP) assessment model, has been approved by the FDA to rule out the presence of venous thromboembolism (VTE) in outpatients suspected of deep venous thrombosis (DVT) or pulmonary embolism (PE). The cut-off for negative  predictive value is <0.50 MCGFEU/mL.    Fibrinogen [[id number]]  (Normal) Collected: 04/03/21 1058    Specimen: Blood Updated: 04/03/21 1132     Fibrinogen 296 mg/dL     CBC & Differential [[id number]]  (Normal) Collected: 04/03/21 1058    Specimen: Blood Updated: 04/03/21 1111    Narrative:      The following orders were created for panel order CBC & Differential.  Procedure                               Abnormality         Status                     ---------                               -----------         ------                     CBC Auto Differential[[id number]]        Normal              Final result                 Please view results for these tests on the individual orders.    CBC Auto Differential [[id number]]  (Normal) Collected: 04/03/21 1058    Specimen: Blood Updated: 04/03/21 1111     WBC 10.48 10*3/mm3      RBC 4.53 10*6/mm3      Hemoglobin 12.5 g/dL      Hematocrit 38.4 %      MCV 84.8 fL      MCH 27.6 pg      MCHC 32.6 g/dL      RDW 13.0 %      RDW-SD 39.7 fl      MPV 9.7 fL      Platelets 309 10*3/mm3      Neutrophil % 63.2 %      Lymphocyte % 28.3 %      Monocyte % 5.6 %      Eosinophil % 1.9 %      Basophil % 0.6 %      Immature Grans % 0.4 %      Neutrophils, Absolute 6.62 10*3/mm3      Lymphocytes, Absolute 2.97 10*3/mm3      Monocytes, Absolute 0.59 10*3/mm3      Eosinophils, Absolute 0.20 10*3/mm3      Basophils, Absolute 0.06 10*3/mm3      Immature Grans, Absolute 0.04 10*3/mm3      nRBC 0.0 /100 WBC     POC Glucose Once [[id number]]  (Normal) Collected: 04/03/21 0422    Specimen: Blood Updated: 04/03/21 0423     Glucose 91 mg/dL     POC Glucose Once [[id number]]  (Normal) Collected: 04/03/21 0008    Specimen: Blood Updated: 04/03/21 0009     Glucose 84 mg/dL     Lithium Level [[id number]]  (Abnormal) Collected: 04/02/21 2321    Specimen: Blood Updated: 04/03/21 0007     Lithium 3.5 mmol/L     Ferritin [[id number]]  (Normal) Collected: 04/02/21 2139    Specimen: Blood Updated: 04/02/21 2220      "Ferritin 28.80 ng/mL     Narrative:      Results may be falsely decreased if patient taking Biotin.      Procalcitonin [[id number]]  (Normal) Collected: 04/02/21 2139    Specimen: Blood Updated: 04/02/21 2220     Procalcitonin 0.08 ng/mL     Narrative:      As a Marker for Sepsis (Non-Neonates):   1. <0.5 ng/mL represents a low risk of severe sepsis and/or septic shock.  1. >2 ng/mL represents a high risk of severe sepsis and/or septic shock.    As a Marker for Lower Respiratory Tract Infections that require antibiotic therapy:  PCT on Admission     Antibiotic Therapy             6-12 Hrs later  > 0.5                Strongly Recommended            >0.25 - <0.5         Recommended  0.1 - 0.25           Discouraged                   Remeasure/reassess PCT  <0.1                 Strongly Discouraged          Remeasure/reassess PCT      As 28 day mortality risk marker: \"Change in Procalcitonin Result\" (> 80 % or <=80 %) if Day 0 (or Day 1) and Day 4 values are available. Refer to http://www.[employer]-pct-calculator.com/   Change in PCT <=80 %   A decrease of PCT levels below or equal to 80 % defines a positive change in PCT test result representing a higher risk for 28-day all-cause mortality of patients diagnosed with severe sepsis or septic shock.  Change in PCT > 80 %   A decrease of PCT levels of more than 80 % defines a negative change in PCT result representing a lower risk for 28-day all-cause mortality of patients diagnosed with severe sepsis or septic shock.                Results may be falsely decreased if patient taking Biotin.     Lactate Dehydrogenase [[id number]]  (Normal) Collected: 04/02/21 2139    Specimen: Blood Updated: 04/02/21 2215      U/L      Comment: Specimen hemolyzed.  Results may be affected.       D-dimer, Quantitative [[id number]]  (Normal) Collected: 04/02/21 2139    Specimen: Blood Updated: 04/02/21 2158     D-Dimer, Quantitative <0.27 MCGFEU/mL     Narrative:      The [place] D-Dimer test " used in conjunction with a clinical pretest probability (PTP) assessment model, has been approved by the FDA to rule out the presence of venous thromboembolism (VTE) in outpatients suspected of deep venous thrombosis (DVT) or pulmonary embolism (PE). The cut-off for negative predictive value is <0.50 MCGFEU/mL.    POC Glucose Once [[id number]]  (Normal) Collected: 04/02/21 2045    Specimen: Blood Updated: 04/02/21 2047     Glucose 79 mg/dL     Lithium Level [[id number]]  (Abnormal) Collected: 04/02/21 1714    Specimen: Blood Updated: 04/02/21 1751     Lithium 2.9 mmol/L     Lithium Level [[id number]]  (Abnormal) Collected: 04/02/21 1256    Specimen: Blood Updated: 04/02/21 1324     Lithium 1.7 mmol/L     Acetaminophen Level [[id number]]  (Normal) Collected: 04/02/21 1114    Specimen: Blood Updated: 04/02/21 1153     Acetaminophen <5.0 mcg/mL     COVID PRE-OP / PRE-PROCEDURE SCREENING ORDER (NO ISOLATION) - Swab, Nasopharynx [[id number]]  (Abnormal) Collected: 04/02/21 0846    Specimen: Swab from Nasopharynx Updated: 04/02/21 1039    Narrative:      The following orders were created for panel order COVID PRE-OP / PRE-PROCEDURE SCREENING ORDER (NO ISOLATION) - Swab, Nasopharynx.  Procedure                               Abnormality         Status                     ---------                               -----------         ------                     COVID-19,[facility]...[[id number]]  Abnormal            Final result                 Please view results for these tests on the individual orders.    COVID-19,[facility] CEPHEID, NP SWAB IN TRANSPORT MEDIA 8-12 HR TAT - Swab, Nasopharynx [[id number]]  (Abnormal) Collected: 04/02/21 0846    Specimen: Swab from Nasopharynx Updated: 04/02/21 1039     COVID19 Detected    Narrative:      Fact sheet for providers: https://www.fda.gov/media/219152/download     Fact sheet for patients: https://www.fda.gov/media/443144/download    Urine Drug Screen - Urine, Clean Catch  [[id number]]  (Abnormal) Collected: 04/02/21 0846    Specimen: Urine, Clean Catch Updated: 04/02/21 0928     Amphet/Methamphet, Screen Negative     Barbiturates Screen, Urine Negative     Benzodiazepine Screen, Urine Negative     Cocaine Screen, Urine Negative     Opiate Screen Negative     THC, Screen, Urine Negative     Methadone Screen, Urine Positive     Oxycodone Screen, Urine Negative    Narrative:      Negative Thresholds Per Drugs Screened:    Amphetamines                 500 ng/ml  Barbiturates                 200 ng/ml  Benzodiazepines              100 ng/ml  Cocaine                      300 ng/ml  Methadone                    300 ng/ml  Opiates                      300 ng/ml  Oxycodone                    100 ng/ml  THC                           50 ng/ml    The Normal Value for all drugs tested is negative. This report includes final unconfirmed screening results to be used for medical treatment purposes only. Unconfirmed results must not be used for non-medical purposes such as employment or legal testing. Clinical consideration should be applied to any drug of abuse test, particularly when unconfirmed results are used.            Comprehensive Metabolic Panel [[id number]]  (Abnormal) Collected: 04/02/21 0846    Specimen: Blood Updated: 04/02/21 0912     Glucose 102 mg/dL      BUN 6 mg/dL      Creatinine 0.79 mg/dL      Sodium 138 mmol/L      Potassium 4.2 mmol/L      Comment: Slight hemolysis detected by analyzer. Results may be affected.        Chloride 104 mmol/L      CO2 23.6 mmol/L      Calcium 8.6 mg/dL      Total Protein 6.3 g/dL      Albumin 4.20 g/dL      ALT (SGPT) 19 U/L      AST (SGOT) 17 U/L      Alkaline Phosphatase 60 U/L      Total Bilirubin 0.3 mg/dL      eGFR Non African Amer 87 mL/min/1.73      Globulin 2.1 gm/dL      A/G Ratio 2.0 g/dL      BUN/Creatinine Ratio 7.6     Anion Gap 10.4 mmol/L     Narrative:      GFR Normal >60  Chronic Kidney Disease <60  Kidney Failure <15       Acetaminophen Level [[id number]]  (Normal) Collected: 04/02/21 0812    Specimen: Blood Updated: 04/02/21 0837     Acetaminophen <5.0 mcg/mL     Ethanol [[id number]] Collected: 04/02/21 0812    Specimen: Blood Updated: 04/02/21 0837     Ethanol <10 mg/dL      Ethanol % <0.010 %     Salicylate Level [[id number]]  (Normal) Collected: 04/02/21 0812    Specimen: Blood Updated: 04/02/21 0837     Salicylate <0.3 mg/dL     Narrative:      Therapeutic range for Salicylates:  3.0 - 10.0 mg/dL for antipyretic/analgesic conditions  15.0 - 30.0 mg/dL for anti-inflammatory conditions    hCG, Serum, Qualitative [[id number]]  (Normal) Collected: 04/02/21 0812    Specimen: Blood Updated: 04/02/21 0829     HCG Qualitative Negative    Lithium Level [[id number]]  (Normal) Collected: 04/02/21 0812    Specimen: Blood Updated: 04/02/21 0828     Lithium 0.8 mmol/L     CBC & Differential [[id number]]  (Abnormal) Collected: 04/02/21 0812    Specimen: Blood Updated: 04/02/21 0821    Narrative:      The following orders were created for panel order CBC & Differential.  Procedure                               Abnormality         Status                     ---------                               -----------         ------                     CBC Auto Differential[[id number]]        Abnormal            Final result                 Please view results for these tests on the individual orders.    CBC Auto Differential [[id number]]  (Abnormal) Collected: 04/02/21 0812    Specimen: Blood Updated: 04/02/21 0821     WBC 10.94 10*3/mm3      RBC 4.97 10*6/mm3      Hemoglobin 14.2 g/dL      Hematocrit 43.0 %      MCV 86.5 fL      MCH 28.6 pg      MCHC 33.0 g/dL      RDW 13.4 %      RDW-SD 41.7 fl      MPV 9.7 fL      Platelets 331 10*3/mm3      Neutrophil % 66.6 %      Lymphocyte % 26.0 %      Monocyte % 5.0 %      Eosinophil % 1.0 %      Basophil % 0.9 %      Immature Grans % 0.5 %      Neutrophils, Absolute 7.29 10*3/mm3      Lymphocytes, Absolute 2.84 10*3/mm3       Monocytes, Absolute 0.55 10*3/mm3      Eosinophils, Absolute 0.11 10*3/mm3      Basophils, Absolute 0.10 10*3/mm3      Immature Grans, Absolute 0.05 10*3/mm3      nRBC 0.0 /100 WBC         Data Review:  Results from last 7 days   Lab Units 04/04/21  0839 04/03/21  1058 04/02/21  0846   SODIUM mmol/L 137 138 138   POTASSIUM mmol/L 4.0 3.7 4.2   CHLORIDE mmol/L 106 107 104   CO2 mmol/L 22.8 23.0 23.6   BUN mg/dL 6 5* 6   CREATININE mg/dL 0.89 0.95 0.79   GLUCOSE mg/dL 101* 109* 102*   CALCIUM mg/dL 8.3* 8.4* 8.6     Results from last 7 days   Lab Units 04/04/21  0839 04/03/21  1058 04/02/21  0812   WBC 10*3/mm3 12.46* 10.48 10.94*   HEMOGLOBIN g/dL 13.4 12.5 14.2   HEMATOCRIT % 41.1 38.4 43.0   PLATELETS 10*3/mm3 286 309 331     Results from last 7 days   Lab Units 04/04/21  0839   TSH uIU/mL 3.130         Lab Results   Lab Value Date/Time    TROPONINT <0.010 12/04/2020 1137         Results from last 7 days   Lab Units 04/04/21  0839 04/03/21  1058 04/02/21  0846   ALK PHOS U/L 52 49 60   BILIRUBIN mg/dL 0.4 0.4 0.3   ALT (SGPT) U/L 13 12 19   AST (SGOT) U/L 18 16 17     Results from last 7 days   Lab Units 04/04/21  0839   TSH uIU/mL 3.130         Glucose   Date/Time Value Ref Range Status   04/03/2021 0422 91 70 - 130 mg/dL Final   04/03/2021 0008 84 70 - 130 mg/dL Final   04/02/2021 2045 79 70 - 130 mg/dL Final           Past Medical History:   Diagnosis Date   • Anxiety    • Asthma    • Depression    • Fibromyalgia    • GERD (gastroesophageal reflux disease)    • Hypothyroidism    • Irritable bowel disease    • Migraines        Assessment:  Active Hospital Problems    Diagnosis  POA   • **Suicide attempt by drug ingestion (CMS/[facility]) [T50.902A]  Unknown   • Elevated lithium level [R79.89]  Unknown   • Polysubstance overdose [T50.901A]  Unknown   • COVID-19 virus detected [U07.1]  Unknown   • Fatty liver [K76.0]  Yes   • Obesity, Class III, BMI 40-49.9 (morbid obesity) (CMS/HCC) [E66.01]  Yes   • Hypothyroidism  [E03.9]  Yes   • Irritable bowel disease [K58.9]  Yes   • Major depressive disorder, recurrent, severe without psychotic features (CMS/HCC) [F33.2]  Yes      Resolved Hospital Problems   No resolved problems to display.       Plan:  Suicide precaution, 72-hour hold, as per psychiatry consult.  Patient is COVID-19 positive but is already had Covid in December and has been vaccinated and I doubt that patient really has active infection.  Will deferral isolation precautions to infection control.  Await Dr Aiken.    Farhad Arrieta MD  4/4/2021  11:56 EDT

## 2021-04-04 NOTE — CONSULTS
Requesting Physician:  Dr. Arrieta  Reason for Consult: Depression with SI    Date of admission: April 2, 2021  Date of assessment: April 4, 2021    Chief Complaint: None given    History of presenting illness: Patient is a 30 y.o. female with a past psychiatric history of depression seen on consultation status post suicide attempt via OD.  The patient had presented to the ED with a friend after having taken unspecified amounts of lithium, trazodone, Xanax and over-the-counter sleep aid.  Her lithium level of initial presentation was 0.8.  However, it quickly escalated to 3.5.  Yesterday his lithium level was 2.3.    [facility] at [facility] 19 through February 21, 2020.  Patient also had 3 previous inpatient admissions as a minor.  She has 1 previous suicide attempt.  She is a current patient of this physician at [facility].  Her outpatient therapist is Selin Mccann.  Patient does have a history of chronic suicidal ideation.  When last seen as an outpatient, patient had been doing fairly well.  She had completed a course of TMS September 2021.  She did have an additional 10 treatments a couple months later.    The patient tested positive for COVID-19.  She remains in isolation until cleared by infectious disease.  Information obtained in this consultation report was obtained from the chart as well as a telephonic interview with the patient.  Patient reports she has been having increased suicidal thoughts for 2 weeks.  She had been planning her overdose for 5 days.  She did write a suicide note.  She apparently had been speaking with a friend and telling her how she was feeling.  The friend was coming to her home to pick her up and take her to the hospital.  The patient took the overdose but did not tell her friend.  It was not known that she had attempted suicide until she arrived at the hospital.  Patient reports that she was hoping to die when she had her overdose.  She continues to have suicidal  ideation.  She denies homicidal ideation and audiovisual hallucinations.    Past psychiatric history: See HPI    Past medical history:  Diagnoses: GERD, hypothyroidism, seasonal allergies, vitamin D deficiency  Medications:   Trazodone 50 mg at bedtime, melatonin 3 mg as needed for sleep, vitamin B12, Protonix, Synthroid, Flonase, vitamin D3, Lovenox.  Medication allergies: Codeine, Imitrex, morphine, DHEA.    Social history: Noncontributory    Family history: Noncontributory    Substance abuse history: The patient denies use of illicit street substances or alcoholic beverages.    Vital Signs    Temp:  97.6  Heart Rate: 85  Resp:  20  BP:  100/50    Mental Status Exam: The patient is interviewed on the telephone.  She is alert and oriented x4.  Speech is fluent with a decreased tone and volume.  Mood is depressed.  Affect congruent.  She continues to report suicidal ideation.  She denies homicidal ideation and audiovisual hallucinations.  Thought processes are goal-directed.  Judgment insight is poor.  Memory is intact.    Assessment:   Major depressive disorder, severe, recurrent without psychotic features;  Suicide attempt    Treatment Plan:     Due to the severity of her suicide attempt, patient will require inpatient admission to the [facility] once medically stabilized.  Resume Trintellix 20 mg daily.  Consider an additional full course of TMS once stabilized.    Thank you for this consultation.  Please contact access for any additional requests.

## 2021-04-05 LAB
ALBUMIN SERPL-MCNC: 3.9 G/DL (ref 3.5–5.2)
ALBUMIN/GLOB SERPL: 1.6 G/DL
ALP SERPL-CCNC: 58 U/L (ref 39–117)
ALT SERPL W P-5'-P-CCNC: 15 U/L (ref 1–33)
ANION GAP SERPL CALCULATED.3IONS-SCNC: 9.2 MMOL/L (ref 5–15)
AST SERPL-CCNC: 16 U/L (ref 1–32)
BASOPHILS # BLD AUTO: 0.1 10*3/MM3 (ref 0–0.2)
BASOPHILS NFR BLD AUTO: 0.8 % (ref 0–1.5)
BILIRUB SERPL-MCNC: 0.5 MG/DL (ref 0–1.2)
BUN SERPL-MCNC: 8 MG/DL (ref 6–20)
BUN/CREAT SERPL: 9.2 (ref 7–25)
CALCIUM SPEC-SCNC: 8.6 MG/DL (ref 8.6–10.5)
CHLORIDE SERPL-SCNC: 103 MMOL/L (ref 98–107)
CK SERPL-CCNC: 32 U/L (ref 20–180)
CO2 SERPL-SCNC: 22.8 MMOL/L (ref 22–29)
CREAT SERPL-MCNC: 0.87 MG/DL (ref 0.57–1)
CRP SERPL-MCNC: <0.3 MG/DL (ref 0–0.5)
D DIMER PPP FEU-MCNC: 0.28 MCGFEU/ML (ref 0–0.49)
DEPRECATED RDW RBC AUTO: 39.4 FL (ref 37–54)
EOSINOPHIL # BLD AUTO: 0.34 10*3/MM3 (ref 0–0.4)
EOSINOPHIL NFR BLD AUTO: 2.8 % (ref 0.3–6.2)
ERYTHROCYTE [DISTWIDTH] IN BLOOD BY AUTOMATED COUNT: 12.9 % (ref 12.3–15.4)
FERRITIN SERPL-MCNC: 41.1 NG/ML (ref 13–150)
FIBRINOGEN PPP-MCNC: 334 MG/DL (ref 219–464)
GFR SERPL CREATININE-BSD FRML MDRD: 78 ML/MIN/1.73
GLOBULIN UR ELPH-MCNC: 2.5 GM/DL
GLUCOSE SERPL-MCNC: 101 MG/DL (ref 65–99)
HCT VFR BLD AUTO: 42.2 % (ref 34–46.6)
HGB BLD-MCNC: 13.7 G/DL (ref 12–15.9)
IMM GRANULOCYTES # BLD AUTO: 0.04 10*3/MM3 (ref 0–0.05)
IMM GRANULOCYTES NFR BLD AUTO: 0.3 % (ref 0–0.5)
LDH SERPL-CCNC: 181 U/L (ref 135–214)
LYMPHOCYTES # BLD AUTO: 2.73 10*3/MM3 (ref 0.7–3.1)
LYMPHOCYTES NFR BLD AUTO: 22.7 % (ref 19.6–45.3)
MCH RBC QN AUTO: 27.9 PG (ref 26.6–33)
MCHC RBC AUTO-ENTMCNC: 32.5 G/DL (ref 31.5–35.7)
MCV RBC AUTO: 85.9 FL (ref 79–97)
MONOCYTES # BLD AUTO: 0.65 10*3/MM3 (ref 0.1–0.9)
MONOCYTES NFR BLD AUTO: 5.4 % (ref 5–12)
NEUTROPHILS NFR BLD AUTO: 68 % (ref 42.7–76)
NEUTROPHILS NFR BLD AUTO: 8.19 10*3/MM3 (ref 1.7–7)
NRBC BLD AUTO-RTO: 0 /100 WBC (ref 0–0.2)
PLATELET # BLD AUTO: 300 10*3/MM3 (ref 140–450)
PMV BLD AUTO: 10 FL (ref 6–12)
POTASSIUM SERPL-SCNC: 4.1 MMOL/L (ref 3.5–5.2)
PROT SERPL-MCNC: 6.4 G/DL (ref 6–8.5)
RBC # BLD AUTO: 4.91 10*6/MM3 (ref 3.77–5.28)
SODIUM SERPL-SCNC: 135 MMOL/L (ref 136–145)
WBC # BLD AUTO: 12.05 10*3/MM3 (ref 3.4–10.8)

## 2021-04-05 PROCEDURE — 83615 LACTATE (LD) (LDH) ENZYME: CPT | Performed by: INTERNAL MEDICINE

## 2021-04-05 PROCEDURE — 82550 ASSAY OF CK (CPK): CPT | Performed by: INTERNAL MEDICINE

## 2021-04-05 PROCEDURE — 96376 TX/PRO/DX INJ SAME DRUG ADON: CPT

## 2021-04-05 PROCEDURE — 25010000002 ONDANSETRON PER 1 MG: Performed by: INTERNAL MEDICINE

## 2021-04-05 PROCEDURE — 85379 FIBRIN DEGRADATION QUANT: CPT | Performed by: INTERNAL MEDICINE

## 2021-04-05 PROCEDURE — 85384 FIBRINOGEN ACTIVITY: CPT | Performed by: INTERNAL MEDICINE

## 2021-04-05 PROCEDURE — 36415 COLL VENOUS BLD VENIPUNCTURE: CPT | Performed by: INTERNAL MEDICINE

## 2021-04-05 PROCEDURE — G0378 HOSPITAL OBSERVATION PER HR: HCPCS

## 2021-04-05 PROCEDURE — 86140 C-REACTIVE PROTEIN: CPT | Performed by: INTERNAL MEDICINE

## 2021-04-05 PROCEDURE — 85025 COMPLETE CBC W/AUTO DIFF WBC: CPT | Performed by: INTERNAL MEDICINE

## 2021-04-05 PROCEDURE — 96372 THER/PROPH/DIAG INJ SC/IM: CPT

## 2021-04-05 PROCEDURE — 80053 COMPREHEN METABOLIC PANEL: CPT | Performed by: INTERNAL MEDICINE

## 2021-04-05 PROCEDURE — 25010000002 ENOXAPARIN PER 10 MG: Performed by: INTERNAL MEDICINE

## 2021-04-05 PROCEDURE — 82728 ASSAY OF FERRITIN: CPT | Performed by: INTERNAL MEDICINE

## 2021-04-05 RX ORDER — CETIRIZINE HYDROCHLORIDE 10 MG/1
1 TABLET ORAL DAILY
Status: ON HOLD | COMMUNITY
End: 2021-04-28

## 2021-04-05 RX ORDER — FLUTICASONE PROPIONATE 110 UG/1
1 AEROSOL, METERED RESPIRATORY (INHALATION) DAILY
Status: ON HOLD | COMMUNITY
End: 2021-04-05

## 2021-04-05 RX ORDER — ONDANSETRON 4 MG/1
1 TABLET, ORALLY DISINTEGRATING ORAL EVERY 8 HOURS PRN
COMMUNITY
Start: 2020-12-28 | End: 2021-04-08 | Stop reason: HOSPADM

## 2021-04-05 RX ORDER — FAMOTIDINE 20 MG/1
1 TABLET, FILM COATED ORAL
Status: ON HOLD | COMMUNITY
End: 2021-04-05

## 2021-04-05 RX ORDER — ERGOCALCIFEROL 1.25 MG/1
50000 CAPSULE ORAL
Status: ON HOLD | COMMUNITY
Start: 2021-03-04 | End: 2021-04-28

## 2021-04-05 RX ORDER — FLUOXETINE 10 MG/1
1 CAPSULE ORAL DAILY
COMMUNITY
End: 2021-04-08 | Stop reason: HOSPADM

## 2021-04-05 RX ADMIN — TRAZODONE HYDROCHLORIDE 50 MG: 50 TABLET ORAL at 20:24

## 2021-04-05 RX ADMIN — PANTOPRAZOLE SODIUM 40 MG: 40 TABLET, DELAYED RELEASE ORAL at 06:38

## 2021-04-05 RX ADMIN — ENOXAPARIN SODIUM 40 MG: 40 INJECTION SUBCUTANEOUS at 04:56

## 2021-04-05 RX ADMIN — Medication 500 MCG: at 08:11

## 2021-04-05 RX ADMIN — VORTIOXETINE 20 MG: 10 TABLET, FILM COATED ORAL at 08:11

## 2021-04-05 RX ADMIN — Medication 2000 UNITS: at 08:11

## 2021-04-05 RX ADMIN — LEVOTHYROXINE SODIUM 88 MCG: 0.09 TABLET ORAL at 04:53

## 2021-04-05 RX ADMIN — ONDANSETRON 4 MG: 2 INJECTION INTRAMUSCULAR; INTRAVENOUS at 08:52

## 2021-04-05 RX ADMIN — ENOXAPARIN SODIUM 40 MG: 40 INJECTION SUBCUTANEOUS at 17:59

## 2021-04-05 RX ADMIN — IBUPROFEN 200 MG: 200 TABLET, FILM COATED ORAL at 13:43

## 2021-04-05 RX ADMIN — ACETAMINOPHEN 650 MG: 325 TABLET, FILM COATED ORAL at 10:31

## 2021-04-05 NOTE — NURSING NOTE
Patient sent to [facility] accompanied by nursing assistant/sitter for suicide precautions. A/Ox4 with stable vitals at transfer. Belongings sent with patient, other things in security per SI precautions.

## 2021-04-05 NOTE — NURSING NOTE
Spoke to infection control team. They state that patient is considered a new COVID case on admission. The earliest she could come out of isolation is 4/12 with the current available information. They would like me to confirm vaccine dates/location and get back to them. They will send the case to review by Dr. Fowler.     COVID vaccines received Jan 11 2021 and Feb 10 2021 at [facility] in [facility].

## 2021-04-05 NOTE — SIGNIFICANT NOTE
4/5/2021: Spoke with primary RN and  on 4/4 and 4/5 regarding patient's COVID status.  Confirmed with [facility] that patient's first COVID positivity date was initially 12/18/2020 following a known COVID exposure at the end of November.  Patient retested COVID positive on 4/2/2021 and is asymptomatic.  COVID vaccination dates have been noted.  Discussed further with Dr. Fowler.  Despite vaccination status, Enhanced Precautions are required since there is no way to positively determine prolonged viral shedding versus asymptomatic re-infection.  If the patient remains asymptomatic, the patient could be re-evaluated by IC after 10 days for possible discontinuation of Enhanced Precautions.  Discussed today with Adry in CCU and Gale WATSON. Adry Cowan RN

## 2021-04-05 NOTE — PROGRESS NOTES
Discharge Planning Assessment  [facility]     Patient Name: Maame Wallace  MRN: [id number]  Today's Date: 4/5/2021    Admit Date: 4/2/2021    Discharge Needs Assessment     Row Name 04/05/21 1412       Living Environment    Lives With  parent(s)    Current Living Arrangements  home/apartment/condo    Potentially Unsafe Housing Conditions  unable to assess    Primary Care Provided by  self    Provides Primary Care For  no one    Family Caregiver if Needed  parent(s)    Able to Return to Prior Arrangements  yes       Resource/Environmental Concerns    Transportation Concerns  car, none       Transition Planning    Patient/Family Anticipates Transition to  home    Patient/Family Anticipated Services at Transition  mental health services    Transportation Anticipated  family or friend will provide       Discharge Needs Assessment    Current Outpatient/Agency/Support Group  support group(s);psychiatric facility;outpatient psychiatric care    Equipment Currently Used at Home  none    Concerns to be Addressed  discharge planning    Anticipated Changes Related to Illness  none        Discharge Plan     Row Name 04/05/21 1413       Plan    Plan  Undetermined    Plan Comments  CCP spoke with patient’s mother, Verónica, [phone] via telephone.   CCP role explained and Face sheet verified.  Pt. emergency contact is her , John, [phone].  Pt PCP is Miriam ELIZALDE.    Pt lives in a one story house with her parents.   She is independent with ADL’s.  She uses no DME to ambulate.  Pt has no past history of Home Health.  She has no past rehab history.   Pt obtains her  medications from [facility].   Plan is undetermined.    CCP will follow for discharge        Continued Care and Services - Admitted Since 4/2/2021    Coordination has not been started for this encounter.         Demographic Summary    No documentation.       Functional Status    No documentation.        Psychosocial    No documentation.       Abuse/Neglect    No documentation.       Legal    No documentation.       Substance Abuse    No documentation.       Patient Forms    No documentation.           Ashia Wong RN

## 2021-04-05 NOTE — NURSING NOTE
Case discussed with KENDRICK Collazo, who reports pt has been tearful at times with no statements indicating thoughts of harming self. I encouraged staff to call Access with any changes in condition.

## 2021-04-05 NOTE — PROGRESS NOTES
"DAILY PROGRESS NOTE  [facility]    Patient Identification:  Name: Maame Wallace  Age: 30 y.o.  Sex: female  :  1990  MRN: [id number]         Primary Care Physician: Miriam Newman    Subjective:  Interval History: She is very depressed.    Objective:    Scheduled Meds:cholecalciferol, 2,000 Units, Oral, Daily  enoxaparin, 40 mg, Subcutaneous, Q12H  fluticasone, 2 spray, Nasal, Daily  levothyroxine, 88 mcg, Oral, Q AM  pantoprazole, 40 mg, Oral, QAM  traZODone, 50 mg, Oral, Nightly  vitamin B-12, 500 mcg, Oral, Daily  Vortioxetine HBr, 20 mg, Oral, Daily      Continuous Infusions:Pharmacy to Dose enoxaparin (LOVENOX),         Vital signs in last 24 hours:  Temp:  [98.2 °F (36.8 °C)-99.1 °F (37.3 °C)] 99.1 °F (37.3 °C)  Heart Rate:  [75-87] 80  Resp:  [18] 18  BP: (106-122)/(59-69) 122/69    Intake/Output:    Intake/Output Summary (Last 24 hours) at 2021 1710  Last data filed at 2021 1245  Gross per 24 hour   Intake 440 ml   Output 700 ml   Net -260 ml       Exam:  /69 (BP Location: Left arm, Patient Position: Lying)   Pulse 80   Temp 99.1 °F (37.3 °C) (Oral)   Resp 18   Ht 167.6 cm (66\")   Wt 113 kg (250 lb)   LMP 2021 (Approximate)   SpO2 98%   BMI 40.35 kg/m²     General Appearance:    Alert, cooperative, no distress   Head:    Normocephalic, without obvious abnormality, atraumatic   Eyes:       Throat:   Lips, tongue, gums normal   Neck:   Supple, symmetrical, trachea midline, no JVD   Lungs:     Clear to auscultation bilaterally, respirations unlabored   Chest Wall:    No tenderness or deformity    Heart:    Regular rate and rhythm, S1 and S2 normal, no murmur,no  Rub or gallop   Abdomen:     Soft, nontender, bowel sounds active, no masses, no organomegaly    Extremities:   Extremities normal, atraumatic, no cyanosis or edema   Pulses:      Skin:   Skin is warm and dry,  no rashes or palpable lesions   Neurologic:   no focal deficits noted      Lab Results " (last 72 hours)     Procedure Component Value Units Date/Time    Lithium Level [[id number]]  (Abnormal) Collected: 04/03/21 1058    Specimen: Blood Updated: 04/03/21 1201     Lithium 2.3 mmol/L     C-reactive Protein [[id number]]  (Normal) Collected: 04/03/21 1058    Specimen: Blood Updated: 04/03/21 1150     C-Reactive Protein <0.30 mg/dL     Ferritin [[id number]]  (Normal) Collected: 04/03/21 1058    Specimen: Blood Updated: 04/03/21 1142     Ferritin 30.30 ng/mL     Narrative:      Results may be falsely decreased if patient taking Biotin.      Comprehensive Metabolic Panel [[id number]]  (Abnormal) Collected: 04/03/21 1058    Specimen: Blood Updated: 04/03/21 1138     Glucose 109 mg/dL      BUN 5 mg/dL      Creatinine 0.95 mg/dL      Sodium 138 mmol/L      Potassium 3.7 mmol/L      Chloride 107 mmol/L      CO2 23.0 mmol/L      Calcium 8.4 mg/dL      Total Protein 6.1 g/dL      Albumin 3.80 g/dL      ALT (SGPT) 12 U/L      AST (SGOT) 16 U/L      Alkaline Phosphatase 49 U/L      Total Bilirubin 0.4 mg/dL      eGFR Non African Amer 70 mL/min/1.73      Globulin 2.3 gm/dL      A/G Ratio 1.7 g/dL      BUN/Creatinine Ratio 5.3     Anion Gap 8.0 mmol/L     Narrative:      GFR Normal >60  Chronic Kidney Disease <60  Kidney Failure <15      Lactate Dehydrogenase [[id number]]  (Abnormal) Collected: 04/03/21 1058    Specimen: Blood Updated: 04/03/21 1138      U/L     CK [[id number]]  (Normal) Collected: 04/03/21 1058    Specimen: Blood Updated: 04/03/21 1138     Creatine Kinase 31 U/L     D-dimer, Quantitative [[id number]]  (Normal) Collected: 04/03/21 1058    Specimen: Blood Updated: 04/03/21 1132     D-Dimer, Quantitative 0.28 MCGFEU/mL     Narrative:      The Stago D-Dimer test used in conjunction with a clinical pretest probability (PTP) assessment model, has been approved by the FDA to rule out the presence of venous thromboembolism (VTE) in outpatients suspected of deep venous thrombosis (DVT) or pulmonary embolism  (PE). The cut-off for negative predictive value is <0.50 MCGFEU/mL.    Fibrinogen [[id number]]  (Normal) Collected: 04/03/21 1058    Specimen: Blood Updated: 04/03/21 1132     Fibrinogen 296 mg/dL     CBC & Differential [[id number]]  (Normal) Collected: 04/03/21 1058    Specimen: Blood Updated: 04/03/21 1111    Narrative:      The following orders were created for panel order CBC & Differential.  Procedure                               Abnormality         Status                     ---------                               -----------         ------                     CBC Auto Differential[[id number]]        Normal              Final result                 Please view results for these tests on the individual orders.    CBC Auto Differential [[id number]]  (Normal) Collected: 04/03/21 1058    Specimen: Blood Updated: 04/03/21 1111     WBC 10.48 10*3/mm3      RBC 4.53 10*6/mm3      Hemoglobin 12.5 g/dL      Hematocrit 38.4 %      MCV 84.8 fL      MCH 27.6 pg      MCHC 32.6 g/dL      RDW 13.0 %      RDW-SD 39.7 fl      MPV 9.7 fL      Platelets 309 10*3/mm3      Neutrophil % 63.2 %      Lymphocyte % 28.3 %      Monocyte % 5.6 %      Eosinophil % 1.9 %      Basophil % 0.6 %      Immature Grans % 0.4 %      Neutrophils, Absolute 6.62 10*3/mm3      Lymphocytes, Absolute 2.97 10*3/mm3      Monocytes, Absolute 0.59 10*3/mm3      Eosinophils, Absolute 0.20 10*3/mm3      Basophils, Absolute 0.06 10*3/mm3      Immature Grans, Absolute 0.04 10*3/mm3      nRBC 0.0 /100 WBC     POC Glucose Once [[id number]]  (Normal) Collected: 04/03/21 0422    Specimen: Blood Updated: 04/03/21 0423     Glucose 91 mg/dL     POC Glucose Once [[id number]]  (Normal) Collected: 04/03/21 0008    Specimen: Blood Updated: 04/03/21 0009     Glucose 84 mg/dL     Lithium Level [[id number]]  (Abnormal) Collected: 04/02/21 2321    Specimen: Blood Updated: 04/03/21 0007     Lithium 3.5 mmol/L     Ferritin [[id number]]  (Normal) Collected: 04/02/21 6139    Specimen:  "Blood Updated: 04/02/21 2220     Ferritin 28.80 ng/mL     Narrative:      Results may be falsely decreased if patient taking Biotin.      Procalcitonin [[id number]]  (Normal) Collected: 04/02/21 2139    Specimen: Blood Updated: 04/02/21 2220     Procalcitonin 0.08 ng/mL     Narrative:      As a Marker for Sepsis (Non-Neonates):   1. <0.5 ng/mL represents a low risk of severe sepsis and/or septic shock.  1. >2 ng/mL represents a high risk of severe sepsis and/or septic shock.    As a Marker for Lower Respiratory Tract Infections that require antibiotic therapy:  PCT on Admission     Antibiotic Therapy             6-12 Hrs later  > 0.5                Strongly Recommended            >0.25 - <0.5         Recommended  0.1 - 0.25           Discouraged                   Remeasure/reassess PCT  <0.1                 Strongly Discouraged          Remeasure/reassess PCT      As 28 day mortality risk marker: \"Change in Procalcitonin Result\" (> 80 % or <=80 %) if Day 0 (or Day 1) and Day 4 values are available. Refer to http://www.[email or web address][email or web address]/   Change in PCT <=80 %   A decrease of PCT levels below or equal to 80 % defines a positive change in PCT test result representing a higher risk for 28-day all-cause mortality of patients diagnosed with severe sepsis or septic shock.  Change in PCT > 80 %   A decrease of PCT levels of more than 80 % defines a negative change in PCT result representing a lower risk for 28-day all-cause mortality of patients diagnosed with severe sepsis or septic shock.                Results may be falsely decreased if patient taking Biotin.     Lactate Dehydrogenase [[id number]]  (Normal) Collected: 04/02/21 2139    Specimen: Blood Updated: 04/02/21 2215      U/L      Comment: Specimen hemolyzed.  Results may be affected.       D-dimer, Quantitative [[id number]]  (Normal) Collected: 04/02/21 2139    Specimen: Blood Updated: 04/02/21 2158     D-Dimer, Quantitative <0.27 MCGFEU/mL     " Narrative:      The Stago D-Dimer test used in conjunction with a clinical pretest probability (PTP) assessment model, has been approved by the FDA to rule out the presence of venous thromboembolism (VTE) in outpatients suspected of deep venous thrombosis (DVT) or pulmonary embolism (PE). The cut-off for negative predictive value is <0.50 MCGFEU/mL.    POC Glucose Once [[id number]]  (Normal) Collected: 04/02/21 2045    Specimen: Blood Updated: 04/02/21 2047     Glucose 79 mg/dL     Lithium Level [[id number]]  (Abnormal) Collected: 04/02/21 1714    Specimen: Blood Updated: 04/02/21 1751     Lithium 2.9 mmol/L     Lithium Level [[id number]]  (Abnormal) Collected: 04/02/21 1256    Specimen: Blood Updated: 04/02/21 1324     Lithium 1.7 mmol/L     Acetaminophen Level [[id number]]  (Normal) Collected: 04/02/21 1114    Specimen: Blood Updated: 04/02/21 1153     Acetaminophen <5.0 mcg/mL     COVID PRE-OP / PRE-PROCEDURE SCREENING ORDER (NO ISOLATION) - Swab, Nasopharynx [[id number]]  (Abnormal) Collected: 04/02/21 0846    Specimen: Swab from Nasopharynx Updated: 04/02/21 1039    Narrative:      The following orders were created for panel order COVID PRE-OP / PRE-PROCEDURE SCREENING ORDER (NO ISOLATION) - Swab, Nasopharynx.  Procedure                               Abnormality         Status                     ---------                               -----------         ------                     COVID-19,[facility]...[[id number]]  Abnormal            Final result                 Please view results for these tests on the individual orders.    COVID-19,[facility] CEPHEID, NP SWAB IN TRANSPORT MEDIA 8-12 HR TAT - Swab, Nasopharynx [[id number]]  (Abnormal) Collected: 04/02/21 0846    Specimen: Swab from Nasopharynx Updated: 04/02/21 1039     COVID19 Detected    Narrative:      Fact sheet for providers: https://www.fda.gov/media/673063/download     Fact sheet for patients: https://www.fda.gov/media/194420/download    Urine  Drug Screen - Urine, Clean Catch [[id number]]  (Abnormal) Collected: 04/02/21 0846    Specimen: Urine, Clean Catch Updated: 04/02/21 0928     Amphet/Methamphet, Screen Negative     Barbiturates Screen, Urine Negative     Benzodiazepine Screen, Urine Negative     Cocaine Screen, Urine Negative     Opiate Screen Negative     THC, Screen, Urine Negative     Methadone Screen, Urine Positive     Oxycodone Screen, Urine Negative    Narrative:      Negative Thresholds Per Drugs Screened:    Amphetamines                 500 ng/ml  Barbiturates                 200 ng/ml  Benzodiazepines              100 ng/ml  Cocaine                      300 ng/ml  Methadone                    300 ng/ml  Opiates                      300 ng/ml  Oxycodone                    100 ng/ml  THC                           50 ng/ml    The Normal Value for all drugs tested is negative. This report includes final unconfirmed screening results to be used for medical treatment purposes only. Unconfirmed results must not be used for non-medical purposes such as employment or legal testing. Clinical consideration should be applied to any drug of abuse test, particularly when unconfirmed results are used.            Comprehensive Metabolic Panel [[id number]]  (Abnormal) Collected: 04/02/21 0846    Specimen: Blood Updated: 04/02/21 0912     Glucose 102 mg/dL      BUN 6 mg/dL      Creatinine 0.79 mg/dL      Sodium 138 mmol/L      Potassium 4.2 mmol/L      Comment: Slight hemolysis detected by analyzer. Results may be affected.        Chloride 104 mmol/L      CO2 23.6 mmol/L      Calcium 8.6 mg/dL      Total Protein 6.3 g/dL      Albumin 4.20 g/dL      ALT (SGPT) 19 U/L      AST (SGOT) 17 U/L      Alkaline Phosphatase 60 U/L      Total Bilirubin 0.3 mg/dL      eGFR Non African Amer 87 mL/min/1.73      Globulin 2.1 gm/dL      A/G Ratio 2.0 g/dL      BUN/Creatinine Ratio 7.6     Anion Gap 10.4 mmol/L     Narrative:      GFR Normal >60  Chronic Kidney Disease  <60  Kidney Failure <15      Acetaminophen Level [[id number]]  (Normal) Collected: 04/02/21 0812    Specimen: Blood Updated: 04/02/21 0837     Acetaminophen <5.0 mcg/mL     Ethanol [[id number]] Collected: 04/02/21 0812    Specimen: Blood Updated: 04/02/21 0837     Ethanol <10 mg/dL      Ethanol % <0.010 %     Salicylate Level [[id number]]  (Normal) Collected: 04/02/21 0812    Specimen: Blood Updated: 04/02/21 0837     Salicylate <0.3 mg/dL     Narrative:      Therapeutic range for Salicylates:  3.0 - 10.0 mg/dL for antipyretic/analgesic conditions  15.0 - 30.0 mg/dL for anti-inflammatory conditions    hCG, Serum, Qualitative [[id number]]  (Normal) Collected: 04/02/21 0812    Specimen: Blood Updated: 04/02/21 0829     HCG Qualitative Negative    Lithium Level [[id number]]  (Normal) Collected: 04/02/21 0812    Specimen: Blood Updated: 04/02/21 0828     Lithium 0.8 mmol/L     CBC & Differential [[id number]]  (Abnormal) Collected: 04/02/21 0812    Specimen: Blood Updated: 04/02/21 0821    Narrative:      The following orders were created for panel order CBC & Differential.  Procedure                               Abnormality         Status                     ---------                               -----------         ------                     CBC Auto Differential[[id number]]        Abnormal            Final result                 Please view results for these tests on the individual orders.    CBC Auto Differential [[id number]]  (Abnormal) Collected: 04/02/21 0812    Specimen: Blood Updated: 04/02/21 0821     WBC 10.94 10*3/mm3      RBC 4.97 10*6/mm3      Hemoglobin 14.2 g/dL      Hematocrit 43.0 %      MCV 86.5 fL      MCH 28.6 pg      MCHC 33.0 g/dL      RDW 13.4 %      RDW-SD 41.7 fl      MPV 9.7 fL      Platelets 331 10*3/mm3      Neutrophil % 66.6 %      Lymphocyte % 26.0 %      Monocyte % 5.0 %      Eosinophil % 1.0 %      Basophil % 0.9 %      Immature Grans % 0.5 %      Neutrophils, Absolute 7.29 10*3/mm3       Lymphocytes, Absolute 2.84 10*3/mm3      Monocytes, Absolute 0.55 10*3/mm3      Eosinophils, Absolute 0.11 10*3/mm3      Basophils, Absolute 0.10 10*3/mm3      Immature Grans, Absolute 0.05 10*3/mm3      nRBC 0.0 /100 WBC         Data Review:  Results from last 7 days   Lab Units 04/05/21  0753 04/04/21  0839 04/03/21  1058   SODIUM mmol/L 135* 137 138   POTASSIUM mmol/L 4.1 4.0 3.7   CHLORIDE mmol/L 103 106 107   CO2 mmol/L 22.8 22.8 23.0   BUN mg/dL 8 6 5*   CREATININE mg/dL 0.87 0.89 0.95   GLUCOSE mg/dL 101* 101* 109*   CALCIUM mg/dL 8.6 8.3* 8.4*     Results from last 7 days   Lab Units 04/05/21  0753 04/04/21  0839 04/03/21  1058   WBC 10*3/mm3 12.05* 12.46* 10.48   HEMOGLOBIN g/dL 13.7 13.4 12.5   HEMATOCRIT % 42.2 41.1 38.4   PLATELETS 10*3/mm3 300 286 309     Results from last 7 days   Lab Units 04/04/21  0839   TSH uIU/mL 3.130         Lab Results   Lab Value Date/Time    TROPONINT <0.010 12/04/2020 1137         Results from last 7 days   Lab Units 04/05/21  0753 04/04/21  0839 04/03/21  1058   ALK PHOS U/L 58 52 49   BILIRUBIN mg/dL 0.5 0.4 0.4   ALT (SGPT) U/L 15 13 12   AST (SGOT) U/L 16 18 16     Results from last 7 days   Lab Units 04/04/21  0839   TSH uIU/mL 3.130         Glucose   Date/Time Value Ref Range Status   04/03/2021 0422 91 70 - 130 mg/dL Final   04/03/2021 0008 84 70 - 130 mg/dL Final   04/02/2021 2045 79 70 - 130 mg/dL Final           Past Medical History:   Diagnosis Date   • Anxiety    • Asthma    • Depression    • Fibromyalgia    • GERD (gastroesophageal reflux disease)    • Hypothyroidism    • Irritable bowel disease    • Migraines    • Suicide attempt (CMS/[facility]) 04/02/2021    purposeful overdose on home medications       Assessment:  Active Hospital Problems    Diagnosis  POA   • **Suicide attempt by drug ingestion (CMS/[facility]) [T50.902A]  Unknown   • Elevated lithium level [R79.89]  Unknown   • Polysubstance overdose [T50.901A]  Unknown   • COVID-19 virus detected [U07.1]  Unknown   •  Fatty liver [K76.0]  Yes   • Obesity, Class III, BMI 40-49.9 (morbid obesity) (CMS/HCC) [E66.01]  Yes   • Hypothyroidism [E03.9]  Yes   • Irritable bowel disease [K58.9]  Yes   • Major depressive disorder, recurrent, severe without psychotic features (CMS/HCC) [F33.2]  Yes      Resolved Hospital Problems   No resolved problems to display.       Plan:  Suicide precaution, 72-hour hold, as per psychiatry consult.  Patient is COVID-19 positive but is already had Covid in December and has been vaccinated and I doubt that patient really has active infection.  Will deferral isolation precautions to infection control.  Logan with nurse who states infection control recommended 10-day isolation.    Farhad Arrieta MD  4/5/2021  17:10 EDT

## 2021-04-06 LAB
ALBUMIN SERPL-MCNC: 4.2 G/DL (ref 3.5–5.2)
ALBUMIN/GLOB SERPL: 1.6 G/DL
ALP SERPL-CCNC: 63 U/L (ref 39–117)
ALT SERPL W P-5'-P-CCNC: 13 U/L (ref 1–33)
ANION GAP SERPL CALCULATED.3IONS-SCNC: 10.8 MMOL/L (ref 5–15)
AST SERPL-CCNC: 14 U/L (ref 1–32)
BASOPHILS # BLD AUTO: 0.1 10*3/MM3 (ref 0–0.2)
BASOPHILS NFR BLD AUTO: 0.8 % (ref 0–1.5)
BILIRUB SERPL-MCNC: 0.3 MG/DL (ref 0–1.2)
BUN SERPL-MCNC: 10 MG/DL (ref 6–20)
BUN/CREAT SERPL: 12.5 (ref 7–25)
CALCIUM SPEC-SCNC: 8.9 MG/DL (ref 8.6–10.5)
CHLORIDE SERPL-SCNC: 104 MMOL/L (ref 98–107)
CK SERPL-CCNC: 26 U/L (ref 20–180)
CO2 SERPL-SCNC: 22.2 MMOL/L (ref 22–29)
CREAT SERPL-MCNC: 0.8 MG/DL (ref 0.57–1)
CRP SERPL-MCNC: <0.3 MG/DL (ref 0–0.5)
DEPRECATED RDW RBC AUTO: 42 FL (ref 37–54)
EOSINOPHIL # BLD AUTO: 0.24 10*3/MM3 (ref 0–0.4)
EOSINOPHIL NFR BLD AUTO: 1.9 % (ref 0.3–6.2)
ERYTHROCYTE [DISTWIDTH] IN BLOOD BY AUTOMATED COUNT: 13.1 % (ref 12.3–15.4)
FERRITIN SERPL-MCNC: 51.6 NG/ML (ref 13–150)
GFR SERPL CREATININE-BSD FRML MDRD: 85 ML/MIN/1.73
GLOBULIN UR ELPH-MCNC: 2.7 GM/DL
GLUCOSE SERPL-MCNC: 150 MG/DL (ref 65–99)
HCT VFR BLD AUTO: 42.2 % (ref 34–46.6)
HGB BLD-MCNC: 13.7 G/DL (ref 12–15.9)
IMM GRANULOCYTES # BLD AUTO: 0.05 10*3/MM3 (ref 0–0.05)
IMM GRANULOCYTES NFR BLD AUTO: 0.4 % (ref 0–0.5)
LYMPHOCYTES # BLD AUTO: 3.31 10*3/MM3 (ref 0.7–3.1)
LYMPHOCYTES NFR BLD AUTO: 25.6 % (ref 19.6–45.3)
MCH RBC QN AUTO: 28.4 PG (ref 26.6–33)
MCHC RBC AUTO-ENTMCNC: 32.5 G/DL (ref 31.5–35.7)
MCV RBC AUTO: 87.4 FL (ref 79–97)
MONOCYTES # BLD AUTO: 0.58 10*3/MM3 (ref 0.1–0.9)
MONOCYTES NFR BLD AUTO: 4.5 % (ref 5–12)
NEUTROPHILS NFR BLD AUTO: 66.8 % (ref 42.7–76)
NEUTROPHILS NFR BLD AUTO: 8.66 10*3/MM3 (ref 1.7–7)
NRBC BLD AUTO-RTO: 0 /100 WBC (ref 0–0.2)
PLATELET # BLD AUTO: 294 10*3/MM3 (ref 140–450)
PMV BLD AUTO: 10.2 FL (ref 6–12)
POTASSIUM SERPL-SCNC: 4 MMOL/L (ref 3.5–5.2)
PROT SERPL-MCNC: 6.9 G/DL (ref 6–8.5)
RBC # BLD AUTO: 4.83 10*6/MM3 (ref 3.77–5.28)
SODIUM SERPL-SCNC: 137 MMOL/L (ref 136–145)
WBC # BLD AUTO: 12.94 10*3/MM3 (ref 3.4–10.8)

## 2021-04-06 PROCEDURE — 82728 ASSAY OF FERRITIN: CPT | Performed by: INTERNAL MEDICINE

## 2021-04-06 PROCEDURE — 85025 COMPLETE CBC W/AUTO DIFF WBC: CPT | Performed by: INTERNAL MEDICINE

## 2021-04-06 PROCEDURE — 96372 THER/PROPH/DIAG INJ SC/IM: CPT

## 2021-04-06 PROCEDURE — 82550 ASSAY OF CK (CPK): CPT | Performed by: INTERNAL MEDICINE

## 2021-04-06 PROCEDURE — 25010000002 ENOXAPARIN PER 10 MG: Performed by: INTERNAL MEDICINE

## 2021-04-06 PROCEDURE — G0378 HOSPITAL OBSERVATION PER HR: HCPCS

## 2021-04-06 PROCEDURE — 36415 COLL VENOUS BLD VENIPUNCTURE: CPT | Performed by: INTERNAL MEDICINE

## 2021-04-06 PROCEDURE — 80053 COMPREHEN METABOLIC PANEL: CPT | Performed by: INTERNAL MEDICINE

## 2021-04-06 PROCEDURE — 86140 C-REACTIVE PROTEIN: CPT | Performed by: INTERNAL MEDICINE

## 2021-04-06 RX ADMIN — ENOXAPARIN SODIUM 40 MG: 40 INJECTION SUBCUTANEOUS at 18:21

## 2021-04-06 RX ADMIN — PANTOPRAZOLE SODIUM 40 MG: 40 TABLET, DELAYED RELEASE ORAL at 06:50

## 2021-04-06 RX ADMIN — Medication 500 MCG: at 10:23

## 2021-04-06 RX ADMIN — TRAZODONE HYDROCHLORIDE 50 MG: 50 TABLET ORAL at 21:45

## 2021-04-06 RX ADMIN — VORTIOXETINE 20 MG: 10 TABLET, FILM COATED ORAL at 10:23

## 2021-04-06 RX ADMIN — ENOXAPARIN SODIUM 40 MG: 40 INJECTION SUBCUTANEOUS at 06:51

## 2021-04-06 RX ADMIN — Medication 10 ML: at 18:22

## 2021-04-06 RX ADMIN — ACETAMINOPHEN 650 MG: 325 TABLET, FILM COATED ORAL at 17:09

## 2021-04-06 RX ADMIN — Medication 2000 UNITS: at 10:22

## 2021-04-06 RX ADMIN — LEVOTHYROXINE SODIUM 88 MCG: 0.09 TABLET ORAL at 06:50

## 2021-04-06 NOTE — CONSULTS
The patient remains in COVID isolation.  Suicide precautions remain in place.  Transfer to [facility] can be considered, but not until the patient is COVID negative.  She is able to promise safety in the hospital, but given the circumstances of admission suicide precautions should remain in place.

## 2021-04-06 NOTE — CONSULTS
Spoke with pt who is in COVID isolation.  When her cell phone is fully charged I will call her for a pastoral care and assessment session.  Chaplain Rasheed Phan

## 2021-04-06 NOTE — PROGRESS NOTES
Continued Stay Note  [facility]     Patient Name: Maame Wallace  MRN: [id number]  Today's Date: 4/6/2021    Admit Date: 4/2/2021    Discharge Plan     Row Name 04/06/21 0900       Plan    Plan  [facility]U once medically stable    Plan Comments  Per notes patient will need CMU admission once medically stable. CCP will continue to follow. Juliette Michelle RN        Discharge Codes    No documentation.             Juliette Michelle RN

## 2021-04-06 NOTE — PROGRESS NOTES
"    Name: Maame Wallace ADMIT: 2021   : 1990  PCP: Miriam Newman    MRN: [id number] LOS: 0 days   AGE/SEX: 30 y.o. female  ROOM: [facility]     Subjective   Subjective   Feeling okay. No medical complaints--says that she has been both \"down\" and anxious today.     Review of Systems   Constitutional: Negative for appetite change, chills, diaphoresis, fatigue and fever.   HENT: Negative for congestion, ear pain, facial swelling, hearing loss, nosebleeds, rhinorrhea, sore throat, trouble swallowing and voice change.    Eyes: Negative for pain and visual disturbance.   Respiratory: Negative for cough, choking, chest tightness, shortness of breath and stridor.    Cardiovascular: Negative for chest pain, palpitations and leg swelling.   Gastrointestinal: Negative for abdominal pain, blood in stool, diarrhea, nausea and vomiting.   Endocrine: Negative for cold intolerance and heat intolerance.   Genitourinary: Negative for decreased urine volume, difficulty urinating, dysuria and hematuria.   Musculoskeletal: Negative for back pain and neck pain.   Skin: Negative for color change, pallor and rash.   Allergic/Immunologic: Negative for environmental allergies and food allergies.   Neurological: Negative for tremors, seizures, syncope, facial asymmetry, speech difficulty and headaches.   Hematological: Negative for adenopathy. Does not bruise/bleed easily.   Psychiatric/Behavioral: Positive for dysphoric mood and suicidal ideas. Negative for agitation, behavioral problems, confusion, hallucinations and sleep disturbance. The patient is nervous/anxious.         Objective   Objective   Vital Signs  Temp:  [97.6 °F (36.4 °C)-98.8 °F (37.1 °C)] 97.6 °F (36.4 °C)  Heart Rate:  [] 86  Resp:  [18] 18  BP: (109-129)/() 119/75  SpO2:  [96 %-98 %] 98 %  on   ;   Device (Oxygen Therapy): room air  Body mass index is 40.35 kg/m².  Physical Exam  Vitals and nursing note reviewed.   Constitutional:       General: " She is not in acute distress.     Appearance: She is obese. She is not ill-appearing, toxic-appearing or diaphoretic.   HENT:      Head: Normocephalic and atraumatic.      Right Ear: External ear normal.      Left Ear: External ear normal.      Nose: Nose normal.      Mouth/Throat:      Mouth: Mucous membranes are moist.      Pharynx: Oropharynx is clear.   Eyes:      General: No scleral icterus.        Right eye: No discharge.         Left eye: No discharge.      Extraocular Movements: Extraocular movements intact.      Conjunctiva/sclera: Conjunctivae normal.   Cardiovascular:      Rate and Rhythm: Normal rate and regular rhythm.      Pulses: Normal pulses.   Pulmonary:      Effort: Pulmonary effort is normal. No respiratory distress.      Breath sounds: Normal breath sounds.   Abdominal:      General: Bowel sounds are normal. There is no distension.      Palpations: Abdomen is soft.      Tenderness: There is no abdominal tenderness.   Musculoskeletal:         General: No swelling or deformity. Normal range of motion.      Cervical back: Neck supple. No rigidity.   Lymphadenopathy:      Cervical: No cervical adenopathy.   Skin:     General: Skin is warm and dry.      Capillary Refill: Capillary refill takes less than 2 seconds.      Coloration: Skin is not jaundiced.      Findings: No rash.   Neurological:      General: No focal deficit present.      Mental Status: She is alert and oriented to person, place, and time. Mental status is at baseline.      Cranial Nerves: No cranial nerve deficit.      Coordination: Coordination normal.   Psychiatric:         Mood and Affect: Mood normal.         Behavior: Behavior normal.         Thought Content: Thought content normal.         Results Review     I reviewed the patient's new clinical results.  Results from last 7 days   Lab Units 04/06/21  0834 04/05/21  0753 04/04/21  0839 04/03/21  1058   WBC 10*3/mm3 12.94* 12.05* 12.46* 10.48   HEMOGLOBIN g/dL 13.7 13.7 13.4 12.5    PLATELETS 10*3/mm3 294 300 286 309     Results from last 7 days   Lab Units 04/06/21  0834 04/05/21  0753 04/04/21  0839 04/03/21  1058   SODIUM mmol/L 137 135* 137 138   POTASSIUM mmol/L 4.0 4.1 4.0 3.7   CHLORIDE mmol/L 104 103 106 107   CO2 mmol/L 22.2 22.8 22.8 23.0   BUN mg/dL 10 8 6 5*   CREATININE mg/dL 0.80 0.87 0.89 0.95   GLUCOSE mg/dL 150* 101* 101* 109*   Estimated Creatinine Clearance: 133.5 mL/min (by C-G formula based on SCr of 0.8 mg/dL).  Results from last 7 days   Lab Units 04/06/21  0834 04/05/21  0753 04/04/21  0839 04/03/21  1058   ALBUMIN g/dL 4.20 3.90 3.80 3.80   BILIRUBIN mg/dL 0.3 0.5 0.4 0.4   ALK PHOS U/L 63 58 52 49   AST (SGOT) U/L 14 16 18 16   ALT (SGPT) U/L 13 15 13 12     Results from last 7 days   Lab Units 04/06/21  0834 04/05/21  0753 04/04/21  0839 04/03/21  1058   CALCIUM mg/dL 8.9 8.6 8.3* 8.4*   ALBUMIN g/dL 4.20 3.90 3.80 3.80     Results from last 7 days   Lab Units 04/02/21  2139   PROCALCITONIN ng/mL 0.08     COVID19   Date Value Ref Range Status   04/02/2021 Detected (C) Not Detected - Ref. Range Final     SARS-CoV-2 PCR   Date Value Ref Range Status   12/18/2020 Detected (C) Not Detected Final     Comment:     Nucleic acid specific to SARS-CoV-2 (COVID-19) virus was detected in this   sample by the TaqPath (TM) COVID-19 Combo Kit.          SARS-CoV-2 (COVID-19) nucleic acid testing performed using [employer]x Aptima (R) SARS-CoV-2 Assay or [employer] TaqPath (TM)  COVID-19 Combo Kit as indicated above under Emergency Use Authorization (EUA) from the FDA. Aptima (R) and TaqPath (TM) test performance  characteristics verified by [facility] in accordance with the FDAs Guidance Document (Policy for Diagnostic Tests for Coronavirus Disease-2019  during the Public Health Emergency) issued on March 16, 2020. The laboratory is regulated under CLIA as qualified to perform high-complexity testing  Unless otherwise noted, all testing was performed at [facility],  [facility] [id number], [facility] Licensee No. [id number]     No results found for: HGBA1C, POCGLU    NM Gastric Emptying  Narrative: NUCLEAR MEDICINE GASTRIC EMPTYING STUDY     HISTORY:  Abdominal pain.     TECHNIQUE:  4 Hour solid gastric emptying study.    523 uCi of 99m  technetium sulfur colloid mixed in a standard solid meal with cooked  eggs, anterior static imaging over the abdomen performed hourly for 4  hours.      FINDINGS: The gastric retention measures:  53% at 1 hour (normal between 30-90%)  34% at 2 hours (normal 60% or less)  35% at 3 hours (normal 30% or less)  5% at 4 hours (normal 10% or less)     Impression: Gastric emptying is essentially within normal limits. There  is slightly increased gastric retention at 3 hours measuring 35% (normal  30% or less), though this normalized at 4 hours where there was 5%  gastric retention (normal 10% or less).     This report was finalized on 2/1/2021 1:08 PM by Dr. Elliot Singh M.D.       Scheduled Medications  cholecalciferol, 2,000 Units, Oral, Daily  enoxaparin, 40 mg, Subcutaneous, Q12H  fluticasone, 2 spray, Nasal, Daily  levothyroxine, 88 mcg, Oral, Q AM  pantoprazole, 40 mg, Oral, QAM  traZODone, 50 mg, Oral, Nightly  vitamin B-12, 500 mcg, Oral, Daily  Vortioxetine HBr, 20 mg, Oral, Daily    Infusions   Diet  Diet Regular       Assessment/Plan     Active Hospital Problems    Diagnosis  POA   • **Suicide attempt by drug ingestion (CMS/HCC) [T50.902A]  Yes   • Elevated lithium level [R79.89]  Yes   • Polysubstance overdose [T50.901A]  Yes   • COVID-19 virus detected [U07.1]  Yes   • Fatty liver [K76.0]  Yes   • Obesity, Class III, BMI 40-49.9 (morbid obesity) (CMS/HCC) [E66.01]  Yes   • Hypothyroidism [E03.9]  Yes   • Irritable bowel disease [K58.9]  Yes   • Major depressive disorder, recurrent, severe without psychotic features (CMS/HCC) [F33.2]  Yes      Resolved Hospital Problems   No resolved problems to display.       Very pleasant 29yo woman with  bipolar d/o admitted after ingesting large amounts of Lithium, Trazodone, Xanax, and an OTC sleep aid in an attempt to end her life. Incidentally tested positive for COViD on admission.    Bipolar Disorder, suicide attempt by drug ingestion  -stable medically at this time  -appreciate Psychiatry attention to pt  -[facility]U recommended but only when out of isolation for COViD  -Trintellix restarted as well as nightly Trazodone    COViD positive  -I really can't believe this is an acute infection  -she had COViD infection in December and has since been vaccinated, she is asymptomatic and her inflammatory markers are wnl  -have asked ID to weigh in--?could we possibly take her out of isolation?    HypoT4  -TSH wnl  -continue L-T at home dose    Morbid obesity, THORPE  -stable    Leukocytosis  -suspect merely reactive  -no fever and PCT wnl on admission      Lovenox 40 mg SC daily for DVT prophylaxis.  Full code.  Discussed with patient.  Anticipate discharge to [facility] timing yet to be determined.    During all interaction with pt proper PPE was utilized (gown, gloves, mask, goggles, and face shield) and was donned/doffed according to recommendations. Hands were cleaned before and after interaction.    Kwaku Reid MD  [facility]  04/06/21  18:11 EDT

## 2021-04-07 LAB
ALBUMIN SERPL-MCNC: 4.1 G/DL (ref 3.5–5.2)
ALBUMIN/GLOB SERPL: 1.5 G/DL
ALP SERPL-CCNC: 61 U/L (ref 39–117)
ALT SERPL W P-5'-P-CCNC: 10 U/L (ref 1–33)
ANION GAP SERPL CALCULATED.3IONS-SCNC: 12 MMOL/L (ref 5–15)
AST SERPL-CCNC: 12 U/L (ref 1–32)
BASOPHILS # BLD AUTO: 0.1 10*3/MM3 (ref 0–0.2)
BASOPHILS NFR BLD AUTO: 0.8 % (ref 0–1.5)
BILIRUB SERPL-MCNC: 0.3 MG/DL (ref 0–1.2)
BUN SERPL-MCNC: 8 MG/DL (ref 6–20)
BUN/CREAT SERPL: 9.5 (ref 7–25)
CALCIUM SPEC-SCNC: 8.9 MG/DL (ref 8.6–10.5)
CHLORIDE SERPL-SCNC: 105 MMOL/L (ref 98–107)
CK SERPL-CCNC: 21 U/L (ref 20–180)
CO2 SERPL-SCNC: 20 MMOL/L (ref 22–29)
CREAT SERPL-MCNC: 0.84 MG/DL (ref 0.57–1)
CRP SERPL-MCNC: <0.3 MG/DL (ref 0–0.5)
D DIMER PPP FEU-MCNC: 0.37 MCGFEU/ML (ref 0–0.49)
DEPRECATED RDW RBC AUTO: 39 FL (ref 37–54)
EOSINOPHIL # BLD AUTO: 0.25 10*3/MM3 (ref 0–0.4)
EOSINOPHIL NFR BLD AUTO: 1.9 % (ref 0.3–6.2)
ERYTHROCYTE [DISTWIDTH] IN BLOOD BY AUTOMATED COUNT: 12.9 % (ref 12.3–15.4)
FERRITIN SERPL-MCNC: 40.3 NG/ML (ref 13–150)
FIBRINOGEN PPP-MCNC: 327 MG/DL (ref 219–464)
GFR SERPL CREATININE-BSD FRML MDRD: 81 ML/MIN/1.73
GLOBULIN UR ELPH-MCNC: 2.7 GM/DL
GLUCOSE SERPL-MCNC: 133 MG/DL (ref 65–99)
HCT VFR BLD AUTO: 40 % (ref 34–46.6)
HGB BLD-MCNC: 13.6 G/DL (ref 12–15.9)
IMM GRANULOCYTES # BLD AUTO: 0.07 10*3/MM3 (ref 0–0.05)
IMM GRANULOCYTES NFR BLD AUTO: 0.5 % (ref 0–0.5)
LDH SERPL-CCNC: 139 U/L (ref 135–214)
LYMPHOCYTES # BLD AUTO: 3.72 10*3/MM3 (ref 0.7–3.1)
LYMPHOCYTES NFR BLD AUTO: 28.4 % (ref 19.6–45.3)
MCH RBC QN AUTO: 28.6 PG (ref 26.6–33)
MCHC RBC AUTO-ENTMCNC: 34 G/DL (ref 31.5–35.7)
MCV RBC AUTO: 84.2 FL (ref 79–97)
MONOCYTES # BLD AUTO: 0.7 10*3/MM3 (ref 0.1–0.9)
MONOCYTES NFR BLD AUTO: 5.4 % (ref 5–12)
NEUTROPHILS NFR BLD AUTO: 63 % (ref 42.7–76)
NEUTROPHILS NFR BLD AUTO: 8.24 10*3/MM3 (ref 1.7–7)
NRBC BLD AUTO-RTO: 0 /100 WBC (ref 0–0.2)
PLATELET # BLD AUTO: 291 10*3/MM3 (ref 140–450)
PMV BLD AUTO: 10.4 FL (ref 6–12)
POTASSIUM SERPL-SCNC: 4.1 MMOL/L (ref 3.5–5.2)
PROT SERPL-MCNC: 6.8 G/DL (ref 6–8.5)
RBC # BLD AUTO: 4.75 10*6/MM3 (ref 3.77–5.28)
SODIUM SERPL-SCNC: 137 MMOL/L (ref 136–145)
WBC # BLD AUTO: 13.08 10*3/MM3 (ref 3.4–10.8)

## 2021-04-07 PROCEDURE — 85379 FIBRIN DEGRADATION QUANT: CPT | Performed by: INTERNAL MEDICINE

## 2021-04-07 PROCEDURE — 99222 1ST HOSP IP/OBS MODERATE 55: CPT | Performed by: INTERNAL MEDICINE

## 2021-04-07 PROCEDURE — 96372 THER/PROPH/DIAG INJ SC/IM: CPT

## 2021-04-07 PROCEDURE — 85025 COMPLETE CBC W/AUTO DIFF WBC: CPT | Performed by: INTERNAL MEDICINE

## 2021-04-07 PROCEDURE — 86140 C-REACTIVE PROTEIN: CPT | Performed by: INTERNAL MEDICINE

## 2021-04-07 PROCEDURE — 83615 LACTATE (LD) (LDH) ENZYME: CPT | Performed by: INTERNAL MEDICINE

## 2021-04-07 PROCEDURE — G0378 HOSPITAL OBSERVATION PER HR: HCPCS

## 2021-04-07 PROCEDURE — 25010000002 ENOXAPARIN PER 10 MG: Performed by: INTERNAL MEDICINE

## 2021-04-07 PROCEDURE — 82550 ASSAY OF CK (CPK): CPT | Performed by: INTERNAL MEDICINE

## 2021-04-07 PROCEDURE — 80053 COMPREHEN METABOLIC PANEL: CPT | Performed by: INTERNAL MEDICINE

## 2021-04-07 PROCEDURE — 82728 ASSAY OF FERRITIN: CPT | Performed by: INTERNAL MEDICINE

## 2021-04-07 PROCEDURE — 85384 FIBRINOGEN ACTIVITY: CPT | Performed by: INTERNAL MEDICINE

## 2021-04-07 PROCEDURE — 36415 COLL VENOUS BLD VENIPUNCTURE: CPT | Performed by: INTERNAL MEDICINE

## 2021-04-07 RX ADMIN — VORTIOXETINE 20 MG: 10 TABLET, FILM COATED ORAL at 10:17

## 2021-04-07 RX ADMIN — ENOXAPARIN SODIUM 40 MG: 40 INJECTION SUBCUTANEOUS at 17:00

## 2021-04-07 RX ADMIN — PANTOPRAZOLE SODIUM 40 MG: 40 TABLET, DELAYED RELEASE ORAL at 06:38

## 2021-04-07 RX ADMIN — ENOXAPARIN SODIUM 40 MG: 40 INJECTION SUBCUTANEOUS at 06:38

## 2021-04-07 RX ADMIN — TRAZODONE HYDROCHLORIDE 50 MG: 50 TABLET ORAL at 23:46

## 2021-04-07 RX ADMIN — Medication 2000 UNITS: at 10:17

## 2021-04-07 RX ADMIN — Medication 500 MCG: at 10:16

## 2021-04-07 RX ADMIN — ACETAMINOPHEN 650 MG: 325 TABLET, FILM COATED ORAL at 16:57

## 2021-04-07 RX ADMIN — LEVOTHYROXINE SODIUM 88 MCG: 0.09 TABLET ORAL at 06:38

## 2021-04-07 NOTE — NURSING NOTE
[facility] called, patient has been accepted to their covid unit.  Room number 356-2, accepting MD Arango.  Report given to Yasmine ADDISON  On coming nursing following up on discharge order.

## 2021-04-07 NOTE — PROGRESS NOTES
Continued Stay Note  [facility]     Patient Name: Maame Wallace  MRN: [id number]  Today's Date: 2021    Admit Date: 2021    Discharge Plan     Row Name 21 1431       Plan    Plan Comments  D/w Dr Reid issue with patient isolation and inability to transfer to [facility] at this time until re-eval .  Infection control involved and CCP will need to follow-up.  72 hr hold  this a.m.                Beka Baxter RN

## 2021-04-07 NOTE — PLAN OF CARE
Goal Outcome Evaluation:  Plan of Care Reviewed With: patient, mother  Progress: improving    Pt A+O x 4. NSR on monitor. AVSS on RA. Up ad selvin. Voiding to restroom without difficulty. COVID precautions maintained - pt reports no acute sx. Denies SI thoughts. Updated mother on pt condition and left note for treatment team per request. Anticipate transfer to [facility] once cleared to leave floor. 72 hour hold terminates this am @ 0758. Will CTM.

## 2021-04-07 NOTE — CONSULTS
"Referring Provider: Dr Reid    Reason for Consultation: \"admitted for suicide attempt, needs to go to [facility], incidentally found to be COViD positive, had COViD infection in December, has since been vaccinated, asymptomatic here, inflammatory markers all negative, can she come out of isolation?\"    History of present illness:  Maame Wallace is a 30 y.o. who I am asked to evaluate and give opinion for \"admitted for suicide attempt, needs to go to [facility], incidentally found to be COViD positive, had COViD infection in December, has since been vaccinated, asymptomatic here, inflammatory markers all negative, can she come out of isolation?\". History is obtained from the patient and review of the old medical records which I summarize/synthesize as follows: She was diagnosed with COVID around 11/30/20. She had symptoms and her household members were positive. She recovered w/in a few days but says she has had long-term headaches and intermittent nausea [facility] She received the Moderna vaccine series which she completed around Feb 10th or 11th at [facility].    She was admitted here on 4/2 following a suicide attempt. She is on a 72-hour hold and the plan is for CMU admission. However, her admission COVID test was positive. Infection control has already recommended 10-days of isolation since > 3 months since initial diagnosis. ID asked our opinion as well.    She is afebrile without any respiratory symptoms. Her inflammatory markers are low. She is on room air.     Past Medical History:   Diagnosis Date   • Anxiety    • Asthma    • Depression    • Fibromyalgia    • GERD (gastroesophageal reflux disease)    • Hypothyroidism    • Irritable bowel disease    • Migraines    • Suicide attempt (CMS/[facility]) 04/02/2021    purposeful overdose on home medications   COVID-19    Past Surgical History:   Procedure Laterality Date   • APPENDECTOMY      laparoscopic   • CHOLECYSTECTOMY     • " KNEE ARTHROSCOPY Left    • PATELLA FEMORAL LIGAMENT RECONSTRUCTION Left    • TONSILLECTOMY AND ADENOIDECTOMY         Social History:  Works as a  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    Family History:  Multiple first degree relatives had COVID-19    Antibiotic allergies and intolerances:  None    Medications:    Current Facility-Administered Medications:   •  acetaminophen (TYLENOL) tablet 650 mg, 650 mg, Oral, Q4H PRN, Airam Hunter MD, 650 mg at 04/06/21 1709  •  albuterol sulfate HFA (PROVENTIL HFA;VENTOLIN HFA;PROAIR HFA) inhaler 2 puff, 2 puff, Inhalation, Q6H PRN, Airam Hunter MD  •  cholecalciferol (VITAMIN D3) tablet 2,000 Units, 2,000 Units, Oral, Daily, Airam Hunter MD, 2,000 Units at 04/07/21 1017  •  enoxaparin (LOVENOX) syringe 40 mg, 40 mg, Subcutaneous, Q12H, Airam Hunter MD, 40 mg at 04/07/21 0638  •  fluticasone (FLONASE) 50 MCG/ACT nasal spray 2 spray, 2 spray, Nasal, Daily, Airam Hunter MD, 2 spray at 04/04/21 0841  •  ibuprofen (ADVIL,MOTRIN) tablet 200 mg, 200 mg, Oral, Q4H PRN, Farhad Arrieta MD, 200 mg at 04/05/21 1343  •  levothyroxine (SYNTHROID, LEVOTHROID) tablet 88 mcg, 88 mcg, Oral, Q AM, Airam Hunter MD, 88 mcg at 04/07/21 0638  •  melatonin tablet 3 mg, 3 mg, Oral, Nightly PRN, Airam Hunter MD  •  nitroglycerin (NITROSTAT) SL tablet 0.4 mg, 0.4 mg, Sublingual, Q5 Min PRN, Airam Hunter MD  •  ondansetron (ZOFRAN) tablet 4 mg, 4 mg, Oral, Q6H PRN **OR** ondansetron (ZOFRAN) injection 4 mg, 4 mg, Intravenous, Q6H PRN, Airam Hunter MD, 4 mg at 04/05/21 0852  •  pantoprazole (PROTONIX) EC tablet 40 mg, 40 mg, Oral, QARAZA, Airam Hunter MD, 40 mg at 04/07/21 0638  •  [COMPLETED] Insert peripheral IV, , , Once **AND** sodium chloride 0.9 % flush 10 mL, 10 mL, Intravenous, PRN, Isabel Medina, APRN, 10 mL at 04/06/21 1822  •  traZODone (DESYREL) tablet 50 mg, 50 mg, Oral, Nightly, Airam Hunter  MD Lars, 50 mg at 04/06/21 2145  •  vitamin B-12 (CYANOCOBALAMIN) tablet 500 mcg, 500 mcg, Oral, Daily, Airam Hunter MD, 500 mcg at 04/07/21 1016  •  Vortioxetine HBr tablet 20 mg, 20 mg, Oral, Daily, Duy Aiken MD, 20 mg at 04/07/21 1017    Review of Systems  All systems were reviewed and are negative unless otherwise stated above in the HPI    Objective   Vital Signs   Temp:  [97.6 °F (36.4 °C)-98.8 °F (37.1 °C)] 97.9 °F (36.6 °C)  Heart Rate:  [] 118  Resp:  [16-20] 18  BP: (114-130)/() 130/112    Physical Exam:   General: awake, alert, very nice   Head: atraumatic  Eyes: Pupils equal, no scleral icterus  ENT: wearing mask  Neck: Supple  Cardiovascular: tachycardic, RR, no murmurs, no LE edema  Respiratory: Lungs are clear to auscultation bilaterally, no rales or wheezing; normal work of breathing on ambient air  GI: Abdomen is soft, non-tender, non-distended  :  no Chatterjee catheter  Musculoskeletal:  normal musculature  Skin: No embolic phenomenon  Neurological: Alert and oriented x 3  Psychiatric: Normal mood and affect   Vasc: no cyanosis    Labs:     Lab Results   Component Value Date    WBC 13.08 (H) 04/07/2021    HGB 13.6 04/07/2021    HCT 40.0 04/07/2021    MCV 84.2 04/07/2021     04/07/2021       Lab Results   Component Value Date    GLUCOSE 133 (H) 04/07/2021    BUN 8 04/07/2021    CREATININE 0.84 04/07/2021    EGFRIFNONA 81 04/07/2021    EGFRIFAFRI 84 06/09/2020    BCR 9.5 04/07/2021    CO2 20.0 (L) 04/07/2021    CALCIUM 8.9 04/07/2021    PROTENTOTREF 7.1 06/09/2020    ALBUMIN 4.10 04/07/2021    LABIL2 1.5 06/09/2020    AST 12 04/07/2021    ALT 10 04/07/2021     3/3 SARS CoV-2 Ab positive    Microbiology:  4/2 COVID postive    Radiology (personally reviewed images and report):  none    Assessment/Plan   1. History of COVID-19  2. Suicide attempt    With natural infection in November, vaccination with Moderna vaccine in January and February, detectable antibodies  in March (through [place]), and no current symptoms of COVID-19, I do not think she has acute COVID-19 infection at this time. Her test likely represents prolonged, intermittent shedding of non-viable virus.     Infection control has already reviewed her case and has recommended their standard policy of keeping her in isolation for 10 days from the date of her positive test since it has been more than 3 months since her initial positive test.     Thank you for allowing me to be involved in the care of this patient. Infectious diseases will sign off at this time, but please call me at [phone] if any further ID questions or new ID concerns.

## 2021-04-07 NOTE — PROGRESS NOTES
"    Name: Maame Wallace ADMIT: 2021   : 1990  PCP: Miriam Newman    MRN: [id number] LOS: 0 days   AGE/SEX: 30 y.o. female  ROOM: [facility]     Subjective   Subjective   Feeling okay. No medical complaints--says that she has been both \"down\" and anxious again today.     Review of Systems   Constitutional: Negative for appetite change, chills, diaphoresis, fatigue and fever.   HENT: Negative for congestion, ear pain, facial swelling, hearing loss, nosebleeds, rhinorrhea, sore throat, trouble swallowing and voice change.    Eyes: Negative for pain and visual disturbance.   Respiratory: Negative for cough, choking, chest tightness, shortness of breath and stridor.    Cardiovascular: Negative for chest pain, palpitations and leg swelling.   Gastrointestinal: Negative for abdominal pain, blood in stool, diarrhea, nausea and vomiting.   Endocrine: Negative for cold intolerance and heat intolerance.   Genitourinary: Negative for decreased urine volume, difficulty urinating, dysuria and hematuria.   Musculoskeletal: Negative for back pain and neck pain.   Skin: Negative for color change, pallor and rash.   Allergic/Immunologic: Negative for environmental allergies and food allergies.   Neurological: Negative for tremors, seizures, syncope, facial asymmetry, speech difficulty and headaches.   Hematological: Negative for adenopathy. Does not bruise/bleed easily.   Psychiatric/Behavioral: Positive for dysphoric mood and suicidal ideas. Negative for agitation, behavioral problems, confusion, hallucinations and sleep disturbance. The patient is nervous/anxious.         Objective   Objective   Vital Signs  Temp:  [97.3 °F (36.3 °C)-98.3 °F (36.8 °C)] 97.3 °F (36.3 °C)  Heart Rate:  [] 103  Resp:  [16-20] 18  BP: (114-134)/() 134/93  SpO2:  [96 %-98 %] 98 %  on   ;   Device (Oxygen Therapy): room air  Body mass index is 40.39 kg/m².  Physical Exam  Vitals and nursing note reviewed.   Constitutional:       " General: She is not in acute distress.     Appearance: She is obese. She is not ill-appearing, toxic-appearing or diaphoretic.   HENT:      Head: Normocephalic and atraumatic.      Right Ear: External ear normal.      Left Ear: External ear normal.      Nose: Nose normal.      Mouth/Throat:      Mouth: Mucous membranes are moist.      Pharynx: Oropharynx is clear.   Eyes:      General: No scleral icterus.        Right eye: No discharge.         Left eye: No discharge.      Extraocular Movements: Extraocular movements intact.      Conjunctiva/sclera: Conjunctivae normal.   Cardiovascular:      Rate and Rhythm: Normal rate and regular rhythm.      Pulses: Normal pulses.   Pulmonary:      Effort: Pulmonary effort is normal. No respiratory distress.      Breath sounds: Normal breath sounds.   Abdominal:      General: Bowel sounds are normal. There is no distension.      Palpations: Abdomen is soft.      Tenderness: There is no abdominal tenderness.   Musculoskeletal:         General: No swelling or deformity. Normal range of motion.      Cervical back: Neck supple. No rigidity.   Lymphadenopathy:      Cervical: No cervical adenopathy.   Skin:     General: Skin is warm and dry.      Capillary Refill: Capillary refill takes less than 2 seconds.      Coloration: Skin is not jaundiced.      Findings: No rash.   Neurological:      General: No focal deficit present.      Mental Status: She is alert and oriented to person, place, and time. Mental status is at baseline.      Cranial Nerves: No cranial nerve deficit.      Coordination: Coordination normal.   Psychiatric:         Mood and Affect: Mood normal.         Behavior: Behavior normal.         Thought Content: Thought content normal.         Results Review     I reviewed the patient's new clinical results.  Results from last 7 days   Lab Units 04/07/21  0844 04/06/21  0834 04/05/21  0753 04/04/21  0839   WBC 10*3/mm3 13.08* 12.94* 12.05* 12.46*   HEMOGLOBIN g/dL 13.6 13.7  13.7 13.4   PLATELETS 10*3/mm3 291 294 300 286     Results from last 7 days   Lab Units 04/07/21  0844 04/06/21  0834 04/05/21  0753 04/04/21  0839   SODIUM mmol/L 137 137 135* 137   POTASSIUM mmol/L 4.1 4.0 4.1 4.0   CHLORIDE mmol/L 105 104 103 106   CO2 mmol/L 20.0* 22.2 22.8 22.8   BUN mg/dL 8 10 8 6   CREATININE mg/dL 0.84 0.80 0.87 0.89   GLUCOSE mg/dL 133* 150* 101* 101*   Estimated Creatinine Clearance: 127.8 mL/min (by C-G formula based on SCr of 0.84 mg/dL).  Results from last 7 days   Lab Units 04/07/21  0844 04/06/21  0834 04/05/21  0753 04/04/21  0839   ALBUMIN g/dL 4.10 4.20 3.90 3.80   BILIRUBIN mg/dL 0.3 0.3 0.5 0.4   ALK PHOS U/L 61 63 58 52   AST (SGOT) U/L 12 14 16 18   ALT (SGPT) U/L 10 13 15 13     Results from last 7 days   Lab Units 04/07/21  0844 04/06/21  0834 04/05/21  0753 04/04/21  0839   CALCIUM mg/dL 8.9 8.9 8.6 8.3*   ALBUMIN g/dL 4.10 4.20 3.90 3.80     Results from last 7 days   Lab Units 04/02/21  2139   PROCALCITONIN ng/mL 0.08     COVID19   Date Value Ref Range Status   04/02/2021 Detected (C) Not Detected - Ref. Range Final     SARS-CoV-2 PCR   Date Value Ref Range Status   12/18/2020 Detected (C) Not Detected Final     Comment:     Nucleic acid specific to SARS-CoV-2 (COVID-19) virus was detected in this   sample by the TaqPath (TM) COVID-19 Combo Kit.          SARS-CoV-2 (COVID-19) nucleic acid testing performed using [employer] Aptima (R) SARS-CoV-2 Assay or [employer] TaqPath (TM)  COVID-19 Combo Kit as indicated above under Emergency Use Authorization (EUA) from the FDA. Aptima (R) and TaqPath (TM) test performance  characteristics verified by [employer] in accordance with the FDAs Guidance Document (Policy for Diagnostic Tests for Coronavirus Disease-2019  during the Public Health Emergency) issued on March 16, 2020. The laboratory is regulated under CLIA as qualified to perform high-complexity testing  Unless otherwise noted, all testing was performed at  [facility], [facility] [id number], [place] State Licensee No. [id number]     No results found for: HGBA1C, POCGLU    NM Gastric Emptying  Narrative: NUCLEAR MEDICINE GASTRIC EMPTYING STUDY     HISTORY:  Abdominal pain.     TECHNIQUE:  4 Hour solid gastric emptying study.    523 uCi of 99m  technetium sulfur colloid mixed in a standard solid meal with cooked  eggs, anterior static imaging over the abdomen performed hourly for 4  hours.      FINDINGS: The gastric retention measures:  53% at 1 hour (normal between 30-90%)  34% at 2 hours (normal 60% or less)  35% at 3 hours (normal 30% or less)  5% at 4 hours (normal 10% or less)     Impression: Gastric emptying is essentially within normal limits. There  is slightly increased gastric retention at 3 hours measuring 35% (normal  30% or less), though this normalized at 4 hours where there was 5%  gastric retention (normal 10% or less).     This report was finalized on 2/1/2021 1:08 PM by Dr. Elliot Singh M.D.       Scheduled Medications  cholecalciferol, 2,000 Units, Oral, Daily  enoxaparin, 40 mg, Subcutaneous, Q12H  fluticasone, 2 spray, Nasal, Daily  levothyroxine, 88 mcg, Oral, Q AM  pantoprazole, 40 mg, Oral, QAM  traZODone, 50 mg, Oral, Nightly  vitamin B-12, 500 mcg, Oral, Daily  Vortioxetine HBr, 20 mg, Oral, Daily    Infusions   Diet  Diet Regular       Assessment/Plan     Active Hospital Problems    Diagnosis  POA   • **Suicide attempt by drug ingestion (CMS/[facility], located within [facility]) [T50.902A]  Yes   • Elevated lithium level [R79.89]  Yes   • Polysubstance overdose [T50.901A]  Yes   • COVID-19 virus detected [U07.1]  Yes   • Fatty liver [K76.0]  Yes   • Obesity, Class III, BMI 40-49.9 (morbid obesity) (CMS/HCC) [E66.01]  Yes   • Hypothyroidism [E03.9]  Yes   • Irritable bowel disease [K58.9]  Yes   • Major depressive disorder, recurrent, severe without psychotic features (CMS/HCC) [F33.2]  Yes      Resolved Hospital Problems   No resolved problems to display.       Very pleasant  31yo woman with bipolar d/o admitted after ingesting large amounts of Lithium, Trazodone, Xanax, and an OTC sleep aid in an attempt to end her life. Incidentally tested positive for COViD on admission.    Bipolar Disorder, suicide attempt by drug ingestion  -stable medically at this time  -appreciate Psychiatry attention to pt  -CMU recommended but only when out of isolation for COViD  -Trintellix restarted as well as nightly Trazodone  -her 72h hold has  as of this AM, but she has agreed to sign in voluntarily    COViD positive  -she had COViD infection in December and has since been vaccinated, she is asymptomatic and her inflammatory markers are wnl  -agree with ID that pt does not have current infection  -unfortunately Infection Control will not allow pt out of isolation per current guidelines    HypoT4  -TSH wnl  -continue L-T at home dose    Morbid obesity, THORPE  -stable    Leukocytosis  -suspect merely reactive, stable around 12-13  -no fever and PCT wnl on admission  -continue to monitor    Very unfortunate situation at present in which the pt cannot go to CMU to receive the care she needs bc of current isolation guidelines surrounding COViD. She is stuck in her room and bc of psych precautions she is not allowed her cell phone or a regular hospital phone (bc of cords). I have d/w CCP and RN. Will try to get an iPad from COViD floor for pt to use to speak with family.     Lovenox 40 mg SC daily for DVT prophylaxis.  Full code.  Discussed with patient. D/w RN and CCP.  Anticipate discharge to CMU timing yet to be determined.    During all interaction with pt proper PPE was utilized (gown, gloves, mask, goggles, and face shield) and was donned/doffed according to recommendations. Hands were cleaned before and after interaction.    Kwaku Reid MD  [facility]  21  14:02 EDT

## 2021-04-07 NOTE — CONSULTS
The patient offers no new complaints today.  She remains on suicide precautions.  Given the seriousness of her suicide attempt, I have recommended that the patient sign in for voluntary treatment as her 72-hour hold should  shortly.  She agrees to do so and wants her COVID status allows, transfer to CMU will take place.

## 2021-04-07 NOTE — PROGRESS NOTES
Continued Stay Note  [facility]     Patient Name: Maame Wallace  MRN: [id number]  Today's Date: 4/7/2021    Admit Date: 4/2/2021    Discharge Plan     Row Name 04/07/21 1539       Plan    Plan  Referral pending to [facility] inpatient psyc    Patient/Family in Agreement with Plan  yes    Plan Comments  CCP followed up with pt via room phone provided by RN. Pt is agreeable to referral to [facility] ([facility] inpatient psyc which has a designated covid 19 unit (only covid psyc unit in [place]). Updated [facility] MD via liaison. CCP called and faxed referral (Mary Caro, ph: [phone] #[phone], fax: [phone]), and faxed additional documentation requested for eval (Vianca, ph: [phone], fax: [phone]). CCP to follow for eval. Ashley Rivera LCSW                         Discharge Codes    No documentation.             Marisol Rivera LCSW

## 2021-04-07 NOTE — CONSULTS
Spiritual Care    FICA    Betty  *  [employer]    Importance  *   Solid part of pt's sense of self, worldview and ability to make meaning    Community  *  Active connection to [facility] in the [place]    Assess/Address  *  Pt stressed by isolation, bored, told her I'd bring her something to read.  Pt has positive relationship with her therapist, although she admits she rarely talks openly of her suicidal thoughts.  Encouraged her to begin to do so she can gain insight into any situational triggers that contribute to hopelessness.    /Assured of my availability by asking nurse for me to return to visit.       oralia Phan

## 2021-04-08 VITALS
BODY MASS INDEX: 39.08 KG/M2 | DIASTOLIC BLOOD PRESSURE: 71 MMHG | HEART RATE: 81 BPM | RESPIRATION RATE: 18 BRPM | WEIGHT: 243.2 LBS | HEIGHT: 66 IN | SYSTOLIC BLOOD PRESSURE: 136 MMHG | OXYGEN SATURATION: 98 % | TEMPERATURE: 98.3 F

## 2021-04-08 LAB
ALBUMIN SERPL-MCNC: 4.1 G/DL (ref 3.5–5.2)
ALBUMIN/GLOB SERPL: 1.5 G/DL
ALP SERPL-CCNC: 63 U/L (ref 39–117)
ALT SERPL W P-5'-P-CCNC: 12 U/L (ref 1–33)
ANION GAP SERPL CALCULATED.3IONS-SCNC: 13.4 MMOL/L (ref 5–15)
AST SERPL-CCNC: 14 U/L (ref 1–32)
BASOPHILS # BLD AUTO: 0.11 10*3/MM3 (ref 0–0.2)
BASOPHILS NFR BLD AUTO: 0.9 % (ref 0–1.5)
BILIRUB SERPL-MCNC: 0.3 MG/DL (ref 0–1.2)
BUN SERPL-MCNC: 10 MG/DL (ref 6–20)
BUN/CREAT SERPL: 12.8 (ref 7–25)
CALCIUM SPEC-SCNC: 9.1 MG/DL (ref 8.6–10.5)
CHLORIDE SERPL-SCNC: 101 MMOL/L (ref 98–107)
CK SERPL-CCNC: 26 U/L (ref 20–180)
CO2 SERPL-SCNC: 22.6 MMOL/L (ref 22–29)
CREAT SERPL-MCNC: 0.78 MG/DL (ref 0.57–1)
CRP SERPL-MCNC: <0.3 MG/DL (ref 0–0.5)
DEPRECATED RDW RBC AUTO: 40.5 FL (ref 37–54)
EOSINOPHIL # BLD AUTO: 0.17 10*3/MM3 (ref 0–0.4)
EOSINOPHIL NFR BLD AUTO: 1.3 % (ref 0.3–6.2)
ERYTHROCYTE [DISTWIDTH] IN BLOOD BY AUTOMATED COUNT: 13.1 % (ref 12.3–15.4)
FERRITIN SERPL-MCNC: 41.5 NG/ML (ref 13–150)
GFR SERPL CREATININE-BSD FRML MDRD: 88 ML/MIN/1.73
GLOBULIN UR ELPH-MCNC: 2.8 GM/DL
GLUCOSE SERPL-MCNC: 122 MG/DL (ref 65–99)
HCT VFR BLD AUTO: 44 % (ref 34–46.6)
HGB BLD-MCNC: 14.6 G/DL (ref 12–15.9)
IMM GRANULOCYTES # BLD AUTO: 0.05 10*3/MM3 (ref 0–0.05)
IMM GRANULOCYTES NFR BLD AUTO: 0.4 % (ref 0–0.5)
LYMPHOCYTES # BLD AUTO: 2.9 10*3/MM3 (ref 0.7–3.1)
LYMPHOCYTES NFR BLD AUTO: 22.7 % (ref 19.6–45.3)
MCH RBC QN AUTO: 28.3 PG (ref 26.6–33)
MCHC RBC AUTO-ENTMCNC: 33.2 G/DL (ref 31.5–35.7)
MCV RBC AUTO: 85.3 FL (ref 79–97)
MONOCYTES # BLD AUTO: 0.68 10*3/MM3 (ref 0.1–0.9)
MONOCYTES NFR BLD AUTO: 5.3 % (ref 5–12)
NEUTROPHILS NFR BLD AUTO: 69.4 % (ref 42.7–76)
NEUTROPHILS NFR BLD AUTO: 8.85 10*3/MM3 (ref 1.7–7)
NRBC BLD AUTO-RTO: 0 /100 WBC (ref 0–0.2)
PLATELET # BLD AUTO: 297 10*3/MM3 (ref 140–450)
PMV BLD AUTO: 10 FL (ref 6–12)
POTASSIUM SERPL-SCNC: 3.9 MMOL/L (ref 3.5–5.2)
PROT SERPL-MCNC: 6.9 G/DL (ref 6–8.5)
RBC # BLD AUTO: 5.16 10*6/MM3 (ref 3.77–5.28)
SODIUM SERPL-SCNC: 137 MMOL/L (ref 136–145)
WBC # BLD AUTO: 12.76 10*3/MM3 (ref 3.4–10.8)

## 2021-04-08 PROCEDURE — 85025 COMPLETE CBC W/AUTO DIFF WBC: CPT | Performed by: INTERNAL MEDICINE

## 2021-04-08 PROCEDURE — 36415 COLL VENOUS BLD VENIPUNCTURE: CPT | Performed by: INTERNAL MEDICINE

## 2021-04-08 PROCEDURE — 96372 THER/PROPH/DIAG INJ SC/IM: CPT

## 2021-04-08 PROCEDURE — 82728 ASSAY OF FERRITIN: CPT | Performed by: INTERNAL MEDICINE

## 2021-04-08 PROCEDURE — 80053 COMPREHEN METABOLIC PANEL: CPT | Performed by: INTERNAL MEDICINE

## 2021-04-08 PROCEDURE — 25010000002 ENOXAPARIN PER 10 MG: Performed by: INTERNAL MEDICINE

## 2021-04-08 PROCEDURE — 86140 C-REACTIVE PROTEIN: CPT | Performed by: INTERNAL MEDICINE

## 2021-04-08 PROCEDURE — 82550 ASSAY OF CK (CPK): CPT | Performed by: INTERNAL MEDICINE

## 2021-04-08 PROCEDURE — G0378 HOSPITAL OBSERVATION PER HR: HCPCS

## 2021-04-08 RX ADMIN — FLUTICASONE PROPIONATE 2 SPRAY: 50 SPRAY, METERED NASAL at 09:03

## 2021-04-08 RX ADMIN — PANTOPRAZOLE SODIUM 40 MG: 40 TABLET, DELAYED RELEASE ORAL at 07:00

## 2021-04-08 RX ADMIN — ENOXAPARIN SODIUM 40 MG: 40 INJECTION SUBCUTANEOUS at 07:00

## 2021-04-08 RX ADMIN — LEVOTHYROXINE SODIUM 88 MCG: 0.09 TABLET ORAL at 07:00

## 2021-04-08 RX ADMIN — VORTIOXETINE 20 MG: 10 TABLET, FILM COATED ORAL at 09:03

## 2021-04-08 RX ADMIN — Medication 2000 UNITS: at 09:02

## 2021-04-08 RX ADMIN — Medication 500 MCG: at 09:02

## 2021-04-08 NOTE — PROGRESS NOTES
Continued Stay Note  [facility]     Patient Name: Maame Wallace  MRN: [id number]  Today's Date: 4/8/2021    Admit Date: 4/2/2021    Discharge Plan     Row Name 04/08/21 0741       Plan    Plan Comments  Inbound call from [facility]/Ike Lui ([phone]). Requesting update regarding admission dx and physicians following patient. Reviewed ID notes regarding covid-19 dx. Faxed a copy to [facility] ([phone]). If patient not stable for DC today referral will need to be resent so they can re-eval at that time. Juliette Michelle RN        Discharge Codes    No documentation.             Juliette Michelle RN

## 2021-04-08 NOTE — NURSING NOTE
Updated report called to Allison at [facility].  Covid results faxed per her request.

## 2021-04-08 NOTE — PROGRESS NOTES
Continued Stay Note  [facility]     Patient Name: Maame Wallace  MRN: [id number]  Today's Date: 4/8/2021    Admit Date: 4/2/2021    Discharge Plan     Row Name 04/08/21 0958       Plan    Plan Comments  Spoke with [facility] and they are able to accept today as DC orders are noted. Packet given to nurse and report number given for nursing to provide updates from overnight. Patient's mother will transport. Juliette Michelle RN    Row Name 04/08/21 0741       Plan    Plan Comments  Inbound call from [facility]/Ike Lui ([phone]). Requesting update regarding admission dx and physicians following patient. Reviewed ID notes regarding covid-19 dx. Faxed a copy to [facility] ([phone]). If patient not stable for DC today referral will need to be resent so they can re-eval at that time. Juliette Michelle RN        Discharge Codes    No documentation.       Expected Discharge Date and Time     Expected Discharge Date Expected Discharge Time    Apr 8, 2021             Juliette Michelle RN

## 2021-04-08 NOTE — NURSING NOTE
Reviewed chart and spoke with RN, Rocio. Patient has been accepted at [facility] on their covid unit and will be transferred today. Access will sign off.

## 2021-04-08 NOTE — DISCHARGE SUMMARY
Patient Name: Maame Wallace  : 1990  MRN: [id number]    Date of Admission: 2021  Date of Discharge:  2021  Primary Care Physician: Miriam Newman      Chief Complaint:   Suicidal and Drug Overdose      Discharge Diagnoses     Active Hospital Problems    Diagnosis  POA   • **Suicide attempt by drug ingestion (CMS/[facility]) [T50.902A]  Yes   • Elevated lithium level [R79.89]  Yes   • Polysubstance overdose [T50.901A]  Yes   • COVID-19 virus detected [U07.1]  Yes   • Fatty liver [K76.0]  Yes   • Obesity, Class III, BMI 40-49.9 (morbid obesity) (CMS/[facility]) [E66.01]  Yes   • Hypothyroidism [E03.9]  Yes   • Irritable bowel disease [K58.9]  Yes   • Major depressive disorder, recurrent, severe without psychotic features (CMS/[facility]) [F33.2]  Yes      Resolved Hospital Problems   No resolved problems to display.        Hospital Course     Very pleasant 31yo woman with bipolar d/o admitted after ingesting large amounts of Lithium, Trazodone, Xanax, and an OTC sleep aid in an attempt to end her life. Incidentally tested positive for COViD on admission. Please see below for details of admission:     Bipolar Disorder, suicide attempt by drug ingestion  -stable medically at this time  -appreciate Psychiatry attention to pt  -CMU recommended but only when out of isolation for COViD  -Trintellix restarted as well as nightly Trazodone  -her 72h hold has  as of  in the AM, but she agreed to sign in voluntarily  -arrangements made for transfer to [facility] today as they have a COViD unit there     COViD positive  -she had COViD infection in December and has since been vaccinated, she is asymptomatic and her inflammatory markers are wnl  -agree with ID that pt does not have current infection  -unfortunately Infection Control will not allow pt out of isolation per current guidelines     HypoT4  -TSH wnl  -continued L-T at home dose     Morbid obesity, THORPE  -stable     Leukocytosis  -suspect merely reactive,  stable around 12-13  -no fever and PCT wnl on admission     Lovenox 40 mg SC daily for DVT prophylaxis while here--though I do not feel that pt actually has COViD infection. When she is transferred to [facility] and can be up and about ad selvin I do not feel that she will require any DVT ppx.  Full code.  Discussed with patient. D/w RN.  Anticipate discharge to [facility] later this AM. Dr. Casillas is accepting physician there.    Day of Discharge     Subjective:  Feeling a little better today. Eager to be transferred to [facility] today.     Physical Exam:  Temp:  [97.3 °F (36.3 °C)-98.5 °F (36.9 °C)] 98.3 °F (36.8 °C)  Heart Rate:  [81-99] 81  Resp:  [16-18] 18  BP: (116-146)/() 136/71  Body mass index is 39.25 kg/m².  Physical Exam  Vitals and nursing note reviewed.   Constitutional:       General: She is not in acute distress.     Appearance: She is obese. She is not ill-appearing, toxic-appearing or diaphoretic.   HENT:      Head: Normocephalic and atraumatic.      Right Ear: External ear normal.      Left Ear: External ear normal.      Nose: Nose normal.      Mouth/Throat:      Mouth: Mucous membranes are moist.      Pharynx: Oropharynx is clear.   Eyes:      General: No scleral icterus.        Right eye: No discharge.         Left eye: No discharge.      Extraocular Movements: Extraocular movements intact.      Conjunctiva/sclera: Conjunctivae normal.   Cardiovascular:      Rate and Rhythm: Normal rate and regular rhythm.      Pulses: Normal pulses.   Pulmonary:      Effort: Pulmonary effort is normal. No respiratory distress.      Breath sounds: Normal breath sounds.   Abdominal:      General: Bowel sounds are normal. There is no distension.      Palpations: Abdomen is soft.      Tenderness: There is no abdominal tenderness.   Musculoskeletal:         General: No swelling or deformity. Normal range of motion.      Cervical back: Neck supple. No rigidity.   Lymphadenopathy:      Cervical: No cervical  adenopathy.   Skin:     General: Skin is warm and dry.      Capillary Refill: Capillary refill takes less than 2 seconds.      Coloration: Skin is not jaundiced.      Findings: No rash.   Neurological:      General: No focal deficit present.      Mental Status: She is alert and oriented to person, place, and time. Mental status is at baseline.      Cranial Nerves: No cranial nerve deficit.      Coordination: Coordination normal.   Psychiatric:         Mood and Affect: Mood normal.         Behavior: Behavior normal.         Thought Content: Thought content normal.       During all interaction with pt proper PPE was utilized (gown, gloves, mask, goggles, and face shield) and was donned/doffed according to recommendations. Hands were cleaned before and after interaction.    Consultants     Consult Orders (all) (From admission, onward)     Start     Ordered    04/06/21 1754  Inpatient Infectious Diseases Consult  Once     Specialty:  Infectious Diseases  Provider:  Charity Fowler MD    04/06/21 1755    04/03/21 1604  Inpatient Psychiatrist Consult  Once     Specialty:  Psychiatry  Provider:  Duy Aiken MD    04/03/21 1604    04/03/21 0900  Inpatient Case Management  Consult  Once     Provider:  (Not yet assigned)    04/03/21 0126    04/02/21 1741  Inpatient Access Center Consult  Once     Provider:  (Not yet assigned)    04/02/21 1741    04/02/21 1354  [facility] (on-call MD unless specified) Details  Once     Specialty:  Hospitalist  Provider:  (Not yet assigned)    04/02/21 1353    04/02/21 0759  Psych / Access Center  Once     Provider:  (Not yet assigned)    04/02/21 0759              Procedures     * Surgery not found *      Imaging Results (All)     None            Pertinent Labs     Results from last 7 days   Lab Units 04/07/21  0844 04/06/21  0834 04/05/21  0753 04/04/21  0839   WBC 10*3/mm3 13.08* 12.94* 12.05* 12.46*   HEMOGLOBIN g/dL 13.6 13.7 13.7 13.4   PLATELETS 10*3/mm3 291 294 300 286      Results from last 7 days   Lab Units 04/07/21  0844 04/06/21  0834 04/05/21  0753 04/04/21  0839   SODIUM mmol/L 137 137 135* 137   POTASSIUM mmol/L 4.1 4.0 4.1 4.0   CHLORIDE mmol/L 105 104 103 106   CO2 mmol/L 20.0* 22.2 22.8 22.8   BUN mg/dL 8 10 8 6   CREATININE mg/dL 0.84 0.80 0.87 0.89   GLUCOSE mg/dL 133* 150* 101* 101*   Estimated Creatinine Clearance: 125.3 mL/min (by C-G formula based on SCr of 0.84 mg/dL).  Results from last 7 days   Lab Units 04/07/21  0844 04/06/21  0834 04/05/21  0753 04/04/21  0839   ALBUMIN g/dL 4.10 4.20 3.90 3.80   BILIRUBIN mg/dL 0.3 0.3 0.5 0.4   ALK PHOS U/L 61 63 58 52   AST (SGOT) U/L 12 14 16 18   ALT (SGPT) U/L 10 13 15 13     Results from last 7 days   Lab Units 04/07/21  0844 04/06/21  0834 04/05/21  0753 04/04/21  0839   CALCIUM mg/dL 8.9 8.9 8.6 8.3*   ALBUMIN g/dL 4.10 4.20 3.90 3.80       Results from last 7 days   Lab Units 04/07/21  0844 04/06/21  0834 04/05/21  0753 04/04/21  0839 04/03/21  1058 04/02/21  2139   CK TOTAL U/L 21 26 32 38 31  --    D DIMER QUANT MCGFEU/mL 0.37  --  0.28  --  0.28 <0.27           Invalid input(s): LDLCALC      Results from last 7 days   Lab Units 04/02/21  0846   COVID19  Detected*       Test Results Pending at Discharge       Discharge Details        Discharge Medications      Changes to Medications      Instructions Start Date   Vortioxetine HBr 10 MG tablet  What changed: how much to take   20 mg, Oral, Daily         Continue These Medications      Instructions Start Date   albuterol sulfate  (90 Base) MCG/ACT inhaler  Commonly known as: PROVENTIL HFA;VENTOLIN HFA;PROAIR HFA   2 puffs, Inhalation, Every 6 Hours PRN      cetirizine 10 MG tablet  Commonly known as: zyrTEC   1 tablet, Oral, Daily      ergocalciferol 1.25 MG (87881 UT) capsule  Commonly known as: ERGOCALCIFEROL   50,000 Units, Oral, Every 7 Days      fluticasone 50 MCG/ACT nasal spray  Commonly known as: FLONASE   2 sprays, Nasal, Daily      levothyroxine 88  MCG tablet  Commonly known as: SYNTHROID, LEVOTHROID   88 mcg, Oral, Every Morning Before Breakfast      omeprazole 40 MG capsule  Commonly known as: priLOSEC   40 mg, Oral, Every Morning Before Breakfast      traZODone 50 MG tablet  Commonly known as: DESYREL   50 mg, Oral, Nightly      Vitamin B 12 500 MCG tablet   500 mcg, Oral, Daily         Stop These Medications    dicyclomine 10 MG capsule  Commonly known as: BENTYL     FLUoxetine 10 MG capsule  Commonly known as: PROzac     hydrocortisone 2.5 % cream     lithium carbonate 300 MG capsule     Milk Thistle 250 MG capsule     ondansetron ODT 4 MG disintegrating tablet  Commonly known as: ZOFRAN-ODT     Vitamin D (Cholecalciferol) 10 MCG (400 UNIT) tablet  Commonly known as: CHOLECALCIFEROL     Vraylar 1.5 MG capsule capsule  Generic drug: Cariprazine HCl     Xanax 0.25 MG tablet  Generic drug: ALPRAZolam            Allergies   Allergen Reactions   • Codeine Nausea And Vomiting and Rash   • Dhea [Nutritional Supplements] Other (See Comments)     Severe headache   • Imitrex [Sumatriptan] Other (See Comments)     Intensified a migraine   • Morphine Other (See Comments)     Severe headache         Discharge Disposition:  Psychiatric Hospital or Unit (DC - External)    Discharge Diet:  Diet Order   Procedures   • Diet Regular       Discharge Activity:   as tolerated    CODE STATUS:    Code Status and Medical Interventions:   Ordered at: 04/02/21 1746     Code Status:    CPR     Medical Interventions (Level of Support Prior to Arrest):    Full       No future appointments.  Follow-up Information     Miriam Newman .    Contact information:  [address]  [facility] [address] [phone]                   Time Spent on Discharge:  Greater than 30 minutes      Kwaku Reid MD  [facility]  04/08/21  08:28 EDT

## 2021-04-08 NOTE — PLAN OF CARE
Goal Outcome Evaluation:         Will continue with mental health treatment.  Will immediately leave here and go to [facility].

## 2021-04-09 NOTE — PROGRESS NOTES
Case Management Discharge Note      Final Note: Patient DC'd to [facility] with planned readmission         Selected Continued Care - Discharged on 4/8/2021 Admission date: 4/2/2021 - Discharge disposition: Psychiatric Hospital or Unit (DC - External)    Destination Coordination complete    Service Provider Selected Services Address Phone Fax Patient Preferred    [facility] [address] [address], [facility] [address] [phone] --          Durable Medical Equipment    No services have been selected for the patient.              Dialysis/Infusion    No services have been selected for the patient.              Home Medical Care    No services have been selected for the patient.              Therapy    No services have been selected for the patient.              Community Resources    No services have been selected for the patient.                       Final Discharge Disposition Code: 65 - psychiatric hospital or unit

## 2021-04-15 ENCOUNTER — OFFICE VISIT (OUTPATIENT)
Dept: PSYCHIATRY | Facility: HOSPITAL | Age: 31
End: 2021-04-15

## 2021-04-15 DIAGNOSIS — F33.1 MAJOR DEPRESSIVE DISORDER, RECURRENT EPISODE, MODERATE (HCC): Primary | ICD-10-CM

## 2021-04-15 PROCEDURE — S9480 INTENSIVE OUTPATIENT PSYCHIA: HCPCS | Performed by: COUNSELOR

## 2021-04-15 NOTE — PROGRESS NOTES
Teaching Record:  Information / activity:  Stress Management in Recovery    Moderate participation   [id number]      Nicolasa Lyn, LCSW

## 2021-04-15 NOTE — PROGRESS NOTES
Wrap-up  Day 1 of IOP dealing with depression and anxiety  Mood at 8  with 10 being the worse and found the class on meditation to be most helpful today    Feeling sad or empty   Trouble with concentration, memory, or making decisions  Fatigue or loss of energy  Loss of interest in things you usually like to do  Easily distracted  Feelings of worthlessness  Feelings of increased guilt  Increased nervous feelings/anxiety  Racing thoughts     Reports some vague thoughts of self-harm but clearly states no plan or intent to act on this    Has appropriate goals for later today.

## 2021-04-15 NOTE — PROGRESS NOTES
The patient begins IOP today after having been admitted to our [facility] secondary to her COVID-19 status.  She really denies suicidal ideations at this time and plans to see Dr. Aiken on Tuesday regarding reinitiation of transcranial magnetic stimulation treatments.

## 2021-04-15 NOTE — PROGRESS NOTES
Group therapy  New to the group today.  Remained quiet and listened as others shared.  When asked, pt shared she has been dealing with depression and anxiety for many years and had a severe OD two weeks ago which resulted in her being on a med-surg floor for several days.   Identifies family and a few friends as her support system.

## 2021-04-16 ENCOUNTER — OFFICE VISIT (OUTPATIENT)
Dept: PSYCHIATRY | Facility: HOSPITAL | Age: 31
End: 2021-04-16

## 2021-04-16 ENCOUNTER — BULK ORDERING (OUTPATIENT)
Dept: CASE MANAGEMENT | Facility: OTHER | Age: 31
End: 2021-04-16

## 2021-04-16 DIAGNOSIS — F33.1 MAJOR DEPRESSIVE DISORDER, RECURRENT EPISODE, MODERATE (HCC): Primary | ICD-10-CM

## 2021-04-16 DIAGNOSIS — Z23 IMMUNIZATION DUE: ICD-10-CM

## 2021-04-16 PROCEDURE — S9480 INTENSIVE OUTPATIENT PSYCHIA: HCPCS | Performed by: COUNSELOR

## 2021-04-16 NOTE — PROGRESS NOTES
Other     Information/activity    Class focused on Mindfulness and other positive coping skills    Instructor Sushma Edmond [facility]     11:37 EDT     Patient Response Good participation; pt was very engaged and able to apply the topic to her own personal experiences.

## 2021-04-16 NOTE — PROGRESS NOTES
Wrap-up  Mood at 9 with 10 being the worse and found the class on coping skills to be most helpful today    Feeling sad or empty   Trouble with concentration, memory, or making decisions  Fatigue or loss of energy  Loss of interest in things you usually like to do  Increased Irritabliliy  Easily distracted  Feelings of worthlessness  Increased nervous feelings/anxiety  Racing thoughts     Pt has some vague thoughts of self-harm but no plan or intent to act on this    Has positive goals for the weekend and plans to return on Monday

## 2021-04-16 NOTE — PROGRESS NOTES
Group therapy  Pt shared of feeling much lethargy the past two weeks since her OD.  Reports has positive plans for the weekend with spending time with her nephew and has a friend coming in from out of town.  Remained quiet as others shared today.

## 2021-04-19 ENCOUNTER — OFFICE VISIT (OUTPATIENT)
Dept: PSYCHIATRY | Facility: HOSPITAL | Age: 31
End: 2021-04-19

## 2021-04-19 DIAGNOSIS — F33.1 MAJOR DEPRESSIVE DISORDER, RECURRENT EPISODE, MODERATE (HCC): Primary | ICD-10-CM

## 2021-04-19 PROCEDURE — S9480 INTENSIVE OUTPATIENT PSYCHIA: HCPCS

## 2021-04-19 NOTE — PROGRESS NOTES
"Mental Health Awareness Class   ([id number])  Information/activity     Video: \"Brock Rodríguez: The Call to Courage\"    Instructor Zonia Martins [facility]     11:58 EDT     Patient Response Good participation      "

## 2021-04-19 NOTE — PROGRESS NOTES
"Wrap Up:  Mood at 8 with 10 being the worse and reports \"group therapy and the Brock Brown video because it made me laugh\" were most helpful today.     Feeling sad or empty   Trouble with concentration, memory, or making decisions  Fatigue or loss of energy  Loss of interest in things you usually like to do  Easily distracted  Feelings of worthlessness  Increased nervous feelings/anxiety  Racing thoughts     Pt reports having thoughts of self harm but no plan or intent to act on these thoughts.  Pt has a safety plan in place.  Therapist offered to facilitate a family session with pt and her parents and pt agreed to think about it.  Also provided pt with name of residential facility to look into.  Pt plans to return tomorrow and has appropriate goals today.     "

## 2021-04-19 NOTE — PROGRESS NOTES
"Group Note:  Pt reports still feeling very down and \"foggy.\"  Reports her mom has been overprotective at home by not letting Jaja be alone and making her sleep on a couch outside her parent's bedroom.  Jaja states her mom will allow her to return to her bedroom if she consents to let them first search her room to make sure there is nothing she could hurt herself with there but Jaja has refused.  Gently probed pt about if there are items still in her room and she admitted to saving other medications \"as a backup.\"  Pt reports she does have thoughts of self harm at times, but does not plan to act on those thoughts.  Pt also spoke about needing increased mental health support and is considering Ketamine treatments, repeating TMS, and residential treatment.    "

## 2021-04-20 ENCOUNTER — OFFICE VISIT (OUTPATIENT)
Dept: PSYCHIATRY | Facility: HOSPITAL | Age: 31
End: 2021-04-20

## 2021-04-20 DIAGNOSIS — F33.1 MAJOR DEPRESSIVE DISORDER, RECURRENT EPISODE, MODERATE (HCC): Primary | ICD-10-CM

## 2021-04-20 PROCEDURE — S9480 INTENSIVE OUTPATIENT PSYCHIA: HCPCS

## 2021-04-20 NOTE — PROGRESS NOTES
Other     Information/activity     [id number] Art Therapy - The Wall therapeutic story read aloud and pt used markers on paper to create an image related to the story    [facility]     13:27 EDT     Patient Response Good participation  Pt had poor eye contact as shared and describe struggles in accepting help from supportive people since did not feel worthy

## 2021-04-20 NOTE — PROGRESS NOTES
The patient will see Dr. Aiken later this week regarding reinitiation of transcranial magnetic stimulation.  She continues to complain of depressed mood and continues to report suicidal Eischen though she is able to promise safety outside the hospital.  A safety plan is discussed at length with her today.

## 2021-04-20 NOTE — PROGRESS NOTES
"Mood at 8-9 with 10 being the worse and reports \"group therapy\" was most helpful today.      Feeling sad or empty   Trouble with concentration, memory, or making decisions  Fatigue or loss of energy  Loss of interest in things you usually like to do  Increased Irritabliliy  Easily distracted  An increase/decrease in normal appetite  Changes in normal sleep patterns (increase, decrease, or broken hoours of sleep)  Feelings of worthlessness  Feelings of increased guilt  Increased nervous feelings/anxiety  Racing thoughts     Pt reports she is continuing to experience suicidal ideation.  States she feels that she is safe at home because her family does not let her be alone and she does not have access to anything she could hurt herself with.  Discussed possible admission to the [facility], and pt states she will consider it if she feels unsafe at home.  Plans to see Dr. Aiken this afternoon and would like to discuss with him the options of [facility], residential and TMS.  Pt reports she feels safe to return home and plans to return to [facility] tomorrow.     "

## 2021-04-20 NOTE — PROGRESS NOTES
Group Note:  Pt shared of feeling more depressed today but could not identify any reason for her shift in mood.  Reports she had a consult at the Ketamine infusion clinic yesterday, but decided that she does not want to pursue that treatment.  Also spent time taking a walk with her 3yo nephew and stayed home with her brother while her parents went out.  She shared that she is not sleeping well, frequently wakes up, and is not comfortable continuing to sleep on the couch.  She has an appointment with her psychiatrist this week and will talk with him about doing another round of TMS vs going to residential treatment.

## 2021-04-21 ENCOUNTER — OFFICE VISIT (OUTPATIENT)
Dept: PSYCHIATRY | Facility: HOSPITAL | Age: 31
End: 2021-04-21

## 2021-04-21 DIAGNOSIS — F33.1 MAJOR DEPRESSIVE DISORDER, RECURRENT EPISODE, MODERATE (HCC): Primary | ICD-10-CM

## 2021-04-23 ENCOUNTER — OFFICE VISIT (OUTPATIENT)
Dept: PSYCHIATRY | Facility: HOSPITAL | Age: 31
End: 2021-04-23

## 2021-04-23 DIAGNOSIS — F33.1 MAJOR DEPRESSIVE DISORDER, RECURRENT EPISODE, MODERATE (HCC): Primary | ICD-10-CM

## 2021-04-23 PROCEDURE — S9480 INTENSIVE OUTPATIENT PSYCHIA: HCPCS | Performed by: COUNSELOR

## 2021-04-23 NOTE — PROGRESS NOTES
Wrap-up  Mood at 9  with 10 being the worse and reports talking with the  was most helpful today.      Feeling sad or empty   Trouble with concentration, memory, or making decisions  Fatigue or loss of energy  Loss of interest in things you usually like to do  Increased Irritabliliy  Easily distracted  An increase/decrease in normal appetite  Feelings of worthlessness  Feelings of increased guilt  Increased nervous feelings/anxiety  Racing thoughts     She reports some vague SI but states no plan/intent to act on this    Has appropriate weekend goals.

## 2021-04-23 NOTE — PROGRESS NOTES
Group therapy  Pt discussed having a difficult day yesterday but was safe and reported her mother was at home most of the and checked on her.  Reports she will continue in IOP and then resume TMS.   She continues with several depressive symptoms.  Has positive weekend goals.  Pt remained quiet as others shared today.

## 2021-04-26 ENCOUNTER — OFFICE VISIT (OUTPATIENT)
Dept: PSYCHIATRY | Facility: HOSPITAL | Age: 31
End: 2021-04-26

## 2021-04-26 DIAGNOSIS — F33.2 SEVERE EPISODE OF RECURRENT MAJOR DEPRESSIVE DISORDER, WITHOUT PSYCHOTIC FEATURES (HCC): Primary | ICD-10-CM

## 2021-04-26 PROCEDURE — S9480 INTENSIVE OUTPATIENT PSYCHIA: HCPCS

## 2021-04-26 NOTE — PROGRESS NOTES
"Group Note:  Pt shared of having a \"meltdown\" yesterday over spilt cereal.  States she thinks of death and suicide often, but will not act on these thoughts because her family watches her constantly.  Reports IOP is not helping her depression, but she feels hopeless about TMS because it will take several weeks to feel any benefit.  She is also considering inpatient acute care and residential care, but \"I just want to someone to make the decision for me.\"  Pt states she does feel she needs a higher level of care.    "

## 2021-04-26 NOTE — PROGRESS NOTES
Mental Health Awareness Class   ([id number])  Information/activity     Identifying feelings and utilizing coping skills    Instructor Zonia Martins LCSW     13:38 EDT     Patient Response: Listened quietly

## 2021-04-26 NOTE — PROGRESS NOTES
"Mood at 9 with 10 being the worse and reports \"everything was helpful\" today. Pt reports following depressive symptoms:    Feeling sad or empty   Trouble with concentration, memory, or making decisions  Fatigue or loss of energy  Loss of interest in things you usually like to do  Increased Irritabliliy  Easily distracted  An increase/decrease in normal appetite  Changes in normal sleep patterns (increase, decrease, or broken hoours of sleep)  Feelings of worthlessness  Feelings of increased guilt  Increased nervous feelings/anxiety  Racing thoughts     Pt reports positive SI but no plan or intent to act on thoughts.  Pt signed a release of information for this therapist to contact her psychiatrist, Dr. Aiken, to coordinate treatment plan.  Pt has appropriate goals for the day.      "

## 2021-04-27 ENCOUNTER — HOSPITAL ENCOUNTER (EMERGENCY)
Facility: HOSPITAL | Age: 31
Discharge: PSYCHIATRIC OR SUBSTANCE USE FACILITY | End: 2021-04-27
Attending: EMERGENCY MEDICINE | Admitting: EMERGENCY MEDICINE

## 2021-04-27 ENCOUNTER — OFFICE VISIT (OUTPATIENT)
Dept: PSYCHIATRY | Facility: HOSPITAL | Age: 31
End: 2021-04-27

## 2021-04-27 ENCOUNTER — HOSPITAL ENCOUNTER (INPATIENT)
Facility: HOSPITAL | Age: 31
LOS: 8 days | Discharge: NURSING, REHAB OR LONG-TERM CARE | End: 2021-05-05
Attending: SPECIALIST | Admitting: SPECIALIST

## 2021-04-27 VITALS
BODY MASS INDEX: 40.18 KG/M2 | HEIGHT: 66 IN | SYSTOLIC BLOOD PRESSURE: 111 MMHG | TEMPERATURE: 97.7 F | HEART RATE: 80 BPM | DIASTOLIC BLOOD PRESSURE: 65 MMHG | RESPIRATION RATE: 16 BRPM | WEIGHT: 250 LBS | OXYGEN SATURATION: 99 %

## 2021-04-27 DIAGNOSIS — R45.851 DEPRESSION WITH SUICIDAL IDEATION: Primary | ICD-10-CM

## 2021-04-27 DIAGNOSIS — F33.2 SEVERE EPISODE OF RECURRENT MAJOR DEPRESSIVE DISORDER, WITHOUT PSYCHOTIC FEATURES (HCC): Primary | ICD-10-CM

## 2021-04-27 DIAGNOSIS — F32.A DEPRESSION WITH SUICIDAL IDEATION: Primary | ICD-10-CM

## 2021-04-27 PROBLEM — Z86.16 HISTORY OF COVID-19: Chronic | Status: ACTIVE | Noted: 2021-04-27

## 2021-04-27 LAB
ALBUMIN SERPL-MCNC: 3.9 G/DL (ref 3.5–5.2)
ALBUMIN/GLOB SERPL: 1.5 G/DL
ALP SERPL-CCNC: 62 U/L (ref 39–117)
ALT SERPL W P-5'-P-CCNC: 19 U/L (ref 1–33)
AMPHET+METHAMPHET UR QL: NEGATIVE
ANION GAP SERPL CALCULATED.3IONS-SCNC: 14 MMOL/L (ref 5–15)
AST SERPL-CCNC: 18 U/L (ref 1–32)
BARBITURATES UR QL SCN: NEGATIVE
BASOPHILS # BLD AUTO: 0.1 10*3/MM3 (ref 0–0.2)
BASOPHILS NFR BLD AUTO: 0.9 % (ref 0–1.5)
BENZODIAZ UR QL SCN: POSITIVE
BILIRUB SERPL-MCNC: 0.2 MG/DL (ref 0–1.2)
BUN SERPL-MCNC: 9 MG/DL (ref 6–20)
BUN/CREAT SERPL: 13 (ref 7–25)
CALCIUM SPEC-SCNC: 8.9 MG/DL (ref 8.6–10.5)
CANNABINOIDS SERPL QL: NEGATIVE
CHLORIDE SERPL-SCNC: 105 MMOL/L (ref 98–107)
CO2 SERPL-SCNC: 19 MMOL/L (ref 22–29)
COCAINE UR QL: NEGATIVE
CREAT SERPL-MCNC: 0.69 MG/DL (ref 0.57–1)
DEPRECATED RDW RBC AUTO: 41 FL (ref 37–54)
EOSINOPHIL # BLD AUTO: 0.12 10*3/MM3 (ref 0–0.4)
EOSINOPHIL NFR BLD AUTO: 1.1 % (ref 0.3–6.2)
ERYTHROCYTE [DISTWIDTH] IN BLOOD BY AUTOMATED COUNT: 13.3 % (ref 12.3–15.4)
ETHANOL BLD-MCNC: <10 MG/DL (ref 0–10)
ETHANOL UR QL: <0.01 %
GFR SERPL CREATININE-BSD FRML MDRD: 101 ML/MIN/1.73
GLOBULIN UR ELPH-MCNC: 2.6 GM/DL
GLUCOSE SERPL-MCNC: 100 MG/DL (ref 65–99)
HCG SERPL QL: NEGATIVE
HCT VFR BLD AUTO: 43.4 % (ref 34–46.6)
HGB BLD-MCNC: 14.3 G/DL (ref 12–15.9)
IMM GRANULOCYTES # BLD AUTO: 0.03 10*3/MM3 (ref 0–0.05)
IMM GRANULOCYTES NFR BLD AUTO: 0.3 % (ref 0–0.5)
LYMPHOCYTES # BLD AUTO: 3.72 10*3/MM3 (ref 0.7–3.1)
LYMPHOCYTES NFR BLD AUTO: 34.7 % (ref 19.6–45.3)
MCH RBC QN AUTO: 28 PG (ref 26.6–33)
MCHC RBC AUTO-ENTMCNC: 32.9 G/DL (ref 31.5–35.7)
MCV RBC AUTO: 84.9 FL (ref 79–97)
METHADONE UR QL SCN: POSITIVE
MONOCYTES # BLD AUTO: 0.54 10*3/MM3 (ref 0.1–0.9)
MONOCYTES NFR BLD AUTO: 5 % (ref 5–12)
NEUTROPHILS NFR BLD AUTO: 58 % (ref 42.7–76)
NEUTROPHILS NFR BLD AUTO: 6.21 10*3/MM3 (ref 1.7–7)
NRBC BLD AUTO-RTO: 0 /100 WBC (ref 0–0.2)
OPIATES UR QL: NEGATIVE
OXYCODONE UR QL SCN: NEGATIVE
PLATELET # BLD AUTO: 348 10*3/MM3 (ref 140–450)
PMV BLD AUTO: 9.3 FL (ref 6–12)
POTASSIUM SERPL-SCNC: 4.3 MMOL/L (ref 3.5–5.2)
PROT SERPL-MCNC: 6.5 G/DL (ref 6–8.5)
RBC # BLD AUTO: 5.11 10*6/MM3 (ref 3.77–5.28)
SARS-COV-2 RNA RESP QL NAA+PROBE: NOT DETECTED
SODIUM SERPL-SCNC: 138 MMOL/L (ref 136–145)
T4 FREE SERPL-MCNC: 0.92 NG/DL (ref 0.93–1.7)
TSH SERPL DL<=0.05 MIU/L-ACNC: 2.08 UIU/ML (ref 0.27–4.2)
WBC # BLD AUTO: 10.72 10*3/MM3 (ref 3.4–10.8)

## 2021-04-27 PROCEDURE — 83036 HEMOGLOBIN GLYCOSYLATED A1C: CPT | Performed by: SPECIALIST

## 2021-04-27 PROCEDURE — 80307 DRUG TEST PRSMV CHEM ANLYZR: CPT | Performed by: NURSE PRACTITIONER

## 2021-04-27 PROCEDURE — 99283 EMERGENCY DEPT VISIT LOW MDM: CPT

## 2021-04-27 PROCEDURE — S9480 INTENSIVE OUTPATIENT PSYCHIA: HCPCS

## 2021-04-27 PROCEDURE — 82077 ASSAY SPEC XCP UR&BREATH IA: CPT | Performed by: NURSE PRACTITIONER

## 2021-04-27 PROCEDURE — 85025 COMPLETE CBC W/AUTO DIFF WBC: CPT | Performed by: SPECIALIST

## 2021-04-27 PROCEDURE — 80053 COMPREHEN METABOLIC PANEL: CPT | Performed by: SPECIALIST

## 2021-04-27 PROCEDURE — 84703 CHORIONIC GONADOTROPIN ASSAY: CPT | Performed by: SPECIALIST

## 2021-04-27 PROCEDURE — 84439 ASSAY OF FREE THYROXINE: CPT | Performed by: SPECIALIST

## 2021-04-27 PROCEDURE — C9803 HOPD COVID-19 SPEC COLLECT: HCPCS

## 2021-04-27 PROCEDURE — U0003 INFECTIOUS AGENT DETECTION BY NUCLEIC ACID (DNA OR RNA); SEVERE ACUTE RESPIRATORY SYNDROME CORONAVIRUS 2 (SARS-COV-2) (CORONAVIRUS DISEASE [COVID-19]), AMPLIFIED PROBE TECHNIQUE, MAKING USE OF HIGH THROUGHPUT TECHNOLOGIES AS DESCRIBED BY CMS-2020-01-R: HCPCS | Performed by: NURSE PRACTITIONER

## 2021-04-27 PROCEDURE — 84443 ASSAY THYROID STIM HORMONE: CPT | Performed by: SPECIALIST

## 2021-04-27 PROCEDURE — 80061 LIPID PANEL: CPT | Performed by: SPECIALIST

## 2021-04-27 RX ORDER — ACETAMINOPHEN 325 MG/1
650 TABLET ORAL EVERY 4 HOURS PRN
Status: DISCONTINUED | OUTPATIENT
Start: 2021-04-27 | End: 2021-05-05 | Stop reason: HOSPADM

## 2021-04-27 RX ORDER — CETIRIZINE HYDROCHLORIDE 10 MG/1
10 TABLET ORAL DAILY
Status: DISCONTINUED | OUTPATIENT
Start: 2021-04-28 | End: 2021-05-05 | Stop reason: HOSPADM

## 2021-04-27 RX ORDER — LEVOCETIRIZINE DIHYDROCHLORIDE 5 MG/1
5 TABLET, FILM COATED ORAL EVERY EVENING
COMMUNITY
End: 2022-07-12

## 2021-04-27 RX ORDER — ALBUTEROL SULFATE 2.5 MG/3ML
2.5 SOLUTION RESPIRATORY (INHALATION) EVERY 6 HOURS PRN
Status: DISCONTINUED | OUTPATIENT
Start: 2021-04-27 | End: 2021-05-05 | Stop reason: HOSPADM

## 2021-04-27 RX ORDER — FAMOTIDINE 20 MG/1
20 TABLET, FILM COATED ORAL 2 TIMES DAILY PRN
Status: DISCONTINUED | OUTPATIENT
Start: 2021-04-27 | End: 2021-05-01

## 2021-04-27 RX ORDER — HYDROXYZINE HYDROCHLORIDE 25 MG/1
50 TABLET, FILM COATED ORAL EVERY 6 HOURS PRN
Status: DISCONTINUED | OUTPATIENT
Start: 2021-04-27 | End: 2021-05-05 | Stop reason: HOSPADM

## 2021-04-27 RX ORDER — LEVOTHYROXINE SODIUM 88 UG/1
88 TABLET ORAL
Status: DISCONTINUED | OUTPATIENT
Start: 2021-04-28 | End: 2021-05-05 | Stop reason: HOSPADM

## 2021-04-27 RX ORDER — FLUTICASONE PROPIONATE 50 MCG
2 SPRAY, SUSPENSION (ML) NASAL DAILY
Status: DISCONTINUED | OUTPATIENT
Start: 2021-04-28 | End: 2021-05-05 | Stop reason: HOSPADM

## 2021-04-27 RX ORDER — ALUMINA, MAGNESIA, AND SIMETHICONE 2400; 2400; 240 MG/30ML; MG/30ML; MG/30ML
15 SUSPENSION ORAL EVERY 6 HOURS PRN
Status: DISCONTINUED | OUTPATIENT
Start: 2021-04-27 | End: 2021-05-05 | Stop reason: HOSPADM

## 2021-04-27 RX ORDER — TRAZODONE HYDROCHLORIDE 50 MG/1
50 TABLET ORAL NIGHTLY
Status: DISCONTINUED | OUTPATIENT
Start: 2021-04-27 | End: 2021-05-05 | Stop reason: HOSPADM

## 2021-04-27 RX ADMIN — TRAZODONE HYDROCHLORIDE 50 MG: 50 TABLET ORAL at 22:19

## 2021-04-27 NOTE — PROGRESS NOTES
"The patient continues to voice positive suicidal ideations and reports that she has a \"secret stash\" of pills in her home the location of which she refuses to divulge.  Her parents are now forced to watch her 24/7.  She has not yet been able to make arrangements to reinitiate transcranial magnetic stimulation secondary to insurance pending approval.  Given the circumstances, readmission to [facility] will be ordered.  "

## 2021-04-27 NOTE — PROGRESS NOTES
Wrap Up:    Pt needed to leave group early to be walked down to ED for admission to [facility].  Therapist stayed with pt until ED staff was able to place her in a room.  Will d/c from [facility] due to inpatient admission.

## 2021-04-27 NOTE — PROGRESS NOTES
Group Note:  Pt attended group after meeting with Dr. Casillas this morning.  Pt was tearful and stated she did not want to share with group today.  After others shared, pt stated that Dr. Casillas wants her to come inpatient due to her ongoing SI.  Therapist met with pt 1:1 after group and pt stated she is not happy about coming inpatient, but agreed that her SI is persistent and she has been asking for a higher level of care.  Pt states she would like to go to residential treatment after d/c from [facility].  Therapist will coordinate with social workers to assist with appropriate referrals.

## 2021-04-28 ENCOUNTER — APPOINTMENT (OUTPATIENT)
Dept: PSYCHIATRY | Facility: HOSPITAL | Age: 31
End: 2021-04-28

## 2021-04-28 LAB
CHOLEST SERPL-MCNC: 225 MG/DL (ref 0–200)
HBA1C MFR BLD: 5.6 % (ref 4.8–5.6)
HDLC SERPL-MCNC: 35 MG/DL (ref 40–60)
LDLC SERPL CALC-MCNC: 155 MG/DL (ref 0–100)
LDLC/HDLC SERPL: 4.34 {RATIO}
TRIGL SERPL-MCNC: 191 MG/DL (ref 0–150)
VLDLC SERPL-MCNC: 35 MG/DL (ref 5–40)

## 2021-04-28 RX ADMIN — HYDROXYZINE HYDROCHLORIDE 50 MG: 25 TABLET ORAL at 21:48

## 2021-04-28 RX ADMIN — CETIRIZINE HYDROCHLORIDE ALLERGY 10 MG: 10 TABLET ORAL at 08:57

## 2021-04-28 RX ADMIN — TRAZODONE HYDROCHLORIDE 50 MG: 50 TABLET ORAL at 21:13

## 2021-04-28 RX ADMIN — FLUTICASONE PROPIONATE 2 SPRAY: 50 SPRAY, METERED NASAL at 08:57

## 2021-04-28 RX ADMIN — VORTIOXETINE 20 MG: 10 TABLET, FILM COATED ORAL at 08:57

## 2021-04-28 RX ADMIN — LEVOTHYROXINE SODIUM 88 MCG: 0.09 TABLET ORAL at 06:35

## 2021-04-28 RX ADMIN — CARIPRAZINE 1.5 MG: 1.5 CAPSULE, GELATIN COATED ORAL at 08:57

## 2021-04-29 ENCOUNTER — APPOINTMENT (OUTPATIENT)
Dept: PSYCHIATRY | Facility: HOSPITAL | Age: 31
End: 2021-04-29

## 2021-04-29 RX ADMIN — FLUTICASONE PROPIONATE 2 SPRAY: 50 SPRAY, METERED NASAL at 08:25

## 2021-04-29 RX ADMIN — TRAZODONE HYDROCHLORIDE 50 MG: 50 TABLET ORAL at 21:25

## 2021-04-29 RX ADMIN — LEVOTHYROXINE SODIUM 88 MCG: 0.09 TABLET ORAL at 05:42

## 2021-04-29 RX ADMIN — CARIPRAZINE 3 MG: 3 CAPSULE, GELATIN COATED ORAL at 08:25

## 2021-04-29 RX ADMIN — VORTIOXETINE 20 MG: 10 TABLET, FILM COATED ORAL at 08:25

## 2021-04-29 RX ADMIN — HYDROXYZINE HYDROCHLORIDE 50 MG: 25 TABLET ORAL at 18:25

## 2021-04-29 RX ADMIN — CETIRIZINE HYDROCHLORIDE ALLERGY 10 MG: 10 TABLET ORAL at 08:25

## 2021-04-29 RX ADMIN — FAMOTIDINE 20 MG: 20 TABLET, FILM COATED ORAL at 22:29

## 2021-04-30 ENCOUNTER — APPOINTMENT (OUTPATIENT)
Dept: PSYCHIATRY | Facility: HOSPITAL | Age: 31
End: 2021-04-30

## 2021-04-30 RX ADMIN — TRAZODONE HYDROCHLORIDE 50 MG: 50 TABLET ORAL at 21:55

## 2021-04-30 RX ADMIN — HYDROXYZINE HYDROCHLORIDE 50 MG: 25 TABLET ORAL at 21:55

## 2021-04-30 RX ADMIN — LEVOTHYROXINE SODIUM 88 MCG: 0.09 TABLET ORAL at 08:12

## 2021-04-30 RX ADMIN — FLUTICASONE PROPIONATE 2 SPRAY: 50 SPRAY, METERED NASAL at 08:59

## 2021-04-30 RX ADMIN — CARIPRAZINE 3 MG: 3 CAPSULE, GELATIN COATED ORAL at 09:00

## 2021-04-30 RX ADMIN — CETIRIZINE HYDROCHLORIDE ALLERGY 10 MG: 10 TABLET ORAL at 08:59

## 2021-04-30 RX ADMIN — FAMOTIDINE 20 MG: 20 TABLET, FILM COATED ORAL at 22:37

## 2021-04-30 RX ADMIN — VORTIOXETINE 20 MG: 10 TABLET, FILM COATED ORAL at 09:00

## 2021-04-30 RX ADMIN — HYDROXYZINE HYDROCHLORIDE 50 MG: 25 TABLET ORAL at 13:02

## 2021-05-01 RX ORDER — FAMOTIDINE 20 MG/1
20 TABLET, FILM COATED ORAL
Status: DISCONTINUED | OUTPATIENT
Start: 2021-05-02 | End: 2021-05-01

## 2021-05-01 RX ORDER — FAMOTIDINE 20 MG/1
20 TABLET, FILM COATED ORAL
Status: DISCONTINUED | OUTPATIENT
Start: 2021-05-01 | End: 2021-05-05 | Stop reason: HOSPADM

## 2021-05-01 RX ADMIN — CARIPRAZINE 3 MG: 3 CAPSULE, GELATIN COATED ORAL at 09:00

## 2021-05-01 RX ADMIN — HYDROXYZINE HYDROCHLORIDE 50 MG: 25 TABLET ORAL at 21:31

## 2021-05-01 RX ADMIN — VORTIOXETINE 20 MG: 10 TABLET, FILM COATED ORAL at 09:00

## 2021-05-01 RX ADMIN — LEVOTHYROXINE SODIUM 88 MCG: 0.09 TABLET ORAL at 09:00

## 2021-05-01 RX ADMIN — FLUTICASONE PROPIONATE 2 SPRAY: 50 SPRAY, METERED NASAL at 09:00

## 2021-05-01 RX ADMIN — TRAZODONE HYDROCHLORIDE 50 MG: 50 TABLET ORAL at 21:31

## 2021-05-01 RX ADMIN — FAMOTIDINE 20 MG: 20 TABLET, FILM COATED ORAL at 21:31

## 2021-05-01 RX ADMIN — CETIRIZINE HYDROCHLORIDE ALLERGY 10 MG: 10 TABLET ORAL at 09:00

## 2021-05-01 RX ADMIN — HYDROXYZINE HYDROCHLORIDE 50 MG: 25 TABLET ORAL at 14:20

## 2021-05-02 RX ADMIN — VORTIOXETINE 20 MG: 10 TABLET, FILM COATED ORAL at 08:29

## 2021-05-02 RX ADMIN — HYDROXYZINE HYDROCHLORIDE 50 MG: 25 TABLET ORAL at 20:38

## 2021-05-02 RX ADMIN — FLUTICASONE PROPIONATE 2 SPRAY: 50 SPRAY, METERED NASAL at 08:29

## 2021-05-02 RX ADMIN — CETIRIZINE HYDROCHLORIDE ALLERGY 10 MG: 10 TABLET ORAL at 08:29

## 2021-05-02 RX ADMIN — FAMOTIDINE 20 MG: 20 TABLET, FILM COATED ORAL at 08:30

## 2021-05-02 RX ADMIN — LEVOTHYROXINE SODIUM 88 MCG: 0.09 TABLET ORAL at 08:29

## 2021-05-02 RX ADMIN — TRAZODONE HYDROCHLORIDE 50 MG: 50 TABLET ORAL at 20:38

## 2021-05-02 RX ADMIN — FAMOTIDINE 20 MG: 20 TABLET, FILM COATED ORAL at 16:44

## 2021-05-02 RX ADMIN — CARIPRAZINE 3 MG: 3 CAPSULE, GELATIN COATED ORAL at 08:29

## 2021-05-03 ENCOUNTER — APPOINTMENT (OUTPATIENT)
Dept: PSYCHIATRY | Facility: HOSPITAL | Age: 31
End: 2021-05-03

## 2021-05-03 LAB
AMPHET+METHAMPHET UR QL: NEGATIVE
BARBITURATES UR QL SCN: NEGATIVE
BENZODIAZ UR QL SCN: NEGATIVE
CANNABINOIDS SERPL QL: NEGATIVE
COCAINE UR QL: NEGATIVE
METHADONE UR QL SCN: POSITIVE
OPIATES UR QL: NEGATIVE
OXYCODONE UR QL SCN: NEGATIVE

## 2021-05-03 PROCEDURE — 80307 DRUG TEST PRSMV CHEM ANLYZR: CPT | Performed by: SPECIALIST

## 2021-05-03 RX ADMIN — VORTIOXETINE 20 MG: 10 TABLET, FILM COATED ORAL at 08:34

## 2021-05-03 RX ADMIN — HYDROXYZINE HYDROCHLORIDE 50 MG: 25 TABLET ORAL at 14:35

## 2021-05-03 RX ADMIN — FLUTICASONE PROPIONATE 2 SPRAY: 50 SPRAY, METERED NASAL at 08:34

## 2021-05-03 RX ADMIN — CARIPRAZINE 3 MG: 3 CAPSULE, GELATIN COATED ORAL at 08:34

## 2021-05-03 RX ADMIN — HYDROXYZINE HYDROCHLORIDE 50 MG: 25 TABLET ORAL at 21:59

## 2021-05-03 RX ADMIN — FAMOTIDINE 20 MG: 20 TABLET, FILM COATED ORAL at 08:33

## 2021-05-03 RX ADMIN — TRAZODONE HYDROCHLORIDE 50 MG: 50 TABLET ORAL at 21:59

## 2021-05-03 RX ADMIN — LEVOTHYROXINE SODIUM 88 MCG: 0.09 TABLET ORAL at 08:32

## 2021-05-03 RX ADMIN — CETIRIZINE HYDROCHLORIDE ALLERGY 10 MG: 10 TABLET ORAL at 08:33

## 2021-05-03 RX ADMIN — FAMOTIDINE 20 MG: 20 TABLET, FILM COATED ORAL at 17:24

## 2021-05-04 ENCOUNTER — APPOINTMENT (OUTPATIENT)
Dept: PSYCHIATRY | Facility: HOSPITAL | Age: 31
End: 2021-05-04

## 2021-05-04 RX ADMIN — CETIRIZINE HYDROCHLORIDE ALLERGY 10 MG: 10 TABLET ORAL at 08:56

## 2021-05-04 RX ADMIN — TRAZODONE HYDROCHLORIDE 50 MG: 50 TABLET ORAL at 21:30

## 2021-05-04 RX ADMIN — HYDROXYZINE HYDROCHLORIDE 50 MG: 25 TABLET ORAL at 21:30

## 2021-05-04 RX ADMIN — FLUTICASONE PROPIONATE 2 SPRAY: 50 SPRAY, METERED NASAL at 08:57

## 2021-05-04 RX ADMIN — CARIPRAZINE 3 MG: 3 CAPSULE, GELATIN COATED ORAL at 08:57

## 2021-05-04 RX ADMIN — ACETAMINOPHEN 650 MG: 325 TABLET, FILM COATED ORAL at 11:25

## 2021-05-04 RX ADMIN — LEVOTHYROXINE SODIUM 88 MCG: 0.09 TABLET ORAL at 07:59

## 2021-05-04 RX ADMIN — FAMOTIDINE 20 MG: 20 TABLET, FILM COATED ORAL at 17:00

## 2021-05-04 RX ADMIN — VORTIOXETINE 20 MG: 10 TABLET, FILM COATED ORAL at 11:25

## 2021-05-04 RX ADMIN — HYDROXYZINE HYDROCHLORIDE 50 MG: 25 TABLET ORAL at 14:40

## 2021-05-04 RX ADMIN — FAMOTIDINE 20 MG: 20 TABLET, FILM COATED ORAL at 07:59

## 2021-05-05 ENCOUNTER — APPOINTMENT (OUTPATIENT)
Dept: PSYCHIATRY | Facility: HOSPITAL | Age: 31
End: 2021-05-05

## 2021-05-05 VITALS
HEART RATE: 75 BPM | TEMPERATURE: 97.3 F | RESPIRATION RATE: 16 BRPM | OXYGEN SATURATION: 97 % | DIASTOLIC BLOOD PRESSURE: 61 MMHG | SYSTOLIC BLOOD PRESSURE: 97 MMHG

## 2021-05-05 RX ADMIN — CETIRIZINE HYDROCHLORIDE ALLERGY 10 MG: 10 TABLET ORAL at 09:28

## 2021-05-05 RX ADMIN — CARIPRAZINE 3 MG: 3 CAPSULE, GELATIN COATED ORAL at 09:28

## 2021-05-05 RX ADMIN — FAMOTIDINE 20 MG: 20 TABLET, FILM COATED ORAL at 08:20

## 2021-05-05 RX ADMIN — FLUTICASONE PROPIONATE 2 SPRAY: 50 SPRAY, METERED NASAL at 09:28

## 2021-05-05 RX ADMIN — LEVOTHYROXINE SODIUM 88 MCG: 0.09 TABLET ORAL at 08:20

## 2021-05-05 RX ADMIN — VORTIOXETINE 20 MG: 10 TABLET, FILM COATED ORAL at 10:59

## 2021-05-06 ENCOUNTER — APPOINTMENT (OUTPATIENT)
Dept: PSYCHIATRY | Facility: HOSPITAL | Age: 31
End: 2021-05-06

## 2021-05-07 ENCOUNTER — APPOINTMENT (OUTPATIENT)
Dept: PSYCHIATRY | Facility: HOSPITAL | Age: 31
End: 2021-05-07

## 2021-05-10 ENCOUNTER — APPOINTMENT (OUTPATIENT)
Dept: PSYCHIATRY | Facility: HOSPITAL | Age: 31
End: 2021-05-10

## 2021-05-11 ENCOUNTER — APPOINTMENT (OUTPATIENT)
Dept: PSYCHIATRY | Facility: HOSPITAL | Age: 31
End: 2021-05-11

## 2021-05-12 ENCOUNTER — APPOINTMENT (OUTPATIENT)
Dept: PSYCHIATRY | Facility: HOSPITAL | Age: 31
End: 2021-05-12

## 2021-05-13 ENCOUNTER — APPOINTMENT (OUTPATIENT)
Dept: PSYCHIATRY | Facility: HOSPITAL | Age: 31
End: 2021-05-13

## 2022-03-22 ENCOUNTER — HOSPITAL ENCOUNTER (EMERGENCY)
Facility: HOSPITAL | Age: 32
Discharge: HOME | End: 2022-03-22
Attending: EMERGENCY MEDICINE | Admitting: EMERGENCY MEDICINE

## 2022-03-22 VITALS
HEART RATE: 98 BPM | WEIGHT: 260 LBS | OXYGEN SATURATION: 98 % | HEIGHT: 66 IN | BODY MASS INDEX: 41.78 KG/M2 | DIASTOLIC BLOOD PRESSURE: 57 MMHG | SYSTOLIC BLOOD PRESSURE: 112 MMHG | TEMPERATURE: 98.3 F | RESPIRATION RATE: 16 BRPM

## 2022-03-22 DIAGNOSIS — B37.31 YEAST VAGINITIS: ICD-10-CM

## 2022-03-22 DIAGNOSIS — R30.0 DYSURIA: ICD-10-CM

## 2022-03-22 DIAGNOSIS — N72 CERVICITIS: Primary | ICD-10-CM

## 2022-03-22 LAB
ALBUMIN SERPL-MCNC: 4.5 G/DL (ref 3.5–5.2)
ALBUMIN/GLOB SERPL: 1.6 G/DL
ALP SERPL-CCNC: 79 U/L (ref 39–117)
ALT SERPL W P-5'-P-CCNC: 24 U/L (ref 1–33)
ANION GAP SERPL CALCULATED.3IONS-SCNC: 12 MMOL/L (ref 5–15)
AST SERPL-CCNC: 22 U/L (ref 1–32)
BACTERIA UR QL AUTO: ABNORMAL /HPF
BASOPHILS # BLD AUTO: 0.1 10*3/MM3 (ref 0–0.2)
BASOPHILS NFR BLD AUTO: 0.9 % (ref 0–1.5)
BILIRUB SERPL-MCNC: 0.2 MG/DL (ref 0–1.2)
BILIRUB UR QL STRIP: NEGATIVE
BUN SERPL-MCNC: 8 MG/DL (ref 6–20)
BUN/CREAT SERPL: 8.7 (ref 7–25)
CALCIUM SPEC-SCNC: 9 MG/DL (ref 8.6–10.5)
CHLORIDE SERPL-SCNC: 102 MMOL/L (ref 98–107)
CLARITY UR: CLEAR
CLUE CELLS SPEC QL WET PREP: ABNORMAL
CO2 SERPL-SCNC: 22 MMOL/L (ref 22–29)
COLOR UR: YELLOW
CREAT SERPL-MCNC: 0.92 MG/DL (ref 0.57–1)
DEPRECATED RDW RBC AUTO: 44.3 FL (ref 37–54)
EGFRCR SERPLBLD CKD-EPI 2021: 86.6 ML/MIN/1.73
EOSINOPHIL # BLD AUTO: 0.13 10*3/MM3 (ref 0–0.4)
EOSINOPHIL NFR BLD AUTO: 1.1 % (ref 0.3–6.2)
ERYTHROCYTE [DISTWIDTH] IN BLOOD BY AUTOMATED COUNT: 14.3 % (ref 12.3–15.4)
GLOBULIN UR ELPH-MCNC: 2.9 GM/DL
GLUCOSE SERPL-MCNC: 82 MG/DL (ref 65–99)
GLUCOSE UR STRIP-MCNC: NEGATIVE MG/DL
HCG SERPL QL: NEGATIVE
HCT VFR BLD AUTO: 38.8 % (ref 34–46.6)
HGB BLD-MCNC: 12.9 G/DL (ref 12–15.9)
HGB UR QL STRIP.AUTO: NEGATIVE
HYALINE CASTS UR QL AUTO: ABNORMAL /LPF
HYDATID CYST SPEC WET PREP: ABNORMAL
KETONES UR QL STRIP: NEGATIVE
LEUKOCYTE ESTERASE UR QL STRIP.AUTO: ABNORMAL
LYMPHOCYTES # BLD AUTO: 4 10*3/MM3 (ref 0.7–3.1)
LYMPHOCYTES NFR BLD AUTO: 34.4 % (ref 19.6–45.3)
MCH RBC QN AUTO: 28.6 PG (ref 26.6–33)
MCHC RBC AUTO-ENTMCNC: 33.2 G/DL (ref 31.5–35.7)
MCV RBC AUTO: 86 FL (ref 79–97)
MONOCYTES # BLD AUTO: 0.91 10*3/MM3 (ref 0.1–0.9)
MONOCYTES NFR BLD AUTO: 7.8 % (ref 5–12)
NEUTROPHILS NFR BLD AUTO: 55.4 % (ref 42.7–76)
NEUTROPHILS NFR BLD AUTO: 6.45 10*3/MM3 (ref 1.7–7)
NITRITE UR QL STRIP: NEGATIVE
PH UR STRIP.AUTO: 6 [PH] (ref 5–8)
PLATELET # BLD AUTO: 328 10*3/MM3 (ref 140–450)
PMV BLD AUTO: 10 FL (ref 6–12)
POTASSIUM SERPL-SCNC: 4.3 MMOL/L (ref 3.5–5.2)
PROT SERPL-MCNC: 7.4 G/DL (ref 6–8.5)
PROT UR QL STRIP: NEGATIVE
RBC # BLD AUTO: 4.51 10*6/MM3 (ref 3.77–5.28)
RBC # UR STRIP: ABNORMAL /HPF
REF LAB TEST METHOD: ABNORMAL
SODIUM SERPL-SCNC: 136 MMOL/L (ref 136–145)
SP GR UR STRIP: 1.02 (ref 1–1.03)
SQUAMOUS #/AREA URNS HPF: ABNORMAL /HPF
T VAGINALIS SPEC QL WET PREP: ABNORMAL
UROBILINOGEN UR QL STRIP: ABNORMAL
WBC # UR STRIP: ABNORMAL /HPF
WBC NRBC COR # BLD: 11.64 10*3/MM3 (ref 3.4–10.8)
WBC SPEC QL WET PREP: ABNORMAL
YEAST GENITAL QL WET PREP: ABNORMAL
YEAST URNS QL MICRO: ABNORMAL /HPF

## 2022-03-22 PROCEDURE — 96372 THER/PROPH/DIAG INJ SC/IM: CPT

## 2022-03-22 PROCEDURE — 84703 CHORIONIC GONADOTROPIN ASSAY: CPT | Performed by: NURSE PRACTITIONER

## 2022-03-22 PROCEDURE — 87255 GENET VIRUS ISOLATE HSV: CPT | Performed by: NURSE PRACTITIONER

## 2022-03-22 PROCEDURE — 25010000002 KETOROLAC TROMETHAMINE PER 15 MG: Performed by: NURSE PRACTITIONER

## 2022-03-22 PROCEDURE — 81001 URINALYSIS AUTO W/SCOPE: CPT | Performed by: NURSE PRACTITIONER

## 2022-03-22 PROCEDURE — 96374 THER/PROPH/DIAG INJ IV PUSH: CPT

## 2022-03-22 PROCEDURE — 99283 EMERGENCY DEPT VISIT LOW MDM: CPT

## 2022-03-22 PROCEDURE — 87210 SMEAR WET MOUNT SALINE/INK: CPT | Performed by: NURSE PRACTITIONER

## 2022-03-22 PROCEDURE — 25010000002 CEFTRIAXONE PER 250 MG: Performed by: NURSE PRACTITIONER

## 2022-03-22 PROCEDURE — 80053 COMPREHEN METABOLIC PANEL: CPT | Performed by: NURSE PRACTITIONER

## 2022-03-22 PROCEDURE — 87591 N.GONORRHOEAE DNA AMP PROB: CPT | Performed by: NURSE PRACTITIONER

## 2022-03-22 PROCEDURE — 87491 CHLMYD TRACH DNA AMP PROBE: CPT | Performed by: NURSE PRACTITIONER

## 2022-03-22 PROCEDURE — 85025 COMPLETE CBC W/AUTO DIFF WBC: CPT | Performed by: NURSE PRACTITIONER

## 2022-03-22 RX ORDER — FLUCONAZOLE 150 MG/1
150 TABLET ORAL
Qty: 2 TABLET | Refills: 0 | Status: SHIPPED | OUTPATIENT
Start: 2022-03-22 | End: 2022-07-12

## 2022-03-22 RX ORDER — KETOROLAC TROMETHAMINE 15 MG/ML
15 INJECTION, SOLUTION INTRAMUSCULAR; INTRAVENOUS ONCE
Status: COMPLETED | OUTPATIENT
Start: 2022-03-22 | End: 2022-03-22

## 2022-03-22 RX ADMIN — LIDOCAINE HYDROCHLORIDE 490 MG: 10 INJECTION, SOLUTION EPIDURAL; INFILTRATION; INTRACAUDAL; PERINEURAL at 16:16

## 2022-03-22 RX ADMIN — KETOROLAC TROMETHAMINE 15 MG: 15 INJECTION, SOLUTION INTRAMUSCULAR; INTRAVENOUS at 16:00

## 2022-03-22 NOTE — DISCHARGE INSTRUCTIONS
Your gonorrhea/chlamydia test and herpes test shoulder result in the next 48 hours.  Diflucan as directed for yeast infection  If any test comes back positive, notify your sexual partners so they may also receive treatment in order to prevent re infection  Return if worse or new concerns   Continue care with your primary care physician and have your blood pressure regularly checked and managed. Normal blood pressure is 120/80.

## 2022-03-22 NOTE — ED NOTES
Pt arrives via PV with c/o lower abd and back pain that began today. Recently diagnosed with UTI and finished up PO abx this AM. Pt a&ox4, abc's intact, NAD noted, ambulatory to triage.    Patient wearing mask during triage. RN wearing appropriate PPE during triage. Hand hygiene performed.

## 2022-03-22 NOTE — ED PROVIDER NOTES
MD ATTESTATION NOTE    The BLAINE and I have discussed this patient's history, physical exam, and treatment plan.  I have reviewed the documentation and personally had a face to face interaction with the patient. I affirm the documentation and agree with the treatment and plan.  The attached note describes my personal findings.      I provided a substantive portion of the care of the patient.  I personally performed the physical exam in its entirety, and below are my findings.  For this patient encounter, the patient wore surgical mask, I wore full protective PPE including N95 and eye protection.      Brief HPI: Patient presents for lower abdominal pain.  Has had recent urinary tract infections and has been given antibiotics.  Patient now having vaginal discharge.  Has had no fevers    PHYSICAL EXAM  ED Triage Vitals   Temp Heart Rate Resp BP SpO2   03/22/22 1421 03/22/22 1421 03/22/22 1421 03/22/22 1433 03/22/22 1421   98.3 °F (36.8 °C) 103 18 134/84 96 %      Temp src Heart Rate Source Patient Position BP Location FiO2 (%)   03/22/22 1421 03/22/22 1421 03/22/22 1433 03/22/22 1433 --   Tympanic Monitor Lying Right arm          GENERAL: no acute distress  HENT: nares patent  EYES: no scleral icterus  CV: regular rhythm, normal rate  RESPIRATORY: normal effort  ABDOMEN: soft, tender lower abdomen  MUSCULOSKELETAL: no deformity  NEURO: alert, moves all extremities, follows commands  PSYCH:  calm, cooperative  SKIN: warm, dry    Vital signs and nursing notes reviewed.        Plan: Pelvic exam with lab evaluation       Xavier Ivan MD  03/22/22 [id number]

## 2022-03-22 NOTE — ED NOTES
Attempted to ambulate patient with rolling walker. Patient was able to take 2 steps forward however c/o pain to left hip. Patient was able to take 4 side-steps with less discomfort.

## 2022-03-22 NOTE — ED PROVIDER NOTES
EMERGENCY DEPARTMENT ENCOUNTER    Room Number:  08/08  Date of encounter:  3/22/2022  PCP: Miriam Newman  Historian: patient  Full history not obtainable due to: none    HPI:  Chief Complaint: dysuria     Context: Maame Wallace is a 31 y.o. female who presents to the ED c/o dysuria x 10 days. Symptoms are constant. Severe at times. Associated pelvic pain today which prompted her to seek evaluation in the ED. She has been seen 2 times for these symptoms and prescribed abx which she has taken without any relief of the symptoms. No fever. No vomiting. Endorses new sexual partner about 3 weeks ago. LMP unknown, irregular since her covid diagnosis a few  months ago.   No urine culture was performed.     PAST MEDICAL HISTORY    Active Ambulatory Problems     Diagnosis Date Noted   • Hemiplegic migraine, intractable 04/25/2016   • Fibromyalgia 04/29/2016   • Major depressive disorder, recurrent, severe without psychotic features ([facility]) 02/19/2020   • Vaginal yeast infection 02/20/2020   • Leukocytosis 02/20/2020   • Hyponatremia 02/20/2020   • Hypothyroidism 02/20/2020   • Irritable bowel disease 02/20/2020   • Obesity (BMI 30-39.9) 02/20/2020   • Suicide attempt by drug ingestion ([facility]) 03/18/2020   • Obesity, Class III, BMI 40-49.9 (morbid obesity) ([facility]) 03/18/2020   • Fatty liver 01/18/2021   • Elevated lithium level 04/03/2021   • Polysubstance overdose 04/03/2021   • COVID-19 virus detected 04/03/2021   • Major depressive disorder, recurrent severe without psychotic features ([facility]) 04/27/2021   • History of COVID-19 04/27/2021     Resolved Ambulatory Problems     Diagnosis Date Noted   • No Resolved Ambulatory Problems     Past Medical History:   Diagnosis Date   • Anxiety    • Asthma    • Depression    • GERD (gastroesophageal reflux disease)    • Migraines    • Suicide attempt ([facility]) 04/02/2021         PAST SURGICAL HISTORY  Past Surgical History:   Procedure Laterality Date   • APPENDECTOMY      laparoscopic   •  CHOLECYSTECTOMY     • KNEE ARTHROSCOPY Left    • PATELLA FEMORAL LIGAMENT RECONSTRUCTION Left    • TONSILLECTOMY AND ADENOIDECTOMY           FAMILY HISTORY  Family History   Problem Relation Age of Onset   • Irritable bowel syndrome Mother    • Crohn's disease Brother    • Colon cancer Neg Hx    • Colon polyps Neg Hx    • Ulcerative colitis Neg Hx          SOCIAL HISTORY  Social History     Socioeconomic History   • Marital status: Single   Tobacco Use   • Smoking status: Never Smoker   • Smokeless tobacco: Never Used   Substance and Sexual Activity   • Alcohol use: Yes     Alcohol/week: 1.0 standard drink     Types: 1 Cans of beer per week     Comment: rare   • Drug use: No   • Sexual activity: Defer         ALLERGIES  Codeine, Dhea [nutritional supplements], Imitrex [sumatriptan], and Morphine        REVIEW OF SYSTEMS  Review of Systems   All systems reviewed and marked as negative except as listed in HPI       PHYSICAL EXAM    I have reviewed the triage vital signs and nursing notes.    ED Triage Vitals   Temp Heart Rate Resp BP SpO2   03/22/22 1421 03/22/22 1421 03/22/22 1421 03/22/22 1433 03/22/22 1421   98.3 °F (36.8 °C) 103 18 134/84 96 %      Temp src Heart Rate Source Patient Position BP Location FiO2 (%)   03/22/22 1421 03/22/22 1421 03/22/22 1433 03/22/22 1433 --   Tympanic Monitor Lying Right arm        GENERAL: alert well developed, well nourished in no distress  HENT: NCAT, neck supple, trachea midline  EYES: no scleral icterus, PERRL, normal conjunctivae  CV: regular rhythm, regular rate, no murmur  RESPIRATORY: unlabored effort, CTAB  ABDOMEN: soft, nontender, nondistended, bowel sounds present  : cervix is closed but erythematous and friable. Mucopurulent discharge noted. No unilateral tenderness or fullness. No CMT. External genitalia normal.   MUSCULOSKELETAL: no gross deformity  NEURO: alert,  sensory and motor function of extremities grossly intact, speech clear, mental status  normal/baseline  SKIN: warm, dry, no rash  PSYCH:  Appropriate mood and affect    Vital signs and nursing notes reviewed.          LAB RESULTS  Recent Results (from the past 24 hour(s))   Comprehensive Metabolic Panel    Collection Time: 03/22/22  2:45 PM    Specimen: Blood   Result Value Ref Range    Glucose 82 65 - 99 mg/dL    BUN 8 6 - 20 mg/dL    Creatinine 0.92 0.57 - 1.00 mg/dL    Sodium 136 136 - 145 mmol/L    Potassium 4.3 3.5 - 5.2 mmol/L    Chloride 102 98 - 107 mmol/L    CO2 22.0 22.0 - 29.0 mmol/L    Calcium 9.0 8.6 - 10.5 mg/dL    Total Protein 7.4 6.0 - 8.5 g/dL    Albumin 4.50 3.50 - 5.20 g/dL    ALT (SGPT) 24 1 - 33 U/L    AST (SGOT) 22 1 - 32 U/L    Alkaline Phosphatase 79 39 - 117 U/L    Total Bilirubin 0.2 0.0 - 1.2 mg/dL    Globulin 2.9 gm/dL    A/G Ratio 1.6 g/dL    BUN/Creatinine Ratio 8.7 7.0 - 25.0    Anion Gap 12.0 5.0 - 15.0 mmol/L    eGFR 86.6 >60.0 mL/min/1.73   hCG, Serum, Qualitative    Collection Time: 03/22/22  2:45 PM    Specimen: Blood   Result Value Ref Range    HCG Qualitative Negative Negative   Urinalysis With Microscopic If Indicated (No Culture) - Urine, Clean Catch    Collection Time: 03/22/22  2:45 PM    Specimen: Urine, Clean Catch   Result Value Ref Range    Color, UA Yellow Yellow, Straw    Appearance, UA Clear Clear    pH, UA 6.0 5.0 - 8.0    Specific Gravity, UA 1.021 1.005 - 1.030    Glucose, UA Negative Negative    Ketones, UA Negative Negative    Bilirubin, UA Negative Negative    Blood, UA Negative Negative    Protein, UA Negative Negative    Leuk Esterase, UA Moderate (2+) (A) Negative    Nitrite, UA Negative Negative    Urobilinogen, UA 0.2 E.U./dL 0.2 - 1.0 E.U./dL   Urinalysis, Microscopic Only - Urine, Clean Catch    Collection Time: 03/22/22  2:45 PM    Specimen: Urine, Clean Catch   Result Value Ref Range    RBC, UA None Seen None Seen, 0-2 /HPF    WBC, UA 21-30 (A) None Seen, 0-2 /HPF    Bacteria, UA None Seen None Seen /HPF    Squamous Epithelial Cells, UA  7-12 (A) None Seen, 0-2 /HPF    Yeast, UA Small/1+ Budding Yeast None Seen /HPF    Hyaline Casts, UA None Seen None Seen /LPF    Methodology Manual Light Microscopy    CBC Auto Differential    Collection Time: 03/22/22  3:19 PM    Specimen: Blood   Result Value Ref Range    WBC 11.64 (H) 3.40 - 10.80 10*3/mm3    RBC 4.51 3.77 - 5.28 10*6/mm3    Hemoglobin 12.9 12.0 - 15.9 g/dL    Hematocrit 38.8 34.0 - 46.6 %    MCV 86.0 79.0 - 97.0 fL    MCH 28.6 26.6 - 33.0 pg    MCHC 33.2 31.5 - 35.7 g/dL    RDW 14.3 12.3 - 15.4 %    RDW-SD 44.3 37.0 - 54.0 fl    MPV 10.0 6.0 - 12.0 fL    Platelets 328 140 - 450 10*3/mm3    Neutrophil % 55.4 42.7 - 76.0 %    Lymphocyte % 34.4 19.6 - 45.3 %    Monocyte % 7.8 5.0 - 12.0 %    Eosinophil % 1.1 0.3 - 6.2 %    Basophil % 0.9 0.0 - 1.5 %    Neutrophils, Absolute 6.45 1.70 - 7.00 10*3/mm3    Lymphocytes, Absolute 4.00 (H) 0.70 - 3.10 10*3/mm3    Monocytes, Absolute 0.91 (H) 0.10 - 0.90 10*3/mm3    Eosinophils, Absolute 0.13 0.00 - 0.40 10*3/mm3    Basophils, Absolute 0.10 0.00 - 0.20 10*3/mm3   Wet Prep, Genital - Swab, Vagina    Collection Time: 03/22/22  3:39 PM    Specimen: Vagina; Swab   Result Value Ref Range    YEAST 1+ Yeast seen (A) No yeast seen    HYPHAL ELEMENTS No Hyphal elements seen No Hyphal elements seen    WBC'S 4+ WBC's seen (A) No WBC's seen    Clue Cells, Wet Prep No Clue cells seen No Clue cells seen    Trichomonas, Wet Prep No Trichomonas seen No Trichomonas seen       Ordered the above labs and independently reviewed the results.            PROCEDURES    Procedures        MEDICATIONS GIVEN IN ER    Medications   ketorolac (TORADOL) injection 15 mg (15 mg Intravenous Given 3/22/22 1600)   cefTRIAXone (ROCEPHIN) 350 mg/ml in lidocaine 1% IM syringe (500 mg vial) (490 mg Intramuscular Given 3/22/22 1616)         PROGRESS, DATA ANALYSIS, CONSULTS, AND MEDICAL DECISION MAKING    All labs have been independently reviewed by me.  All radiology studies have been reviewed  by me.   EKG's independently reviewed by me.  Discussion below represents my analysis of pertinent findings related to patient's condition, differential diagnosis, treatment plan and final disposition.    DIFFERENTIAL DIAGNOSIS INCLUDE BUT NOT LIMITED TO: uti, std, pid, ovarian cyst, ovarian torsion, pregnancy     ED Course as of 03/22/22 1921   Tue Mar 22, 2022   1512 Leukocytes, UA(!): Moderate (2+) [JS]   1556 WBC, UA(!): 21-30 [JS]   1557 WBC(!): 11.64 [JS]   1624 WBC'S(!): 4+ WBC's seen [JS]   1624 YEAST(!): 1+ Yeast seen [JS]   1630 Medical record review, Miriam Newman Rx Keflex for UTI on 3/14/2022  [JS]      ED Course User Index  [JS] Adry Wallace APRN       MDM: Patient presents to ED with dysuria.  Her vaginal exam suggest vaginal source.  She does have yeast infection and will be prescribed Diflucan.  Additionally gonorrhea chlamydia testing has been sent and herpes.  She was treated preemptively with a dose of Rocephin and urine was cultured given dysuria although I suspect dysuria is vaginal etiology.  There are no signs clinically of PID or ovarian torsion.  She is not pregnant.  Overall she appears stable and appropriate for discharge from the ED.  Discussed we will call her with results and partners may need to be treated if she is positive.      BP - 112/57  HR - 98  TEMP - 98.3 °F (36.8 °C) (Tympanic)  O2 SATS - 98%         Medication List      New Prescriptions    fluconazole 150 MG tablet  Commonly known as: DIFLUCAN  Take 1 tablet by mouth Every 72 (Seventy-Two) Hours.           Where to Get Your Medications      These medications were sent to [facility] [place] [address] [place], [place] - [address] [facility] [address] - [phone]  - [phone]   [address] [facility] [address], [facility] [address]    Phone: [phone]   · fluconazole 150 MG tablet           DIAGNOSIS  Final diagnoses:   Cervicitis   Dysuria   Yeast vaginitis         DISPOSITION  Discharge     Pt masked in first look.  I wore appropriate PPE throughout my encounters with the pt. I performed hand hygiene on entry into the pt room and upon exit.     Dictated utilizing Dragon dictation:  Much of this encounter note is an electronic transcription/translation of spoken language to printed text.      Adry Wallace, APRN  03/22/22 1922

## 2022-03-23 LAB
C TRACH RRNA SPEC QL NAA+PROBE: NEGATIVE
N GONORRHOEA RRNA SPEC QL NAA+PROBE: NEGATIVE

## 2022-03-25 LAB — HSV SPEC CULT: NEGATIVE

## 2022-03-26 ENCOUNTER — APPOINTMENT (OUTPATIENT)
Dept: ULTRASOUND IMAGING | Facility: HOSPITAL | Age: 32
End: 2022-03-26

## 2022-03-26 ENCOUNTER — HOSPITAL ENCOUNTER (EMERGENCY)
Facility: HOSPITAL | Age: 32
Discharge: HOME | End: 2022-03-26
Attending: EMERGENCY MEDICINE | Admitting: EMERGENCY MEDICINE

## 2022-03-26 VITALS
TEMPERATURE: 97.4 F | SYSTOLIC BLOOD PRESSURE: 114 MMHG | HEART RATE: 75 BPM | RESPIRATION RATE: 18 BRPM | DIASTOLIC BLOOD PRESSURE: 72 MMHG | OXYGEN SATURATION: 99 %

## 2022-03-26 DIAGNOSIS — N83.202 CYST OF LEFT OVARY: ICD-10-CM

## 2022-03-26 DIAGNOSIS — R10.2 PELVIC PAIN: Primary | ICD-10-CM

## 2022-03-26 PROCEDURE — 76830 TRANSVAGINAL US NON-OB: CPT

## 2022-03-26 PROCEDURE — 96372 THER/PROPH/DIAG INJ SC/IM: CPT

## 2022-03-26 PROCEDURE — 76856 US EXAM PELVIC COMPLETE: CPT

## 2022-03-26 PROCEDURE — 25010000002 KETOROLAC TROMETHAMINE PER 15 MG: Performed by: EMERGENCY MEDICINE

## 2022-03-26 PROCEDURE — 99283 EMERGENCY DEPT VISIT LOW MDM: CPT

## 2022-03-26 PROCEDURE — 93976 VASCULAR STUDY: CPT

## 2022-03-26 RX ORDER — HYDROCODONE BITARTRATE AND ACETAMINOPHEN 5; 325 MG/1; MG/1
1 TABLET ORAL EVERY 6 HOURS PRN
Qty: 12 TABLET | Refills: 0 | Status: SHIPPED | OUTPATIENT
Start: 2022-03-26 | End: 2022-07-12

## 2022-03-26 RX ORDER — KETOROLAC TROMETHAMINE 30 MG/ML
60 INJECTION, SOLUTION INTRAMUSCULAR; INTRAVENOUS ONCE
Status: COMPLETED | OUTPATIENT
Start: 2022-03-26 | End: 2022-03-26

## 2022-03-26 RX ORDER — ONDANSETRON 4 MG/1
4 TABLET, ORALLY DISINTEGRATING ORAL 4 TIMES DAILY PRN
Qty: 12 TABLET | Refills: 0 | Status: SHIPPED | OUTPATIENT
Start: 2022-03-26 | End: 2022-10-19

## 2022-03-26 RX ADMIN — KETOROLAC TROMETHAMINE 60 MG: 30 INJECTION, SOLUTION INTRAMUSCULAR; INTRAVENOUS at 17:58

## 2022-03-26 NOTE — ED PROVIDER NOTES
EMERGENCY DEPARTMENT ENCOUNTER    Room Number:  13/13  Date of encounter:  3/26/2022  PCP: Miriam Newman  Patient Care Team:  Miriam Newman as PCP - General  Joy Castro APRN as Nurse Practitioner (Gastroenterology)  Quinton Funes MD as Consulting Physician (Gastroenterology)   Historian: Patient    HPI:  Chief Complaint: Suprapubic pain  A complete HPI/ROS/PMH/PSH/SH/FH are unobtainable due to: Nothing    Context: Maame Wallace is a 31 y.o. female who presents to the ED c/o lower abdominal and suprapubic pain for the last 2 weeks.  She reports 2 weeks ago she was diagnosed with UTI.  She reports she has been on 2 different antibiotics since then.  She states that she was seen here 4 days ago for worsened pain.  She had a pelvic exam, urinalysis, blood work.  She states she has been taking ibuprofen without improvement for her pain since then.  Nothing makes it worse or better.  She reports she was told that her cervix appeared irritated on exam 4 days ago.  She reports that she took 2 doses of fluconazole as prescribed.  She reports a history of an appendectomy and a cholecystectomy.    Prior record review: ER visit 3/22/2022.  Unremarkable chemistries, blood counts, urinalysis, chlamydia and gonorrhea screen.  Positive yeast infection.    PAST MEDICAL HISTORY  Active Ambulatory Problems     Diagnosis Date Noted   • Hemiplegic migraine, intractable 04/25/2016   • Fibromyalgia 04/29/2016   • Major depressive disorder, recurrent, severe without psychotic features ([facility]) 02/19/2020   • Vaginal yeast infection 02/20/2020   • Leukocytosis 02/20/2020   • Hyponatremia 02/20/2020   • Hypothyroidism 02/20/2020   • Irritable bowel disease 02/20/2020   • Obesity (BMI 30-39.9) 02/20/2020   • Suicide attempt by drug ingestion ([facility]) 03/18/2020   • Obesity, Class III, BMI 40-49.9 (morbid obesity) ([facility]) 03/18/2020   • Fatty liver 01/18/2021   • Elevated lithium level 04/03/2021   • Polysubstance overdose  04/03/2021   • COVID-19 virus detected 04/03/2021   • Major depressive disorder, recurrent severe without psychotic features ([facility]) 04/27/2021   • History of COVID-19 04/27/2021     Resolved Ambulatory Problems     Diagnosis Date Noted   • No Resolved Ambulatory Problems     Past Medical History:   Diagnosis Date   • Anxiety    • Asthma    • Depression    • GERD (gastroesophageal reflux disease)    • Migraines    • Suicide attempt ([facility]) 04/02/2021       The patient has a COVID HM Topic on their chart, and they are fully vaccinated.    PAST SURGICAL HISTORY  Past Surgical History:   Procedure Laterality Date   • APPENDECTOMY      laparoscopic   • CHOLECYSTECTOMY     • KNEE ARTHROSCOPY Left    • PATELLA FEMORAL LIGAMENT RECONSTRUCTION Left    • TONSILLECTOMY AND ADENOIDECTOMY           FAMILY HISTORY  Family History   Problem Relation Age of Onset   • Irritable bowel syndrome Mother    • Crohn's disease Brother    • Colon cancer Neg Hx    • Colon polyps Neg Hx    • Ulcerative colitis Neg Hx          SOCIAL HISTORY  Social History     Socioeconomic History   • Marital status: Single   Tobacco Use   • Smoking status: Never Smoker   • Smokeless tobacco: Never Used   Substance and Sexual Activity   • Alcohol use: Yes     Alcohol/week: 1.0 standard drink     Types: 1 Cans of beer per week     Comment: rare   • Drug use: No   • Sexual activity: Defer         ALLERGIES  Codeine, Dhea [nutritional supplements], Imitrex [sumatriptan], and Morphine        REVIEW OF SYSTEMS  Review of Systems   No chest pain, no shortness of breath, no nausea, no vomiting, no diarrhea, no fever, no chills, no vaginal discharge, no dysuria  All systems reviewed and negative except for those discussed in HPI.       PHYSICAL EXAM    I have reviewed the triage vital signs and nursing notes.    ED Triage Vitals [03/26/22 1737]   Temp Heart Rate Resp BP SpO2   97.4 °F (36.3 °C) 89 18 -- 99 %      Temp src Heart Rate Source Patient Position BP Location  FiO2 (%)   -- -- -- -- --       Physical Exam  GENERAL: Awake, alert, no acute distress  SKIN: Warm, dry  HENT: Normocephalic, atraumatic  EYES: no scleral icterus  CV: regular rhythm, regular rate  RESPIRATORY: normal effort, lungs clear  ABDOMEN: soft, mild suprapubic tenderness, more pronounced on the left, nondistended  MUSCULOSKELETAL: no deformity  NEURO: alert, moves all extremities, follows commands          LAB RESULTS  No results found for this or any previous visit (from the past 24 hour(s)).    Ordered the above labs and independently reviewed the results.        RADIOLOGY  No Radiology Exams Resulted Within Past 24 Hours    I ordered the above noted radiological studies. Reviewed by me and discussed with radiologist.  See dictation for official radiology interpretation.      PROCEDURES    Procedures      MEDICATIONS GIVEN IN ER    Medications   ketorolac (TORADOL) injection 60 mg (60 mg Intramuscular Given 3/26/22 1758)         PROGRESS, DATA ANALYSIS, CONSULTS, AND MEDICAL DECISION MAKING    All labs have been independently reviewed by me.  All radiology studies have been reviewed by me and discussed with radiologist dictating the report.   EKG's independently viewed and interpreted by me.  Discussion below represents my analysis of pertinent findings related to patient's condition, differential diagnosis, treatment plan and final disposition.    Differential diagnosis includes but is not limited to STD, UTI, pyelonephritis, kidney stone, ovarian cyst, ovarian torsion, pelvic congestion syndrome, endometriosis.    ED Course as of 03/26/22 1925   Sat Mar 26, 2022   1802 The patient had a fairly complete work-up 4 days ago.  She is not pregnant.  Plan to ultrasound her pelvis to rule out ovarian torsion, ovarian cyst, other causes of lower abdominal pain.  She has no appendix or gallbladder to be the cause of her pain.  I do not feel that CT would likely reveal a cause given that she has no nausea or  diarrhea and given her surgical history.  I do not feel that repeating her laboratory evaluation or pelvic exam will be diagnostic for her today.  She is agreeable with this. [TR]   1846 Speaking with the ultrasound technician.  She has a complex ovarian cyst.  No evidence of torsion. [TR]   1923 I reviewed work-up and findings with the patient at the bedside.  Answered all questions.  She has a ovarian cyst that needs outpatient follow-up.  She has follow-up with her gynecologist this coming Monday.  She does not have much pain improvement with Toradol.  Her prior drug screens have been positive for methadone but she states she has never been on methadone.  She states one of her psychiatric drugs always gave a false positive for methadone.  Plan to give her couple days of pain medicine to help her until Monday. [TR]      ED Course User Index  [TR] Mike Muniz MD           PPE: The patient wore a mask and I wore an N95 mask throughout the entire patient encounter.       AS OF 19:25 EDT VITALS:    BP - 114/72  HR - 75  TEMP - 97.4 °F (36.3 °C)  O2 SATS - 99%        DIAGNOSIS  Final diagnoses:   Pelvic pain   Cyst of left ovary         DISPOSITION  ED Disposition     ED Disposition   Discharge    Condition   Stable    Comment   --                   Mike Muniz MD  03/26/22 1925

## 2022-03-26 NOTE — DISCHARGE INSTRUCTIONS
Follow-up with your gynecologist on Monday as scheduled.  Take medication for pain as prescribed.  Return here for worsening pain, bleeding.

## 2022-03-26 NOTE — ED TRIAGE NOTES
Pt reports lower abd. Pain and lower back pain started Tuesday afternoon was seen in ER then and discharged states everything had come back negative. Pt was treated for UTI on march 11. Pt states pain is more severe today.

## 2022-07-12 ENCOUNTER — PREP FOR SURGERY (OUTPATIENT)
Dept: SURGERY | Facility: SURGERY CENTER | Age: 32
End: 2022-07-12

## 2022-07-12 ENCOUNTER — OFFICE VISIT (OUTPATIENT)
Dept: GASTROENTEROLOGY | Facility: CLINIC | Age: 32
End: 2022-07-12

## 2022-07-12 VITALS
BODY MASS INDEX: 42.12 KG/M2 | HEART RATE: 94 BPM | DIASTOLIC BLOOD PRESSURE: 88 MMHG | OXYGEN SATURATION: 98 % | TEMPERATURE: 98.4 F | HEIGHT: 66 IN | SYSTOLIC BLOOD PRESSURE: 120 MMHG | WEIGHT: 262.1 LBS

## 2022-07-12 DIAGNOSIS — K21.9 GASTROESOPHAGEAL REFLUX DISEASE WITHOUT ESOPHAGITIS: ICD-10-CM

## 2022-07-12 DIAGNOSIS — R10.32 LEFT LOWER QUADRANT ABDOMINAL PAIN: Primary | ICD-10-CM

## 2022-07-12 DIAGNOSIS — R12 HEARTBURN: ICD-10-CM

## 2022-07-12 PROCEDURE — 99214 OFFICE O/P EST MOD 30 MIN: CPT | Performed by: NURSE PRACTITIONER

## 2022-07-12 RX ORDER — ONDANSETRON 4 MG/1
TABLET, FILM COATED ORAL
COMMUNITY
Start: 2021-04-07 | End: 2022-07-12

## 2022-07-12 RX ORDER — LEVOTHYROXINE SODIUM 88 UG/1
88 TABLET ORAL
COMMUNITY
Start: 2021-04-07

## 2022-07-12 RX ORDER — SODIUM CHLORIDE, SODIUM LACTATE, POTASSIUM CHLORIDE, CALCIUM CHLORIDE 600; 310; 30; 20 MG/100ML; MG/100ML; MG/100ML; MG/100ML
30 INJECTION, SOLUTION INTRAVENOUS CONTINUOUS PRN
Status: CANCELLED | OUTPATIENT
Start: 2022-07-12

## 2022-07-12 RX ORDER — SODIUM CHLORIDE 0.9 % (FLUSH) 0.9 %
10 SYRINGE (ML) INJECTION AS NEEDED
Status: CANCELLED | OUTPATIENT
Start: 2022-07-12

## 2022-07-12 RX ORDER — ALBUTEROL SULFATE 90 UG/1
2 AEROSOL, METERED RESPIRATORY (INHALATION)
COMMUNITY
Start: 2022-07-11 | End: 2022-10-19

## 2022-07-12 RX ORDER — ONDANSETRON 4 MG/1
TABLET, ORALLY DISINTEGRATING ORAL
COMMUNITY
End: 2022-10-19

## 2022-07-12 RX ORDER — ONDANSETRON 8 MG/1
TABLET, ORALLY DISINTEGRATING ORAL
COMMUNITY
Start: 2022-04-07 | End: 2022-07-12

## 2022-07-12 RX ORDER — FAMOTIDINE 20 MG/1
20 TABLET, FILM COATED ORAL DAILY
Qty: 30 TABLET | Refills: 2 | Status: SHIPPED | OUTPATIENT
Start: 2022-07-12 | End: 2022-11-02 | Stop reason: SDUPTHER

## 2022-07-12 RX ORDER — BUPROPION HYDROCHLORIDE 300 MG/1
300 TABLET ORAL EVERY MORNING
COMMUNITY
Start: 2022-06-23

## 2022-07-12 RX ORDER — DICYCLOMINE HCL 20 MG
20 TABLET ORAL 4 TIMES DAILY
Qty: 120 TABLET | Refills: 5 | Status: SHIPPED | OUTPATIENT
Start: 2022-07-12 | End: 2022-10-19

## 2022-07-12 RX ORDER — SODIUM CHLORIDE 0.9 % (FLUSH) 0.9 %
3 SYRINGE (ML) INJECTION EVERY 12 HOURS SCHEDULED
Status: CANCELLED | OUTPATIENT
Start: 2022-07-12

## 2022-07-12 RX ORDER — VORTIOXETINE 20 MG/1
TABLET, FILM COATED ORAL
COMMUNITY
End: 2022-07-12

## 2022-07-12 RX ORDER — LEVOCETIRIZINE DIHYDROCHLORIDE 5 MG/1
1 TABLET, FILM COATED ORAL
COMMUNITY

## 2022-07-12 NOTE — PROGRESS NOTES
Chief Complaint   Patient presents with   • Abdominal Pain         History of Present Illness  31-year-old female presents today for follow-up.  Last visit February 16, 2021.  She has a history of fatty liver, elevated liver function test, retained fluid on EGD, normal gastric emptying study and IBS.  She has a history of cholecystectomy and appendectomy.    She presents today with lower left sided abdominal pain. This started March 2022.  Pain has been constant ranging from a 5-9 out of 10.  Pain is described as a cramping and dull pressure.  Pain does not radiate.    When pain first started she was initially diagnosed with UTI then thought to be pelvic/GYN pain. She has had three ED visits due to the is pain. No pattern or relation to BM.  She can experience pain with intercourse.    She underwent diagnostic lap with GYN this year for ongoing pelvic pain with no cause for symptoms noted and recommended to follow up with gastroenterology.     She has had worsening heartburn and reflux for the past 12 months, daily. No pattern. No specific food triggers. She has epigastric burning and indigestion. No pain with swallowing. No relation to oral intake.  She has used famotidine in the past with PPI therapy with improvement in symptoms.    She is taking pantoprazole 40 mg once daily. She has been on this for the past year, switched from omemprazole to pantoprazole one year ago with no improvement in symptoms.     Occasional dry bulky foods feel stuck No forced regurgitation.     She has tried  Narcotic pain Rx for this pain which does not alleviate pain but does make her somnolent.    No associated weight loss.    She does not have diagnosis of PCOS.    Review of Systems      Result Review :       ENDOSCOPY, INT (12/21/2020)   ED Provider Notes by Mike Muniz MD (03/26/2022 17:57)   ED Provider Notes by Adry Wallace APRN (03/22/2022 15:52)   ED Provider Notes by Xavier Ivan MD (03/22/2022 16:57)  "    Vital Signs:   /88   Pulse 94   Temp 98.4 °F (36.9 °C)   Ht 167.6 cm (66\")   Wt 119 kg (262 lb 1.6 oz)   SpO2 98%   BMI 42.30 kg/m²     Body mass index is 42.3 kg/m².     Physical Exam  Vitals reviewed.   Constitutional:       General: She is not in acute distress.     Appearance: Normal appearance. She is not ill-appearing, toxic-appearing or diaphoretic.   Pulmonary:      Effort: Pulmonary effort is normal. No respiratory distress.   Abdominal:      General: There is no distension.      Palpations: Abdomen is soft.      Tenderness: There is abdominal tenderness (Left suprapubic pelvic pain with deep palpation). There is no guarding.   Neurological:      Mental Status: She is alert.           Assessment and Plan    Diagnoses and all orders for this visit:    1. Left lower quadrant abdominal pain (Primary)  -     dicyclomine (BENTYL) 20 MG tablet; Take 1 tablet by mouth 4 (Four) Times a Day.  Dispense: 120 tablet; Refill: 5    2. Heartburn  -     famotidine (PEPCID) 20 MG tablet; Take 1 tablet by mouth Daily.  Dispense: 30 tablet; Refill: 2       Worsening acid reflux over the past year despite change in PPI therapy.  Will add Pepcid to pantoprazole.    Unclear etiology of left suprapubic pelvic lower quadrant pain.  Will start dicyclomine up to 4 pills daily, strongly recommend 2 to 3 pills regularly and a fourth dose if needed.    Esophagitis and gastritis on EGD December 2022, will proceed with repeat EGD given worsening acid reflux.  We will add colonoscopy at the same time however if symptoms improve with dicyclomine, patient is agreeable to contact the office and we can cancel colonoscopy.          Patient Instructions   Add famotidine 20 mg once daily    Continue pantoprazole    Start dicyclomine up to 4 times daily    Schedule EGD and colonoscopy, orders placed.    Additional recommendations will be made based on results of EGD and colonoscopy findings.    Follow-up visit after procedures to " discuss results and make any additional recommendations.     Contact office in 3 weeks with update.           EMR Dragon/Transcription Disclaimer:  This document has been Dictated utilizing Dragon dictation.

## 2022-07-12 NOTE — PATIENT INSTRUCTIONS
Add famotidine 20 mg once daily    Continue pantoprazole    Start dicyclomine up to 4 times daily    Schedule EGD and colonoscopy, orders placed.    Additional recommendations will be made based on results of EGD and colonoscopy findings.    Follow-up visit after procedures to discuss results and make any additional recommendations.     Contact office in 3 weeks with update.

## 2022-09-30 ENCOUNTER — OUTSIDE FACILITY SERVICE (OUTPATIENT)
Dept: GASTROENTEROLOGY | Facility: CLINIC | Age: 32
End: 2022-09-30

## 2022-09-30 PROCEDURE — 45385 COLONOSCOPY W/LESION REMOVAL: CPT | Performed by: INTERNAL MEDICINE

## 2022-09-30 PROCEDURE — 43239 EGD BIOPSY SINGLE/MULTIPLE: CPT | Performed by: INTERNAL MEDICINE

## 2022-09-30 PROCEDURE — 45380 COLONOSCOPY AND BIOPSY: CPT | Performed by: INTERNAL MEDICINE

## 2022-10-19 ENCOUNTER — OFFICE VISIT (OUTPATIENT)
Dept: GASTROENTEROLOGY | Facility: CLINIC | Age: 32
End: 2022-10-19

## 2022-10-19 VITALS
HEIGHT: 66 IN | SYSTOLIC BLOOD PRESSURE: 118 MMHG | WEIGHT: 263.5 LBS | HEART RATE: 79 BPM | TEMPERATURE: 97.9 F | BODY MASS INDEX: 42.35 KG/M2 | DIASTOLIC BLOOD PRESSURE: 74 MMHG | OXYGEN SATURATION: 98 %

## 2022-10-19 DIAGNOSIS — R12 HEARTBURN: Primary | ICD-10-CM

## 2022-10-19 DIAGNOSIS — R10.2 PELVIC PAIN IN FEMALE: ICD-10-CM

## 2022-10-19 PROCEDURE — 99214 OFFICE O/P EST MOD 30 MIN: CPT | Performed by: NURSE PRACTITIONER

## 2022-10-19 RX ORDER — GALCANEZUMAB 120 MG/ML
INJECTION, SOLUTION SUBCUTANEOUS
COMMUNITY
Start: 2022-09-27

## 2022-10-19 RX ORDER — POLYETHYLENE GLYCOL 3350 17 G/17G
POWDER, FOR SOLUTION ORAL
COMMUNITY
Start: 2022-05-17 | End: 2022-10-19

## 2022-10-19 RX ORDER — LANSOPRAZOLE 30 MG/1
30 CAPSULE, DELAYED RELEASE ORAL 2 TIMES DAILY
Qty: 60 CAPSULE | Refills: 4 | Status: SHIPPED | OUTPATIENT
Start: 2022-10-19

## 2022-10-19 RX ORDER — ACETAZOLAMIDE 500 MG/1
CAPSULE, EXTENDED RELEASE ORAL
COMMUNITY
Start: 2022-10-18

## 2022-10-19 RX ORDER — ALBUTEROL SULFATE 90 UG/1
AEROSOL, METERED RESPIRATORY (INHALATION) EVERY 6 HOURS SCHEDULED
COMMUNITY

## 2022-10-19 NOTE — PROGRESS NOTES
"Chief Complaint   Patient presents with   • Follow-up     Review EGD and colonoscopy, abdominal pain and heartburn           History of Present Illness  31-year-old female presents today for follow-up.  Last visit July 12, 2022.  She presents today for follow-up after EGD and colonoscopy September 30, 2022.    She was on and continues on pantoprazole 40 mg once daily prior to EGD.  She has been on this for the past year.  She was on omeprazole previously.  When switched from omeprazole to pantoprazole no improvement in symptoms.    Previous EGD December 2020 with grade a esophagitis.    She is taking pantoprazole and Pepcid daily but still has breakthrough symptoms of acid reflux daily.  She avoids spicy foods.  She feels acid reflux is not well controlled.      Implanted BC was removed and diarrhea resolved, now BM are back to baseline, 1-3 times per day, feels empty, no blood or dark stools.    Still has left lower pelvic pain. Pain started March 2022, prior to BC placement. GYN eval and CT scan unremarkable. Did not improve with dicyclomine. Pain is present daily and can be more severe at times. NO improvement after recent bowel prep. Pain is most often 4-5/10.  Pain is more pronounced during her menstrual cycle.  She is unable to say if pain is more pronounced at the time of ovulation.      Review of Systems      Result Review :       SCANNED - COLONOSCOPY (09/30/2022)   SCANNED PATHOLOGY (09/30/2022)   SCANNED PATHOLOGY (09/30/2022)     Vital Signs:   /74   Pulse 79   Temp 97.9 °F (36.6 °C)   Ht 167.6 cm (66\")   Wt 120 kg (263 lb 8 oz)   SpO2 98%   BMI 42.53 kg/m²     Body mass index is 42.53 kg/m².     Physical Exam  Vitals reviewed.   Constitutional:       General: She is not in acute distress.     Appearance: Normal appearance. She is not ill-appearing.   Pulmonary:      Effort: Pulmonary effort is normal.   Abdominal:      General: Abdomen is flat. There is no distension.      Palpations: " Abdomen is soft.      Tenderness: There is abdominal tenderness (left sided deep pelvic pain suprapubic ). There is no guarding.   Neurological:      Mental Status: She is alert.       Assessment and Plan    Diagnoses and all orders for this visit:    1. Heartburn (Primary)  -     lansoprazole (PREVACID) 30 MG capsule; Take 1 capsule by mouth 2 (Two) Times a Day.  Dispense: 60 capsule; Refill: 4    2. Pelvic pain in female       Reviewed recent EGD and colonoscopy.  Recommend strict reflux precautions.  As she has had grade a esophagitis on EGD in 2020 and grade B esophagitis on most recent EGD despite daily pantoprazole, will stop pantoprazole and start lansoprazole 30 mg twice daily.  Encourage patient to contact the office with an update in 8 weeks.  If acid reflux is better with twice daily lansoprazole, to consider tapering off of second dose as her goal is to use lowest dose of medication possible.  Patient is agreeable to contact the office with a MyChart update in 8 weeks.  If acid reflux symptoms are not controlled, to consider longer course of twice daily dosing versus switching to Dexilant.    Patient with suprapubic left-sided pelvic pain, this did not improve with bowel cleanse, no relation to bowel movements, no improvement with dicyclomine.  Unclear etiology, suspect GYN source although patient has had GYN evaluation that has been unremarkable.  Encourage patient to keep track of pain and menstrual cycles and ovulation and discuss at GYN follow-up later this year.    Follow-up visit in 6 months, sooner if symptoms change or condition warrants.          Patient Instructions   For GERD, follow antireflux precautions.  Recommend avoiding eating within 3 to 4 hours of bedtime.  Avoid foods that can trigger symptoms which may include citrus fruits, spicy foods, tomatoes and red sauces, chocolate, coffee/tea, caffeinated or carbonated beverages, alcohol.     Stop pantoprazole    Start lansoprazole twice  daily for 8 weeks, then send MyChart update in 8 weeks, to consider decreasing dose if symptoms are well controlled    Continue Pepcid daily and to consider as needed    Track pelvic pain with menstrual symptoms and discuss with UroGYN at follow up later this year          EMR Dragon/Transcription Disclaimer:  This document has been Dictated utilizing Dragon dictation.

## 2022-10-19 NOTE — PATIENT INSTRUCTIONS
For GERD, follow antireflux precautions.  Recommend avoiding eating within 3 to 4 hours of bedtime.  Avoid foods that can trigger symptoms which may include citrus fruits, spicy foods, tomatoes and red sauces, chocolate, coffee/tea, caffeinated or carbonated beverages, alcohol.     Stop pantoprazole    Start lansoprazole twice daily for 8 weeks, then send MyChart update in 8 weeks, to consider decreasing dose if symptoms are well controlled    Continue Pepcid daily and to consider as needed    Track pelvic pain with menstrual symptoms and discuss with UroGYN at follow up later this year

## 2022-11-02 DIAGNOSIS — R12 HEARTBURN: ICD-10-CM

## 2022-11-02 RX ORDER — FAMOTIDINE 20 MG/1
20 TABLET, FILM COATED ORAL DAILY
Qty: 30 TABLET | Refills: 2 | Status: SHIPPED | OUTPATIENT
Start: 2022-11-02 | End: 2023-01-24

## 2023-01-24 DIAGNOSIS — R12 HEARTBURN: ICD-10-CM

## 2023-01-24 RX ORDER — FAMOTIDINE 20 MG/1
TABLET, FILM COATED ORAL
Qty: 90 TABLET | Refills: 1 | Status: SHIPPED | OUTPATIENT
Start: 2023-01-24

## 2023-07-29 DIAGNOSIS — R12 HEARTBURN: ICD-10-CM

## 2023-07-31 RX ORDER — LANSOPRAZOLE 30 MG/1
CAPSULE, DELAYED RELEASE ORAL
Qty: 60 CAPSULE | Refills: 3 | Status: SHIPPED | OUTPATIENT
Start: 2023-07-31

## 2023-08-16 ENCOUNTER — OFFICE VISIT (OUTPATIENT)
Dept: NEUROLOGY | Facility: CLINIC | Age: 33
End: 2023-08-16
Payer: COMMERCIAL

## 2023-08-16 VITALS
DIASTOLIC BLOOD PRESSURE: 78 MMHG | BODY MASS INDEX: 41.3 KG/M2 | OXYGEN SATURATION: 97 % | SYSTOLIC BLOOD PRESSURE: 126 MMHG | HEIGHT: 66 IN | WEIGHT: 257 LBS | HEART RATE: 100 BPM

## 2023-08-16 DIAGNOSIS — G43.709 CHRONIC MIGRAINE W/O AURA, NOT INTRACTABLE, W/O STAT MIGR: Primary | ICD-10-CM

## 2023-08-16 DIAGNOSIS — G93.2 BIH (BENIGN INTRACRANIAL HYPERTENSION): ICD-10-CM

## 2023-08-16 DIAGNOSIS — G93.5 CHIARI I MALFORMATION: ICD-10-CM

## 2023-08-16 RX ORDER — OLANZAPINE AND FLUOXETINE HYDROCHLORIDE 6; 50 MG/1; MG/1
CAPSULE ORAL
COMMUNITY

## 2023-08-16 RX ORDER — CYANOCOBALAMIN 1000 UG/ML
1000 INJECTION, SOLUTION INTRAMUSCULAR; SUBCUTANEOUS
COMMUNITY
Start: 2023-06-09

## 2023-08-16 RX ORDER — ALPRAZOLAM 0.5 MG/1
0.5 TABLET ORAL 2 TIMES DAILY PRN
COMMUNITY

## 2023-08-16 RX ORDER — TRAMADOL HYDROCHLORIDE 50 MG/1
TABLET ORAL
COMMUNITY
Start: 2023-05-16

## 2023-08-16 RX ORDER — ONDANSETRON 4 MG/1
4 TABLET, FILM COATED ORAL
COMMUNITY
Start: 2023-04-12

## 2023-08-16 RX ORDER — TOPIRAMATE 25 MG
25 TABLET ORAL
COMMUNITY
Start: 2023-05-16 | End: 2023-08-16

## 2023-08-16 RX ORDER — TOPIRAMATE 50 MG/1
50 TABLET, FILM COATED ORAL 2 TIMES DAILY
Qty: 180 TABLET | Refills: 1 | Status: SHIPPED | OUTPATIENT
Start: 2023-08-16 | End: 2024-02-12

## 2023-08-16 RX ORDER — GALCANEZUMAB 120 MG/ML
120 INJECTION, SOLUTION SUBCUTANEOUS
Qty: 1 ML | Refills: 5 | Status: SHIPPED | OUTPATIENT
Start: 2023-08-16 | End: 2024-02-12

## 2023-08-16 RX ORDER — CETIRIZINE HYDROCHLORIDE 10 MG/1
10 TABLET ORAL DAILY
COMMUNITY

## 2023-08-16 RX ORDER — RIMEGEPANT SULFATE 75 MG/75MG
75 TABLET, ORALLY DISINTEGRATING ORAL AS NEEDED
Qty: 8 TABLET | Refills: 5 | Status: SHIPPED | OUTPATIENT
Start: 2023-08-16 | End: 2024-02-12

## 2023-08-16 NOTE — PROGRESS NOTES
"[facility] NEUROLOGY         Date of Visit: 2023    Name: Maame Wallace    :  1990    PCP: Sirisha Aparicio MD    Visit Type: an initial evaluation         Subjective     Patient ID: Maame DONOVAN" is a 32 y.o. female.         History of Present Illness  I had the pleasure of seeing your patient today. As you may know she is a 32-year-old female here today for follow-up of migraine headache with history of Chiari malformation and previous diagnosis of benign intracranial hypertension. She was previously seen by [place] neurolog[facility].    History:    Patient states she has dealt with migraine headaches as well as diagnosis of Chiari I malformation and history of possible benign intracranial hypertension for many years. She states headache started as a child and increased in severity and frequency throughout her teenage and young adult years.    Patient was initially diagnosed with benign intracranial hypertension at the age of 18 or 19. She did have a repeat lumbar puncture done in  with an opening pressure of 18. At that time she also had MRI of the brain which showed evidence for a Chiari I malformation with approximately a 6 mm herniation of the cerebellar tonsils.. A MRI C-spine flow study was completed at that time which showed some mild flow limitation in the posterior circulation but no other flow limitation was noted.    Patient states that at that time they did try to treat her headaches with prophylactic medications. She has been on topiramate, Depakote, Cymbalta, amitriptyline, gabapentin, propranolol, and Botox for migraine prevention in the past all with little improvement in headache symptoms. She had significant side effects to topiramate and Cymbalta in the past as well. She is also tried several abortive therapies including sumatriptan, rizatriptan, eletripitan all with worsening headache symptoms from medication.    Patient states that in addition to this she " was sent for evaluation by neurosurgery and was about to undergo surgical decompression however was lost to follow-up and headache symptoms improved so she did not pursue any additional intervention at this time.    On 8/23/2022 patient did end up receiving repeat MRI brain and cervical spine. MRI brain and cervical did show a mild tonsillar herniation with approximately 4 to 5 mm in depth. It was not causing significant crowding with no syrinx noted in the cervical spinal cord. There was however increased Meckel's cave prominence typically associated with increased intracranial hypertension. There was also some white matter changes noted. There is also mild degenerative changes noted in the cervical spine with no other significant abnormalities.    And did end up following up with neurosurgery they did find mild Chiari malformation as well as pseudotumor's type symptoms. Because of this they wanted to try and see if symptoms improved with Diamox. They were going to meet again in a month to discuss possible decompressive surgery versus shunt procedure depending on how patient was doing.    Patient did undergo chiari decompression in January 2023 with Dr Vallejo. She was evaluated by Dr Vallejo the beginning of April where he recommended an ophthalmology evaluation, which she is unable to obtain until June. He did end up seeing ophthalmology over the weekend when she was in the hospital which showed no optic nerve swelling. She is scheduled to see the [facility] eye clinic in 2 weeks.. She underwent an LP on 4/12, opening pressure 26, 20ml drained, closing pressure 15, and this did not help. She underwent venous manometry with Dr Ortega on 4/14, which was normal. She did end up again in the hospital for headaches in May due to severity of headache symptoms. Her evaluation with ophthalmology was also normal. They did end up giving her several medications and headache cocktail which did reduce the severity of symptoms however  patient is still experiencing frequent headache.     Patient did end up receiving her first Botox injection on 7/6/2023 for chronic migraine headache symptoms.    She then subsequently underwent additional decompressive surgery in [place] on 7/24/2023.  Postoperative MRI was stable.  She does have follow-up in September as they are also concerned for possible tethered cord syndrome and may consider surgical intervention for this.    Current:    Patient states that between the Botox and the second decompressive surgery she actually has had a significant decrease in migraine headache.  She is also having light less light sensitivity.  She does still have mild daily headache however it is less severe.  She also has some mild occasional dizziness episodes however they told her that after the cauterization of her cerebellar tonsils this was normal postsurgical.  She states that overall she feels much better than she did prior to her first decompressive surgery.  She states that she feels that the Botox was helpful however did take approximately a week to start helping improving symptoms.  She is still also taking Emgality for prophylaxis.  In addition to this she uses Nurtec for abortive treatment.  She also takes topiramate 50 mg twice daily to help with the symptoms of pseudotumor.  No other new neurological complaints at today's visit.  See headache questionnaire dated 8/16/2023 for additional information.          The following portions of the patient's history were reviewed and updated as appropriate: allergies, current medications, past family history, past medical history, past social history, past surgical history, and problem list.                 Review of Systems         Current Medications:    Current Outpatient Medications   Medication Instructions    albuterol sulfate  (90 Base) MCG/ACT inhaler Every 6 Hours Scheduled    ALPRAZolam (XANAX) 0.5 mg, Oral, 2 Times Daily PRN    buPROPion XL (WELLBUTRIN  "XL) 300 mg, Oral, Every Morning    cetirizine (ZYRTEC) 10 mg, Oral, Daily    Citalopram Hydrobromide (CELEXA PO) 30 mg, Oral, Daily    cyanocobalamin 1,000 mcg, Subcutaneous    diclofenac (VOLTAREN) 50 mg, Oral    Emgality 120 mg, Subcutaneous, Every 30 Days    famotidine (PEPCID) 20 MG tablet TAKE 1 TABLET BY MOUTH EVERY DAY    lansoprazole (PREVACID) 30 MG capsule 1 CAPSULE BY MOUTH TWICE DAILY    levocetirizine (XYZAL) 5 MG tablet 1 tablet, Oral    levothyroxine (SYNTHROID, LEVOTHROID) 88 mcg    Nurtec 75 mg, Oral, As Needed    ondansetron (ZOFRAN) 4 mg    Symbyax 6-50 MG per capsule     topiramate (TOPAMAX) 50 mg, Oral, 2 Times Daily    traMADol (ULTRAM) 50 MG tablet           /78   Pulse 100   Ht 167.6 cm (66\")   Wt 117 kg (257 lb)   SpO2 97%   BMI 41.48 kg/mý                Objective     Neurological Exam  Mental Status  Awake, alert and oriented to person, place and time. Recent and remote memory are intact. Speech is normal. Language is fluent with no aphasia. Attention and concentration are normal.    Cranial Nerves  CN II: Visual acuity is normal. Visual fields full to confrontation.  CN III, IV, VI: Extraocular movements intact bilaterally. Normal lids and orbits bilaterally. Pupils equal round and reactive to light bilaterally.  CN V: Facial sensation is normal.  CN VII: Full and symmetric facial movement.  CN IX, X: Palate elevates symmetrically  CN XI: Shoulder shrug strength is normal.  CN XII: Tongue midline without atrophy or fasciculations.    Motor  Normal muscle bulk throughout. Normal muscle tone. No abnormal involuntary movements. Strength is 5/5 throughout all four extremities.    Sensory  Sensation is intact to light touch, pinprick, vibration and proprioception in all four extremities.    Reflexes  Deep tendon reflexes are 2+ and symmetric in all four extremities.    Coordination    Finger-to-nose, rapid alternating movements and heel-to-shin normal bilaterally without " dysmetria.    Gait  Normal casual, toe, heel and tandem gait.    Physical Exam  Constitutional:       Appearance: Normal appearance. She is normal weight.   Eyes:      General: Lids are normal.      Extraocular Movements: Extraocular movements intact.      Pupils: Pupils are equal, round, and reactive to light.   Pulmonary:      Effort: Pulmonary effort is normal.   Skin:     General: Skin is warm.   Neurological:      Motor: Motor strength is normal.      Coordination: Coordination is intact.      Deep Tendon Reflexes: Reflexes are normal and symmetric.   Psychiatric:         Mood and Affect: Mood normal.         Speech: Speech normal.         Behavior: Behavior normal.                   Assessment & Plan     Diagnoses and all orders for this visit:    1. Chronic migraine w/o aura, not intractable, w/o stat migr (Primary)  -     Destruction By Neurolytic Agent; Future  -     Rimegepant Sulfate (Nurtec) 75 MG tablet dispersible tablet; Take 1 tablet by mouth As Needed (Migraine headache, max 1 dose in 24 hours.) for up to 180 days.  Dispense: 8 tablet; Refill: 5  -     Emgality 120 MG/ML auto-injector pen; Inject 1 mL under the skin into the appropriate area as directed Every 30 (Thirty) Days for 180 days.  Dispense: 1 mL; Refill: 5    2. Chiari I malformation  -     topiramate (Topamax) 50 MG tablet; Take 1 tablet by mouth 2 (Two) Times a Day for 180 days.  Dispense: 180 tablet; Refill: 1    3. BIH (benign intracranial hypertension)  -     topiramate (Topamax) 50 MG tablet; Take 1 tablet by mouth 2 (Two) Times a Day for 180 days.  Dispense: 180 tablet; Refill: 1       At this time I would like to go ahead and continue Botox and Emgality for migraine prophylaxis.  She can continue Nurtec for abortive treatment.    We will continue Topamax at 50 mg twice daily for now for the benign intracranial hypertension however may consider decreasing dosage or discontinuing medication if patient's headaches improved with the  Botox and CGRP therapy.    Patient can continue follow-up in [place] for Chiari and tethered cord syndrome.    Follow-up for Botox            Zonia ELIZALDE    Neurology    [facility] [place]    Phone: [phone]    8/16/2023 , 14:00 EDT

## 2023-08-18 ENCOUNTER — TELEPHONE (OUTPATIENT)
Dept: NEUROLOGY | Facility: CLINIC | Age: 33
End: 2023-08-18

## 2023-08-18 NOTE — TELEPHONE ENCOUNTER
Pharmacy Name:  [facility]    Pharmacy representative name: ABRAHAM    Pharmacy representative phone number: [phone]    What medication are you calling in regards to: BOTOX    What question does the pharmacy have: SHE STATES THEY NEED A CORRECT ICD 10 CODE (PRIOR AUTH AND PRESCRIPTION DO NOT MATCH)

## 2023-08-24 ENCOUNTER — TELEPHONE (OUTPATIENT)
Dept: NEUROLOGY | Facility: CLINIC | Age: 33
End: 2023-08-24
Payer: COMMERCIAL

## 2023-08-24 NOTE — TELEPHONE ENCOUNTER
Provider: ALFREDO  Caller: CECIL  Phone Number: [phone] [facility]   Reason for Call:CECIL WITH [facility] CALLED AND STATES THAT THEY RECEIVED PAPER WORK FOR PROGRAM AND THEY ARE NEEDING PROOF OF INCOME. CECIL STATES THAT IS HAS TO BE PROOF FROM 2022 TO 2023    [phone] [facility]    PLEASE REVIEW

## 2023-10-09 ENCOUNTER — PROCEDURE VISIT (OUTPATIENT)
Dept: NEUROLOGY | Facility: CLINIC | Age: 33
End: 2023-10-09
Payer: COMMERCIAL

## 2023-10-09 DIAGNOSIS — G43.709 CHRONIC MIGRAINE W/O AURA, NOT INTRACTABLE, W/O STAT MIGR: Primary | ICD-10-CM

## 2023-10-09 NOTE — PROGRESS NOTES
Botulinum Toxin Injection Procedure Note         Procedure: Botulinum Toxin Procedure      Diagnosis: Chronic Migraines         Agents Used:  Botox or Onabotulinum toxin A         Indications/ Clinical rationale for BOTOX: Chronic Migraines         Number of headache days per month : 30 with 0 migraine    Baseline (if applicable): 30 with 10-20 migraine         Number of headache hours per day: 24    Baseline (if applicable):24         (When determining number of headache days, it may be beneficial to ask the patient how many headache-free days each month the patient is experiencing.)         EMG guidance given:  N0         Procedure Details/ Discussion:  Risks, benefits, indications, potential complications and alternatives were discussed with Patient. The Patient received and read the Onabotulinum toxin A handout given and agrees to proceed with the procedure. Informed consent was obtained from the patient.    The limb(s) for injection were identified and a time out called to re-identify the correct limb for injection. After prepping the skin with alcohol overlying the following muscles, Botulinum toxin was injected intramuscularly using [place] guidance as follows.    5 units in the left     5 units in the right     5 units in the procerus    10 units in the left frontalis divided into 2 different sites    10 units in the right frontalis divided into 2 different sites    20 units in the left temporalis divided into 4 different sites    20 units in the right temporalis divided into 4 different sites    15 units in the left occipitalis divided into 3 different sites    15 units in the right occipitalis divded into 3 different sites    10 units in the left cervical paraspinal muscles divided into 2 different sites    10 units in the right cervical paraspinal muscles divided into 2 different sites    15 units in the left trapezius divided into 3 different sites    15 units in the right trapezius  divided into 3 different sites              Vials:    Botox Vial Unit: 200 unit vial, 1 vial, 155 used, 45 wasted         Impression:    Patient tolerated injection procedure well with no complications         Patient Instruction(s):     Patient was instructed that they may experience localized discomfort over the next 12- 24 hours and may take over the counter (OTC) medication as needed (PRN).         Follow-Up: Follow up in the office in 1 months

## 2023-11-30 ENCOUNTER — OFFICE VISIT (OUTPATIENT)
Dept: NEUROLOGY | Facility: CLINIC | Age: 33
End: 2023-11-30
Payer: COMMERCIAL

## 2023-11-30 VITALS
WEIGHT: 254 LBS | OXYGEN SATURATION: 98 % | HEIGHT: 66 IN | BODY MASS INDEX: 40.82 KG/M2 | DIASTOLIC BLOOD PRESSURE: 72 MMHG | SYSTOLIC BLOOD PRESSURE: 122 MMHG | HEART RATE: 79 BPM

## 2023-11-30 DIAGNOSIS — G43.709 CHRONIC MIGRAINE W/O AURA, NOT INTRACTABLE, W/O STAT MIGR: Primary | ICD-10-CM

## 2023-11-30 DIAGNOSIS — E53.8 VITAMIN B12 DEFICIENCY: ICD-10-CM

## 2023-11-30 DIAGNOSIS — G93.5 CHIARI I MALFORMATION: ICD-10-CM

## 2023-11-30 RX ORDER — FLUOXETINE HYDROCHLORIDE 40 MG/1
40 CAPSULE ORAL DAILY
COMMUNITY

## 2023-11-30 RX ORDER — RIMEGEPANT SULFATE 75 MG/75MG
75 TABLET, ORALLY DISINTEGRATING ORAL AS NEEDED
Qty: 8 TABLET | Refills: 5 | Status: SHIPPED | OUTPATIENT
Start: 2023-11-30 | End: 2024-05-28

## 2023-11-30 NOTE — PROGRESS NOTES
"[facility] NEUROLOGY         Date of Visit: 2023    Name: Maame Wallace    :  1990    PCP: Sirisha Aparicio MD    Visit Type: follow-up         Subjective     Patient ID: Maame DONOVAN" is a 33 y.o. female.         History of Present Illness  I had the pleasure of seeing your patient today. As you may know she is a 32-year-old female here today for follow-up of migraine headache with history of Chiari malformation and previous diagnosis of benign intracranial hypertension. She was previously seen by [place] neurology.    History:    Patient states she has dealt with migraine headaches as well as diagnosis of Chiari I malformation and history of possible benign intracranial hypertension for many years. She states headache started as a child and increased in severity and frequency throughout her teenage and young adult years.    Patient was initially diagnosed with benign intracranial hypertension at the age of 18 or 19. She did have a repeat lumbar puncture done in  with an opening pressure of 18. At that time she also had MRI of the brain which showed evidence for a Chiari I malformation with approximately a 6 mm herniation of the cerebellar tonsils.. A MRI C-spine flow study was completed at that time which showed some mild flow limitation in the posterior circulation but no other flow limitation was noted.    Patient states that at that time they did try to treat her headaches with prophylactic medications. She has been on topiramate, Depakote, Cymbalta, amitriptyline, gabapentin, propranolol, and Botox for migraine prevention in the past all with little improvement in headache symptoms. She had significant side effects to topiramate and Cymbalta in the past as well. She is also tried several abortive therapies including sumatriptan, rizatriptan, eletripitan all with worsening headache symptoms from medication.    Patient states that in addition to this she was sent for " evaluation by neurosurgery and was about to undergo surgical decompression however was lost to follow-up and headache symptoms improved so she did not pursue any additional intervention at this time.    On 8/23/2022 patient did end up receiving repeat MRI brain and cervical spine. MRI brain and cervical did show a mild tonsillar herniation with approximately 4 to 5 mm in depth. It was not causing significant crowding with no syrinx noted in the cervical spinal cord. There was however increased Meckel's cave prominence typically associated with increased intracranial hypertension. There was also some white matter changes noted. There is also mild degenerative changes noted in the cervical spine with no other significant abnormalities.    And did end up following up with neurosurgery they did find mild Chiari malformation as well as pseudotumor's type symptoms. Because of this they wanted to try and see if symptoms improved with Diamox. They were going to meet again in a month to discuss possible decompressive surgery versus shunt procedure depending on how patient was doing.    Patient did undergo chiari decompression in January 2023 with Dr Vallejo. She was evaluated by Dr Vallejo the beginning of April where he recommended an ophthalmology evaluation, which she is unable to obtain until June. He did end up seeing ophthalmology over the weekend when she was in the hospital which showed no optic nerve swelling. She is scheduled to see the [facility] eye clinic in 2 weeks.. She underwent an LP on 4/12, opening pressure 26, 20ml drained, closing pressure 15, and this did not help. She underwent venous manometry with Dr Ortega on 4/14, which was normal. She did end up again in the hospital for headaches in May due to severity of headache symptoms. Her evaluation with ophthalmology was also normal. They did end up giving her several medications and headache cocktail which did reduce the severity of symptoms however patient is still  experiencing frequent headache.     Patient did end up receiving her first Botox injection on 7/6/2023 for chronic migraine headache symptoms.    She then subsequently underwent additional decompressive surgery in [place] on 7/24/2023.  Postoperative MRI was stable.  She does have follow-up in September as they are also concerned for possible tethered cord syndrome and may consider surgical intervention for this.    Current:    Patient did receive Botox at her last appointment.  She states that she has had a slight increase in her migraine frequency this last month however has been under a significant amount of stress and feels this was mostly related to that.  She does still use Nurtec for abortive treatment which she states is still very effective.  She is also still taking topiramate once daily for the benign intracranial hypertension.  Patient reports proximately 15 days of headache in the last 30 days 4-5 which were migrainous in nature.  She has no new symptoms with the headaches.  Headaches are remaining at approximately a 4 or 5 out of 10 on the pain scale.  No other new neurological complaints at today's visit.  See headache questionnaire dated 11/30/2023 for additional information.          The following portions of the patient's history were reviewed and updated as appropriate: allergies, current medications, past family history, past medical history, past social history, past surgical history, and problem list.                 Review of Systems         Current Medications:    Current Outpatient Medications   Medication Instructions    albuterol sulfate  (90 Base) MCG/ACT inhaler Every 6 Hours Scheduled    ALPRAZolam (XANAX) 0.5 mg, Oral, 2 Times Daily PRN    buPROPion XL (WELLBUTRIN XL) 300 mg, Oral, Every Morning    cetirizine (ZYRTEC) 10 mg, Oral, Daily    cyanocobalamin 1,000 mcg    diclofenac (VOLTAREN) 50 mg, Oral    Emgality 120 mg, Subcutaneous, Every 30 Days    famotidine (PEPCID) 20 MG  "tablet TAKE 1 TABLET BY MOUTH EVERY DAY    FLUoxetine (PROZAC) 40 mg, Oral, Daily    lansoprazole (PREVACID) 30 MG capsule 1 CAPSULE BY MOUTH TWICE DAILY    levothyroxine (SYNTHROID, LEVOTHROID) 88 mcg    Nurtec 75 mg, Oral, As Needed    OnabotulinumtoxinA 155 Units, Intramuscular, Every 3 Months    ondansetron (ZOFRAN) 4 mg    topiramate (TOPAMAX) 50 mg, Oral, 2 Times Daily          /72   Pulse 79   Ht 167.6 cm (66\")   Wt 115 kg (254 lb)   SpO2 98%   BMI 41.00 kg/m²                Objective     Neurological Exam  Mental Status  Awake, alert and oriented to person, place and time. Recent and remote memory are intact. Speech is normal. Language is fluent with no aphasia. Attention and concentration are normal.    Cranial Nerves  CN II: Visual acuity is normal. Visual fields full to confrontation.  CN III, IV, VI: Extraocular movements intact bilaterally. Normal lids and orbits bilaterally. Pupils equal round and reactive to light bilaterally.  CN V: Facial sensation is normal.  CN VII: Full and symmetric facial movement.  CN IX, X: Palate elevates symmetrically  CN XI: Shoulder shrug strength is normal.  CN XII: Tongue midline without atrophy or fasciculations.    Motor  Normal muscle bulk throughout. Normal muscle tone. No abnormal involuntary movements. Strength is 5/5 throughout all four extremities.    Sensory  Sensation is intact to light touch, pinprick, vibration and proprioception in all four extremities.    Reflexes  Deep tendon reflexes are 2+ and symmetric in all four extremities.    Coordination    Finger-to-nose, rapid alternating movements and heel-to-shin normal bilaterally without dysmetria.    Gait  Normal casual, toe, heel and tandem gait.      Physical Exam  Constitutional:       Appearance: Normal appearance. She is normal weight.   Eyes:      General: Lids are normal.      Extraocular Movements: Extraocular movements intact.      Pupils: Pupils are equal, round, and reactive to light. "   Pulmonary:      Effort: Pulmonary effort is normal.   Skin:     General: Skin is warm.   Neurological:      Motor: Motor strength is normal.     Coordination: Coordination is intact.      Deep Tendon Reflexes: Reflexes are normal and symmetric.   Psychiatric:         Mood and Affect: Mood normal.         Speech: Speech normal.         Behavior: Behavior normal.                     Assessment & Plan     Diagnoses and all orders for this visit:    1. Chronic migraine w/o aura, not intractable, w/o stat migr (Primary)  -     Destruction By Neurolytic Agent; Future  -     Rimegepant Sulfate (Nurtec) 75 MG tablet dispersible tablet; Take 1 tablet by mouth As Needed (Migraine headache, max 1 dose in 24 hours.) for up to 180 days.  Dispense: 8 tablet; Refill: 5    2. Chiari I malformation    3. Vitamin B12 deficiency         At this time we will continue Botox for migraine prevention.    Continue Nurtec for abortive treatment.    Continue topiramate for benign intracranial hypertension.    We will have patient come in for repeat B12 level due to the B12 deficiency and finishing her injections a week and a half ago.  She is having surgery for tethered cord the beginning of December and she will come in for draw after that.  Lab order will be placed at that time.    Follow-up for Botox.         Zonia ELIZALDE    Neurology    [facility] Neurology [place]    Phone: [phone]    11/30/2023 , 16:11 EST

## 2023-12-04 DIAGNOSIS — R12 HEARTBURN: ICD-10-CM

## 2023-12-04 RX ORDER — LANSOPRAZOLE 30 MG/1
30 CAPSULE, DELAYED RELEASE ORAL 2 TIMES DAILY
Qty: 60 CAPSULE | Refills: 3 | OUTPATIENT
Start: 2023-12-04

## 2023-12-19 DIAGNOSIS — E53.8 VITAMIN B12 DEFICIENCY: Primary | ICD-10-CM

## 2024-01-10 ENCOUNTER — PROCEDURE VISIT (OUTPATIENT)
Dept: NEUROLOGY | Facility: CLINIC | Age: 34
End: 2024-01-10
Payer: COMMERCIAL

## 2024-01-10 DIAGNOSIS — G43.709 CHRONIC MIGRAINE W/O AURA, NOT INTRACTABLE, W/O STAT MIGR: Primary | ICD-10-CM

## 2024-01-10 PROBLEM — M54.2 CHRONIC NECK PAIN: Status: ACTIVE | Noted: 2024-01-10

## 2024-01-10 PROBLEM — N30.10 CHRONIC INTERSTITIAL CYSTITIS: Status: ACTIVE | Noted: 2024-01-10

## 2024-01-10 PROBLEM — G89.29 CHRONIC NECK PAIN: Status: ACTIVE | Noted: 2024-01-10

## 2024-01-10 PROBLEM — E55.9 VITAMIN D DEFICIENCY: Status: ACTIVE | Noted: 2021-03-04

## 2024-01-10 PROBLEM — Q06.8 TETHERED CORD: Status: ACTIVE | Noted: 2023-09-05

## 2024-01-10 PROBLEM — F41.9 ANXIETY: Status: ACTIVE | Noted: 2022-04-19

## 2024-01-10 PROBLEM — J30.2 SEASONAL ALLERGIES: Status: ACTIVE | Noted: 2024-01-10

## 2024-01-10 PROBLEM — H43.399 VITREOUS FLOATERS: Status: ACTIVE | Noted: 2023-05-09

## 2024-01-10 PROBLEM — G93.5 ARNOLD-CHIARI MALFORMATION, TYPE I: Status: ACTIVE | Noted: 2023-06-21

## 2024-01-10 PROBLEM — H52.13 BILATERAL MYOPIA: Status: ACTIVE | Noted: 2023-05-09

## 2024-01-10 PROBLEM — M19.90 OA (OSTEOARTHRITIS): Status: ACTIVE | Noted: 2024-01-10

## 2024-01-10 NOTE — PROGRESS NOTES
Botulinum Toxin Injection Procedure Note         Procedure: Botulinum Toxin Procedure      Diagnosis: Chronic Migraines         Agents Used:  Botox or Onabotulinum toxin A         Indications/ Clinical rationale for BOTOX: Chronic Migraines         Number of headache days per month : 15 (4-5 migrine)    Baseline (if applicable):30         Number of headache hours per day: 4-6    Baseline (if applicable):12-24         (When determining number of headache days, it may be beneficial to ask the patient how many headache-free days each month the patient is experiencing.)         EMG guidance given:  N0         Procedure Details/ Discussion:  Risks, benefits, indications, potential complications and alternatives were discussed with Patient. The Patient received and read the Onabotulinum toxin A handout given and agrees to proceed with the procedure. Informed consent was obtained from the patient.    The limb(s) for injection were identified and a time out called to re-identify the correct limb for injection. After prepping the skin with alcohol overlying the following muscles, Botulinum toxin was injected intramuscularly using [place] guidance as follows.    5 units in the left     5 units in the right     5 units in the procerus    10 units in the left frontalis divided into 2 different sites    10 units in the right frontalis divided into 2 different sites    20 units in the left temporalis divided into 4 different sites    20 units in the right temporalis divided into 4 different sites    15 units in the left occipitalis divided into 3 different sites    15 units in the right occipitalis divded into 3 different sites    10 units in the left cervical paraspinal muscles divided into 2 different sites    10 units in the right cervical paraspinal muscles divided into 2 different sites    15 units in the left trapezius divided into 3 different sites    15 units in the right trapezius divided into 3  different sites              Vials:    Botox Vial Unit: 200 unit vial, 1 vial, 155 used, 45 wasted         Impression:    Patient tolerated injection procedure well with no complications         Patient Instruction(s):     Patient was instructed that they may experience localized discomfort over the next 12- 24 hours and may take over the counter (OTC) medication as needed (PRN).         Follow-Up: Follow up in the office in 1 months

## 2024-02-13 ENCOUNTER — TELEMEDICINE (OUTPATIENT)
Dept: NEUROLOGY | Facility: CLINIC | Age: 34
End: 2024-02-13
Payer: COMMERCIAL

## 2024-02-13 DIAGNOSIS — G93.2 BIH (BENIGN INTRACRANIAL HYPERTENSION): ICD-10-CM

## 2024-02-13 DIAGNOSIS — G93.5 CHIARI I MALFORMATION: ICD-10-CM

## 2024-02-13 DIAGNOSIS — G43.709 CHRONIC MIGRAINE W/O AURA, NOT INTRACTABLE, W/O STAT MIGR: ICD-10-CM

## 2024-02-13 PROCEDURE — 99214 OFFICE O/P EST MOD 30 MIN: CPT | Performed by: NURSE PRACTITIONER

## 2024-02-13 RX ORDER — RIMEGEPANT SULFATE 75 MG/75MG
75 TABLET, ORALLY DISINTEGRATING ORAL AS NEEDED
Qty: 8 TABLET | Refills: 5 | Status: SHIPPED | OUTPATIENT
Start: 2024-02-13 | End: 2024-08-11

## 2024-02-13 RX ORDER — GALCANEZUMAB 120 MG/ML
120 INJECTION, SOLUTION SUBCUTANEOUS
Qty: 1 ML | Refills: 5 | Status: SHIPPED | OUTPATIENT
Start: 2024-02-13 | End: 2024-08-11

## 2024-02-13 RX ORDER — TOPIRAMATE 50 MG/1
50 TABLET, FILM COATED ORAL 2 TIMES DAILY
Qty: 180 TABLET | Refills: 1 | Status: SHIPPED | OUTPATIENT
Start: 2024-02-13 | End: 2024-08-11

## 2024-02-21 ENCOUNTER — APPOINTMENT (OUTPATIENT)
Dept: MRI IMAGING | Facility: HOSPITAL | Age: 34
End: 2024-02-21
Payer: COMMERCIAL

## 2024-02-21 ENCOUNTER — HOSPITAL ENCOUNTER (INPATIENT)
Facility: HOSPITAL | Age: 34
LOS: 5 days | Discharge: HOME | End: 2024-02-29
Attending: EMERGENCY MEDICINE | Admitting: INTERNAL MEDICINE
Payer: COMMERCIAL

## 2024-02-21 ENCOUNTER — APPOINTMENT (OUTPATIENT)
Dept: OTHER | Facility: HOSPITAL | Age: 34
End: 2024-02-21
Payer: COMMERCIAL

## 2024-02-21 ENCOUNTER — APPOINTMENT (OUTPATIENT)
Dept: CT IMAGING | Facility: HOSPITAL | Age: 34
End: 2024-02-21
Payer: COMMERCIAL

## 2024-02-21 DIAGNOSIS — M54.50 LUMBAR SPINE PAIN: ICD-10-CM

## 2024-02-21 DIAGNOSIS — Z09 FOLLOW-UP EXAM: ICD-10-CM

## 2024-02-21 DIAGNOSIS — R20.2 PARESTHESIA OF BOTH LEGS: ICD-10-CM

## 2024-02-21 DIAGNOSIS — T88.8XXA FLUID COLLECTION AT SURGICAL SITE, INITIAL ENCOUNTER: Primary | ICD-10-CM

## 2024-02-21 LAB
ALBUMIN SERPL-MCNC: 4 G/DL (ref 3.5–5.2)
ALBUMIN/GLOB SERPL: 2 G/DL
ALP SERPL-CCNC: 62 U/L (ref 39–117)
ALT SERPL W P-5'-P-CCNC: 42 U/L (ref 1–33)
ANION GAP SERPL CALCULATED.3IONS-SCNC: 12.2 MMOL/L (ref 5–15)
ANION GAP SERPL CALCULATED.3IONS-SCNC: 9 MMOL/L (ref 5–15)
AST SERPL-CCNC: 27 U/L (ref 1–32)
BASOPHILS # BLD AUTO: 0.07 10*3/MM3 (ref 0–0.2)
BASOPHILS # BLD AUTO: 0.08 10*3/MM3 (ref 0–0.2)
BASOPHILS NFR BLD AUTO: 0.7 % (ref 0–1.5)
BASOPHILS NFR BLD AUTO: 0.8 % (ref 0–1.5)
BILIRUB SERPL-MCNC: <0.2 MG/DL (ref 0–1.2)
BUN SERPL-MCNC: 13 MG/DL (ref 6–20)
BUN SERPL-MCNC: 13 MG/DL (ref 6–20)
BUN/CREAT SERPL: 15.7 (ref 7–25)
BUN/CREAT SERPL: 15.7 (ref 7–25)
CALCIUM SPEC-SCNC: 8.7 MG/DL (ref 8.6–10.5)
CALCIUM SPEC-SCNC: 8.8 MG/DL (ref 8.6–10.5)
CHLORIDE SERPL-SCNC: 106 MMOL/L (ref 98–107)
CHLORIDE SERPL-SCNC: 109 MMOL/L (ref 98–107)
CO2 SERPL-SCNC: 19.8 MMOL/L (ref 22–29)
CO2 SERPL-SCNC: 21 MMOL/L (ref 22–29)
CREAT SERPL-MCNC: 0.83 MG/DL (ref 0.57–1)
CREAT SERPL-MCNC: 0.83 MG/DL (ref 0.57–1)
CRP SERPL-MCNC: <0.3 MG/DL (ref 0–0.5)
DEPRECATED RDW RBC AUTO: 43.8 FL (ref 37–54)
DEPRECATED RDW RBC AUTO: 45.6 FL (ref 37–54)
EGFRCR SERPLBLD CKD-EPI 2021: 96.8 ML/MIN/1.73
EGFRCR SERPLBLD CKD-EPI 2021: 96.8 ML/MIN/1.73
EOSINOPHIL # BLD AUTO: 0.27 10*3/MM3 (ref 0–0.4)
EOSINOPHIL # BLD AUTO: 0.29 10*3/MM3 (ref 0–0.4)
EOSINOPHIL NFR BLD AUTO: 2.7 % (ref 0.3–6.2)
EOSINOPHIL NFR BLD AUTO: 3.3 % (ref 0.3–6.2)
ERYTHROCYTE [DISTWIDTH] IN BLOOD BY AUTOMATED COUNT: 14 % (ref 12.3–15.4)
ERYTHROCYTE [DISTWIDTH] IN BLOOD BY AUTOMATED COUNT: 14.3 % (ref 12.3–15.4)
ERYTHROCYTE [SEDIMENTATION RATE] IN BLOOD: 14 MM/HR (ref 0–20)
GLOBULIN UR ELPH-MCNC: 2 GM/DL
GLUCOSE SERPL-MCNC: 107 MG/DL (ref 65–99)
GLUCOSE SERPL-MCNC: 90 MG/DL (ref 65–99)
HCG SERPL QL: NEGATIVE
HCT VFR BLD AUTO: 37.1 % (ref 34–46.6)
HCT VFR BLD AUTO: 37.4 % (ref 34–46.6)
HGB BLD-MCNC: 12.1 G/DL (ref 12–15.9)
HGB BLD-MCNC: 12.4 G/DL (ref 12–15.9)
IMM GRANULOCYTES # BLD AUTO: 0.02 10*3/MM3 (ref 0–0.05)
IMM GRANULOCYTES # BLD AUTO: 0.05 10*3/MM3 (ref 0–0.05)
IMM GRANULOCYTES NFR BLD AUTO: 0.2 % (ref 0–0.5)
IMM GRANULOCYTES NFR BLD AUTO: 0.5 % (ref 0–0.5)
LYMPHOCYTES # BLD AUTO: 3.43 10*3/MM3 (ref 0.7–3.1)
LYMPHOCYTES # BLD AUTO: 3.63 10*3/MM3 (ref 0.7–3.1)
LYMPHOCYTES NFR BLD AUTO: 31.6 % (ref 19.6–45.3)
LYMPHOCYTES NFR BLD AUTO: 43.7 % (ref 19.6–45.3)
MCH RBC QN AUTO: 28 PG (ref 26.6–33)
MCH RBC QN AUTO: 29.2 PG (ref 26.6–33)
MCHC RBC AUTO-ENTMCNC: 32.6 G/DL (ref 31.5–35.7)
MCHC RBC AUTO-ENTMCNC: 33.2 G/DL (ref 31.5–35.7)
MCV RBC AUTO: 85.9 FL (ref 79–97)
MCV RBC AUTO: 88 FL (ref 79–97)
MONOCYTES # BLD AUTO: 0.51 10*3/MM3 (ref 0.1–0.9)
MONOCYTES # BLD AUTO: 0.53 10*3/MM3 (ref 0.1–0.9)
MONOCYTES NFR BLD AUTO: 4.9 % (ref 5–12)
MONOCYTES NFR BLD AUTO: 6.1 % (ref 5–12)
NEUTROPHILS NFR BLD AUTO: 3.8 10*3/MM3 (ref 1.7–7)
NEUTROPHILS NFR BLD AUTO: 45.9 % (ref 42.7–76)
NEUTROPHILS NFR BLD AUTO: 59.6 % (ref 42.7–76)
NEUTROPHILS NFR BLD AUTO: 6.46 10*3/MM3 (ref 1.7–7)
NRBC BLD AUTO-RTO: 0 /100 WBC (ref 0–0.2)
NRBC BLD AUTO-RTO: 0 /100 WBC (ref 0–0.2)
PLATELET # BLD AUTO: 282 10*3/MM3 (ref 140–450)
PLATELET # BLD AUTO: 286 10*3/MM3 (ref 140–450)
PMV BLD AUTO: 9.4 FL (ref 6–12)
PMV BLD AUTO: 9.8 FL (ref 6–12)
POTASSIUM SERPL-SCNC: 4.1 MMOL/L (ref 3.5–5.2)
POTASSIUM SERPL-SCNC: 4.1 MMOL/L (ref 3.5–5.2)
PROT SERPL-MCNC: 6 G/DL (ref 6–8.5)
RBC # BLD AUTO: 4.25 10*6/MM3 (ref 3.77–5.28)
RBC # BLD AUTO: 4.32 10*6/MM3 (ref 3.77–5.28)
SODIUM SERPL-SCNC: 138 MMOL/L (ref 136–145)
SODIUM SERPL-SCNC: 139 MMOL/L (ref 136–145)
WBC NRBC COR # BLD AUTO: 10.84 10*3/MM3 (ref 3.4–10.8)
WBC NRBC COR # BLD AUTO: 8.3 10*3/MM3 (ref 3.4–10.8)

## 2024-02-21 PROCEDURE — 25010000002 KETOROLAC TROMETHAMINE PER 15 MG: Performed by: PHYSICIAN ASSISTANT

## 2024-02-21 PROCEDURE — G0378 HOSPITAL OBSERVATION PER HR: HCPCS

## 2024-02-21 PROCEDURE — 25810000003 SODIUM CHLORIDE 0.9 % SOLUTION: Performed by: HOSPITALIST

## 2024-02-21 PROCEDURE — 85652 RBC SED RATE AUTOMATED: CPT | Performed by: PHYSICIAN ASSISTANT

## 2024-02-21 PROCEDURE — 25010000002 ONDANSETRON PER 1 MG: Performed by: PHYSICIAN ASSISTANT

## 2024-02-21 PROCEDURE — 85025 COMPLETE CBC W/AUTO DIFF WBC: CPT | Performed by: NURSE PRACTITIONER

## 2024-02-21 PROCEDURE — 36415 COLL VENOUS BLD VENIPUNCTURE: CPT | Performed by: NURSE PRACTITIONER

## 2024-02-21 PROCEDURE — 25010000002 HYDROMORPHONE PER 4 MG: Performed by: EMERGENCY MEDICINE

## 2024-02-21 PROCEDURE — A9577 INJ MULTIHANCE: HCPCS | Performed by: HOSPITALIST

## 2024-02-21 PROCEDURE — 72131 CT LUMBAR SPINE W/O DYE: CPT

## 2024-02-21 PROCEDURE — 0 GADOBENATE DIMEGLUMINE 529 MG/ML SOLUTION: Performed by: HOSPITALIST

## 2024-02-21 PROCEDURE — 99214 OFFICE O/P EST MOD 30 MIN: CPT

## 2024-02-21 PROCEDURE — 25810000003 LACTATED RINGERS SOLUTION: Performed by: PHYSICIAN ASSISTANT

## 2024-02-21 PROCEDURE — 85025 COMPLETE CBC W/AUTO DIFF WBC: CPT | Performed by: PHYSICIAN ASSISTANT

## 2024-02-21 PROCEDURE — 80053 COMPREHEN METABOLIC PANEL: CPT | Performed by: PHYSICIAN ASSISTANT

## 2024-02-21 PROCEDURE — 25010000002 HYDROMORPHONE PER 4 MG: Performed by: HOSPITALIST

## 2024-02-21 PROCEDURE — 84703 CHORIONIC GONADOTROPIN ASSAY: CPT | Performed by: PHYSICIAN ASSISTANT

## 2024-02-21 PROCEDURE — 86140 C-REACTIVE PROTEIN: CPT | Performed by: PHYSICIAN ASSISTANT

## 2024-02-21 PROCEDURE — 25010000002 ONDANSETRON PER 1 MG: Performed by: NURSE PRACTITIONER

## 2024-02-21 PROCEDURE — 72158 MRI LUMBAR SPINE W/O & W/DYE: CPT

## 2024-02-21 PROCEDURE — 99285 EMERGENCY DEPT VISIT HI MDM: CPT

## 2024-02-21 RX ORDER — IBUPROFEN 600 MG/1
600 TABLET ORAL EVERY 6 HOURS PRN
COMMUNITY

## 2024-02-21 RX ORDER — KETOROLAC TROMETHAMINE 30 MG/ML
30 INJECTION, SOLUTION INTRAMUSCULAR; INTRAVENOUS ONCE
Status: COMPLETED | OUTPATIENT
Start: 2024-02-21 | End: 2024-02-21

## 2024-02-21 RX ORDER — AMOXICILLIN 250 MG
2 CAPSULE ORAL 2 TIMES DAILY PRN
Status: DISCONTINUED | OUTPATIENT
Start: 2024-02-21 | End: 2024-02-26

## 2024-02-21 RX ORDER — ONDANSETRON 2 MG/ML
4 INJECTION INTRAMUSCULAR; INTRAVENOUS EVERY 6 HOURS PRN
Status: DISCONTINUED | OUTPATIENT
Start: 2024-02-21 | End: 2024-02-28

## 2024-02-21 RX ORDER — ACETAMINOPHEN 650 MG/1
650 SUPPOSITORY RECTAL EVERY 4 HOURS PRN
Status: DISCONTINUED | OUTPATIENT
Start: 2024-02-21 | End: 2024-02-29 | Stop reason: HOSPADM

## 2024-02-21 RX ORDER — ACETAMINOPHEN 160 MG/5ML
650 SOLUTION ORAL EVERY 4 HOURS PRN
Status: DISCONTINUED | OUTPATIENT
Start: 2024-02-21 | End: 2024-02-29 | Stop reason: HOSPADM

## 2024-02-21 RX ORDER — LEVOTHYROXINE SODIUM 88 UG/1
88 TABLET ORAL
Status: DISCONTINUED | OUTPATIENT
Start: 2024-02-21 | End: 2024-02-29 | Stop reason: HOSPADM

## 2024-02-21 RX ORDER — SODIUM CHLORIDE 0.9 % (FLUSH) 0.9 %
10 SYRINGE (ML) INJECTION AS NEEDED
Status: DISCONTINUED | OUTPATIENT
Start: 2024-02-21 | End: 2024-02-29 | Stop reason: HOSPADM

## 2024-02-21 RX ORDER — ONDANSETRON 2 MG/ML
4 INJECTION INTRAMUSCULAR; INTRAVENOUS ONCE
Status: COMPLETED | OUTPATIENT
Start: 2024-02-21 | End: 2024-02-21

## 2024-02-21 RX ORDER — BISACODYL 5 MG/1
5 TABLET, DELAYED RELEASE ORAL DAILY PRN
Status: DISCONTINUED | OUTPATIENT
Start: 2024-02-21 | End: 2024-02-26

## 2024-02-21 RX ORDER — HYDROMORPHONE HYDROCHLORIDE 1 MG/ML
0.5 INJECTION, SOLUTION INTRAMUSCULAR; INTRAVENOUS; SUBCUTANEOUS
Status: DISPENSED | OUTPATIENT
Start: 2024-02-21 | End: 2024-02-26

## 2024-02-21 RX ORDER — ALPRAZOLAM 0.5 MG/1
0.5 TABLET ORAL 2 TIMES DAILY PRN
Status: DISCONTINUED | OUTPATIENT
Start: 2024-02-21 | End: 2024-02-29 | Stop reason: HOSPADM

## 2024-02-21 RX ORDER — SODIUM CHLORIDE 9 MG/ML
40 INJECTION, SOLUTION INTRAVENOUS AS NEEDED
Status: DISCONTINUED | OUTPATIENT
Start: 2024-02-21 | End: 2024-02-29 | Stop reason: HOSPADM

## 2024-02-21 RX ORDER — HYDROCODONE BITARTRATE AND ACETAMINOPHEN 5; 325 MG/1; MG/1
1 TABLET ORAL EVERY 4 HOURS PRN
COMMUNITY
End: 2024-02-29 | Stop reason: HOSPADM

## 2024-02-21 RX ORDER — HYDROCODONE BITARTRATE AND ACETAMINOPHEN 5; 325 MG/1; MG/1
1 TABLET ORAL EVERY 4 HOURS PRN
Status: DISCONTINUED | OUTPATIENT
Start: 2024-02-21 | End: 2024-02-28

## 2024-02-21 RX ORDER — ACETAMINOPHEN 325 MG/1
650 TABLET ORAL EVERY 4 HOURS PRN
Status: DISCONTINUED | OUTPATIENT
Start: 2024-02-21 | End: 2024-02-29 | Stop reason: HOSPADM

## 2024-02-21 RX ORDER — FLUOXETINE HYDROCHLORIDE 20 MG/1
40 CAPSULE ORAL DAILY
Status: DISCONTINUED | OUTPATIENT
Start: 2024-02-21 | End: 2024-02-29 | Stop reason: HOSPADM

## 2024-02-21 RX ORDER — GABAPENTIN 300 MG/1
300 CAPSULE ORAL 3 TIMES DAILY
Status: DISCONTINUED | OUTPATIENT
Start: 2024-02-21 | End: 2024-02-29 | Stop reason: HOSPADM

## 2024-02-21 RX ORDER — BISACODYL 10 MG
10 SUPPOSITORY, RECTAL RECTAL DAILY PRN
Status: DISCONTINUED | OUTPATIENT
Start: 2024-02-21 | End: 2024-02-26

## 2024-02-21 RX ORDER — POLYETHYLENE GLYCOL 3350 17 G/17G
17 POWDER, FOR SOLUTION ORAL DAILY PRN
Status: DISCONTINUED | OUTPATIENT
Start: 2024-02-21 | End: 2024-02-26

## 2024-02-21 RX ORDER — METHOCARBAMOL 750 MG/1
750 TABLET, FILM COATED ORAL ONCE
Status: COMPLETED | OUTPATIENT
Start: 2024-02-21 | End: 2024-02-21

## 2024-02-21 RX ORDER — PANTOPRAZOLE SODIUM 40 MG/1
40 TABLET, DELAYED RELEASE ORAL
Status: DISCONTINUED | OUTPATIENT
Start: 2024-02-21 | End: 2024-02-29 | Stop reason: HOSPADM

## 2024-02-21 RX ORDER — SODIUM CHLORIDE 0.9 % (FLUSH) 0.9 %
10 SYRINGE (ML) INJECTION EVERY 12 HOURS SCHEDULED
Status: DISCONTINUED | OUTPATIENT
Start: 2024-02-21 | End: 2024-02-29 | Stop reason: HOSPADM

## 2024-02-21 RX ORDER — GABAPENTIN 300 MG/1
300 CAPSULE ORAL 3 TIMES DAILY
COMMUNITY

## 2024-02-21 RX ORDER — TOPIRAMATE 50 MG/1
50 TABLET, FILM COATED ORAL 2 TIMES DAILY
Status: DISCONTINUED | OUTPATIENT
Start: 2024-02-21 | End: 2024-02-29 | Stop reason: HOSPADM

## 2024-02-21 RX ORDER — LIDOCAINE 4 G/G
1 PATCH TOPICAL ONCE
Status: COMPLETED | OUTPATIENT
Start: 2024-02-21 | End: 2024-02-21

## 2024-02-21 RX ORDER — KETOROLAC TROMETHAMINE 30 MG/ML
60 INJECTION, SOLUTION INTRAMUSCULAR; INTRAVENOUS ONCE
Status: DISCONTINUED | OUTPATIENT
Start: 2024-02-21 | End: 2024-02-21

## 2024-02-21 RX ORDER — HYDROMORPHONE HYDROCHLORIDE 1 MG/ML
0.5 INJECTION, SOLUTION INTRAMUSCULAR; INTRAVENOUS; SUBCUTANEOUS ONCE
Status: COMPLETED | OUTPATIENT
Start: 2024-02-21 | End: 2024-02-21

## 2024-02-21 RX ORDER — SODIUM CHLORIDE 9 MG/ML
100 INJECTION, SOLUTION INTRAVENOUS CONTINUOUS
Status: DISCONTINUED | OUTPATIENT
Start: 2024-02-21 | End: 2024-02-23

## 2024-02-21 RX ORDER — OXYCODONE HYDROCHLORIDE AND ACETAMINOPHEN 5; 325 MG/1; MG/1
1 TABLET ORAL ONCE
Status: COMPLETED | OUTPATIENT
Start: 2024-02-21 | End: 2024-02-21

## 2024-02-21 RX ORDER — DEXTROMETHORPHAN HYDROBROMIDE, BUPROPION HYDROCHLORIDE 105; 45 MG/1; MG/1
1 TABLET, MULTILAYER, EXTENDED RELEASE ORAL
COMMUNITY

## 2024-02-21 RX ADMIN — METHOCARBAMOL TABLETS 750 MG: 750 TABLET, COATED ORAL at 03:58

## 2024-02-21 RX ADMIN — SODIUM CHLORIDE 100 ML/HR: 9 INJECTION, SOLUTION INTRAVENOUS at 21:33

## 2024-02-21 RX ADMIN — LEVOTHYROXINE SODIUM 88 MCG: 88 TABLET ORAL at 13:15

## 2024-02-21 RX ADMIN — TOPIRAMATE 50 MG: 50 TABLET, FILM COATED ORAL at 20:28

## 2024-02-21 RX ADMIN — SODIUM CHLORIDE 100 ML/HR: 9 INJECTION, SOLUTION INTRAVENOUS at 09:19

## 2024-02-21 RX ADMIN — GABAPENTIN 300 MG: 300 CAPSULE ORAL at 20:28

## 2024-02-21 RX ADMIN — Medication 10 ML: at 09:24

## 2024-02-21 RX ADMIN — HYDROCODONE BITARTRATE AND ACETAMINOPHEN 1 TABLET: 5; 325 TABLET ORAL at 21:32

## 2024-02-21 RX ADMIN — GABAPENTIN 300 MG: 300 CAPSULE ORAL at 16:58

## 2024-02-21 RX ADMIN — SODIUM CHLORIDE, POTASSIUM CHLORIDE, SODIUM LACTATE AND CALCIUM CHLORIDE 1000 ML: 600; 310; 30; 20 INJECTION, SOLUTION INTRAVENOUS at 05:15

## 2024-02-21 RX ADMIN — KETOROLAC TROMETHAMINE 30 MG: 30 INJECTION, SOLUTION INTRAMUSCULAR; INTRAVENOUS at 04:00

## 2024-02-21 RX ADMIN — HYDROCODONE BITARTRATE AND ACETAMINOPHEN 1 TABLET: 5; 325 TABLET ORAL at 13:13

## 2024-02-21 RX ADMIN — LIDOCAINE 1 PATCH: 4 PATCH TOPICAL at 03:58

## 2024-02-21 RX ADMIN — PANTOPRAZOLE SODIUM 40 MG: 40 TABLET, DELAYED RELEASE ORAL at 13:13

## 2024-02-21 RX ADMIN — ACETAMINOPHEN 325MG 650 MG: 325 TABLET ORAL at 09:28

## 2024-02-21 RX ADMIN — ONDANSETRON 4 MG: 2 INJECTION INTRAMUSCULAR; INTRAVENOUS at 05:15

## 2024-02-21 RX ADMIN — HYDROMORPHONE HYDROCHLORIDE 0.5 MG: 1 INJECTION, SOLUTION INTRAMUSCULAR; INTRAVENOUS; SUBCUTANEOUS at 20:28

## 2024-02-21 RX ADMIN — ONDANSETRON HYDROCHLORIDE 4 MG: 2 INJECTION, SOLUTION INTRAMUSCULAR; INTRAVENOUS at 20:28

## 2024-02-21 RX ADMIN — HYDROMORPHONE HYDROCHLORIDE 0.5 MG: 1 INJECTION, SOLUTION INTRAMUSCULAR; INTRAVENOUS; SUBCUTANEOUS at 16:58

## 2024-02-21 RX ADMIN — FLUOXETINE HYDROCHLORIDE 40 MG: 20 CAPSULE ORAL at 13:15

## 2024-02-21 RX ADMIN — TOPIRAMATE 50 MG: 50 TABLET, FILM COATED ORAL at 13:48

## 2024-02-21 RX ADMIN — OXYCODONE AND ACETAMINOPHEN 1 TABLET: 5; 325 TABLET ORAL at 03:57

## 2024-02-21 RX ADMIN — HYDROMORPHONE HYDROCHLORIDE 0.5 MG: 1 INJECTION, SOLUTION INTRAMUSCULAR; INTRAVENOUS; SUBCUTANEOUS at 05:15

## 2024-02-21 RX ADMIN — Medication 10 ML: at 20:32

## 2024-02-21 RX ADMIN — GABAPENTIN 300 MG: 300 CAPSULE ORAL at 13:12

## 2024-02-21 RX ADMIN — GADOBENATE DIMEGLUMINE 20 ML: 529 INJECTION, SOLUTION INTRAVENOUS at 08:13

## 2024-02-21 NOTE — ED PROVIDER NOTES
MD ATTESTATION NOTE    The BLAINE and I have discussed this patient's history, physical exam, and treatment plan.  I have reviewed the documentation and personally had a face to face interaction with the patient. I affirm the documentation and agree with the treatment and plan.  The attached note describes my personal findings.      I provided a substantive portion of the care of the patient.  I personally performed the physical exam in its entirety, and below are my findings.       Brief HPI: This patient is a 33-year-old female with a history of a Chiari malformation as well as a recent tethered cord surgical procedure presenting to the emergency room today with pain as well as a numb/burning sensation down both lower extremities.  She denies leg weakness, urinary retention, fecal incontinence, or groin numbness.      PHYSICAL EXAM  ED Triage Vitals   Temp Heart Rate Resp BP SpO2   02/21/24 0321 02/21/24 0321 02/21/24 0321 02/21/24 0329 02/21/24 0321   97.4 °F (36.3 °C) 111 18 122/70 97 %      Temp src Heart Rate Source Patient Position BP Location FiO2 (%)   -- 02/21/24 0321 02/21/24 0329 02/21/24 0329 --    Monitor Lying Right arm          GENERAL: Resting comfortably and in no acute distress, nontoxic in appearance  HENT: nares patent  EYES: no scleral icterus  CV: regular rhythm, normal rate, no M/R/G  RESPIRATORY: normal effort, lungs clear bilaterally  ABDOMEN: soft, nontender, no rebound or guarding  MUSCULOSKELETAL: no deformity, no edema  NEURO: alert, moves all extremities, follows commands  PSYCH:  calm, cooperative  SKIN: warm, dry    Vital signs and nursing notes reviewed.      Differential diagnosis includes but is not limited to disc herniation, cauda equina syndrome, lumbar radiculopathy, lumbar epidural abscess, or seroma formation.      Plan: We will obtain labs as well as a CT scan of the patient's lumbar spine for ED assessment.  We will monitor and reassess following.      Laboratory results were  independently interpreted by myself with my interpretation showing a normal white blood cell count as well as normal sedimentation rate and CRP.       Jay Schulz MD  02/21/24 0545

## 2024-02-21 NOTE — ED NOTES
Nursing report ED to floor  Maame Wallace  33 y.o.  female    HPI :   Chief Complaint   Patient presents with    Post-op Problem     Post op problem, numbness and tingling in back and both legs       Admitting doctor:   Farhad Arrieta MD    Admitting diagnosis:   The primary encounter diagnosis was Fluid collection at surgical site, initial encounter. A diagnosis of Paresthesia of both legs was also pertinent to this visit.    Code status:   Current Code Status       Date Active Code Status Order ID Comments User Context       2/21/2024 0545 CPR (Attempt to Resuscitate) [id number]  Cass Man, APRN ED        Question Answer    Code Status (Patient has no pulse and is not breathing) CPR (Attempt to Resuscitate)    Medical Interventions (Patient has pulse or is breathing) Full Support                    Allergies:   Acetazolamide, Adhesive tape, Duloxetine hcl, Codeine, Duloxetine, Oxycodone, Chlorhexidine, Dhea [nutritional supplements], Dihydroergotamine, Imitrex [sumatriptan], and Morphine    Isolation:   No active isolations    Intake and Output  No intake or output data in the 24 hours ending 02/21/24 0559    Weight:       02/21/24  0321   Weight: 113 kg (250 lb)       Most recent vitals:   Vitals:    02/21/24 0401 02/21/24 0431 02/21/24 0501 02/21/24 0532   BP: 98/61 97/59 93/64 127/60   BP Location:       Patient Position:       Pulse: 85 82 84 85   Resp:       Temp:       SpO2: 96% 96% 94% 97%   Weight:       Height:           Active LDAs/IV Access:   Lines, Drains & Airways       Active LDAs       Name Placement date Placement time Site Days    Peripheral IV 02/21/24 0351 Left Antecubital 02/21/24  0351  Antecubital  less than 1                    Labs (abnormal labs have a star):   Labs Reviewed   COMPREHENSIVE METABOLIC PANEL - Abnormal; Notable for the following components:       Result Value    Glucose 107 (*)     Chloride 109 (*)     CO2 21.0 (*)     ALT (SGPT) 42 (*)     All other components  within normal limits    Narrative:     GFR Normal >60  Chronic Kidney Disease <60  Kidney Failure <15     CBC WITH AUTO DIFFERENTIAL - Abnormal; Notable for the following components:    Lymphocytes, Absolute 3.63 (*)     All other components within normal limits   HCG, SERUM, QUALITATIVE - Normal   SEDIMENTATION RATE - Normal   C-REACTIVE PROTEIN - Normal   BASIC METABOLIC PANEL   CBC WITH AUTO DIFFERENTIAL   CBC AND DIFFERENTIAL    Narrative:     The following orders were created for panel order CBC & Differential.  Procedure                               Abnormality         Status                     ---------                               -----------         ------                     CBC Auto Differential[[id number]]        Abnormal            Final result                 Please view results for these tests on the individual orders.       EKG:   No orders to display       Meds given in ED:   Medications   Lidocaine 4 % 1 patch (1 patch Transdermal Medication Applied 2/21/24 6872)   lactated ringers bolus 1,000 mL (1,000 mL Intravenous New Bag 2/21/24 0577)   sodium chloride 0.9 % flush 10 mL (has no administration in time range)   sodium chloride 0.9 % flush 10 mL (has no administration in time range)   sodium chloride 0.9 % infusion 40 mL (has no administration in time range)   acetaminophen (TYLENOL) tablet 650 mg (has no administration in time range)     Or   acetaminophen (TYLENOL) 160 MG/5ML oral solution 650 mg (has no administration in time range)     Or   acetaminophen (TYLENOL) suppository 650 mg (has no administration in time range)   sennosides-docusate (PERICOLACE) 8.6-50 MG per tablet 2 tablet (has no administration in time range)     And   polyethylene glycol (MIRALAX) packet 17 g (has no administration in time range)     And   bisacodyl (DULCOLAX) EC tablet 5 mg (has no administration in time range)     And   bisacodyl (DULCOLAX) suppository 10 mg (has no administration in time range)   ondansetron  (ZOFRAN) injection 4 mg (has no administration in time range)   oxyCODONE-acetaminophen (PERCOCET) 5-325 MG per tablet 1 tablet (1 tablet Oral Given 2/21/24 7887)   methocarbamol (ROBAXIN) tablet 750 mg (750 mg Oral Given 2/21/24 0358)   ketorolac (TORADOL) injection 30 mg (30 mg Intravenous Given 2/21/24 0400)   ondansetron (ZOFRAN) injection 4 mg (4 mg Intravenous Given 2/21/24 0515)   HYDROmorphone (DILAUDID) injection 0.5 mg (0.5 mg Intravenous Given 2/21/24 0515)       Imaging results:  CT Lumbar Spine Without Contrast    Result Date: 2/21/2024   1. Postoperative changes noted at L5-S1 and S1-S2. There is a fluid collection identified within the posterior soft tissues. Full assessment is limited in the absence of contrast material. Patient does have degenerative changes most significantly at L5-S1. Consider further evaluation with MRI of the lumbar spine without and with contrast, particularly given history of recent surgery.  Radiation dose reduction techniques were utilized, including automated exposure control and exposure modulation based on body size.   This report was finalized on 2/21/2024 5:12 AM by Dr. Siena Childers M.D on Workstation: [id number]       Ambulatory status:   - self    Social issues:   Social History     Socioeconomic History    Marital status: Single   Tobacco Use    Smoking status: Never     Passive exposure: Never    Smokeless tobacco: Never   Vaping Use    Vaping Use: Never used   Substance and Sexual Activity    Alcohol use: Yes     Alcohol/week: 1.0 standard drink of alcohol     Types: 1 Cans of beer per week     Comment: Social drinker    Drug use: Yes     Frequency: 5.0 times per week     Types: Marijuana    Sexual activity: Not Currently     Partners: Male     Birth control/protection: Condom, Coitus interruptus       NIH Stroke Scale:         Shelley Rangel RN  02/21/24 05:59 EST

## 2024-02-21 NOTE — CONSULTS
"[facility] NEUROSURGERY CONSULT NOTE    Patient name: Maame Wallace  Referring Provider: Dr. Farhad Arrieta  Reason for Consultation: Recent back surgery with numbness and tingling in legs and fluid collection in soft tissues of back      Patient Care Team:  Sirisha Aparicio MD as PCP - General (Nurse Practitioner)  Joy Castro APRN as Nurse Practitioner (Gastroenterology)  Quinton Funes MD as Consulting Physician (Gastroenterology)    Chief complaint: Low back pain radiating to bilateral legs    Subjective .     History of present illness:    Patient is a 33 y.o. female with history of hypothyroidism, osteoarthritis, Niru malformation s/p decompression in 7/2023, and tethered cord s/p cord release with L5-S1, S1-S2 laminectomy by Dr. Angel as in [place] 1/29/2024, who presented to the ED with progressive low back pain and  radiculopathy.  She states that she began having severe low back pain with sciatica in both legs L > R down to her feet approximately 1 week postop. It is worsened with activity.  She also had moments where her feet have gone numb.  She complains of intermittent tingling in her groin she describes as \"static.\" She informed her surgeon of her symptoms and was prescribed gabapentin 300 mg tid which has done little to relieve her pain.  She has also tried ice but has not helped much either.  At its worst her pain is 10/10, but currently 5/10.  Has sporadic urinary incontinence at baseline but has not noticed any change and denies bowel incontinence.  Complains of feeling weak in her legs and describes it as \"the jelly feeling after you work out\".  Denies falls or balance issues.    Review of Systems  Review of Systems   Constitutional:  Negative for chills and fever.   Respiratory:  Negative for shortness of breath.    Cardiovascular:  Negative for leg swelling.   Gastrointestinal:         Denies bowel incontinence   Genitourinary:         Sporadic urinary incontinence at baseline " "due to tethered cord.  Denies change since surgery in January.  Intermittent tingling in groin but denies numbness   Musculoskeletal:  Positive for back pain. Negative for gait problem.        Positive for leg pain left > right   Neurological:  Positive for weakness and numbness. Negative for dizziness and headaches.        Attests to weakness in eliz legs described as \"feeling like jelly\".  Denies falls       History  PAST MEDICAL HISTORY  Past Medical History:   Diagnosis Date    Anxiety     Asthma     Cholelithiasis     Depression     Fatty liver     Fatty liver 01/18/2021    Fibromyalgia     Fibromyalgia, primary     GERD (gastroesophageal reflux disease)     Headache, tension-type     Hypothyroidism     Irritable bowel disease     Migraines     Suicide attempt 04/02/2021    purposeful overdose on home medications       PAST SURGICAL HISTORY  Past Surgical History:   Procedure Laterality Date    APPENDECTOMY      laparoscopic    BRAIN SURGERY      CHOLECYSTECTOMY      COLONOSCOPY      CRANIOTOMY  1/11/23 and 7/24/23    Chiari decompression    KNEE ARTHROSCOPY Left     KNEE SURGERY Left     LAMINECTOMY  1/11/23 and 7/24/23    Chiari decompression    PATELLA FEMORAL LIGAMENT RECONSTRUCTION Left     SHOULDER SURGERY Left     SPINE SURGERY      TONSILLECTOMY AND ADENOIDECTOMY      UPPER GASTROINTESTINAL ENDOSCOPY         FAMILY HISTORY  Family History   Problem Relation Age of Onset    Irritable bowel syndrome Mother     Celiac disease Mother     Migraines Mother     Crohn's disease Brother     Alcohol abuse Paternal Grandfather     Colon cancer Neg Hx     Colon polyps Neg Hx     Ulcerative colitis Neg Hx        SOCIAL HISTORY  Social History     Tobacco Use    Smoking status: Never     Passive exposure: Never    Smokeless tobacco: Never   Vaping Use    Vaping Use: Never used   Substance Use Topics    Alcohol use: Yes     Alcohol/week: 1.0 standard drink of alcohol     Types: 1 Cans of beer per week     Comment: " Social drinker    Drug use: Yes     Frequency: 5.0 times per week     Types: Marijuana     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       ALPRAZolam (XANAX) 0.5 MG tablet Take 1 tablet by mouth 2 (Two) Times a Day As Needed for Anxiety.       cetirizine (zyrTEC) 10 MG tablet Take 1 tablet by mouth Daily.       cyanocobalamin 1000 MCG/ML injection Inject 1 mL under the skin into the appropriate area as directed. (Patient not taking: Reported on 11/30/2023)       Dextromethorphan-buPROPion ER (Auvelity)  MG tablet controlled-release Take 1 tablet by mouth.       diclofenac (VOLTAREN) 50 MG EC tablet Take 1 tablet by mouth.       Emgality 120 MG/ML auto-injector pen Inject 1 mL under the skin into the appropriate area as directed Every 30 (Thirty) Days for 180 days. 1 mL 5     famotidine (PEPCID) 20 MG tablet TAKE 1 TABLET BY MOUTH EVERY DAY 90 tablet 1     FLUoxetine (PROzac) 40 MG capsule Take 1 capsule by mouth Daily.       gabapentin (NEURONTIN) 300 MG capsule Take 1 capsule by mouth 3 (Three) Times a Day.       HYDROcodone-acetaminophen (NORCO) 5-325 MG per tablet Take 1 tablet by mouth Every 4 (Four) Hours As Needed.       ibuprofen (ADVIL,MOTRIN) 600 MG tablet Take 1 tablet by mouth Every 6 (Six) Hours As Needed for Mild Pain.       lansoprazole (PREVACID) 30 MG capsule 1 CAPSULE BY MOUTH TWICE DAILY 60 capsule 3     levothyroxine (SYNTHROID, LEVOTHROID) 88 MCG tablet 1 tablet.       ondansetron (ZOFRAN) 4 MG tablet 1 tablet.       Rimegepant Sulfate (Nurtec) 75 MG tablet dispersible tablet Take 1 tablet by mouth As Needed  (Migraine headache, max 1 dose in 24 hours.) for up to 180 days. 8 tablet 5     topiramate (Topamax) 50 MG tablet Take 1 tablet by mouth 2 (Two) Times a Day for 180 days. 180 tablet 1        Current Facility-Administered Medications:     acetaminophen (TYLENOL) tablet 650 mg, 650 mg, Oral, Q4H PRN, 650 mg at 02/21/24 0928 **OR** acetaminophen (TYLENOL) 160 MG/5ML oral solution 650 mg, 650 mg, Oral, Q4H PRN **OR** acetaminophen (TYLENOL) suppository 650 mg, 650 mg, Rectal, Q4H PRN, Cass Man APRN    ALPRAZolam (XANAX) tablet 0.5 mg, 0.5 mg, Oral, BID PRN, Farhad Arrieta MD    sennosides-docusate (PERICOLACE) 8.6-50 MG per tablet 2 tablet, 2 tablet, Oral, BID PRN **AND** polyethylene glycol (MIRALAX) packet 17 g, 17 g, Oral, Daily PRN **AND** bisacodyl (DULCOLAX) EC tablet 5 mg, 5 mg, Oral, Daily PRN **AND** bisacodyl (DULCOLAX) suppository 10 mg, 10 mg, Rectal, Daily PRN, Cass Man APRN    FLUoxetine (PROzac) capsule 40 mg, 40 mg, Oral, Daily, Farhad Arrieta MD, 40 mg at 02/21/24 1315    gabapentin (NEURONTIN) capsule 300 mg, 300 mg, Oral, TID, Farhad Arrieta MD, 300 mg at 02/21/24 1658    HYDROcodone-acetaminophen (NORCO) 5-325 MG per tablet 1 tablet, 1 tablet, Oral, Q4H PRN, Farhad Arrieta MD, 1 tablet at 02/21/24 1313    HYDROmorphone (DILAUDID) injection 0.5 mg, 0.5 mg, Intravenous, Q2H PRN, Farhad Arrieta MD, 0.5 mg at 02/21/24 1658    levothyroxine (SYNTHROID, LEVOTHROID) tablet 88 mcg, 88 mcg, Oral, Q AM, Farhad Arrieta MD, 88 mcg at 02/21/24 1315    ondansetron (ZOFRAN) injection 4 mg, 4 mg, Intravenous, Q6H PRN, Cass Man APRN    pantoprazole (PROTONIX) EC tablet 40 mg, 40 mg, Oral, Q AM, Farhad Arrieta MD, 40 mg at 02/21/24 1313    sodium chloride 0.9 % flush 10 mL, 10 mL, Intravenous, Q12H, Cass Man APRN, 10 mL at 02/21/24 0924    sodium chloride 0.9 % flush 10 mL, 10 mL, Intravenous, PRN, Csas Man APRN    sodium chloride 0.9 % infusion 40 mL, 40 mL,  Intravenous, PRN, Cass Man APRN    sodium chloride 0.9 % infusion, 100 mL/hr, Intravenous, Continuous, Farhad Arrieta MD, Last Rate: 100 mL/hr at 02/21/24 0919, 100 mL/hr at 02/21/24 0919    topiramate (TOPAMAX) tablet 50 mg, 50 mg, Oral, BID, Farhad Arrieta MD, 50 mg at 02/21/24 1348   Objective     Results Review:  LABS:    Admission on 02/21/2024   Component Date Value Ref Range Status    Glucose 02/21/2024 107 (H)  65 - 99 mg/dL Final    BUN 02/21/2024 13  6 - 20 mg/dL Final    Creatinine 02/21/2024 0.83  0.57 - 1.00 mg/dL Final    Sodium 02/21/2024 139  136 - 145 mmol/L Final    Potassium 02/21/2024 4.1  3.5 - 5.2 mmol/L Final    Chloride 02/21/2024 109 (H)  98 - 107 mmol/L Final    CO2 02/21/2024 21.0 (L)  22.0 - 29.0 mmol/L Final    Calcium 02/21/2024 8.8  8.6 - 10.5 mg/dL Final    Total Protein 02/21/2024 6.0  6.0 - 8.5 g/dL Final    Albumin 02/21/2024 4.0  3.5 - 5.2 g/dL Final    ALT (SGPT) 02/21/2024 42 (H)  1 - 33 U/L Final    AST (SGOT) 02/21/2024 27  1 - 32 U/L Final    Alkaline Phosphatase 02/21/2024 62  39 - 117 U/L Final    Total Bilirubin 02/21/2024 <0.2  0.0 - 1.2 mg/dL Final    Globulin 02/21/2024 2.0  gm/dL Final    A/G Ratio 02/21/2024 2.0  g/dL Final    BUN/Creatinine Ratio 02/21/2024 15.7  7.0 - 25.0 Final    Anion Gap 02/21/2024 9.0  5.0 - 15.0 mmol/L Final    eGFR 02/21/2024 96.8  >60.0 mL/min/1.73 Final    HCG Qualitative 02/21/2024 Negative  Negative Final    Sed Rate 02/21/2024 14  0 - 20 mm/hr Final    C-Reactive Protein 02/21/2024 <0.30  0.00 - 0.50 mg/dL Final    WBC 02/21/2024 8.30  3.40 - 10.80 10*3/mm3 Final    RBC 02/21/2024 4.32  3.77 - 5.28 10*6/mm3 Final    Hemoglobin 02/21/2024 12.1  12.0 - 15.9 g/dL Final    Hematocrit 02/21/2024 37.1  34.0 - 46.6 % Final    MCV 02/21/2024 85.9  79.0 - 97.0 fL Final    MCH 02/21/2024 28.0  26.6 - 33.0 pg Final    MCHC 02/21/2024 32.6  31.5 - 35.7 g/dL Final    RDW 02/21/2024 14.0  12.3 - 15.4 % Final    RDW-SD 02/21/2024 43.8  37.0  - 54.0 fl Final    MPV 02/21/2024 9.4  6.0 - 12.0 fL Final    Platelets 02/21/2024 286  140 - 450 10*3/mm3 Final    Neutrophil % 02/21/2024 45.9  42.7 - 76.0 % Final    Lymphocyte % 02/21/2024 43.7  19.6 - 45.3 % Final    Monocyte % 02/21/2024 6.1  5.0 - 12.0 % Final    Eosinophil % 02/21/2024 3.3  0.3 - 6.2 % Final    Basophil % 02/21/2024 0.8  0.0 - 1.5 % Final    Immature Grans % 02/21/2024 0.2  0.0 - 0.5 % Final    Neutrophils, Absolute 02/21/2024 3.80  1.70 - 7.00 10*3/mm3 Final    Lymphocytes, Absolute 02/21/2024 3.63 (H)  0.70 - 3.10 10*3/mm3 Final    Monocytes, Absolute 02/21/2024 0.51  0.10 - 0.90 10*3/mm3 Final    Eosinophils, Absolute 02/21/2024 0.27  0.00 - 0.40 10*3/mm3 Final    Basophils, Absolute 02/21/2024 0.07  0.00 - 0.20 10*3/mm3 Final    Immature Grans, Absolute 02/21/2024 0.02  0.00 - 0.05 10*3/mm3 Final    nRBC 02/21/2024 0.0  0.0 - 0.2 /100 WBC Final    Glucose 02/21/2024 90  65 - 99 mg/dL Final    BUN 02/21/2024 13  6 - 20 mg/dL Final    Creatinine 02/21/2024 0.83  0.57 - 1.00 mg/dL Final    Sodium 02/21/2024 138  136 - 145 mmol/L Final    Potassium 02/21/2024 4.1  3.5 - 5.2 mmol/L Final    Chloride 02/21/2024 106  98 - 107 mmol/L Final    CO2 02/21/2024 19.8 (L)  22.0 - 29.0 mmol/L Final    Calcium 02/21/2024 8.7  8.6 - 10.5 mg/dL Final    BUN/Creatinine Ratio 02/21/2024 15.7  7.0 - 25.0 Final    Anion Gap 02/21/2024 12.2  5.0 - 15.0 mmol/L Final    eGFR 02/21/2024 96.8  >60.0 mL/min/1.73 Final    WBC 02/21/2024 10.84 (H)  3.40 - 10.80 10*3/mm3 Final    RBC 02/21/2024 4.25  3.77 - 5.28 10*6/mm3 Final    Hemoglobin 02/21/2024 12.4  12.0 - 15.9 g/dL Final    Hematocrit 02/21/2024 37.4  34.0 - 46.6 % Final    MCV 02/21/2024 88.0  79.0 - 97.0 fL Final    MCH 02/21/2024 29.2  26.6 - 33.0 pg Final    MCHC 02/21/2024 33.2  31.5 - 35.7 g/dL Final    RDW 02/21/2024 14.3  12.3 - 15.4 % Final    RDW-SD 02/21/2024 45.6  37.0 - 54.0 fl Final    MPV 02/21/2024 9.8  6.0 - 12.0 fL Final    Platelets  02/21/2024 282  140 - 450 10*3/mm3 Final    Neutrophil % 02/21/2024 59.6  42.7 - 76.0 % Final    Lymphocyte % 02/21/2024 31.6  19.6 - 45.3 % Final    Monocyte % 02/21/2024 4.9 (L)  5.0 - 12.0 % Final    Eosinophil % 02/21/2024 2.7  0.3 - 6.2 % Final    Basophil % 02/21/2024 0.7  0.0 - 1.5 % Final    Immature Grans % 02/21/2024 0.5  0.0 - 0.5 % Final    Neutrophils, Absolute 02/21/2024 6.46  1.70 - 7.00 10*3/mm3 Final    Lymphocytes, Absolute 02/21/2024 3.43 (H)  0.70 - 3.10 10*3/mm3 Final    Monocytes, Absolute 02/21/2024 0.53  0.10 - 0.90 10*3/mm3 Final    Eosinophils, Absolute 02/21/2024 0.29  0.00 - 0.40 10*3/mm3 Final    Basophils, Absolute 02/21/2024 0.08  0.00 - 0.20 10*3/mm3 Final    Immature Grans, Absolute 02/21/2024 0.05  0.00 - 0.05 10*3/mm3 Final    nRBC 02/21/2024 0.0  0.0 - 0.2 /100 WBC Final       DIAGNOSTICS:  CT Lumbar: Postoperative changes noted at L5-S1 and S1-S2. There is a fluid collection identified within the posterior soft tissues. Full assessment  is limited in the absence of contrast material. Patient does have  degenerative changes most significantly at L5-S1. Consider further  evaluation with MRI of the lumbar spine without and with contrast,  particularly given history of recent surgery.    MRI lumbar w/ & w/o:   1.  The conus is at L1.  2.  The patient has undergone bilateral laminotomies at L5-S1. A small  fluid collection is noted posterior to the thecal sac measuring  approximately 2.3 x 1.5 x 1.2 cm in size. This is located 7-8 mm  posterior to the thecal sac. A larger more superficial fluid collection  is identified measuring 6 x 6 x 7.5 cm in size. It extends from the  level of L3-L4 inferiorly to S1. These fluid collections are nonspecific  and likely represent seromas. Infected collections cannot be entirely  excluded.  3.  A broad-based disc osteophyte complex is present at L5-S1 which is  slightly more prominent to the left. It abuts the traversing S1 nerve  root slightly,  more so on the left where there is mild lateral recess  narrowing.    Results Review:   I reviewed the patient's new clinical results.  I personally viewed and interpreted the patient's chart    Vital Signs   Temp:  [97.4 °F (36.3 °C)-98.3 °F (36.8 °C)] 98.3 °F (36.8 °C)  Heart Rate:  [] 80  Resp:  [16-18] 16  BP: ()/(52-70) 93/61    Physical Exam:  Physical Exam  Vitals reviewed.   Constitutional:       Appearance: Normal appearance.   HENT:      Head: Normocephalic and atraumatic.   Pulmonary:      Effort: Pulmonary effort is normal.   Musculoskeletal:      Right lower leg: No edema.      Left lower leg: No edema.   Skin:     General: Skin is warm and dry.      Comments: Surgical lumbar incision fully approximated without redness drainage   Neurological:      Mental Status: She is alert and oriented to person, place, and time.      Motor: Motor strength is normal.      Neurologic Exam     Mental Status   Oriented to person, place, and time.   Level of consciousness: alert    Motor Exam   Muscle bulk: normal  Right leg tone: normal  Left leg tone: normal    Strength   Strength 5/5 throughout.     Sensory Exam   Right leg light touch: normal  Left leg light touch: Inner thigh mildly decreased sensation.  Full sensation bilateral buttocks and groin area       Assessment & Plan       Fluid collection at surgical site, initial encounter    Fibromyalgia    Hypothyroidism    Fatty liver    Anxiety    Arnold-Chiari malformation, type I    Asthma    Benign intracranial hypertension    Tethered cord    33-year-old female with history of Chiari malformation s/p Chiari decompression in 7/2023 and L5-S1, S1-S2 laminectomy with tethered cord release approximately 3 weeks ago presents to the ED with worsening low back pain and radiculopathy.  Imaging reveals large superficial fluid collection extending from level of L3-L4 inferiorly to S1, suspected to be seroma. She is afebrile and normal sed rate, CRP and  "essentially normal WBC do not indicate any signs of infection. She has full strength in both legs and full sensation aside from slightly decreased sensation of her inner left thigh.  Disc osteophyte complex at L5-S1 does not account for distribution of symptoms. No indication that surgery is warranted at this point.  Recommend treatment with pain medication and if pain persists to follow-up with Dr. Newby in [place].  No further recommendations at this time.  No follow-up needed.    PLAN:   -Will defer pain management to admitting  -Will sign off.  No follow-up needed.    I discussed the patient's findings and my recommendations with patient, family, nursing staff, and Dr. Benavides    During patient visit, I utilized appropriate personal protective equipment including gloves and mask.  Mask used was standard procedure mask. Appropriate PPE was worn during the entire visit.  Hand hygiene was completed before and after.     Cheryl Cade, APRN  02/21/24  12:37 EST    \"Dictated utilizing Dragon dictation\".     "

## 2024-02-21 NOTE — ED NOTES
Pt to ER via pv. States she had spinal surgery 1/29/24, is experiencing tingling and numbness at tailbone and down both legs.  Pt ambulatory at triage.

## 2024-02-21 NOTE — ED PROVIDER NOTES
EMERGENCY DEPARTMENT ENCOUNTER  Room Number:  03/03  PCP: Sirisha Aparicio MD  Independent Historians: Patient      HPI:  Chief Complaint: had concerns including Post-op Problem (Post op problem, numbness and tingling in back and both legs).     A complete HPI/ROS/PMH/PSH/SH/FH are unobtainable due to: None    Chronic or social conditions impacting patient care (Social Determinants of Health): None      Context: Maame Wallace is a 33 y.o. female with a medical history of fibromyalgia, depression, Chiari malformation, tethered cord, and chronic migraines presents emergency department complaining of low back pain that radiates into her bilateral lower extremities it has been ongoing since surgery a few weeks ago.  Patient had a tethered cord release and since then has had a burning pain in her low back down both of her legs and describes it as a pins and needle sensation in her lower extremities.  She denies any numbness or weakness.  She denies saddle anesthesia or loss of bowel or bladder function.  Patient called the surgeon who performed the surgery in [place] and they prescribed her gabapentin.  She has a follow-up MRI scheduled for 2 days from now.  She denies fever or chills.  She is scheduled to go back and see them in a few weeks.      Review of prior external notes (non-ED) -and- Review of prior external test results outside of this encounter:   01/29/2024: Tethered cord release, S1-S2 laminectomy performed by Dr. Newby at [facility]    Patient saw spine surgeon in follow-up on 2/2/2024 they reported improvement of numbness and tingling no urinary incontinence at that time    Patient saw her PCP on 2/7/2024 for staple removal      PAST MEDICAL HISTORY  Active Ambulatory Problems     Diagnosis Date Noted    Hemiplegic migraine, intractable 04/25/2016    Fibromyalgia 04/29/2016    Major depressive disorder, recurrent, severe without psychotic features 02/19/2020     Vaginal yeast infection 02/20/2020    Leukocytosis 02/20/2020    Hyponatremia 02/20/2020    Hypothyroidism 02/20/2020    Irritable bowel disease 02/20/2020    Obesity (BMI 30-39.9) 02/20/2020    Suicide attempt by drug ingestion 03/18/2020    Obesity, Class III, BMI 40-49.9 (morbid obesity) 03/18/2020    Fatty liver 01/18/2021    Elevated lithium level 04/03/2021    Polysubstance overdose 04/03/2021    COVID-19 virus detected 04/03/2021    Major depressive disorder, recurrent severe without psychotic features 04/27/2021    History of COVID-19 04/27/2021    Anxiety 04/19/2022    Arnold-Chiari malformation, type I 06/21/2023    OA (osteoarthritis) 01/10/2024    Asthma 05/07/2012    Bilateral myopia 05/09/2023    Chronic interstitial cystitis 01/10/2024    Gastroesophageal reflux disease 06/14/2012    Benign intracranial hypertension 06/14/2012    Migraine without aura 10/05/2012    Seasonal allergies 01/10/2024    Tethered cord 09/05/2023    Vitamin D deficiency 03/04/2021    Vitreous floaters 05/09/2023    Chronic neck pain 01/10/2024     Resolved Ambulatory Problems     Diagnosis Date Noted    No Resolved Ambulatory Problems     Past Medical History:   Diagnosis Date    Cholelithiasis     Depression     Fibromyalgia, primary     GERD (gastroesophageal reflux disease)     Headache, tension-type     Migraines     Suicide attempt 04/02/2021         PAST SURGICAL HISTORY  Past Surgical History:   Procedure Laterality Date    APPENDECTOMY      laparoscopic    BRAIN SURGERY      CHOLECYSTECTOMY      COLONOSCOPY      CRANIOTOMY  1/11/23 and 7/24/23    Chiari decompression    KNEE ARTHROSCOPY Left     KNEE SURGERY Left     LAMINECTOMY  1/11/23 and 7/24/23    Chiari decompression    PATELLA FEMORAL LIGAMENT RECONSTRUCTION Left     SHOULDER SURGERY Left     SPINE SURGERY      TONSILLECTOMY AND ADENOIDECTOMY      UPPER GASTROINTESTINAL ENDOSCOPY           FAMILY HISTORY  Family History   Problem Relation Age of Onset     Irritable bowel syndrome Mother     Celiac disease Mother     Migraines Mother     Crohn's disease Brother     Alcohol abuse Paternal Grandfather     Colon cancer Neg Hx     Colon polyps Neg Hx     Ulcerative colitis Neg Hx          SOCIAL HISTORY  Social History     Socioeconomic History    Marital status: Single   Tobacco Use    Smoking status: Never     Passive exposure: Never    Smokeless tobacco: Never   Vaping Use    Vaping Use: Never used   Substance and Sexual Activity    Alcohol use: Yes     Alcohol/week: 1.0 standard drink of alcohol     Types: 1 Cans of beer per week     Comment: Social drinker    Drug use: Yes     Frequency: 5.0 times per week     Types: Marijuana    Sexual activity: Not Currently     Partners: Male     Birth control/protection: Condom, Coitus interruptus         ALLERGIES  Acetazolamide, Adhesive tape, Duloxetine hcl, Codeine, Duloxetine, Oxycodone, Chlorhexidine, Dhea [nutritional supplements], Dihydroergotamine, Imitrex [sumatriptan], and Morphine      REVIEW OF SYSTEMS  Included in HPI  All systems reviewed and negative except for those discussed in HPI.      PHYSICAL EXAM    I have reviewed the triage vital signs and nursing notes.    ED Triage Vitals   Temp Heart Rate Resp BP SpO2   02/21/24 0321 02/21/24 0321 02/21/24 0321 02/21/24 0329 02/21/24 0321   97.4 °F (36.3 °C) 111 18 122/70 97 %      Temp src Heart Rate Source Patient Position BP Location FiO2 (%)   -- 02/21/24 0321 02/21/24 0329 02/21/24 0329 --    Monitor Lying Right arm        Physical Exam  Constitutional:       General: She is not in acute distress.     Appearance: Normal appearance. She is obese.   HENT:      Head: Normocephalic and atraumatic.      Nose: Nose normal.      Mouth/Throat:      Mouth: Mucous membranes are moist.   Eyes:      Extraocular Movements: Extraocular movements intact.      Pupils: Pupils are equal, round, and reactive to light.   Cardiovascular:      Rate and Rhythm: Normal rate and regular  rhythm.      Pulses: Normal pulses.      Heart sounds: Normal heart sounds.   Pulmonary:      Effort: Pulmonary effort is normal. No respiratory distress.      Breath sounds: Normal breath sounds.   Abdominal:      General: Abdomen is flat. There is no distension.      Palpations: Abdomen is soft.      Tenderness: There is no abdominal tenderness.   Musculoskeletal:         General: Normal range of motion.      Cervical back: Normal range of motion and neck supple.      Comments: Lumbar spine: There is a well-healing surgical scar vertical over the sacrum that is clean dry and intact.  There is no midline tenderness to palpation and no paraspinal tenderness or tenderness over the sciatic notch.  Negative straight leg raise.  5 out of 5 strength with plantar and dorsiflexion bilaterally.  2+ DP pulses bilaterally.  Limbs are warm and well-perfused.  Sensation to light touch intact distally.     Skin:     General: Skin is warm and dry.      Capillary Refill: Capillary refill takes less than 2 seconds.   Neurological:      General: No focal deficit present.      Mental Status: She is alert and oriented to person, place, and time.   Psychiatric:         Mood and Affect: Mood normal.         Behavior: Behavior normal.           LAB RESULTS  Recent Results (from the past 24 hour(s))   Comprehensive Metabolic Panel    Collection Time: 02/21/24  4:29 AM    Specimen: Blood   Result Value Ref Range    Glucose 107 (H) 65 - 99 mg/dL    BUN 13 6 - 20 mg/dL    Creatinine 0.83 0.57 - 1.00 mg/dL    Sodium 139 136 - 145 mmol/L    Potassium 4.1 3.5 - 5.2 mmol/L    Chloride 109 (H) 98 - 107 mmol/L    CO2 21.0 (L) 22.0 - 29.0 mmol/L    Calcium 8.8 8.6 - 10.5 mg/dL    Total Protein 6.0 6.0 - 8.5 g/dL    Albumin 4.0 3.5 - 5.2 g/dL    ALT (SGPT) 42 (H) 1 - 33 U/L    AST (SGOT) 27 1 - 32 U/L    Alkaline Phosphatase 62 39 - 117 U/L    Total Bilirubin <0.2 0.0 - 1.2 mg/dL    Globulin 2.0 gm/dL    A/G Ratio 2.0 g/dL    BUN/Creatinine Ratio  15.7 7.0 - 25.0    Anion Gap 9.0 5.0 - 15.0 mmol/L    eGFR 96.8 >60.0 mL/min/1.73   hCG, Serum, Qualitative    Collection Time: 02/21/24  4:29 AM    Specimen: Blood   Result Value Ref Range    HCG Qualitative Negative Negative   Sedimentation Rate    Collection Time: 02/21/24  4:29 AM    Specimen: Blood   Result Value Ref Range    Sed Rate 14 0 - 20 mm/hr   C-reactive Protein    Collection Time: 02/21/24  4:29 AM    Specimen: Blood   Result Value Ref Range    C-Reactive Protein <0.30 0.00 - 0.50 mg/dL   CBC Auto Differential    Collection Time: 02/21/24  4:29 AM    Specimen: Blood   Result Value Ref Range    WBC 8.30 3.40 - 10.80 10*3/mm3    RBC 4.32 3.77 - 5.28 10*6/mm3    Hemoglobin 12.1 12.0 - 15.9 g/dL    Hematocrit 37.1 34.0 - 46.6 %    MCV 85.9 79.0 - 97.0 fL    MCH 28.0 26.6 - 33.0 pg    MCHC 32.6 31.5 - 35.7 g/dL    RDW 14.0 12.3 - 15.4 %    RDW-SD 43.8 37.0 - 54.0 fl    MPV 9.4 6.0 - 12.0 fL    Platelets 286 140 - 450 10*3/mm3    Neutrophil % 45.9 42.7 - 76.0 %    Lymphocyte % 43.7 19.6 - 45.3 %    Monocyte % 6.1 5.0 - 12.0 %    Eosinophil % 3.3 0.3 - 6.2 %    Basophil % 0.8 0.0 - 1.5 %    Immature Grans % 0.2 0.0 - 0.5 %    Neutrophils, Absolute 3.80 1.70 - 7.00 10*3/mm3    Lymphocytes, Absolute 3.63 (H) 0.70 - 3.10 10*3/mm3    Monocytes, Absolute 0.51 0.10 - 0.90 10*3/mm3    Eosinophils, Absolute 0.27 0.00 - 0.40 10*3/mm3    Basophils, Absolute 0.07 0.00 - 0.20 10*3/mm3    Immature Grans, Absolute 0.02 0.00 - 0.05 10*3/mm3    nRBC 0.0 0.0 - 0.2 /100 WBC         RADIOLOGY  CT Lumbar Spine Without Contrast    Result Date: 2/21/2024  CT OF THE LUMBAR SPINE  HISTORY: Pins-and-needles sensation in lower extremities following tethered cord surgery.  COMPARISON: None available.  TECHNIQUE: Axial CT imaging was obtained through the lumbar spine. Coronal and sagittal reformatted images were obtained.  FINDINGS: No acute fracture or subluxation of the lumbar spine is seen. There is very minimal intervertebral disc  space narrowing noted at L5-S1. There does appear to be some very minimal lumbar scoliosis, with convexity to the left. Patient has undergone bilateral laminectomy at L5-S1. Further laminectomy changes noted at S1-S2. The patient is noted to have a fluid collection within the posterior soft tissues. This measures up to 6.1 x 5.9 cm. It measures up to 7.2 cm in craniocaudal dimensions. It may represent a postoperative seroma. It does not have any bubbles of gas within it to suggest superimposed infection, although this is difficult to assess in the absence of contrast material. It extends from the level of L3-L4 to the level of S1-S2.  T12-L1: There is no canal stenosis or neuroforaminal narrowing. L1-L2: There is no canal stenosis or neuroforaminal narrowing. L2-L3: There is no canal stenosis or neuroforaminal narrowing. L3-L4: There is diffuse disc bulge, although more significant on the left. There is no significant canal stenosis. There may be some minimal neuroforaminal narrowing on the left. L4-L5: There is disc bulge. There is some mild canal stenosis. There is mild bilateral neuroforaminal narrowing. L5-S1: There is diffuse disc bulge. Patient appears to have moderate canal narrowing. There is severe neuroforaminal narrowing on the left and moderate narrowing on the right.       1. Postoperative changes noted at L5-S1 and S1-S2. There is a fluid collection identified within the posterior soft tissues. Full assessment is limited in the absence of contrast material. Patient does have degenerative changes most significantly at L5-S1. Consider further evaluation with MRI of the lumbar spine without and with contrast, particularly given history of recent surgery.  Radiation dose reduction techniques were utilized, including automated exposure control and exposure modulation based on body size.   This report was finalized on 2/21/2024 5:12 AM by Dr. Siena Childers M.D on Workstation: [id number]          MEDICATIONS GIVEN IN ER  Medications   Lidocaine 4 % 1 patch (1 patch Transdermal Medication Applied 2/21/24 0358)   lactated ringers bolus 1,000 mL (1,000 mL Intravenous New Bag 2/21/24 0515)   oxyCODONE-acetaminophen (PERCOCET) 5-325 MG per tablet 1 tablet (1 tablet Oral Given 2/21/24 0357)   methocarbamol (ROBAXIN) tablet 750 mg (750 mg Oral Given 2/21/24 0358)   ketorolac (TORADOL) injection 30 mg (30 mg Intravenous Given 2/21/24 0400)   ondansetron (ZOFRAN) injection 4 mg (4 mg Intravenous Given 2/21/24 0515)   HYDROmorphone (DILAUDID) injection 0.5 mg (0.5 mg Intravenous Given 2/21/24 0515)           OUTPATIENT MEDICATION MANAGEMENT:  Current Facility-Administered Medications Ordered in Epic   Medication Dose Route Frequency Provider Last Rate Last Admin    lactated ringers bolus 1,000 mL  1,000 mL Intravenous Once Jelly Holcomb PA-C 1,000 mL/hr at 02/21/24 0515 1,000 mL at 02/21/24 0515    Lidocaine 4 % 1 patch  1 patch Transdermal Once Jelly Holcomb PA-C   1 patch at 02/21/24 0358     Current Outpatient Medications Ordered in Epic   Medication Sig Dispense Refill    albuterol sulfate  (90 Base) MCG/ACT inhaler Every 6 (Six) Hours.      ALPRAZolam (XANAX) 0.5 MG tablet Take 1 tablet by mouth 2 (Two) Times a Day As Needed for Anxiety.      cetirizine (zyrTEC) 10 MG tablet Take 1 tablet by mouth Daily.      cyanocobalamin 1000 MCG/ML injection Inject 1 mL under the skin into the appropriate area as directed. (Patient not taking: Reported on 11/30/2023)      Dextromethorphan-buPROPion ER (Auvelity)  MG tablet controlled-release Take 1 tablet by mouth.      diclofenac (VOLTAREN) 50 MG EC tablet Take 1 tablet by mouth.      Emgality 120 MG/ML auto-injector pen Inject 1 mL under the skin into the appropriate area as directed Every 30 (Thirty) Days for 180 days. 1 mL 5    famotidine (PEPCID) 20 MG tablet TAKE 1 TABLET BY MOUTH EVERY DAY 90 tablet 1    FLUoxetine (PROzac) 40 MG capsule Take  1 capsule by mouth Daily.      gabapentin (NEURONTIN) 300 MG capsule Take 1 capsule by mouth 3 (Three) Times a Day.      HYDROcodone-acetaminophen (NORCO) 5-325 MG per tablet Take 1 tablet by mouth Every 4 (Four) Hours As Needed.      ibuprofen (ADVIL,MOTRIN) 600 MG tablet Take 1 tablet by mouth Every 6 (Six) Hours As Needed for Mild Pain.      lansoprazole (PREVACID) 30 MG capsule 1 CAPSULE BY MOUTH TWICE DAILY 60 capsule 3    levothyroxine (SYNTHROID, LEVOTHROID) 88 MCG tablet 1 tablet.      ondansetron (ZOFRAN) 4 MG tablet 1 tablet.      Rimegepant Sulfate (Nurtec) 75 MG tablet dispersible tablet Take 1 tablet by mouth As Needed (Migraine headache, max 1 dose in 24 hours.) for up to 180 days. 8 tablet 5    topiramate (Topamax) 50 MG tablet Take 1 tablet by mouth 2 (Two) Times a Day for 180 days. 180 tablet 1               PROGRESS, DATA ANALYSIS, CONSULTS, AND MEDICAL DECISION MAKING  ORDERS PLACED DURING THIS VISIT:  Orders Placed This Encounter   Procedures    CT Lumbar Spine Without Contrast    Comprehensive Metabolic Panel    hCG, Serum, Qualitative    Sedimentation Rate    C-reactive Protein    CBC Auto Differential    [facility] (on-call MD unless specified) Details    Initiate Observation Status    CBC & Differential       All labs have been independently interpreted by me.  All radiology studies have been reviewed by me. All EKG's have been independently viewed and interpreted by me.  Discussion below represents my analysis of pertinent findings related to patient's condition, differential diagnosis, treatment plan and final disposition.    Differential diagnosis includes but is not limited to:   My differential diagnosis for back pain includes but is not limited to:  Musculoskeletal strain, contusion, retroperitoneal hematoma, disc protrusion, vertebral fracture, transverse process fracture, rib fracture, facet syndrome, sacroiliac joint strain, sciatica, renal injury, splenic injury, pancreatic injury,  osteoarthritis, lumbar spondylosis, spinal stenosis, ankylosing spondylitis, sacroiliac joint inflammation, pancreatitis, perforated peptic ulcer, diverticulitis, endometriosis, chronic PID, epidural abscess, osteomyelitis, retroperitoneal abscess, pyelonephritis, pneumonia, subphrenic abscess, tuberculosis, neurofibroma, meningioma, multiple myeloma, lymphoma, metastatic cancer, primary cancer, AAA, aortic dissection, spinal ischemia, referred pain, ureterolithiasis      ED Course:  ED Course as of 02/21/24 0543   Wed Feb 21, 2024   0343 I discussed the case with Dr. Schulz and they agree to evaluate the patient at the bedside.    [CC]   0443 CT Lumbar Spine Without Contrast  My independent interpretation of the CT of the lumbar spine is fluid collection down near the cauda equina [CC]   0447 WBC: 8.30 [CC]   0454 HCG Qualitative: Negative [CC]   0513 C-Reactive Protein: <0.30 [CC]   0516 I rechecked the patient.  I discussed the patient's labs, radiology findings (including all incidental findings), diagnosis, and plan for admission. The patient understands and agrees with the plan.   [CC]   0542 Spoke with ANCA Odell with [facility].  Reviewed history, exam, results, treatments.  She agrees admit the patient to Dr. Wong.      [CC]      ED Course User Index  [CC] Jelly oHlcomb PA-C           AS OF 05:43 EST VITALS:    BP - 93/64  HR - 84  TEMP - 97.4 °F (36.3 °C)  O2 SATS - 94%      MDM:  Patient is a 33-year-old female presents emergency department today with a sensation of pins-and-needles and burning pain down both of her legs that has been ongoing since a tethered cord decompression a few weeks ago.  Patient surgeon is in [place] and she did notify them of the paresthesias and they ordered an outpatient MRI for 2 days from now but she says the pain is worse which is why she presented to the ED.  On arrival here in the emergency department, vitals reassuring, she is afebrile.  On my exam the patient has 5  out of 5 strength in the bilateral lower extremities and has subjective decrease sensation bilaterally.  She has no saddle anesthesia or loss of bowel or bladder function.  Her incision site is clean dry and intact with no signs of infection or palpable fluctuance.  Patient was evaluated with a CT of the lumbar spine which shows a large fluid collection at the surgical site.  Patient will need MRI imaging to further evaluate the fluid collection as this may be the source of her paresthesias.  Patient will require admission to the hospital.  She is stable at the time of admission.      COMPLEXITY OF CARE  The patient requires admission.        DIAGNOSIS  Final diagnoses:   Fluid collection at surgical site, initial encounter   Paresthesia of both legs         DISPOSITION  ED Disposition       ED Disposition   Decision to Admit    Condition   --    Comment   Level of Care: Med/Surg [1]   Diagnosis: Fluid collection at surgical site, initial encounter [[id number]]   Admitting Physician: OLY SALDANA [[id number]]   Attending Physician: OLY SALDANA [[id number]]                        Please note that portions of this document were completed with a voice recognition program.    Note Disclaimer: At [facility], we believe that sharing information builds trust and better relationships. You are receiving this note because you recently visited [facility]. It is possible you will see health information before a provider has talked with you about it. This kind of information can be easy to misunderstand. To help you fully understand what it means for your health, we urge you to discuss this note with your provider.     Jelly Holcomb PA-C  02/21/24 0539       Jelly Holcomb PA-C  02/21/24 0543

## 2024-02-21 NOTE — H&P
HISTORY AND PHYSICAL   [facility]        Patient Identification:  Name: Maame Wallace  Age: 33 y.o.  Sex: female  :  1990  MRN: [id number]                     Primary Care Physician: Sirisha Aparicio MD    Chief Complaint:  Post op  numbness and tingling back and both legs.    History of Present Illness:      The patient  is a 33 y.o. female with a medical history of fibromyalgia, depression, Chiari malformation, tethered cord, and chronic migraines presents emergency department complaining of low back pain that radiates into her bilateral lower extremities it has been ongoing since surgery a few weeks ago.  Patient had a tethered cord release and since then has had a burning pain in her low back down both of her legs and describes it as a pins and needle sensation in her lower extremities.  She denies any numbness or weakness.  She denies saddle anesthesia or loss of bowel or bladder function.  Patient called the surgeon who performed the surgery in [place] and they prescribed her gabapentin.  She has a follow-up MRI scheduled for 2 days from the day of admission.  She denies fever or chills.  She is scheduled to go back and see them in a few weeks.  The patient was evaluated in the ER and CT scan of the lumbar spine did not show some fluid collections.  The patient was admitted to the hospital for further evaluation treatment.    Past Medical History:  Past Medical History:   Diagnosis Date    Anxiety     Asthma     Cholelithiasis     Depression     Fatty liver     Fatty liver 2021    Fibromyalgia     Fibromyalgia, primary     GERD (gastroesophageal reflux disease)     Headache, tension-type     Hypothyroidism     Irritable bowel disease     Migraines     Suicide attempt 2021    purposeful overdose on home medications     Past Surgical History:  Past Surgical History:   Procedure Laterality Date    APPENDECTOMY      laparoscopic    BRAIN SURGERY      CHOLECYSTECTOMY       COLONOSCOPY      CRANIOTOMY  1/11/23 and 7/24/23    Chiari decompression    KNEE ARTHROSCOPY Left     KNEE SURGERY Left     LAMINECTOMY  1/11/23 and 7/24/23    Chiari decompression    PATELLA FEMORAL LIGAMENT RECONSTRUCTION Left     SHOULDER SURGERY Left     SPINE SURGERY      TONSILLECTOMY AND ADENOIDECTOMY      UPPER GASTROINTESTINAL ENDOSCOPY        Home Meds:  Medications Prior to Admission   Medication Sig Dispense Refill Last Dose    albuterol sulfate  (90 Base) MCG/ACT inhaler Every 6 (Six) Hours.       ALPRAZolam (XANAX) 0.5 MG tablet Take 1 tablet by mouth 2 (Two) Times a Day As Needed for Anxiety.       cetirizine (zyrTEC) 10 MG tablet Take 1 tablet by mouth Daily.       cyanocobalamin 1000 MCG/ML injection Inject 1 mL under the skin into the appropriate area as directed. (Patient not taking: Reported on 11/30/2023)       Dextromethorphan-buPROPion ER (Auvelity)  MG tablet controlled-release Take 1 tablet by mouth.       diclofenac (VOLTAREN) 50 MG EC tablet Take 1 tablet by mouth.       Emgality 120 MG/ML auto-injector pen Inject 1 mL under the skin into the appropriate area as directed Every 30 (Thirty) Days for 180 days. 1 mL 5     famotidine (PEPCID) 20 MG tablet TAKE 1 TABLET BY MOUTH EVERY DAY 90 tablet 1     FLUoxetine (PROzac) 40 MG capsule Take 1 capsule by mouth Daily.       gabapentin (NEURONTIN) 300 MG capsule Take 1 capsule by mouth 3 (Three) Times a Day.       HYDROcodone-acetaminophen (NORCO) 5-325 MG per tablet Take 1 tablet by mouth Every 4 (Four) Hours As Needed.       ibuprofen (ADVIL,MOTRIN) 600 MG tablet Take 1 tablet by mouth Every 6 (Six) Hours As Needed for Mild Pain.       lansoprazole (PREVACID) 30 MG capsule 1 CAPSULE BY MOUTH TWICE DAILY 60 capsule 3     levothyroxine (SYNTHROID, LEVOTHROID) 88 MCG tablet 1 tablet.       ondansetron (ZOFRAN) 4 MG tablet 1 tablet.       Rimegepant Sulfate (Nurtec) 75 MG tablet dispersible tablet Take 1 tablet by mouth As Needed  (Migraine headache, max 1 dose in 24 hours.) for up to 180 days. 8 tablet 5     topiramate (Topamax) 50 MG tablet Take 1 tablet by mouth 2 (Two) Times a Day for 180 days. 180 tablet 1      Current meds    Current Facility-Administered Medications:     acetaminophen (TYLENOL) tablet 650 mg, 650 mg, Oral, Q4H PRN, 650 mg at 02/21/24 0928 **OR** acetaminophen (TYLENOL) 160 MG/5ML oral solution 650 mg, 650 mg, Oral, Q4H PRN **OR** acetaminophen (TYLENOL) suppository 650 mg, 650 mg, Rectal, Q4H PRN, Donovan Cass M, APRN    sennosides-docusate (PERICOLACE) 8.6-50 MG per tablet 2 tablet, 2 tablet, Oral, BID PRN **AND** polyethylene glycol (MIRALAX) packet 17 g, 17 g, Oral, Daily PRN **AND** bisacodyl (DULCOLAX) EC tablet 5 mg, 5 mg, Oral, Daily PRN **AND** bisacodyl (DULCOLAX) suppository 10 mg, 10 mg, Rectal, Daily PRN, Donovan Cass M, APRN    Lidocaine 4 % 1 patch, 1 patch, Transdermal, Once, Jelly Holcomb PA-C, 1 patch at 02/21/24 0358    ondansetron (ZOFRAN) injection 4 mg, 4 mg, Intravenous, Q6H PRN, Remi Manie M, APRN    sodium chloride 0.9 % flush 10 mL, 10 mL, Intravenous, Q12H, Remi Manie M, APRN, 10 mL at 02/21/24 0924    sodium chloride 0.9 % flush 10 mL, 10 mL, Intravenous, PRN, Donovan, Cass M, APRN    sodium chloride 0.9 % infusion 40 mL, 40 mL, Intravenous, PRN, Donovan, Cass M, APRN    sodium chloride 0.9 % infusion, 100 mL/hr, Intravenous, Continuous, Farhad Arrieta MD, Last Rate: 100 mL/hr at 02/21/24 0919, 100 mL/hr at 02/21/24 0919  Allergies:  Allergies   Allergen Reactions    Acetazolamide Paresthesia    Adhesive Tape Other (See Comments)     blisters    Duloxetine Hcl Other (See Comments)     Other reaction(s): Other (See Comments)  Suicidal thoughts    Codeine Nausea And Vomiting and Rash    Duloxetine Unknown (See Comments)    Oxycodone Nausea And Vomiting    Chlorhexidine Rash    Dhea [Nutritional Supplements] Other (See Comments)     Severe headache     Dihydroergotamine Other (See Comments)     Made ha worse and her b/p went very low    Imitrex [Sumatriptan] Other (See Comments)     Intensified a migraine    Morphine Other (See Comments)     Severe headache     Immunizations:  Immunization History   Administered Date(s) Administered    COVID-19 (MODERNA) 1st,2nd,3rd Dose Monovalent 01/11/2021, 02/10/2021    COVID-19 (PFIZER) BIVALENT 12+YRS 12/02/2022    COVID-19 (PFIZER) Purple Cap Monovalent 12/21/2021     Social History:   Social History     Social History Narrative    Not on file     Social History     Socioeconomic History    Marital status: Single   Tobacco Use    Smoking status: Never     Passive exposure: Never    Smokeless tobacco: Never   Vaping Use    Vaping Use: Never used   Substance and Sexual Activity    Alcohol use: Yes     Alcohol/week: 1.0 standard drink of alcohol     Types: 1 Cans of beer per week     Comment: Social drinker    Drug use: Yes     Frequency: 5.0 times per week     Types: Marijuana    Sexual activity: Not Currently     Partners: Male     Birth control/protection: Condom, Coitus interruptus       Family History:  Family History   Problem Relation Age of Onset    Irritable bowel syndrome Mother     Celiac disease Mother     Migraines Mother     Crohn's disease Brother     Alcohol abuse Paternal Grandfather     Colon cancer Neg Hx     Colon polyps Neg Hx     Ulcerative colitis Neg Hx         Review of Systems  See history of present illness and past medical history.  Patient denies headache, dizziness, syncope, falls, trauma, change in vision, change in hearing, change in taste, changes in weight, changes in appetite, focal weakness.  Patient denies chest pain, palpitations, dyspnea, orthopnea, PND, cough, sinus pressure, rhinorrhea, epistaxis, hemoptysis, nausea, vomiting, hematemesis, diarrhea, constipation or hematochezia. Denies cold or heat intolerance, polydipsia, polyuria, polyphagia. Denies hematuria, pyuria, dysuria,  "hesitancy, frequency or urgency.  Denies fever, chills, sweats, night sweats.  Denies missing any routine medications. Remainder of ROS is negative.    Objective:  tMax 24 hrs: Temp (24hrs), Av.9 °F (36.6 °C), Min:97.4 °F (36.3 °C), Max:98.2 °F (36.8 °C)    Vitals Ranges:   Temp:  [97.4 °F (36.3 °C)-98.2 °F (36.8 °C)] 98.2 °F (36.8 °C)  Heart Rate:  [] 80  Resp:  [16-18] 16  BP: ()/(52-70) 93/52      Exam:  BP 93/52 (BP Location: Right arm, Patient Position: Lying)   Pulse 80   Temp 98.2 °F (36.8 °C) (Oral)   Resp 16   Ht 167.6 cm (66\")   Wt 120 kg (263 lb 7.2 oz)   LMP 2024   SpO2 96%   BMI 42.52 kg/m²     General Appearance:    Alert, cooperative, no distress, appears stated age   Head:    Normocephalic, without obvious abnormality, atraumatic   Eyes:    PERRL, conjunctivae/corneas clear, EOM's intact, both eyes   Ears:    Normal external ear canals, both ears   Nose:   Nares normal, septum midline, mucosa normal, no drainage    or sinus tenderness   Throat:   Lips, mucosa, and tongue normal   Neck:   Supple, symmetrical, trachea midline, no adenopathy;     thyroid:  no enlargement/tenderness/nodules; no carotid    bruit or JVD   Back:     Symmetric, no curvature, ROM normal, no CVA tenderness   Lungs:     Clear to auscultation bilaterally, respirations unlabored   Chest Wall:    No tenderness or deformity    Heart:    Regular rate and rhythm, S1 and S2 normal, no murmur, rub   or gallop   Abdomen:     Soft, nontender, bowel sounds active all four quadrants,     no masses, no hepatomegaly, no splenomegaly   Extremities:   Extremities normal, atraumatic, no cyanosis or edema   Pulses:   2+ and symmetric all extremities   Skin:   Skin color, texture, turgor normal, no rashes or lesions   Lymph nodes:   Cervical, supraclavicular, and axillary nodes normal   Neurologic:   CNII-XII intact, normal strength, sensation intact throughout      .    Data Review:  Lab Results (last 72 hours)  "      Procedure Component Value Units Date/Time    CBC Auto Differential [[id number]]  (Abnormal) Collected: 02/21/24 0835    Specimen: Blood Updated: 02/21/24 0928     WBC 10.84 10*3/mm3      RBC 4.25 10*6/mm3      Hemoglobin 12.4 g/dL      Hematocrit 37.4 %      MCV 88.0 fL      MCH 29.2 pg      MCHC 33.2 g/dL      RDW 14.3 %      RDW-SD 45.6 fl      MPV 9.8 fL      Platelets 282 10*3/mm3      Neutrophil % 59.6 %      Lymphocyte % 31.6 %      Monocyte % 4.9 %      Eosinophil % 2.7 %      Basophil % 0.7 %      Immature Grans % 0.5 %      Neutrophils, Absolute 6.46 10*3/mm3      Lymphocytes, Absolute 3.43 10*3/mm3      Monocytes, Absolute 0.53 10*3/mm3      Eosinophils, Absolute 0.29 10*3/mm3      Basophils, Absolute 0.08 10*3/mm3      Immature Grans, Absolute 0.05 10*3/mm3      nRBC 0.0 /100 WBC     Basic Metabolic Panel [[id number]] Collected: 02/21/24 0835    Specimen: Blood Updated: 02/21/24 0919    Sedimentation Rate [[id number]]  (Normal) Collected: 02/21/24 0429    Specimen: Blood Updated: 02/21/24 0520     Sed Rate 14 mm/hr     Comprehensive Metabolic Panel [[id number]]  (Abnormal) Collected: 02/21/24 0429    Specimen: Blood Updated: 02/21/24 0503     Glucose 107 mg/dL      BUN 13 mg/dL      Creatinine 0.83 mg/dL      Sodium 139 mmol/L      Potassium 4.1 mmol/L      Chloride 109 mmol/L      CO2 21.0 mmol/L      Calcium 8.8 mg/dL      Total Protein 6.0 g/dL      Albumin 4.0 g/dL      ALT (SGPT) 42 U/L      AST (SGOT) 27 U/L      Alkaline Phosphatase 62 U/L      Total Bilirubin <0.2 mg/dL      Globulin 2.0 gm/dL      A/G Ratio 2.0 g/dL      BUN/Creatinine Ratio 15.7     Anion Gap 9.0 mmol/L      eGFR 96.8 mL/min/1.73     Narrative:      GFR Normal >60  Chronic Kidney Disease <60  Kidney Failure <15      C-reactive Protein [[id number]]  (Normal) Collected: 02/21/24 0429    Specimen: Blood Updated: 02/21/24 0503     C-Reactive Protein <0.30 mg/dL     hCG, Serum, Qualitative [[id number]]  (Normal) Collected: 02/21/24  0429    Specimen: Blood Updated: 02/21/24 0453     HCG Qualitative Negative    CBC & Differential [[id number]]  (Abnormal) Collected: 02/21/24 0429    Specimen: Blood Updated: 02/21/24 0447    Narrative:      The following orders were created for panel order CBC & Differential.  Procedure                               Abnormality         Status                     ---------                               -----------         ------                     CBC Auto Differential[[id number]]        Abnormal            Final result                 Please view results for these tests on the individual orders.    CBC Auto Differential [[id number]]  (Abnormal) Collected: 02/21/24 0429    Specimen: Blood Updated: 02/21/24 0447     WBC 8.30 10*3/mm3      RBC 4.32 10*6/mm3      Hemoglobin 12.1 g/dL      Hematocrit 37.1 %      MCV 85.9 fL      MCH 28.0 pg      MCHC 32.6 g/dL      RDW 14.0 %      RDW-SD 43.8 fl      MPV 9.4 fL      Platelets 286 10*3/mm3      Neutrophil % 45.9 %      Lymphocyte % 43.7 %      Monocyte % 6.1 %      Eosinophil % 3.3 %      Basophil % 0.8 %      Immature Grans % 0.2 %      Neutrophils, Absolute 3.80 10*3/mm3      Lymphocytes, Absolute 3.63 10*3/mm3      Monocytes, Absolute 0.51 10*3/mm3      Eosinophils, Absolute 0.27 10*3/mm3      Basophils, Absolute 0.07 10*3/mm3      Immature Grans, Absolute 0.02 10*3/mm3      nRBC 0.0 /100 WBC                      Imaging Results (All)       Procedure Component Value Units Date/Time    MRI Outside Films [[id number]] Resulted: 02/21/24 0851     Updated: 02/21/24 0851    Narrative:      This procedure was auto-finalized with no dictation required.    MRI Lumbar Spine With & Without Contrast [[id number]] Resulted: 02/21/24 0804     Updated: 02/21/24 0824    CT Lumbar Spine Without Contrast [[id number]] Collected: 02/21/24 0503     Updated: 02/21/24 0515    Narrative:      CT OF THE LUMBAR SPINE     HISTORY: Pins-and-needles sensation in lower extremities following  tethered  cord surgery.     COMPARISON: None available.     TECHNIQUE: Axial CT imaging was obtained through the lumbar spine.  Coronal and sagittal reformatted images were obtained.     FINDINGS:  No acute fracture or subluxation of the lumbar spine is seen. There is  very minimal intervertebral disc space narrowing noted at L5-S1. There  does appear to be some very minimal lumbar scoliosis, with convexity to  the left. Patient has undergone bilateral laminectomy at L5-S1. Further  laminectomy changes noted at S1-S2. The patient is noted to have a fluid  collection within the posterior soft tissues. This measures up to 6.1 x  5.9 cm. It measures up to 7.2 cm in craniocaudal dimensions. It may  represent a postoperative seroma. It does not have any bubbles of gas  within it to suggest superimposed infection, although this is difficult  to assess in the absence of contrast material. It extends from the level  of L3-L4 to the level of S1-S2.     T12-L1: There is no canal stenosis or neuroforaminal narrowing.  L1-L2: There is no canal stenosis or neuroforaminal narrowing.  L2-L3: There is no canal stenosis or neuroforaminal narrowing.  L3-L4: There is diffuse disc bulge, although more significant on the  left. There is no significant canal stenosis. There may be some minimal  neuroforaminal narrowing on the left.  L4-L5: There is disc bulge. There is some mild canal stenosis. There is  mild bilateral neuroforaminal narrowing.  L5-S1: There is diffuse disc bulge. Patient appears to have moderate  canal narrowing. There is severe neuroforaminal narrowing on the left  and moderate narrowing on the right.       Impression:         1. Postoperative changes noted at L5-S1 and S1-S2. There is a fluid  collection identified within the posterior soft tissues. Full assessment  is limited in the absence of contrast material. Patient does have  degenerative changes most significantly at L5-S1. Consider further  evaluation with MRI of the  lumbar spine without and with contrast,  particularly given history of recent surgery.     Radiation dose reduction techniques were utilized, including automated  exposure control and exposure modulation based on body size.        This report was finalized on 2/21/2024 5:12 AM by Dr. Siena Childers M.D on Workstation: [id number]             Past Medical History:   Diagnosis Date    Anxiety     Asthma     Cholelithiasis     Depression     Fatty liver     Fatty liver 01/18/2021    Fibromyalgia     Fibromyalgia, primary     GERD (gastroesophageal reflux disease)     Headache, tension-type     Hypothyroidism     Irritable bowel disease     Migraines     Suicide attempt 04/02/2021    purposeful overdose on home medications       Assessment:  Active Hospital Problems    Diagnosis  POA    **Fluid collection at surgical site, initial encounter [T88.8XXA]  Yes    Tethered cord [Q06.8]  Not Applicable    Arnold-Chiari malformation, 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       Plan:  Await results of MRI of lumbar spine.  Will ask for neurosurgery consult.    Farhad Arrieta MD  2/21/2024  09:41 EST

## 2024-02-21 NOTE — NURSING NOTE
IV therapy was called to place a new IV and was unsuccessful. Nurse informed and another IV nurse called.

## 2024-02-21 NOTE — PLAN OF CARE
Goal Outcome Evaluation:      Patient was admitted with complaints of numbness, and tingling in back and BLE following recent spinal surgery. Patient is alert x4.  Standby assistance with ambulation. MRI of the lumbar spine was ordered.  Order written for consult to neurosurgery. Dilaudid, and norco were ordered for pain management.  Will continue to monitor and update accordingly.

## 2024-02-22 PROCEDURE — G0378 HOSPITAL OBSERVATION PER HR: HCPCS

## 2024-02-22 PROCEDURE — 25010000002 HYDROMORPHONE PER 4 MG: Performed by: HOSPITALIST

## 2024-02-22 PROCEDURE — 25810000003 SODIUM CHLORIDE 0.9 % SOLUTION: Performed by: HOSPITALIST

## 2024-02-22 PROCEDURE — 25010000002 ONDANSETRON PER 1 MG: Performed by: NURSE PRACTITIONER

## 2024-02-22 PROCEDURE — 63710000001 PROMETHAZINE PER 25 MG: Performed by: NURSE PRACTITIONER

## 2024-02-22 RX ORDER — PROMETHAZINE HYDROCHLORIDE 25 MG/1
25 TABLET ORAL EVERY 4 HOURS PRN
Status: DISCONTINUED | OUTPATIENT
Start: 2024-02-22 | End: 2024-02-29 | Stop reason: HOSPADM

## 2024-02-22 RX ORDER — BUTALBITAL, ACETAMINOPHEN AND CAFFEINE 50; 325; 40 MG/1; MG/1; MG/1
1 TABLET ORAL EVERY 4 HOURS PRN
Status: DISCONTINUED | OUTPATIENT
Start: 2024-02-22 | End: 2024-02-29 | Stop reason: HOSPADM

## 2024-02-22 RX ADMIN — ONDANSETRON HYDROCHLORIDE 4 MG: 2 INJECTION, SOLUTION INTRAMUSCULAR; INTRAVENOUS at 02:59

## 2024-02-22 RX ADMIN — HYDROMORPHONE HYDROCHLORIDE 0.5 MG: 1 INJECTION, SOLUTION INTRAMUSCULAR; INTRAVENOUS; SUBCUTANEOUS at 14:29

## 2024-02-22 RX ADMIN — PROMETHAZINE HYDROCHLORIDE 25 MG: 25 TABLET ORAL at 04:16

## 2024-02-22 RX ADMIN — SODIUM CHLORIDE 100 ML/HR: 9 INJECTION, SOLUTION INTRAVENOUS at 09:43

## 2024-02-22 RX ADMIN — HYDROMORPHONE HYDROCHLORIDE 0.5 MG: 1 INJECTION, SOLUTION INTRAMUSCULAR; INTRAVENOUS; SUBCUTANEOUS at 12:04

## 2024-02-22 RX ADMIN — HYDROCODONE BITARTRATE AND ACETAMINOPHEN 1 TABLET: 5; 325 TABLET ORAL at 06:34

## 2024-02-22 RX ADMIN — GABAPENTIN 300 MG: 300 CAPSULE ORAL at 20:57

## 2024-02-22 RX ADMIN — Medication 10 ML: at 09:40

## 2024-02-22 RX ADMIN — GABAPENTIN 300 MG: 300 CAPSULE ORAL at 16:33

## 2024-02-22 RX ADMIN — FLUOXETINE HYDROCHLORIDE 40 MG: 20 CAPSULE ORAL at 09:39

## 2024-02-22 RX ADMIN — HYDROMORPHONE HYDROCHLORIDE 0.5 MG: 1 INJECTION, SOLUTION INTRAMUSCULAR; INTRAVENOUS; SUBCUTANEOUS at 07:46

## 2024-02-22 RX ADMIN — HYDROMORPHONE HYDROCHLORIDE 0.5 MG: 1 INJECTION, SOLUTION INTRAMUSCULAR; INTRAVENOUS; SUBCUTANEOUS at 09:39

## 2024-02-22 RX ADMIN — HYDROMORPHONE HYDROCHLORIDE 0.5 MG: 1 INJECTION, SOLUTION INTRAMUSCULAR; INTRAVENOUS; SUBCUTANEOUS at 21:00

## 2024-02-22 RX ADMIN — TOPIRAMATE 50 MG: 50 TABLET, FILM COATED ORAL at 20:57

## 2024-02-22 RX ADMIN — Medication 10 ML: at 20:57

## 2024-02-22 RX ADMIN — HYDROCODONE BITARTRATE AND ACETAMINOPHEN 1 TABLET: 5; 325 TABLET ORAL at 12:39

## 2024-02-22 RX ADMIN — PROMETHAZINE HYDROCHLORIDE 25 MG: 25 TABLET ORAL at 12:07

## 2024-02-22 RX ADMIN — HYDROMORPHONE HYDROCHLORIDE 0.5 MG: 1 INJECTION, SOLUTION INTRAMUSCULAR; INTRAVENOUS; SUBCUTANEOUS at 01:57

## 2024-02-22 RX ADMIN — PANTOPRAZOLE SODIUM 40 MG: 40 TABLET, DELAYED RELEASE ORAL at 18:46

## 2024-02-22 RX ADMIN — HYDROMORPHONE HYDROCHLORIDE 0.5 MG: 1 INJECTION, SOLUTION INTRAMUSCULAR; INTRAVENOUS; SUBCUTANEOUS at 18:37

## 2024-02-22 RX ADMIN — HYDROMORPHONE HYDROCHLORIDE 0.5 MG: 1 INJECTION, SOLUTION INTRAMUSCULAR; INTRAVENOUS; SUBCUTANEOUS at 05:08

## 2024-02-22 RX ADMIN — TOPIRAMATE 50 MG: 50 TABLET, FILM COATED ORAL at 09:39

## 2024-02-22 RX ADMIN — HYDROMORPHONE HYDROCHLORIDE 0.5 MG: 1 INJECTION, SOLUTION INTRAMUSCULAR; INTRAVENOUS; SUBCUTANEOUS at 16:33

## 2024-02-22 RX ADMIN — GABAPENTIN 300 MG: 300 CAPSULE ORAL at 09:39

## 2024-02-22 RX ADMIN — ONDANSETRON HYDROCHLORIDE 4 MG: 2 INJECTION, SOLUTION INTRAMUSCULAR; INTRAVENOUS at 18:37

## 2024-02-22 RX ADMIN — PROMETHAZINE HYDROCHLORIDE 25 MG: 25 TABLET ORAL at 21:08

## 2024-02-22 RX ADMIN — ONDANSETRON HYDROCHLORIDE 4 MG: 2 INJECTION, SOLUTION INTRAMUSCULAR; INTRAVENOUS at 09:43

## 2024-02-22 RX ADMIN — SODIUM CHLORIDE 100 ML/HR: 9 INJECTION, SOLUTION INTRAVENOUS at 21:36

## 2024-02-22 NOTE — PLAN OF CARE
Goal Outcome Evaluation:  Plan of Care Reviewed With: patient           Outcome Evaluation: Patient has uncontrolled N/V and pain. Phenergen was added to MAR last night. Patient also has zofran, Dilaudid and Norco PRN. Patient does not want to take anything PO right now until N/V can be resolved. Patient is stand by assist. A&OX4.

## 2024-02-22 NOTE — PROGRESS NOTES
"DAILY PROGRESS NOTE  [facility]    Patient Identification:  Name: Maame Wallace  Age: 33 y.o.  Sex: female  :  1990  MRN: [id number]         Primary Care Physician: Sirisha Aparicio MD    Subjective:  Interval History: She complains of headache and back pain.  The headache is worse when she stands up.  She has had some nausea and vomiting.    Objective:    Scheduled Meds:FLUoxetine, 40 mg, Oral, Daily  gabapentin, 300 mg, Oral, TID  levothyroxine, 88 mcg, Oral, Q AM  pantoprazole, 40 mg, Oral, Q AM  sodium chloride, 10 mL, Intravenous, Q12H  topiramate, 50 mg, Oral, BID      Continuous Infusions:sodium chloride, 100 mL/hr, Last Rate: 100 mL/hr (24 0943)        Vital signs in last 24 hours:  Temp:  [97.8 °F (36.6 °C)-98.6 °F (37 °C)] 98 °F (36.7 °C)  Heart Rate:  [] 103  Resp:  [16] 16  BP: ()/(62-74) 138/74    Intake/Output:    Intake/Output Summary (Last 24 hours) at 2024 1431  Last data filed at 2024 1234  Gross per 24 hour   Intake 1020 ml   Output --   Net 1020 ml       Exam:  /74 (BP Location: Right arm, Patient Position: Lying)   Pulse 103   Temp 98 °F (36.7 °C) (Oral)   Resp 16   Ht 167.6 cm (66\")   Wt 120 kg (263 lb 7.2 oz)   LMP 2024   SpO2 98%   BMI 42.52 kg/m²     General Appearance:    Alert, cooperative, no distress   Head:    Normocephalic, without obvious abnormality, atraumatic   Eyes:       Throat:   Lips, tongue, gums normal   Neck:   Supple, symmetrical, trachea midline, no JVD   Lungs:     Clear to auscultation bilaterally, respirations unlabored   Chest Wall:    No tenderness or deformity    Heart:    Regular rate and rhythm, S1 and S2 normal, no murmur,no  Rub or gallop   Abdomen:     Soft, nontender, bowel sounds active, no masses, no organomegaly    Extremities:   Extremities normal, atraumatic, no cyanosis or edema   Pulses:      Skin:   Skin is warm and dry,  no rashes or palpable lesions   Neurologic:   no " focal deficits noted      Lab Results (last 72 hours)       Procedure Component Value Units Date/Time    Basic Metabolic Panel [[id number]]  (Abnormal) Collected: 02/21/24 0835    Specimen: Blood Updated: 02/21/24 0944     Glucose 90 mg/dL      BUN 13 mg/dL      Creatinine 0.83 mg/dL      Sodium 138 mmol/L      Potassium 4.1 mmol/L      Chloride 106 mmol/L      CO2 19.8 mmol/L      Calcium 8.7 mg/dL      BUN/Creatinine Ratio 15.7     Anion Gap 12.2 mmol/L      eGFR 96.8 mL/min/1.73     Narrative:      GFR Normal >60  Chronic Kidney Disease <60  Kidney Failure <15      CBC Auto Differential [[id number]]  (Abnormal) Collected: 02/21/24 0835    Specimen: Blood Updated: 02/21/24 0928     WBC 10.84 10*3/mm3      RBC 4.25 10*6/mm3      Hemoglobin 12.4 g/dL      Hematocrit 37.4 %      MCV 88.0 fL      MCH 29.2 pg      MCHC 33.2 g/dL      RDW 14.3 %      RDW-SD 45.6 fl      MPV 9.8 fL      Platelets 282 10*3/mm3      Neutrophil % 59.6 %      Lymphocyte % 31.6 %      Monocyte % 4.9 %      Eosinophil % 2.7 %      Basophil % 0.7 %      Immature Grans % 0.5 %      Neutrophils, Absolute 6.46 10*3/mm3      Lymphocytes, Absolute 3.43 10*3/mm3      Monocytes, Absolute 0.53 10*3/mm3      Eosinophils, Absolute 0.29 10*3/mm3      Basophils, Absolute 0.08 10*3/mm3      Immature Grans, Absolute 0.05 10*3/mm3      nRBC 0.0 /100 WBC     Sedimentation Rate [[id number]]  (Normal) Collected: 02/21/24 0429    Specimen: Blood Updated: 02/21/24 0520     Sed Rate 14 mm/hr     Comprehensive Metabolic Panel [[id number]]  (Abnormal) Collected: 02/21/24 0429    Specimen: Blood Updated: 02/21/24 0503     Glucose 107 mg/dL      BUN 13 mg/dL      Creatinine 0.83 mg/dL      Sodium 139 mmol/L      Potassium 4.1 mmol/L      Chloride 109 mmol/L      CO2 21.0 mmol/L      Calcium 8.8 mg/dL      Total Protein 6.0 g/dL      Albumin 4.0 g/dL      ALT (SGPT) 42 U/L      AST (SGOT) 27 U/L      Alkaline Phosphatase 62 U/L      Total Bilirubin <0.2 mg/dL      Globulin  2.0 gm/dL      A/G Ratio 2.0 g/dL      BUN/Creatinine Ratio 15.7     Anion Gap 9.0 mmol/L      eGFR 96.8 mL/min/1.73     Narrative:      GFR Normal >60  Chronic Kidney Disease <60  Kidney Failure <15      C-reactive Protein [[id number]]  (Normal) Collected: 02/21/24 0429    Specimen: Blood Updated: 02/21/24 0503     C-Reactive Protein <0.30 mg/dL     hCG, Serum, Qualitative [[id number]]  (Normal) Collected: 02/21/24 0429    Specimen: Blood Updated: 02/21/24 0453     HCG Qualitative Negative    CBC & Differential [[id number]]  (Abnormal) Collected: 02/21/24 0429    Specimen: Blood Updated: 02/21/24 0447    Narrative:      The following orders were created for panel order CBC & Differential.  Procedure                               Abnormality         Status                     ---------                               -----------         ------                     CBC Auto Differential[[id number]]        Abnormal            Final result                 Please view results for these tests on the individual orders.    CBC Auto Differential [[id number]]  (Abnormal) Collected: 02/21/24 0429    Specimen: Blood Updated: 02/21/24 0447     WBC 8.30 10*3/mm3      RBC 4.32 10*6/mm3      Hemoglobin 12.1 g/dL      Hematocrit 37.1 %      MCV 85.9 fL      MCH 28.0 pg      MCHC 32.6 g/dL      RDW 14.0 %      RDW-SD 43.8 fl      MPV 9.4 fL      Platelets 286 10*3/mm3      Neutrophil % 45.9 %      Lymphocyte % 43.7 %      Monocyte % 6.1 %      Eosinophil % 3.3 %      Basophil % 0.8 %      Immature Grans % 0.2 %      Neutrophils, Absolute 3.80 10*3/mm3      Lymphocytes, Absolute 3.63 10*3/mm3      Monocytes, Absolute 0.51 10*3/mm3      Eosinophils, Absolute 0.27 10*3/mm3      Basophils, Absolute 0.07 10*3/mm3      Immature Grans, Absolute 0.02 10*3/mm3      nRBC 0.0 /100 WBC           Data Review:  Results from last 7 days   Lab Units 02/21/24  0835 02/21/24 0429   SODIUM mmol/L 138 139   POTASSIUM mmol/L 4.1 4.1   CHLORIDE mmol/L 106 109*  "  CO2 mmol/L 19.8* 21.0*   BUN mg/dL 13 13   CREATININE mg/dL 0.83 0.83   GLUCOSE mg/dL 90 107*   CALCIUM mg/dL 8.7 8.8     Results from last 7 days   Lab Units 02/21/24  0835 02/21/24  0429   WBC 10*3/mm3 10.84* 8.30   HEMOGLOBIN g/dL 12.4 12.1   HEMATOCRIT % 37.4 37.1   PLATELETS 10*3/mm3 282 286             Lab Results   Lab Value Date/Time    TROPONINT <0.010 12/04/2020 1137         Results from last 7 days   Lab Units 02/21/24  0429   ALK PHOS U/L 62   BILIRUBIN mg/dL <0.2   ALT (SGPT) U/L 42*   AST (SGOT) U/L 27             No results found for: \"POCGLU\"        Past Medical History:   Diagnosis Date    Anxiety     Asthma     Cholelithiasis     Depression     Fatty liver     Fatty liver 01/18/2021    Fibromyalgia     Fibromyalgia, primary     GERD (gastroesophageal reflux disease)     Headache, tension-type     Hypothyroidism     Irritable bowel disease     Migraines     Suicide attempt 04/02/2021    purposeful overdose on home medications       Assessment:  Active Hospital Problems    Diagnosis  POA    **Fluid collection at surgical site, initial encounter [T88.8XXA]  Yes    Tethered cord [Q06.8]  Not Applicable    Arnold-Chiari malformation, 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       Plan:  Neurosurgery consult noted.  Continue with medication for pain and nausea.  Follow-up labs.  She has contacted her neurosurgeon in [place] and is waiting for callback.  She is does not feel well enough to leave the hospital today.    Farhad Arrieta MD  2/22/2024  14:31 EST    "

## 2024-02-23 ENCOUNTER — APPOINTMENT (OUTPATIENT)
Dept: CT IMAGING | Facility: HOSPITAL | Age: 34
End: 2024-02-23
Payer: COMMERCIAL

## 2024-02-23 LAB
ANION GAP SERPL CALCULATED.3IONS-SCNC: 10 MMOL/L (ref 5–15)
BASOPHILS # BLD AUTO: 0.04 10*3/MM3 (ref 0–0.2)
BASOPHILS NFR BLD AUTO: 0.4 % (ref 0–1.5)
BUN SERPL-MCNC: 9 MG/DL (ref 6–20)
BUN/CREAT SERPL: 10.8 (ref 7–25)
CALCIUM SPEC-SCNC: 8.3 MG/DL (ref 8.6–10.5)
CHLORIDE SERPL-SCNC: 110 MMOL/L (ref 98–107)
CO2 SERPL-SCNC: 20 MMOL/L (ref 22–29)
CREAT SERPL-MCNC: 0.83 MG/DL (ref 0.57–1)
DEPRECATED RDW RBC AUTO: 45.6 FL (ref 37–54)
EGFRCR SERPLBLD CKD-EPI 2021: 96.8 ML/MIN/1.73
EOSINOPHIL # BLD AUTO: 0.14 10*3/MM3 (ref 0–0.4)
EOSINOPHIL NFR BLD AUTO: 1.4 % (ref 0.3–6.2)
ERYTHROCYTE [DISTWIDTH] IN BLOOD BY AUTOMATED COUNT: 14.3 % (ref 12.3–15.4)
GLUCOSE SERPL-MCNC: 99 MG/DL (ref 65–99)
HCT VFR BLD AUTO: 38 % (ref 34–46.6)
HGB BLD-MCNC: 12.8 G/DL (ref 12–15.9)
IMM GRANULOCYTES # BLD AUTO: 0.03 10*3/MM3 (ref 0–0.05)
IMM GRANULOCYTES NFR BLD AUTO: 0.3 % (ref 0–0.5)
LYMPHOCYTES # BLD AUTO: 2.61 10*3/MM3 (ref 0.7–3.1)
LYMPHOCYTES NFR BLD AUTO: 25.3 % (ref 19.6–45.3)
MCH RBC QN AUTO: 29.7 PG (ref 26.6–33)
MCHC RBC AUTO-ENTMCNC: 33.7 G/DL (ref 31.5–35.7)
MCV RBC AUTO: 88.2 FL (ref 79–97)
MONOCYTES # BLD AUTO: 0.75 10*3/MM3 (ref 0.1–0.9)
MONOCYTES NFR BLD AUTO: 7.3 % (ref 5–12)
NEUTROPHILS NFR BLD AUTO: 6.73 10*3/MM3 (ref 1.7–7)
NEUTROPHILS NFR BLD AUTO: 65.3 % (ref 42.7–76)
NRBC BLD AUTO-RTO: 0 /100 WBC (ref 0–0.2)
PLATELET # BLD AUTO: 263 10*3/MM3 (ref 140–450)
PMV BLD AUTO: 9.7 FL (ref 6–12)
POTASSIUM SERPL-SCNC: 4.1 MMOL/L (ref 3.5–5.2)
RBC # BLD AUTO: 4.31 10*6/MM3 (ref 3.77–5.28)
SODIUM SERPL-SCNC: 140 MMOL/L (ref 136–145)
WBC NRBC COR # BLD AUTO: 10.3 10*3/MM3 (ref 3.4–10.8)

## 2024-02-23 PROCEDURE — 25010000002 HYDROMORPHONE PER 4 MG: Performed by: HOSPITALIST

## 2024-02-23 PROCEDURE — 25010000002 ONDANSETRON PER 1 MG: Performed by: NURSE PRACTITIONER

## 2024-02-23 PROCEDURE — G0378 HOSPITAL OBSERVATION PER HR: HCPCS

## 2024-02-23 PROCEDURE — 85025 COMPLETE CBC W/AUTO DIFF WBC: CPT | Performed by: HOSPITALIST

## 2024-02-23 PROCEDURE — 25810000003 SODIUM CHLORIDE 0.9 % SOLUTION: Performed by: STUDENT IN AN ORGANIZED HEALTH CARE EDUCATION/TRAINING PROGRAM

## 2024-02-23 PROCEDURE — 80048 BASIC METABOLIC PNL TOTAL CA: CPT | Performed by: HOSPITALIST

## 2024-02-23 PROCEDURE — 99214 OFFICE O/P EST MOD 30 MIN: CPT | Performed by: STUDENT IN AN ORGANIZED HEALTH CARE EDUCATION/TRAINING PROGRAM

## 2024-02-23 PROCEDURE — 70450 CT HEAD/BRAIN W/O DYE: CPT

## 2024-02-23 RX ORDER — CAFFEINE CITRATE 20 MG/ML
10 SOLUTION INTRAVENOUS EVERY 24 HOURS
Status: DISCONTINUED | OUTPATIENT
Start: 2024-02-24 | End: 2024-02-23

## 2024-02-23 RX ORDER — CAFFEINE CITRATE 20 MG/ML
60 SOLUTION INTRAVENOUS ONCE
Status: DISCONTINUED | OUTPATIENT
Start: 2024-02-23 | End: 2024-02-23

## 2024-02-23 RX ORDER — SODIUM CHLORIDE 9 MG/ML
100 INJECTION, SOLUTION INTRAVENOUS CONTINUOUS
Status: DISCONTINUED | OUTPATIENT
Start: 2024-02-23 | End: 2024-02-28

## 2024-02-23 RX ORDER — CAFFEINE CITRATE 20 MG/ML
200 SOLUTION ORAL 2 TIMES DAILY
Qty: 60 ML | Refills: 0 | Status: DISPENSED | OUTPATIENT
Start: 2024-02-23 | End: 2024-02-26

## 2024-02-23 RX ADMIN — HYDROMORPHONE HYDROCHLORIDE 0.5 MG: 1 INJECTION, SOLUTION INTRAMUSCULAR; INTRAVENOUS; SUBCUTANEOUS at 15:45

## 2024-02-23 RX ADMIN — PANTOPRAZOLE SODIUM 40 MG: 40 TABLET, DELAYED RELEASE ORAL at 06:02

## 2024-02-23 RX ADMIN — Medication 10 ML: at 08:01

## 2024-02-23 RX ADMIN — GABAPENTIN 300 MG: 300 CAPSULE ORAL at 20:52

## 2024-02-23 RX ADMIN — Medication 10 ML: at 20:56

## 2024-02-23 RX ADMIN — HYDROMORPHONE HYDROCHLORIDE 0.5 MG: 1 INJECTION, SOLUTION INTRAMUSCULAR; INTRAVENOUS; SUBCUTANEOUS at 22:22

## 2024-02-23 RX ADMIN — ALPRAZOLAM 0.5 MG: 0.5 TABLET ORAL at 20:52

## 2024-02-23 RX ADMIN — HYDROMORPHONE HYDROCHLORIDE 0.5 MG: 1 INJECTION, SOLUTION INTRAMUSCULAR; INTRAVENOUS; SUBCUTANEOUS at 00:52

## 2024-02-23 RX ADMIN — ONDANSETRON HYDROCHLORIDE 4 MG: 2 INJECTION, SOLUTION INTRAMUSCULAR; INTRAVENOUS at 07:53

## 2024-02-23 RX ADMIN — HYDROMORPHONE HYDROCHLORIDE 0.5 MG: 1 INJECTION, SOLUTION INTRAMUSCULAR; INTRAVENOUS; SUBCUTANEOUS at 12:57

## 2024-02-23 RX ADMIN — HYDROCODONE BITARTRATE AND ACETAMINOPHEN 1 TABLET: 5; 325 TABLET ORAL at 09:28

## 2024-02-23 RX ADMIN — FLUOXETINE HYDROCHLORIDE 40 MG: 20 CAPSULE ORAL at 08:01

## 2024-02-23 RX ADMIN — HYDROCODONE BITARTRATE AND ACETAMINOPHEN 1 TABLET: 5; 325 TABLET ORAL at 02:17

## 2024-02-23 RX ADMIN — HYDROCODONE BITARTRATE AND ACETAMINOPHEN 1 TABLET: 5; 325 TABLET ORAL at 14:39

## 2024-02-23 RX ADMIN — ONDANSETRON HYDROCHLORIDE 4 MG: 2 INJECTION, SOLUTION INTRAMUSCULAR; INTRAVENOUS at 00:52

## 2024-02-23 RX ADMIN — HYDROMORPHONE HYDROCHLORIDE 0.5 MG: 1 INJECTION, SOLUTION INTRAMUSCULAR; INTRAVENOUS; SUBCUTANEOUS at 07:53

## 2024-02-23 RX ADMIN — TOPIRAMATE 50 MG: 50 TABLET, FILM COATED ORAL at 20:52

## 2024-02-23 RX ADMIN — HYDROCODONE BITARTRATE AND ACETAMINOPHEN 1 TABLET: 5; 325 TABLET ORAL at 20:52

## 2024-02-23 RX ADMIN — LEVOTHYROXINE SODIUM 88 MCG: 88 TABLET ORAL at 06:02

## 2024-02-23 RX ADMIN — HYDROMORPHONE HYDROCHLORIDE 0.5 MG: 1 INJECTION, SOLUTION INTRAMUSCULAR; INTRAVENOUS; SUBCUTANEOUS at 19:54

## 2024-02-23 RX ADMIN — GABAPENTIN 300 MG: 300 CAPSULE ORAL at 08:01

## 2024-02-23 RX ADMIN — SODIUM CHLORIDE 150 ML/HR: 9 INJECTION, SOLUTION INTRAVENOUS at 15:41

## 2024-02-23 RX ADMIN — HYDROMORPHONE HYDROCHLORIDE 0.5 MG: 1 INJECTION, SOLUTION INTRAMUSCULAR; INTRAVENOUS; SUBCUTANEOUS at 05:59

## 2024-02-23 RX ADMIN — ONDANSETRON HYDROCHLORIDE 4 MG: 2 INJECTION, SOLUTION INTRAMUSCULAR; INTRAVENOUS at 20:52

## 2024-02-23 RX ADMIN — SODIUM CHLORIDE 150 ML/HR: 9 INJECTION, SOLUTION INTRAVENOUS at 22:16

## 2024-02-23 RX ADMIN — TOPIRAMATE 50 MG: 50 TABLET, FILM COATED ORAL at 08:01

## 2024-02-23 RX ADMIN — GABAPENTIN 300 MG: 300 CAPSULE ORAL at 15:41

## 2024-02-23 RX ADMIN — HYDROMORPHONE HYDROCHLORIDE 0.5 MG: 1 INJECTION, SOLUTION INTRAMUSCULAR; INTRAVENOUS; SUBCUTANEOUS at 17:35

## 2024-02-23 NOTE — CASE MANAGEMENT/SOCIAL WORK
Discharge Planning Assessment  [facility]     Patient Name: Maame Wallace  MRN: [id number]  Today's Date: 2/23/2024    Admit Date: 2/21/2024    Plan: plans home with family- CCP will follow for needs   Discharge Needs Assessment       Row Name 02/23/24 1451       Living Environment    People in Home parent(s);sibling(s)    Current Living Arrangements home    Potentially Unsafe Housing Conditions none    Primary Care Provided by self    Provides Primary Care For no one    Family Caregiver if Needed parent(s)    Quality of Family Relationships helpful;involved;supportive    Able to Return to Prior Arrangements yes       Resource/Environmental Concerns    Resource/Environmental Concerns none    Transportation Concerns none       Transition Planning    Patient/Family Anticipates Transition to home with family    Patient/Family Anticipated Services at Transition none    Transportation Anticipated family or friend will provide       Discharge Needs Assessment    Readmission Within the Last 30 Days no previous admission in last 30 days    Equipment Currently Used at Home none                   Discharge Plan       Row Name 02/23/24 1449       Plan    Plan plans home with family- CCP will follow for needs    Patient/Family in Agreement with Plan yes    Plan Comments Spoke with patient at bedside. Introduced self and explained role. Facesheeet verified. Patient lives with her parents, brother and nephew. At baseline, she is IADLS. She does not use any DME andis not current with HH. She does not have a SNF history. At [place], she plans to return home. CCP will follow.                  Continued Care and Services - Admitted Since 2/21/2024    Coordination has not been started for this encounter.          Demographic Summary       Row Name 02/23/24 1451       General Information    Admission Type observation    Arrived From home    Referral Source admission list    Reason for Consult discharge planning    Preferred Language  English                   Functional Status       Row Name 02/23/24 1451       Functional Status    Usual Activity Tolerance good    Current Activity Tolerance fair       Functional Status, IADL    Medications independent    Meal Preparation independent    Housekeeping independent    Laundry independent    Shopping independent       Mental Status    General Appearance WDL WDL                   Psychosocial    No documentation.                  Abuse/Neglect    No documentation.                  Legal    No documentation.                  Substance Abuse    No documentation.                  Patient Forms    No documentation.                     Ekaterina Loya RN

## 2024-02-23 NOTE — CONSULTS
CHIEF COMPLAINT:   Headache    HISTORY OF PRESENT ILLNESS:    The patient is a 33-year-old female with history of chronic migraine headaches, idiopathic intracranial hypertension, Chiari I malformation and tethered cord status post decompression who presents with a new onset of headache following back surgery approximately 3 weeks ago.  The patient had an S1-S2 laminectomy at an outside hospital with neurosurgeon Dr. Newby in [place].  Following her surgery she slowly developed headache symptoms that were positional and different in character from her migraine and IIH headaches.  She is also having low back pain that radiates to her groin and lower extremities.    An MRI was performed that was significant for a small fluid collection in the L-spine that is concerning for possible CSF leak.  The inpatient neurosurgery service evaluated the patient and do not recommend surgical intervention at this time.    The pain management service was consulted to evaluate for a possible epidural blood patch.    On evaluation, the patient reports that her headache is in the midline, top of her head, and radiates behind her eyes, and to the back of her neck.  She has significant neck stiffness.  She endorses transient visual disturbance with the headache, and fullness in her hearing.  Her headache is positional, worse upright, and improved while laying flat.    When talking to the patient this afternoon, she was able to bring up her EHR-messaging andria with her neurosurgeon.  According to the messages, the neurosurgeon recommended conservative management of her headache at this point.  He reviewed the image and did not believe surgical intervention was necessary at this point.    PAST MEDICAL HISTORY:  No current facility-administered medications on file prior to encounter.     Current Outpatient Medications on File Prior to Encounter   Medication Sig Dispense Refill    albuterol sulfate  (90 Base) MCG/ACT inhaler Every 6  (Six) Hours.      ALPRAZolam (XANAX) 0.5 MG tablet Take 1 tablet by mouth 2 (Two) Times a Day As Needed for Anxiety.      cetirizine (zyrTEC) 10 MG tablet Take 1 tablet by mouth Daily.      cyanocobalamin 1000 MCG/ML injection Inject 1 mL under the skin into the appropriate area as directed. (Patient not taking: Reported on 11/30/2023)      Dextromethorphan-buPROPion ER (Auvelity)  MG tablet controlled-release Take 1 tablet by mouth.      diclofenac (VOLTAREN) 50 MG EC tablet Take 1 tablet by mouth.      Emgality 120 MG/ML auto-injector pen Inject 1 mL under the skin into the appropriate area as directed Every 30 (Thirty) Days for 180 days. 1 mL 5    famotidine (PEPCID) 20 MG tablet TAKE 1 TABLET BY MOUTH EVERY DAY 90 tablet 1    FLUoxetine (PROzac) 40 MG capsule Take 1 capsule by mouth Daily.      gabapentin (NEURONTIN) 300 MG capsule Take 1 capsule by mouth 3 (Three) Times a Day.      HYDROcodone-acetaminophen (NORCO) 5-325 MG per tablet Take 1 tablet by mouth Every 4 (Four) Hours As Needed.      ibuprofen (ADVIL,MOTRIN) 600 MG tablet Take 1 tablet by mouth Every 6 (Six) Hours As Needed for Mild Pain.      lansoprazole (PREVACID) 30 MG capsule 1 CAPSULE BY MOUTH TWICE DAILY 60 capsule 3    levothyroxine (SYNTHROID, LEVOTHROID) 88 MCG tablet 1 tablet.      ondansetron (ZOFRAN) 4 MG tablet 1 tablet.      Rimegepant Sulfate (Nurtec) 75 MG tablet dispersible tablet Take 1 tablet by mouth As Needed (Migraine headache, max 1 dose in 24 hours.) for up to 180 days. 8 tablet 5    topiramate (Topamax) 50 MG tablet Take 1 tablet by mouth 2 (Two) Times a Day for 180 days. 180 tablet 1       Past Medical History:   Diagnosis Date    Anxiety     Asthma     Cholelithiasis     Depression     Fatty liver     Fatty liver 01/18/2021    Fibromyalgia     Fibromyalgia, primary     GERD (gastroesophageal reflux disease)     Headache, tension-type     Hypothyroidism     Irritable bowel disease     Migraines     Suicide attempt  "04/02/2021    purposeful overdose on home medications         SOCIAL HISTORY:  No tobacco    REVIEW OF SYSTEMS:  No hematologic infectious or constitutional symptoms  Other review of systems non-contributory      PHYSICAL EXAM:  /73 (BP Location: Right arm, Patient Position: Lying)   Pulse 85   Temp 36.9 °C (98.4 °F) (Oral)   Resp 16   Ht 167.6 cm (66\")   Wt 120 kg (263 lb 7.2 oz)   LMP 02/11/2024   SpO2 96%   BMI 42.52 kg/m²   Well-developed well-nourished no acute distress  NCAT  EOMI  Mallampati class 2  Alert and oriented ×3  Unlabored respirations  Extremities warm well-perfused      DIAGNOSIS:  Lumbar spine fluid collection at surgical site concerning for CSF leak.    PLAN:  1.  Due to the patient's complex medical/surgical history, we recommend initiating conservative management of the headache.  This includes increased fluid intake, caffeine supplementation (200mg), Tylenol as appropriate, rest, abdominal binder for patient comfort.      2. If headache is refractory on Saturday may trial cosyntropin dose (0.5mg in 1 L LRS  6-8hr infusion)    3.  I discussed with the patient that if conservative management did not improve the patient's headache, Monday, will consider epidural blood patch if approved by the patient's neurosurgeon, Dr. Newby.     Of note she is highly allergic to skin preps with chlorhexidine and adhesives.  And had a blistering facial rash after her back surgery. She is also a very difficult IV placement and has had numerous IVs placed using ultrasound guidance while here in the hospital.  If a blood patch is performed, recommend ultrasound guidance, possible arterial stick.   She has had a prior postdural puncture headache from an LP for her IIH in the remote past.  She had a bad experience with the blood patch and is amenable to trialing conservative therapy.    Consult time 15:59 - 16:56  Time spent in reviewing patient records, imaging, labs, patient interview and exam.  " Significant amount of time spent answering patient questions and discussing plan for care.

## 2024-02-23 NOTE — PROGRESS NOTES
"DAILY PROGRESS NOTE  [facility]    Patient Identification:  Name: Maame Wallace  Age: 33 y.o.  Sex: female  :  1990  MRN: [id number]         Primary Care Physician: Sirisha Aparicio MD    Subjective:  Interval History: She complains of headache and back pain.  The headache is worse when she stands up.  She has had some nausea and vomiting.    Objective:    Scheduled Meds:caffeine citrate, 200 mg, Oral, BID  FLUoxetine, 40 mg, Oral, Daily  gabapentin, 300 mg, Oral, TID  levothyroxine, 88 mcg, Oral, Q AM  pantoprazole, 40 mg, Oral, Q AM  sodium chloride, 10 mL, Intravenous, Q12H  topiramate, 50 mg, Oral, BID      Continuous Infusions:sodium chloride, 150 mL/hr        Vital signs in last 24 hours:  Temp:  [97.7 °F (36.5 °C)-98.3 °F (36.8 °C)] 98 °F (36.7 °C)  Heart Rate:  [] 89  Resp:  [16] 16  BP: ()/(62-72) 110/67    Intake/Output:    Intake/Output Summary (Last 24 hours) at 2024 1406  Last data filed at 2024 1238  Gross per 24 hour   Intake 960 ml   Output --   Net 960 ml       Exam:  /67 (BP Location: Right arm, Patient Position: Lying)   Pulse 89   Temp 98 °F (36.7 °C) (Oral)   Resp 16   Ht 167.6 cm (66\")   Wt 120 kg (263 lb 7.2 oz)   LMP 2024   SpO2 98%   BMI 42.52 kg/m²     General Appearance:    Alert, cooperative, no distress   Head:    Normocephalic, without obvious abnormality, atraumatic   Eyes:       Throat:   Lips, tongue, gums normal   Neck:   Supple, symmetrical, trachea midline, no JVD   Lungs:     Clear to auscultation bilaterally, respirations unlabored   Chest Wall:    No tenderness or deformity    Heart:    Regular rate and rhythm, S1 and S2 normal, no murmur,no  Rub or gallop   Abdomen:     Soft, nontender, bowel sounds active, no masses, no organomegaly    Extremities:   Extremities normal, atraumatic, no cyanosis or edema   Pulses:      Skin:   Skin is warm and dry,  no rashes or palpable lesions   Neurologic:   no focal " deficits noted      Lab Results (last 72 hours)       Procedure Component Value Units Date/Time    Basic Metabolic Panel [[id number]]  (Abnormal) Collected: 02/21/24 0835    Specimen: Blood Updated: 02/21/24 0944     Glucose 90 mg/dL      BUN 13 mg/dL      Creatinine 0.83 mg/dL      Sodium 138 mmol/L      Potassium 4.1 mmol/L      Chloride 106 mmol/L      CO2 19.8 mmol/L      Calcium 8.7 mg/dL      BUN/Creatinine Ratio 15.7     Anion Gap 12.2 mmol/L      eGFR 96.8 mL/min/1.73     Narrative:      GFR Normal >60  Chronic Kidney Disease <60  Kidney Failure <15      CBC Auto Differential [[id number]]  (Abnormal) Collected: 02/21/24 0835    Specimen: Blood Updated: 02/21/24 0928     WBC 10.84 10*3/mm3      RBC 4.25 10*6/mm3      Hemoglobin 12.4 g/dL      Hematocrit 37.4 %      MCV 88.0 fL      MCH 29.2 pg      MCHC 33.2 g/dL      RDW 14.3 %      RDW-SD 45.6 fl      MPV 9.8 fL      Platelets 282 10*3/mm3      Neutrophil % 59.6 %      Lymphocyte % 31.6 %      Monocyte % 4.9 %      Eosinophil % 2.7 %      Basophil % 0.7 %      Immature Grans % 0.5 %      Neutrophils, Absolute 6.46 10*3/mm3      Lymphocytes, Absolute 3.43 10*3/mm3      Monocytes, Absolute 0.53 10*3/mm3      Eosinophils, Absolute 0.29 10*3/mm3      Basophils, Absolute 0.08 10*3/mm3      Immature Grans, Absolute 0.05 10*3/mm3      nRBC 0.0 /100 WBC     Sedimentation Rate [[id number]]  (Normal) Collected: 02/21/24 0429    Specimen: Blood Updated: 02/21/24 0520     Sed Rate 14 mm/hr     Comprehensive Metabolic Panel [[id number]]  (Abnormal) Collected: 02/21/24 0429    Specimen: Blood Updated: 02/21/24 0503     Glucose 107 mg/dL      BUN 13 mg/dL      Creatinine 0.83 mg/dL      Sodium 139 mmol/L      Potassium 4.1 mmol/L      Chloride 109 mmol/L      CO2 21.0 mmol/L      Calcium 8.8 mg/dL      Total Protein 6.0 g/dL      Albumin 4.0 g/dL      ALT (SGPT) 42 U/L      AST (SGOT) 27 U/L      Alkaline Phosphatase 62 U/L      Total Bilirubin <0.2 mg/dL      Globulin 2.0  gm/dL      A/G Ratio 2.0 g/dL      BUN/Creatinine Ratio 15.7     Anion Gap 9.0 mmol/L      eGFR 96.8 mL/min/1.73     Narrative:      GFR Normal >60  Chronic Kidney Disease <60  Kidney Failure <15      C-reactive Protein [[id number]]  (Normal) Collected: 02/21/24 0429    Specimen: Blood Updated: 02/21/24 0503     C-Reactive Protein <0.30 mg/dL     hCG, Serum, Qualitative [[id number]]  (Normal) Collected: 02/21/24 0429    Specimen: Blood Updated: 02/21/24 0453     HCG Qualitative Negative    CBC & Differential [[id number]]  (Abnormal) Collected: 02/21/24 0429    Specimen: Blood Updated: 02/21/24 0447    Narrative:      The following orders were created for panel order CBC & Differential.  Procedure                               Abnormality         Status                     ---------                               -----------         ------                     CBC Auto Differential[[id number]]        Abnormal            Final result                 Please view results for these tests on the individual orders.    CBC Auto Differential [[id number]]  (Abnormal) Collected: 02/21/24 0429    Specimen: Blood Updated: 02/21/24 0447     WBC 8.30 10*3/mm3      RBC 4.32 10*6/mm3      Hemoglobin 12.1 g/dL      Hematocrit 37.1 %      MCV 85.9 fL      MCH 28.0 pg      MCHC 32.6 g/dL      RDW 14.0 %      RDW-SD 43.8 fl      MPV 9.4 fL      Platelets 286 10*3/mm3      Neutrophil % 45.9 %      Lymphocyte % 43.7 %      Monocyte % 6.1 %      Eosinophil % 3.3 %      Basophil % 0.8 %      Immature Grans % 0.2 %      Neutrophils, Absolute 3.80 10*3/mm3      Lymphocytes, Absolute 3.63 10*3/mm3      Monocytes, Absolute 0.51 10*3/mm3      Eosinophils, Absolute 0.27 10*3/mm3      Basophils, Absolute 0.07 10*3/mm3      Immature Grans, Absolute 0.02 10*3/mm3      nRBC 0.0 /100 WBC           Data Review:  Results from last 7 days   Lab Units 02/23/24  0517 02/21/24  0835 02/21/24 0429   SODIUM mmol/L 140 138 139   POTASSIUM mmol/L 4.1 4.1 4.1  "  CHLORIDE mmol/L 110* 106 109*   CO2 mmol/L 20.0* 19.8* 21.0*   BUN mg/dL 9 13 13   CREATININE mg/dL 0.83 0.83 0.83   GLUCOSE mg/dL 99 90 107*   CALCIUM mg/dL 8.3* 8.7 8.8     Results from last 7 days   Lab Units 02/23/24  0517 02/21/24  0835 02/21/24  0429   WBC 10*3/mm3 10.30 10.84* 8.30   HEMOGLOBIN g/dL 12.8 12.4 12.1   HEMATOCRIT % 38.0 37.4 37.1   PLATELETS 10*3/mm3 263 282 286             Lab Results   Lab Value Date/Time    TROPONINT <0.010 12/04/2020 1137         Results from last 7 days   Lab Units 02/21/24  0429   ALK PHOS U/L 62   BILIRUBIN mg/dL <0.2   ALT (SGPT) U/L 42*   AST (SGOT) U/L 27             No results found for: \"POCGLU\"        Past Medical History:   Diagnosis Date    Anxiety     Asthma     Cholelithiasis     Depression     Fatty liver     Fatty liver 01/18/2021    Fibromyalgia     Fibromyalgia, primary     GERD (gastroesophageal reflux disease)     Headache, tension-type     Hypothyroidism     Irritable bowel disease     Migraines     Suicide attempt 04/02/2021    purposeful overdose on home medications       Assessment:  Active Hospital Problems    Diagnosis  POA    **Fluid collection at surgical site, initial encounter [T88.8XXA]  Yes    Tethered cord [Q06.8]  Not Applicable    Arnold-Chiari malformation, 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       Plan:  Neurosurgery consult noted.  Neurology consult noted.  Await anesthesia to see if they can do a blood patch.  Continue with medication for pain and nausea.  Follow-up labs.  She has contacted her neurosurgeon in [place] and they did call back and did not have any further recommendations at this time according to the family.  She is does not feel well enough to leave the hospital today.  Getting another " head CT and continuing with some IV fluids.    Farhad Arrieta MD  2/23/2024  14:06 EST

## 2024-02-23 NOTE — CONSULTS
Neurology Consult Note    Consult Date: 2/23/2024    Referring MD: Jay Schulz MD    Reason for Consult I have been asked to see the patient in neurological consultation to render advice and opinion regarding headache management.    Maame Wallace is a 33 y.o. right-handed white female with known diagnosis of chronic migraine headache treated with Nurtec and frequent Botox injection, IIH on Topamax 50 mg twice daily, Chiari I malformation and tethered cord status post decompression on 7/2023 at L5-S1, S1-S2 laminectomy approximately 3 weeks ago done by neurosurgery Dr. Newby at [place] she presented to the ED for evaluation of worsening low back pain and radiculopathy, positional headache.  She stated about 1 week after the lumbar surgery she started having severe low back pain and positional headache that would get worse by sitting and standing, she felt the headache was different than her usual migraine felt like a pressure and positional.  She also complaining of low back pain with radicular pain to her lower extremities and perianal region, with numbness at the perianal region but she denied urinary or fecal incontinence, on neuroexam she had intact sensation to pain, temperature, vibration on her lower and upper extremities, reflexes 2+ on the lower extremities, 2+ on the upper extremities, negative Tabby and negative for clonus, she was able to hold all extremities against gravity without drifting, extraocular movements intact, pupils reactive to light bilaterally, she denied any vision loss or blurred vision.  Neurosurgery evaluated the patient and felt no indication for surgery needed at this point.  Patient afebrile with normal white cell count.    Past Medical History:   Diagnosis Date    Anxiety     Asthma     Cholelithiasis     Depression     Fatty liver     Fatty liver 01/18/2021    Fibromyalgia     Fibromyalgia, primary     GERD (gastroesophageal reflux disease)     Headache,  "tension-type     Hypothyroidism     Irritable bowel disease     Migraines     Suicide attempt 04/02/2021    purposeful overdose on home medications       Exam  /67 (BP Location: Right arm, Patient Position: Lying)   Pulse 89   Temp 98 °F (36.7 °C) (Oral)   Resp 16   Ht 167.6 cm (66\")   Wt 120 kg (263 lb 7.2 oz)   LMP 02/11/2024   SpO2 98%   BMI 42.52 kg/m²   Gen: NAD, vitals reviewed  MS: oriented x3, recent/remote memory intact, normal attention/concentration, language intact, no neglect.  CN: visual acuity grossly normal, PERRL, EOMI, no facial droop, no dysarthria  Motor: 5/5 throughout upper and lower extremities, normal tone  Sensory exam: Normal to light touch throughout  Coordination: Normal finger-nose-finger bilaterally  Sensory exam: Normal to light touch, vibration and temperature throughout  Reflexes: 3+ on the lower extremities, 2+ on the upper extremities, negative Tabby, no clonus.    DATA:    Lab Results   Component Value Date    GLUCOSE 99 02/23/2024    CALCIUM 8.3 (L) 02/23/2024     02/23/2024    K 4.1 02/23/2024    CO2 20.0 (L) 02/23/2024     (H) 02/23/2024    BUN 9 02/23/2024    CREATININE 0.83 02/23/2024    EGFRIFAFRI >60 03/03/2021    EGFRIFNONA 101 04/27/2021    BCR 10.8 02/23/2024    ANIONGAP 10.0 02/23/2024     Lab Results   Component Value Date    WBC 10.30 02/23/2024    HGB 12.8 02/23/2024    HCT 38.0 02/23/2024    MCV 88.2 02/23/2024     02/23/2024       Lab review: White blood cell count 10.3, sodium 140, potassium 4.1, BUN 9, creatinine 0.83    Imaging review: I personally reviewed her lumbar spine imaging which showed a fluid collection approximately 2.3 x 1.5 x 1.2 posterior to the thecal sac larger amount of fluid collection 6 x 6 x 7.5 cm more superficial from the level of L3-L4 inferiorly to S1.  Radiology report reviewed.    IMPRESSION:  1.  The conus is at L1.  2.  The patient has undergone bilateral laminotomies at L5-S1. A small  fluid " "collection is noted posterior to the thecal sac measuring  approximately 2.3 x 1.5 x 1.2 cm in size. This is located 7-8 mm  posterior to the thecal sac. A larger more superficial fluid collection  is identified measuring 6 x 6 x 7.5 cm in size. It extends from the  level of L3-L4 inferiorly to S1. These fluid collections are nonspecific  and likely represent seromas. Infected collections cannot be entirely  excluded.  3.  A broad-based disc osteophyte complex is present at L5-S1 which is  slightly more prominent to the left. It abuts the traversing S1 nerve  root slightly, more so on the left where there is mild lateral recess  narrowing.    Diagnoses:  -New onset positional headache that gets worse by sitting and standing up likely from CSF leak  -Abnormal lumbar spine MRI with fluid collection in the lumbar area likely CSF leak  -Chronic migraine  -Idiopathic intracranial hypertension      PLAN:   1.  Will consult anesthesia/pain management for blood patch  2.  Will order CT head given the history of Chiari malformation  3.  Start Caffeine at a dose of 200 mg twice daily  4.  Normal saline bolus followed by 150/h  5.  Bedrest  6.  Neurosurgery evaluated the patient on this admission and felt no surgery needed at this time, follow-up with your neurosurgeon at [place] after discharge.    Discussed with patient, mother, Dr. Arrieta.    Medical Decision Making for this neurology consultation consists of the following:  Review of previous chart, including H/P, provider and nursing notes as applicable.  Review of medications and vital signs.  Review of previous labs, neuroimaging, and additional relevant diagnostics.   Interpretation of laboratory, imaging, and other diagnostic results  Discussion with other providers and family   Total face-to-face/floor time: 60 minutes.     \"Dictated utilizing Dragon dictation\".      "

## 2024-02-24 LAB
ANION GAP SERPL CALCULATED.3IONS-SCNC: 9.9 MMOL/L (ref 5–15)
BASOPHILS # BLD AUTO: 0.05 10*3/MM3 (ref 0–0.2)
BASOPHILS NFR BLD AUTO: 0.7 % (ref 0–1.5)
BUN SERPL-MCNC: 7 MG/DL (ref 6–20)
BUN/CREAT SERPL: 10.1 (ref 7–25)
CALCIUM SPEC-SCNC: 8 MG/DL (ref 8.6–10.5)
CHLORIDE SERPL-SCNC: 109 MMOL/L (ref 98–107)
CO2 SERPL-SCNC: 19.1 MMOL/L (ref 22–29)
CREAT SERPL-MCNC: 0.69 MG/DL (ref 0.57–1)
DEPRECATED RDW RBC AUTO: 43.7 FL (ref 37–54)
EGFRCR SERPLBLD CKD-EPI 2021: 119.2 ML/MIN/1.73
EOSINOPHIL # BLD AUTO: 0.28 10*3/MM3 (ref 0–0.4)
EOSINOPHIL NFR BLD AUTO: 3.9 % (ref 0.3–6.2)
ERYTHROCYTE [DISTWIDTH] IN BLOOD BY AUTOMATED COUNT: 13.9 % (ref 12.3–15.4)
GLUCOSE SERPL-MCNC: 121 MG/DL (ref 65–99)
HCT VFR BLD AUTO: 36.5 % (ref 34–46.6)
HGB BLD-MCNC: 12.2 G/DL (ref 12–15.9)
IMM GRANULOCYTES # BLD AUTO: 0.02 10*3/MM3 (ref 0–0.05)
IMM GRANULOCYTES NFR BLD AUTO: 0.3 % (ref 0–0.5)
LYMPHOCYTES # BLD AUTO: 3.34 10*3/MM3 (ref 0.7–3.1)
LYMPHOCYTES NFR BLD AUTO: 47.1 % (ref 19.6–45.3)
MCH RBC QN AUTO: 29.1 PG (ref 26.6–33)
MCHC RBC AUTO-ENTMCNC: 33.4 G/DL (ref 31.5–35.7)
MCV RBC AUTO: 87.1 FL (ref 79–97)
MONOCYTES # BLD AUTO: 0.5 10*3/MM3 (ref 0.1–0.9)
MONOCYTES NFR BLD AUTO: 7.1 % (ref 5–12)
NEUTROPHILS NFR BLD AUTO: 2.9 10*3/MM3 (ref 1.7–7)
NEUTROPHILS NFR BLD AUTO: 40.9 % (ref 42.7–76)
NRBC BLD AUTO-RTO: 0 /100 WBC (ref 0–0.2)
PLATELET # BLD AUTO: 253 10*3/MM3 (ref 140–450)
PMV BLD AUTO: 9.5 FL (ref 6–12)
POTASSIUM SERPL-SCNC: 3.9 MMOL/L (ref 3.5–5.2)
RBC # BLD AUTO: 4.19 10*6/MM3 (ref 3.77–5.28)
SODIUM SERPL-SCNC: 138 MMOL/L (ref 136–145)
WBC NRBC COR # BLD AUTO: 7.09 10*3/MM3 (ref 3.4–10.8)

## 2024-02-24 PROCEDURE — 63710000001 PROMETHAZINE PER 25 MG: Performed by: NURSE PRACTITIONER

## 2024-02-24 PROCEDURE — 25810000003 SODIUM CHLORIDE 0.9 % SOLUTION: Performed by: STUDENT IN AN ORGANIZED HEALTH CARE EDUCATION/TRAINING PROGRAM

## 2024-02-24 PROCEDURE — 85025 COMPLETE CBC W/AUTO DIFF WBC: CPT | Performed by: HOSPITALIST

## 2024-02-24 PROCEDURE — 25010000002 ONDANSETRON PER 1 MG: Performed by: NURSE PRACTITIONER

## 2024-02-24 PROCEDURE — 80048 BASIC METABOLIC PNL TOTAL CA: CPT | Performed by: HOSPITALIST

## 2024-02-24 PROCEDURE — 25010000002 HYDROMORPHONE PER 4 MG: Performed by: HOSPITALIST

## 2024-02-24 RX ADMIN — TOPIRAMATE 50 MG: 50 TABLET, FILM COATED ORAL at 20:08

## 2024-02-24 RX ADMIN — HYDROMORPHONE HYDROCHLORIDE 0.5 MG: 1 INJECTION, SOLUTION INTRAMUSCULAR; INTRAVENOUS; SUBCUTANEOUS at 07:56

## 2024-02-24 RX ADMIN — ALPRAZOLAM 0.5 MG: 0.5 TABLET ORAL at 21:51

## 2024-02-24 RX ADMIN — GABAPENTIN 300 MG: 300 CAPSULE ORAL at 09:48

## 2024-02-24 RX ADMIN — ALPRAZOLAM 0.5 MG: 0.5 TABLET ORAL at 09:48

## 2024-02-24 RX ADMIN — Medication 10 ML: at 20:08

## 2024-02-24 RX ADMIN — TOPIRAMATE 50 MG: 50 TABLET, FILM COATED ORAL at 09:48

## 2024-02-24 RX ADMIN — HYDROMORPHONE HYDROCHLORIDE 0.5 MG: 1 INJECTION, SOLUTION INTRAMUSCULAR; INTRAVENOUS; SUBCUTANEOUS at 19:57

## 2024-02-24 RX ADMIN — DOCUSATE SODIUM 50MG AND SENNOSIDES 8.6MG 2 TABLET: 8.6; 5 TABLET, FILM COATED ORAL at 16:00

## 2024-02-24 RX ADMIN — CAFFEINE CITRATE 200 MG: 60 SOLUTION ORAL at 20:08

## 2024-02-24 RX ADMIN — SODIUM CHLORIDE 150 ML/HR: 9 INJECTION, SOLUTION INTRAVENOUS at 04:42

## 2024-02-24 RX ADMIN — ONDANSETRON HYDROCHLORIDE 4 MG: 2 INJECTION, SOLUTION INTRAMUSCULAR; INTRAVENOUS at 21:51

## 2024-02-24 RX ADMIN — CAFFEINE CITRATE 200 MG: 60 SOLUTION ORAL at 03:57

## 2024-02-24 RX ADMIN — PROMETHAZINE HYDROCHLORIDE 25 MG: 25 TABLET ORAL at 03:57

## 2024-02-24 RX ADMIN — POLYETHYLENE GLYCOL 3350 17 G: 17 POWDER, FOR SOLUTION ORAL at 21:51

## 2024-02-24 RX ADMIN — HYDROCODONE BITARTRATE AND ACETAMINOPHEN 1 TABLET: 5; 325 TABLET ORAL at 09:48

## 2024-02-24 RX ADMIN — GABAPENTIN 300 MG: 300 CAPSULE ORAL at 16:00

## 2024-02-24 RX ADMIN — SODIUM CHLORIDE 150 ML/HR: 9 INJECTION, SOLUTION INTRAVENOUS at 19:58

## 2024-02-24 RX ADMIN — HYDROCODONE BITARTRATE AND ACETAMINOPHEN 1 TABLET: 5; 325 TABLET ORAL at 05:33

## 2024-02-24 RX ADMIN — FLUOXETINE HYDROCHLORIDE 40 MG: 20 CAPSULE ORAL at 09:48

## 2024-02-24 RX ADMIN — GABAPENTIN 300 MG: 300 CAPSULE ORAL at 20:08

## 2024-02-24 RX ADMIN — HYDROMORPHONE HYDROCHLORIDE 0.5 MG: 1 INJECTION, SOLUTION INTRAMUSCULAR; INTRAVENOUS; SUBCUTANEOUS at 03:57

## 2024-02-24 RX ADMIN — Medication 10 ML: at 09:51

## 2024-02-24 RX ADMIN — PANTOPRAZOLE SODIUM 40 MG: 40 TABLET, DELAYED RELEASE ORAL at 05:33

## 2024-02-24 RX ADMIN — HYDROMORPHONE HYDROCHLORIDE 0.5 MG: 1 INJECTION, SOLUTION INTRAMUSCULAR; INTRAVENOUS; SUBCUTANEOUS at 12:40

## 2024-02-24 RX ADMIN — HYDROCODONE BITARTRATE AND ACETAMINOPHEN 1 TABLET: 5; 325 TABLET ORAL at 21:51

## 2024-02-24 RX ADMIN — LEVOTHYROXINE SODIUM 88 MCG: 88 TABLET ORAL at 07:56

## 2024-02-24 RX ADMIN — HYDROCODONE BITARTRATE AND ACETAMINOPHEN 1 TABLET: 5; 325 TABLET ORAL at 16:00

## 2024-02-24 NOTE — PLAN OF CARE
A/Ox4. VSS. NVS intact. No s/s distress. IVF NS at 150ml/hr infusing well to her left upper arm. On Caffeine Citrate BID. PRN Norco and Dilaudid for pain. PRN Zofran and Phenergan for nausea.

## 2024-02-24 NOTE — PLAN OF CARE
Goal Outcome Evaluation:VSS, afebrile, pain controlled with po and IV pain medications. NS @150cc,hr continuous, up ad selvin, Pericolace given prn today per pt request. Plan for blood patch on Monday if appropriate. Will cont to monitor. Call light in reach.

## 2024-02-24 NOTE — PROGRESS NOTES
"DAILY PROGRESS NOTE  [facility]    Patient Identification:  Name: Maame Wallace  Age: 33 y.o.  Sex: female  :  1990  MRN: [id number]         Primary Care Physician: Sriisha Aparicio MD    Subjective:  Interval History: She complains of headache and back pain.  The headache is worse when she stands up.  She has had some nausea and vomiting.    Objective:    Scheduled Meds:caffeine citrate, 200 mg, Oral, BID  FLUoxetine, 40 mg, Oral, Daily  gabapentin, 300 mg, Oral, TID  levothyroxine, 88 mcg, Oral, Q AM  pantoprazole, 40 mg, Oral, Q AM  sodium chloride, 10 mL, Intravenous, Q12H  topiramate, 50 mg, Oral, BID      Continuous Infusions:sodium chloride, 150 mL/hr, Last Rate: 150 mL/hr (24 0803)        Vital signs in last 24 hours:  Temp:  [98.2 °F (36.8 °C)-98.8 °F (37.1 °C)] 98.8 °F (37.1 °C)  Heart Rate:  [83-93] 83  Resp:  [16] 16  BP: ()/(59-73) 91/59    Intake/Output:    Intake/Output Summary (Last 24 hours) at 2024 1311  Last data filed at 2024 1204  Gross per 24 hour   Intake 980 ml   Output --   Net 980 ml       Exam:  BP 91/59 (BP Location: Right arm, Patient Position: Lying)   Pulse 83   Temp 98.8 °F (37.1 °C) (Oral)   Resp 16   Ht 167.6 cm (66\")   Wt 120 kg (263 lb 7.2 oz)   LMP 2024   SpO2 97%   BMI 42.52 kg/m²     General Appearance:    Alert, cooperative, no distress   Head:    Normocephalic, without obvious abnormality, atraumatic   Eyes:       Throat:   Lips, tongue, gums normal   Neck:   Supple, symmetrical, trachea midline, no JVD   Lungs:     Clear to auscultation bilaterally, respirations unlabored   Chest Wall:    No tenderness or deformity    Heart:    Regular rate and rhythm, S1 and S2 normal, no murmur,no  Rub or gallop   Abdomen:     Soft, nontender, bowel sounds active, no masses, no organomegaly    Extremities:   Extremities normal, atraumatic, no cyanosis or edema   Pulses:      Skin:   Skin is warm and dry,  no rashes or " palpable lesions   Neurologic:   no focal deficits noted      Lab Results (last 72 hours)       Procedure Component Value Units Date/Time    Basic Metabolic Panel [[id number]]  (Abnormal) Collected: 02/21/24 0835    Specimen: Blood Updated: 02/21/24 0944     Glucose 90 mg/dL      BUN 13 mg/dL      Creatinine 0.83 mg/dL      Sodium 138 mmol/L      Potassium 4.1 mmol/L      Chloride 106 mmol/L      CO2 19.8 mmol/L      Calcium 8.7 mg/dL      BUN/Creatinine Ratio 15.7     Anion Gap 12.2 mmol/L      eGFR 96.8 mL/min/1.73     Narrative:      GFR Normal >60  Chronic Kidney Disease <60  Kidney Failure <15      CBC Auto Differential [[id number]]  (Abnormal) Collected: 02/21/24 0835    Specimen: Blood Updated: 02/21/24 0928     WBC 10.84 10*3/mm3      RBC 4.25 10*6/mm3      Hemoglobin 12.4 g/dL      Hematocrit 37.4 %      MCV 88.0 fL      MCH 29.2 pg      MCHC 33.2 g/dL      RDW 14.3 %      RDW-SD 45.6 fl      MPV 9.8 fL      Platelets 282 10*3/mm3      Neutrophil % 59.6 %      Lymphocyte % 31.6 %      Monocyte % 4.9 %      Eosinophil % 2.7 %      Basophil % 0.7 %      Immature Grans % 0.5 %      Neutrophils, Absolute 6.46 10*3/mm3      Lymphocytes, Absolute 3.43 10*3/mm3      Monocytes, Absolute 0.53 10*3/mm3      Eosinophils, Absolute 0.29 10*3/mm3      Basophils, Absolute 0.08 10*3/mm3      Immature Grans, Absolute 0.05 10*3/mm3      nRBC 0.0 /100 WBC     Sedimentation Rate [[id number]]  (Normal) Collected: 02/21/24 0429    Specimen: Blood Updated: 02/21/24 0520     Sed Rate 14 mm/hr     Comprehensive Metabolic Panel [[id number]]  (Abnormal) Collected: 02/21/24 0429    Specimen: Blood Updated: 02/21/24 0503     Glucose 107 mg/dL      BUN 13 mg/dL      Creatinine 0.83 mg/dL      Sodium 139 mmol/L      Potassium 4.1 mmol/L      Chloride 109 mmol/L      CO2 21.0 mmol/L      Calcium 8.8 mg/dL      Total Protein 6.0 g/dL      Albumin 4.0 g/dL      ALT (SGPT) 42 U/L      AST (SGOT) 27 U/L      Alkaline Phosphatase 62 U/L      Total  Bilirubin <0.2 mg/dL      Globulin 2.0 gm/dL      A/G Ratio 2.0 g/dL      BUN/Creatinine Ratio 15.7     Anion Gap 9.0 mmol/L      eGFR 96.8 mL/min/1.73     Narrative:      GFR Normal >60  Chronic Kidney Disease <60  Kidney Failure <15      C-reactive Protein [[id number]]  (Normal) Collected: 02/21/24 0429    Specimen: Blood Updated: 02/21/24 0503     C-Reactive Protein <0.30 mg/dL     hCG, Serum, Qualitative [[id number]]  (Normal) Collected: 02/21/24 0429    Specimen: Blood Updated: 02/21/24 0453     HCG Qualitative Negative    CBC & Differential [[id number]]  (Abnormal) Collected: 02/21/24 0429    Specimen: Blood Updated: 02/21/24 0447    Narrative:      The following orders were created for panel order CBC & Differential.  Procedure                               Abnormality         Status                     ---------                               -----------         ------                     CBC Auto Differential[[id number]]        Abnormal            Final result                 Please view results for these tests on the individual orders.    CBC Auto Differential [[id number]]  (Abnormal) Collected: 02/21/24 0429    Specimen: Blood Updated: 02/21/24 0447     WBC 8.30 10*3/mm3      RBC 4.32 10*6/mm3      Hemoglobin 12.1 g/dL      Hematocrit 37.1 %      MCV 85.9 fL      MCH 28.0 pg      MCHC 32.6 g/dL      RDW 14.0 %      RDW-SD 43.8 fl      MPV 9.4 fL      Platelets 286 10*3/mm3      Neutrophil % 45.9 %      Lymphocyte % 43.7 %      Monocyte % 6.1 %      Eosinophil % 3.3 %      Basophil % 0.8 %      Immature Grans % 0.2 %      Neutrophils, Absolute 3.80 10*3/mm3      Lymphocytes, Absolute 3.63 10*3/mm3      Monocytes, Absolute 0.51 10*3/mm3      Eosinophils, Absolute 0.27 10*3/mm3      Basophils, Absolute 0.07 10*3/mm3      Immature Grans, Absolute 0.02 10*3/mm3      nRBC 0.0 /100 WBC           Data Review:  Results from last 7 days   Lab Units 02/24/24  0554 02/23/24  0517 02/21/24  0835   SODIUM mmol/L 138 140 138  "  POTASSIUM mmol/L 3.9 4.1 4.1   CHLORIDE mmol/L 109* 110* 106   CO2 mmol/L 19.1* 20.0* 19.8*   BUN mg/dL 7 9 13   CREATININE mg/dL 0.69 0.83 0.83   GLUCOSE mg/dL 121* 99 90   CALCIUM mg/dL 8.0* 8.3* 8.7     Results from last 7 days   Lab Units 02/24/24  0554 02/23/24  0517 02/21/24  0835   WBC 10*3/mm3 7.09 10.30 10.84*   HEMOGLOBIN g/dL 12.2 12.8 12.4   HEMATOCRIT % 36.5 38.0 37.4   PLATELETS 10*3/mm3 253 263 282             Lab Results   Lab Value Date/Time    TROPONINT <0.010 12/04/2020 1137         Results from last 7 days   Lab Units 02/21/24  0429   ALK PHOS U/L 62   BILIRUBIN mg/dL <0.2   ALT (SGPT) U/L 42*   AST (SGOT) U/L 27             No results found for: \"POCGLU\"        Past Medical History:   Diagnosis Date    Anxiety     Asthma     Cholelithiasis     Depression     Fatty liver     Fatty liver 01/18/2021    Fibromyalgia     Fibromyalgia, primary     GERD (gastroesophageal reflux disease)     Headache, tension-type     Hypothyroidism     Irritable bowel disease     Migraines     Suicide attempt 04/02/2021    purposeful overdose on home medications       Assessment:  Active Hospital Problems    Diagnosis  POA    **Fluid collection at surgical site, initial encounter [T88.8XXA]  Yes    Tethered cord [Q06.8]  Not Applicable    Arnold-Chiari malformation, 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       Plan:  Neurosurgery consult noted.  Neurology consult noted.  Await anesthesia to see if they can do a blood patch.  Continue with medication for pain and nausea.  Follow-up labs.  She has contacted her neurosurgeon in [place] and they did call back and did not have any further recommendations at this time according to the family.  She is does not feel well enough to leave the " hospital today.  Plans for possible blood patch on Monday per pain management if she is not getting better with conservative measures.    Farhad Arrieta MD  2/24/2024  13:11 EST

## 2024-02-25 LAB
ANION GAP SERPL CALCULATED.3IONS-SCNC: 10.8 MMOL/L (ref 5–15)
BASOPHILS # BLD AUTO: 0.07 10*3/MM3 (ref 0–0.2)
BASOPHILS NFR BLD AUTO: 0.9 % (ref 0–1.5)
BUN SERPL-MCNC: 7 MG/DL (ref 6–20)
BUN/CREAT SERPL: 10.8 (ref 7–25)
CALCIUM SPEC-SCNC: 8.1 MG/DL (ref 8.6–10.5)
CHLORIDE SERPL-SCNC: 109 MMOL/L (ref 98–107)
CO2 SERPL-SCNC: 20.2 MMOL/L (ref 22–29)
CREAT SERPL-MCNC: 0.65 MG/DL (ref 0.57–1)
DEPRECATED RDW RBC AUTO: 44.2 FL (ref 37–54)
EGFRCR SERPLBLD CKD-EPI 2021: 120.9 ML/MIN/1.73
EOSINOPHIL # BLD AUTO: 0.38 10*3/MM3 (ref 0–0.4)
EOSINOPHIL NFR BLD AUTO: 5 % (ref 0.3–6.2)
ERYTHROCYTE [DISTWIDTH] IN BLOOD BY AUTOMATED COUNT: 13.9 % (ref 12.3–15.4)
GLUCOSE SERPL-MCNC: 115 MG/DL (ref 65–99)
HCT VFR BLD AUTO: 38.3 % (ref 34–46.6)
HGB BLD-MCNC: 12.7 G/DL (ref 12–15.9)
IMM GRANULOCYTES # BLD AUTO: 0.02 10*3/MM3 (ref 0–0.05)
IMM GRANULOCYTES NFR BLD AUTO: 0.3 % (ref 0–0.5)
LYMPHOCYTES # BLD AUTO: 3.56 10*3/MM3 (ref 0.7–3.1)
LYMPHOCYTES NFR BLD AUTO: 47.3 % (ref 19.6–45.3)
MCH RBC QN AUTO: 29.4 PG (ref 26.6–33)
MCHC RBC AUTO-ENTMCNC: 33.2 G/DL (ref 31.5–35.7)
MCV RBC AUTO: 88.7 FL (ref 79–97)
MONOCYTES # BLD AUTO: 0.47 10*3/MM3 (ref 0.1–0.9)
MONOCYTES NFR BLD AUTO: 6.2 % (ref 5–12)
NEUTROPHILS NFR BLD AUTO: 3.03 10*3/MM3 (ref 1.7–7)
NEUTROPHILS NFR BLD AUTO: 40.3 % (ref 42.7–76)
NRBC BLD AUTO-RTO: 0 /100 WBC (ref 0–0.2)
PLATELET # BLD AUTO: 264 10*3/MM3 (ref 140–450)
PMV BLD AUTO: 9.2 FL (ref 6–12)
POTASSIUM SERPL-SCNC: 3.6 MMOL/L (ref 3.5–5.2)
RBC # BLD AUTO: 4.32 10*6/MM3 (ref 3.77–5.28)
SODIUM SERPL-SCNC: 140 MMOL/L (ref 136–145)
WBC NRBC COR # BLD AUTO: 7.53 10*3/MM3 (ref 3.4–10.8)

## 2024-02-25 PROCEDURE — 63710000001 PROMETHAZINE PER 25 MG: Performed by: NURSE PRACTITIONER

## 2024-02-25 PROCEDURE — 25810000003 SODIUM CHLORIDE 0.9 % SOLUTION: Performed by: STUDENT IN AN ORGANIZED HEALTH CARE EDUCATION/TRAINING PROGRAM

## 2024-02-25 PROCEDURE — 25010000002 ONDANSETRON PER 1 MG: Performed by: NURSE PRACTITIONER

## 2024-02-25 PROCEDURE — 80048 BASIC METABOLIC PNL TOTAL CA: CPT | Performed by: HOSPITALIST

## 2024-02-25 PROCEDURE — 99232 SBSQ HOSP IP/OBS MODERATE 35: CPT | Performed by: STUDENT IN AN ORGANIZED HEALTH CARE EDUCATION/TRAINING PROGRAM

## 2024-02-25 PROCEDURE — 85025 COMPLETE CBC W/AUTO DIFF WBC: CPT | Performed by: HOSPITALIST

## 2024-02-25 PROCEDURE — 25010000002 HYDROMORPHONE PER 4 MG: Performed by: HOSPITALIST

## 2024-02-25 PROCEDURE — 83735 ASSAY OF MAGNESIUM: CPT | Performed by: NURSE PRACTITIONER

## 2024-02-25 RX ORDER — FAMOTIDINE 20 MG/1
20 TABLET, FILM COATED ORAL DAILY
Status: DISCONTINUED | OUTPATIENT
Start: 2024-02-25 | End: 2024-02-25

## 2024-02-25 RX ORDER — FAMOTIDINE 20 MG/1
40 TABLET, FILM COATED ORAL
Status: DISCONTINUED | OUTPATIENT
Start: 2024-02-26 | End: 2024-02-29 | Stop reason: HOSPADM

## 2024-02-25 RX ORDER — CALCIUM CARBONATE 500 MG/1
1 TABLET, CHEWABLE ORAL 3 TIMES DAILY PRN
Status: DISCONTINUED | OUTPATIENT
Start: 2024-02-25 | End: 2024-02-29 | Stop reason: HOSPADM

## 2024-02-25 RX ADMIN — GABAPENTIN 300 MG: 300 CAPSULE ORAL at 16:27

## 2024-02-25 RX ADMIN — SODIUM CHLORIDE 150 ML/HR: 9 INJECTION, SOLUTION INTRAVENOUS at 02:47

## 2024-02-25 RX ADMIN — HYDROMORPHONE HYDROCHLORIDE 0.5 MG: 1 INJECTION, SOLUTION INTRAMUSCULAR; INTRAVENOUS; SUBCUTANEOUS at 09:24

## 2024-02-25 RX ADMIN — PANTOPRAZOLE SODIUM 40 MG: 40 TABLET, DELAYED RELEASE ORAL at 05:43

## 2024-02-25 RX ADMIN — SODIUM CHLORIDE 150 ML/HR: 9 INJECTION, SOLUTION INTRAVENOUS at 18:53

## 2024-02-25 RX ADMIN — ANTACID TABLETS 1 TABLET: 500 TABLET, CHEWABLE ORAL at 11:35

## 2024-02-25 RX ADMIN — ONDANSETRON HYDROCHLORIDE 4 MG: 2 INJECTION, SOLUTION INTRAMUSCULAR; INTRAVENOUS at 05:55

## 2024-02-25 RX ADMIN — GABAPENTIN 300 MG: 300 CAPSULE ORAL at 09:24

## 2024-02-25 RX ADMIN — SODIUM CHLORIDE 150 ML/HR: 9 INJECTION, SOLUTION INTRAVENOUS at 10:38

## 2024-02-25 RX ADMIN — CAFFEINE CITRATE 200 MG: 60 SOLUTION ORAL at 12:29

## 2024-02-25 RX ADMIN — TOPIRAMATE 50 MG: 50 TABLET, FILM COATED ORAL at 09:24

## 2024-02-25 RX ADMIN — Medication 10 ML: at 11:36

## 2024-02-25 RX ADMIN — HYDROCODONE BITARTRATE AND ACETAMINOPHEN 1 TABLET: 5; 325 TABLET ORAL at 20:46

## 2024-02-25 RX ADMIN — HYDROCODONE BITARTRATE AND ACETAMINOPHEN 1 TABLET: 5; 325 TABLET ORAL at 05:43

## 2024-02-25 RX ADMIN — LEVOTHYROXINE SODIUM 88 MCG: 88 TABLET ORAL at 05:43

## 2024-02-25 RX ADMIN — HYDROMORPHONE HYDROCHLORIDE 0.5 MG: 1 INJECTION, SOLUTION INTRAMUSCULAR; INTRAVENOUS; SUBCUTANEOUS at 17:08

## 2024-02-25 RX ADMIN — FLUOXETINE HYDROCHLORIDE 40 MG: 20 CAPSULE ORAL at 09:24

## 2024-02-25 RX ADMIN — PROMETHAZINE HYDROCHLORIDE 25 MG: 25 TABLET ORAL at 01:49

## 2024-02-25 RX ADMIN — GABAPENTIN 300 MG: 300 CAPSULE ORAL at 20:46

## 2024-02-25 RX ADMIN — HYDROCODONE BITARTRATE AND ACETAMINOPHEN 1 TABLET: 5; 325 TABLET ORAL at 11:35

## 2024-02-25 RX ADMIN — HYDROCODONE BITARTRATE AND ACETAMINOPHEN 1 TABLET: 5; 325 TABLET ORAL at 15:33

## 2024-02-25 RX ADMIN — BUTALBITAL, ACETAMINOPHEN AND CAFFEINE 1 TABLET: 325; 50; 40 TABLET ORAL at 22:00

## 2024-02-25 RX ADMIN — ONDANSETRON HYDROCHLORIDE 4 MG: 2 INJECTION, SOLUTION INTRAMUSCULAR; INTRAVENOUS at 17:15

## 2024-02-25 RX ADMIN — FAMOTIDINE 20 MG: 20 TABLET, FILM COATED ORAL at 11:35

## 2024-02-25 RX ADMIN — HYDROCODONE BITARTRATE AND ACETAMINOPHEN 1 TABLET: 5; 325 TABLET ORAL at 01:46

## 2024-02-25 RX ADMIN — HYDROMORPHONE HYDROCHLORIDE 0.5 MG: 1 INJECTION, SOLUTION INTRAMUSCULAR; INTRAVENOUS; SUBCUTANEOUS at 13:51

## 2024-02-25 RX ADMIN — TOPIRAMATE 50 MG: 50 TABLET, FILM COATED ORAL at 20:46

## 2024-02-25 RX ADMIN — Medication 10 ML: at 20:48

## 2024-02-25 NOTE — PLAN OF CARE
Goal Outcome Evaluation:  Plan of Care Reviewed With: patient        Progress: no change  Outcome Evaluation: Patient stable during shift, VSS and voiding function intact. Patient states pain is worse today but believes that it is mostly d/t positioning as patient has needed to sit more upright throughout the day d/t acid reflux rather than laying more flat. New meds added for acid reflux and patient reports improvement in that regard this evening. Pain managed with prn norco and IV dilaudid. Plan for potential blood patch tomorrow.

## 2024-02-25 NOTE — PLAN OF CARE
Problem: Adult Inpatient Plan of Care  Goal: Plan of Care Review  Outcome: Ongoing, Progressing  Flowsheets (Taken 2/25/2024 0300)  Progress: improving  Plan of Care Reviewed With: patient  Outcome Evaluation: Pt is A&Ox4, cooperative, pleasant, up ad selvin, IV in L UA, NS at 150ml/hr, pt does c/o constant headache and lower back pain radiating down legs, pain meds given per pt request, neuropathy in B feet, pt also c/o nausea intermittently, meds given per pt request, meds whole w/ water, incision to sacral spine healed, no edema, she is resting off and on, plan of care ongoing.   Goal Outcome Evaluation:  Plan of Care Reviewed With: patient        Progress: improving  Outcome Evaluation: Pt is A&Ox4, cooperative, pleasant, up ad selvin, IV in L UA, NS at 150ml/hr, pt does c/o constant headache and lower back pain radiating down legs, pain meds given per pt request, neuropathy in B feet, pt also c/o nausea intermittently, meds given per pt request, meds whole w/ water, incision to sacral spine healed, no edema, she is resting off and on, plan of care ongoing.

## 2024-02-25 NOTE — PROGRESS NOTES
"No change on neurological examination    She still complaining of severe headache 8/10 in intensity when she is lying down and increased to 10/10 when she is sitting.    -No change to the plan  -Pain management will assess the patient tomorrow for blood patch    DOS: 2024  NAME: Maame Wallace   : 1990  PCP: Sirisha Aparicio MD  Chief Complaint   Patient presents with    Post-op Problem     Post op problem, numbness and tingling in back and both legs       Chief complaint: Positional low CSF headache  Subjective: Patient seen and examined the bedside this morning, patient still complaining of severe headache 8/10 when lying down and 10/10 when she is sitting up.    Objective:  Vital signs: /63 (BP Location: Right arm, Patient Position: Lying)   Pulse 95   Temp 98.4 °F (36.9 °C) (Oral)   Resp 16   Ht 167.6 cm (66\")   Wt 120 kg (263 lb 7.2 oz)   LMP 2024   SpO2 96%   BMI 42.52 kg/m²    Gen: Lying on the bed, complaining of headache, vitals reviewed  MS: oriented x3, recent/remote memory intact, normal attention/concentration, language intact, no neglect.  CN: visual acuity grossly normal, PERRL, EOMI, no facial droop, no dysarthria  Motor: 5/5 throughout upper and lower extremities, normal tone  Sensory: intact to light touch all 4 ext.  Reflexes: 3+ on the lower extremities 2+ on the upper extremities    Laboratory results:  Lab Results   Component Value Date    GLUCOSE 115 (H) 2024    CALCIUM 8.1 (L) 2024     2024    K 3.6 2024    CO2 20.2 (L) 2024     (H) 2024    BUN 7 2024    CREATININE 0.65 2024    EGFRIFAFRI >60 2021    EGFRIFNONA 101 2021    BCR 10.8 2024    ANIONGAP 10.8 2024     Lab Results   Component Value Date    WBC 7.53 2024    HGB 12.7 2024    HCT 38.3 2024    MCV 88.7 2024     2024     Lab Results   Component Value Date     (H) " 04/27/2021            Review of labs: Above labs reviewed    Review and interpretation of imaging: I personally reviewed the CT scan of the head which showed no acute abnormality.  Small lateral ventricles, no posterior fossa crowding or Chiari malformation.    Diagnoses:  -Low CSF headache  -Abnormal lumbar spine with a fluid collection, neurosurgery have seen the patient on this admission and recommended follow-up with her neurosurgeon in [place]      Plan:  1.  Continue current treatment with caffeine 200 mg twice daily  2.  Continue normal saline 100 mill per hour  3.  Bedrest  4.  Pain management to follow-up on the patient tomorrow to evaluate the need for blood patch as her headache not improving.

## 2024-02-26 LAB
B PARAPERT DNA SPEC QL NAA+PROBE: NOT DETECTED
B PERT DNA SPEC QL NAA+PROBE: NOT DETECTED
C PNEUM DNA NPH QL NAA+NON-PROBE: NOT DETECTED
FLUAV SUBTYP SPEC NAA+PROBE: NOT DETECTED
FLUBV RNA ISLT QL NAA+PROBE: NOT DETECTED
HADV DNA SPEC NAA+PROBE: NOT DETECTED
HCOV 229E RNA SPEC QL NAA+PROBE: NOT DETECTED
HCOV HKU1 RNA SPEC QL NAA+PROBE: NOT DETECTED
HCOV NL63 RNA SPEC QL NAA+PROBE: NOT DETECTED
HCOV OC43 RNA SPEC QL NAA+PROBE: NOT DETECTED
HMPV RNA NPH QL NAA+NON-PROBE: NOT DETECTED
HPIV1 RNA ISLT QL NAA+PROBE: NOT DETECTED
HPIV2 RNA SPEC QL NAA+PROBE: NOT DETECTED
HPIV3 RNA NPH QL NAA+PROBE: NOT DETECTED
HPIV4 P GENE NPH QL NAA+PROBE: NOT DETECTED
M PNEUMO IGG SER IA-ACNC: NOT DETECTED
MAGNESIUM SERPL-MCNC: 2.1 MG/DL (ref 1.6–2.6)
RHINOVIRUS RNA SPEC NAA+PROBE: NOT DETECTED
RSV RNA NPH QL NAA+NON-PROBE: NOT DETECTED
SARS-COV-2 RNA NPH QL NAA+NON-PROBE: NOT DETECTED

## 2024-02-26 PROCEDURE — 25010000002 HYDROMORPHONE PER 4 MG: Performed by: NURSE PRACTITIONER

## 2024-02-26 PROCEDURE — 99233 SBSQ HOSP IP/OBS HIGH 50: CPT | Performed by: PHYSICIAN ASSISTANT

## 2024-02-26 PROCEDURE — 25810000003 SODIUM CHLORIDE 0.9 % SOLUTION: Performed by: NURSE PRACTITIONER

## 2024-02-26 PROCEDURE — 0202U NFCT DS 22 TRGT SARS-COV-2: CPT | Performed by: NURSE PRACTITIONER

## 2024-02-26 PROCEDURE — 25010000002 HYDROMORPHONE PER 4 MG: Performed by: HOSPITALIST

## 2024-02-26 PROCEDURE — 25010000002 ONDANSETRON PER 1 MG: Performed by: NURSE PRACTITIONER

## 2024-02-26 RX ORDER — BISACODYL 10 MG
10 SUPPOSITORY, RECTAL RECTAL DAILY PRN
Status: DISCONTINUED | OUTPATIENT
Start: 2024-02-26 | End: 2024-02-29 | Stop reason: HOSPADM

## 2024-02-26 RX ORDER — POLYETHYLENE GLYCOL 3350 17 G/17G
17 POWDER, FOR SOLUTION ORAL DAILY
Status: DISCONTINUED | OUTPATIENT
Start: 2024-02-27 | End: 2024-02-29 | Stop reason: HOSPADM

## 2024-02-26 RX ORDER — HYDROMORPHONE HYDROCHLORIDE 1 MG/ML
0.5 INJECTION, SOLUTION INTRAMUSCULAR; INTRAVENOUS; SUBCUTANEOUS
Status: DISCONTINUED | OUTPATIENT
Start: 2024-02-26 | End: 2024-02-28

## 2024-02-26 RX ORDER — AMOXICILLIN 250 MG
2 CAPSULE ORAL 2 TIMES DAILY PRN
Status: DISCONTINUED | OUTPATIENT
Start: 2024-02-26 | End: 2024-02-29 | Stop reason: HOSPADM

## 2024-02-26 RX ORDER — SENNOSIDES A AND B 8.6 MG/1
2 TABLET, FILM COATED ORAL 2 TIMES DAILY
Status: DISCONTINUED | OUTPATIENT
Start: 2024-02-26 | End: 2024-02-29 | Stop reason: HOSPADM

## 2024-02-26 RX ORDER — CAFFEINE 200 MG
200 TABLET ORAL EVERY 12 HOURS
Status: COMPLETED | OUTPATIENT
Start: 2024-02-26 | End: 2024-02-29

## 2024-02-26 RX ORDER — CAFFEINE CITRATE 20 MG/ML
200 SOLUTION ORAL 2 TIMES DAILY
Qty: 60 ML | Refills: 0 | Status: DISCONTINUED | OUTPATIENT
Start: 2024-02-26 | End: 2024-02-26 | Stop reason: ALTCHOICE

## 2024-02-26 RX ORDER — BISACODYL 5 MG/1
5 TABLET, DELAYED RELEASE ORAL DAILY PRN
Status: DISCONTINUED | OUTPATIENT
Start: 2024-02-26 | End: 2024-02-29 | Stop reason: HOSPADM

## 2024-02-26 RX ADMIN — ANTACID TABLETS 1 TABLET: 500 TABLET, CHEWABLE ORAL at 20:45

## 2024-02-26 RX ADMIN — PANTOPRAZOLE SODIUM 40 MG: 40 TABLET, DELAYED RELEASE ORAL at 05:32

## 2024-02-26 RX ADMIN — FAMOTIDINE 40 MG: 20 TABLET, FILM COATED ORAL at 08:25

## 2024-02-26 RX ADMIN — HYDROMORPHONE HYDROCHLORIDE 0.5 MG: 1 INJECTION, SOLUTION INTRAMUSCULAR; INTRAVENOUS; SUBCUTANEOUS at 22:07

## 2024-02-26 RX ADMIN — HYDROCODONE BITARTRATE AND ACETAMINOPHEN 1 TABLET: 5; 325 TABLET ORAL at 16:26

## 2024-02-26 RX ADMIN — HYDROMORPHONE HYDROCHLORIDE 0.5 MG: 1 INJECTION, SOLUTION INTRAMUSCULAR; INTRAVENOUS; SUBCUTANEOUS at 13:38

## 2024-02-26 RX ADMIN — TOPIRAMATE 50 MG: 50 TABLET, FILM COATED ORAL at 20:40

## 2024-02-26 RX ADMIN — HYDROCODONE BITARTRATE AND ACETAMINOPHEN 1 TABLET: 5; 325 TABLET ORAL at 20:40

## 2024-02-26 RX ADMIN — HYDROMORPHONE HYDROCHLORIDE 0.5 MG: 1 INJECTION, SOLUTION INTRAMUSCULAR; INTRAVENOUS; SUBCUTANEOUS at 00:20

## 2024-02-26 RX ADMIN — GABAPENTIN 300 MG: 300 CAPSULE ORAL at 20:40

## 2024-02-26 RX ADMIN — ONDANSETRON HYDROCHLORIDE 4 MG: 2 INJECTION, SOLUTION INTRAMUSCULAR; INTRAVENOUS at 16:26

## 2024-02-26 RX ADMIN — BUTALBITAL, ACETAMINOPHEN AND CAFFEINE 1 TABLET: 325; 50; 40 TABLET ORAL at 14:44

## 2024-02-26 RX ADMIN — FLUOXETINE HYDROCHLORIDE 40 MG: 20 CAPSULE ORAL at 08:25

## 2024-02-26 RX ADMIN — Medication 400 MG: at 12:28

## 2024-02-26 RX ADMIN — CAFFEINE 200 MG: 200 TABLET ORAL at 20:43

## 2024-02-26 RX ADMIN — ANTACID TABLETS 1 TABLET: 500 TABLET, CHEWABLE ORAL at 01:14

## 2024-02-26 RX ADMIN — HYDROCODONE BITARTRATE AND ACETAMINOPHEN 1 TABLET: 5; 325 TABLET ORAL at 08:25

## 2024-02-26 RX ADMIN — ONDANSETRON HYDROCHLORIDE 4 MG: 2 INJECTION, SOLUTION INTRAMUSCULAR; INTRAVENOUS at 10:14

## 2024-02-26 RX ADMIN — SENNOSIDES 2 TABLET: 8.6 TABLET, FILM COATED ORAL at 12:29

## 2024-02-26 RX ADMIN — ONDANSETRON HYDROCHLORIDE 4 MG: 2 INJECTION, SOLUTION INTRAMUSCULAR; INTRAVENOUS at 03:52

## 2024-02-26 RX ADMIN — GABAPENTIN 300 MG: 300 CAPSULE ORAL at 08:26

## 2024-02-26 RX ADMIN — CAFFEINE CITRATE 200 MG: 60 SOLUTION ORAL at 01:05

## 2024-02-26 RX ADMIN — TOPIRAMATE 50 MG: 50 TABLET, FILM COATED ORAL at 08:26

## 2024-02-26 RX ADMIN — FAMOTIDINE 40 MG: 20 TABLET, FILM COATED ORAL at 16:26

## 2024-02-26 RX ADMIN — HYDROCODONE BITARTRATE AND ACETAMINOPHEN 1 TABLET: 5; 325 TABLET ORAL at 12:27

## 2024-02-26 RX ADMIN — SODIUM CHLORIDE 100 ML/HR: 9 INJECTION, SOLUTION INTRAVENOUS at 17:16

## 2024-02-26 RX ADMIN — GABAPENTIN 300 MG: 300 CAPSULE ORAL at 16:26

## 2024-02-26 RX ADMIN — HYDROMORPHONE HYDROCHLORIDE 0.5 MG: 1 INJECTION, SOLUTION INTRAMUSCULAR; INTRAVENOUS; SUBCUTANEOUS at 05:36

## 2024-02-26 RX ADMIN — HYDROMORPHONE HYDROCHLORIDE 0.5 MG: 1 INJECTION, SOLUTION INTRAMUSCULAR; INTRAVENOUS; SUBCUTANEOUS at 10:14

## 2024-02-26 RX ADMIN — HYDROMORPHONE HYDROCHLORIDE 0.5 MG: 1 INJECTION, SOLUTION INTRAMUSCULAR; INTRAVENOUS; SUBCUTANEOUS at 17:25

## 2024-02-26 RX ADMIN — CAFFEINE CITRATE 200 MG: 60 SOLUTION ORAL at 08:27

## 2024-02-26 RX ADMIN — LEVOTHYROXINE SODIUM 88 MCG: 88 TABLET ORAL at 05:32

## 2024-02-26 RX ADMIN — HYDROCODONE BITARTRATE AND ACETAMINOPHEN 1 TABLET: 5; 325 TABLET ORAL at 03:52

## 2024-02-26 RX ADMIN — BUTALBITAL, ACETAMINOPHEN AND CAFFEINE 1 TABLET: 325; 50; 40 TABLET ORAL at 10:04

## 2024-02-26 RX ADMIN — Medication 10 ML: at 20:41

## 2024-02-26 RX ADMIN — BISACODYL 5 MG: 5 TABLET, COATED ORAL at 08:34

## 2024-02-26 RX ADMIN — SENNOSIDES 2 TABLET: 8.6 TABLET, FILM COATED ORAL at 20:41

## 2024-02-26 NOTE — PLAN OF CARE
Goal Outcome Evaluation:  Plan of Care Reviewed With: patient        Progress: no change  Outcome Evaluation: Pt is A&Ox4. VSS. IV site CDI, running NS at 100 ml/hr. Up ad selvin. LBM 2/21, medications added. PRN Norco and Dilaudid given for back pain. Fioricet ineffective for migraine headache. Possibility for blood patch Tuesday, neurology reaching out to pt's neurosurgeon. D/C home when ready.

## 2024-02-26 NOTE — NURSING NOTE
Spoke with Charge Nurse in Pain Management, patient requesting blood patch. Per Charge Nurse, patient needs to be NPO after midnight, Anesthesiology to see in morning for blood patch tomorrow.

## 2024-02-26 NOTE — PROGRESS NOTES
Name: Maame Wallace ADMIT: 2024   : 1990  PCP: Sirisha Aparicio MD    MRN: [id number] LOS: 1 days   AGE/SEX: 33 y.o. female  ROOM: [facility]     Subjective   Subjective   Continues to have significant headache that is positional.  Worsens with sitting up right in bed.  Baseline flat pain anywhere from 5-8 on 10 pain scale with being 10 on 10 if sitting up or raising the head of the bed up.  Today she states her back pain appears to be main source of pain.  Feels that she just cannot get comfortable has a pressure sensation in her rectum.  Pain medications helping some.  Has not tried Fioricet.  Mother is at bedside.  Patient reports some constipation.  Appetite a little bit better.  Is passing flatus.  Denies abdominal pain.  Is having some nausea off-and-on but no vomiting today.        Objective   Objective   Vital Signs  Temp:  [97.8 °F (36.6 °C)-98.6 °F (37 °C)] 98.5 °F (36.9 °C)  Heart Rate:  [85-98] 93  Resp:  [16] 16  BP: ()/(57-80) 92/57  SpO2:  [95 %-100 %] 97 %  on   ;   Device (Oxygen Therapy): room air  Body mass index is 42.52 kg/m².    Physical Exam  Vitals and nursing note reviewed.   Constitutional:       General: She is not in acute distress.     Appearance: She is obese. She is not toxic-appearing.   Eyes:      General: No scleral icterus.     Conjunctiva/sclera: Conjunctivae normal.   Cardiovascular:      Rate and Rhythm: Normal rate and regular rhythm.      Pulses: Normal pulses.      Heart sounds: Normal heart sounds.   Pulmonary:      Effort: Pulmonary effort is normal.      Breath sounds: Normal breath sounds.   Abdominal:      General: Bowel sounds are normal. There is no distension.      Palpations: Abdomen is soft.      Tenderness: There is no abdominal tenderness.   Skin:     General: Skin is warm and dry.   Neurological:      Mental Status: She is alert and oriented to person, place, and time. Mental status is at baseline.   Psychiatric:         Mood and  "Affect: Mood normal.         Behavior: Behavior normal.         Thought Content: Thought content normal.         Judgment: Judgment normal.           Results Review  I reviewed the patient's new clinical results.  Results from last 7 days   Lab Units 02/25/24  0514 02/24/24  0554 02/23/24  0517 02/21/24  0835   WBC 10*3/mm3 7.53 7.09 10.30 10.84*   HEMOGLOBIN g/dL 12.7 12.2 12.8 12.4   PLATELETS 10*3/mm3 264 253 263 282     Results from last 7 days   Lab Units 02/25/24  0514 02/24/24  0554 02/23/24  0517 02/21/24  0835   SODIUM mmol/L 140 138 140 138   POTASSIUM mmol/L 3.6 3.9 4.1 4.1   CHLORIDE mmol/L 109* 109* 110* 106   CO2 mmol/L 20.2* 19.1* 20.0* 19.8*   BUN mg/dL 7 7 9 13   CREATININE mg/dL 0.65 0.69 0.83 0.83   GLUCOSE mg/dL 115* 121* 99 90     Lab Results   Component Value Date    ANIONGAP 10.8 02/25/2024     Estimated Creatinine Clearance: 165.5 mL/min (by C-G formula based on SCr of 0.65 mg/dL).   Lab Results   Component Value Date    EGFR 120.9 02/25/2024     Results from last 7 days   Lab Units 02/21/24  0429   ALBUMIN g/dL 4.0   BILIRUBIN mg/dL <0.2   ALK PHOS U/L 62   AST (SGOT) U/L 27   ALT (SGPT) U/L 42*     Results from last 7 days   Lab Units 02/25/24  0514 02/24/24  0554 02/23/24  0517 02/21/24  0835 02/21/24  0429   CALCIUM mg/dL 8.1* 8.0* 8.3* 8.7 8.8   ALBUMIN g/dL  --   --   --   --  4.0       No results found for: \"HGBA1C\", \"POCGLU\"    CT Head Without Contrast    Result Date: 2/23/2024  Electronically signed by Jennifer Reagan MD on 02-23-24 at 2248       Current Facility-Administered Medications:     acetaminophen (TYLENOL) tablet 650 mg, 650 mg, Oral, Q4H PRN, 650 mg at 02/21/24 0928 **OR** acetaminophen (TYLENOL) 160 MG/5ML oral solution 650 mg, 650 mg, Oral, Q4H PRN **OR** acetaminophen (TYLENOL) suppository 650 mg, 650 mg, Rectal, Q4H PRN, Cass Man APRN    ALPRAZolam (XANAX) tablet 0.5 mg, 0.5 mg, Oral, BID PRN, Farhad Arrieta MD, 0.5 mg at 02/24/24 2151    " sennosides-docusate (PERICOLACE) 8.6-50 MG per tablet 2 tablet, 2 tablet, Oral, BID PRN, 2 tablet at 02/24/24 1600 **AND** polyethylene glycol (MIRALAX) packet 17 g, 17 g, Oral, Daily PRN, 17 g at 02/24/24 2151 **AND** bisacodyl (DULCOLAX) EC tablet 5 mg, 5 mg, Oral, Daily PRN **AND** bisacodyl (DULCOLAX) suppository 10 mg, 10 mg, Rectal, Daily PRN, Cass Man APRN    butalbital-acetaminophen-caffeine (FIORICET, ESGIC) -40 MG per tablet 1 tablet, 1 tablet, Oral, Q4H PRN, Farhad Arrieta MD    caffeine citrate (CAFCIT) solution 200 mg, 200 mg, Oral, BID, Nimesh Kelsey MD, 200 mg at 02/25/24 1229    calcium carbonate (TUMS) chewable tablet 500 mg (200 mg elemental), 1 tablet, Oral, TID PRN, Shefali Foley APRN, 1 tablet at 02/25/24 1135    [START ON 2/26/2024] famotidine (PEPCID) tablet 40 mg, 40 mg, Oral, BID AC, Shefali Foley APRN    FLUoxetine (PROzac) capsule 40 mg, 40 mg, Oral, Daily, Farhad Arrieta MD, 40 mg at 02/25/24 0924    gabapentin (NEURONTIN) capsule 300 mg, 300 mg, Oral, TID, Farhad Arrieta MD, 300 mg at 02/25/24 1627    HYDROcodone-acetaminophen (NORCO) 5-325 MG per tablet 1 tablet, 1 tablet, Oral, Q4H PRN, Farhad Arrieta MD, 1 tablet at 02/25/24 1533    HYDROmorphone (DILAUDID) injection 0.5 mg, 0.5 mg, Intravenous, Q2H PRN, Farhad Arrieta MD, 0.5 mg at 02/25/24 1708    levothyroxine (SYNTHROID, LEVOTHROID) tablet 88 mcg, 88 mcg, Oral, Q AM, Farhad Arrieta MD, 88 mcg at 02/25/24 0543    ondansetron (ZOFRAN) injection 4 mg, 4 mg, Intravenous, Q6H PRN, Cass Man APRN, 4 mg at 02/25/24 1715    pantoprazole (PROTONIX) EC tablet 40 mg, 40 mg, Oral, Q AM, Farhad Arrieta MD, 40 mg at 02/25/24 0543    promethazine (PHENERGAN) tablet 25 mg, 25 mg, Oral, Q4H PRN, Cass Man APRN, 25 mg at 02/25/24 0149    sodium chloride 0.9 % flush 10 mL, 10 mL, Intravenous, Q12H, Cass Man APRN, 10 mL at 02/25/24 1136    sodium chloride 0.9 % flush 10 mL, 10 mL,  Intravenous, PRN, Cass Man APRN    sodium chloride 0.9 % infusion 40 mL, 40 mL, Intravenous, PRN, Cass Man APRVINITA    sodium chloride 0.9 % infusion, 150 mL/hr, Intravenous, Continuous, Nimesh Kelsey MD, Last Rate: 150 mL/hr at 02/25/24 1853, 150 mL/hr at 02/25/24 1853    topiramate (TOPAMAX) tablet 50 mg, 50 mg, Oral, BID, Farhad Arrieta MD, 50 mg at 02/25/24 0924      sodium chloride, 150 mL/hr, Last Rate: 150 mL/hr (02/25/24 1853)    Diet  Diet: Regular/House Diet; Texture: Regular Texture (IDDSI 7); Fluid Consistency: Thin (IDDSI 0)         Assessment/Plan     Active Hospital Problems    Diagnosis  POA    **Fluid collection at surgical site, initial encounter [T88.8XXA]  Yes    Tethered cord [Q06.8]  Not Applicable    Arnold-Chiari malformation, 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     33 y.o. female  with history of hypothyroidism, fibromyalgia, asthma, chronic migraine headaches, idiopathic intracranial hypertension, Chiari I malformation and tethered cord status post decompression who presented to [facility] 2/21/24 with a new onset of headache following S1- S2 laminectomy approximately 3 weeks ago  in [place] with neurosurgeon Dr. Newby.      -MRI revealed  fluid collection in the L-spine concerning for possible CSF leak.  -Inpatient neurosurgery was consulted and did not recommend surgical intervention  -Neurology is following and asked anesthesia pain management to evaluate for possible blood patch.  -Patient has a history of Chiari malformation neurology did order CT of the head which showed no acute abnormality, small lateral ventricles, no posterior fossa crowding or Chiari malformation.    -Headache continues.  She has not tried Fioricet.  Did  review literature that shows improvement in CSF leak with use of Fioricet.  Patient is willing to try.  -Low back pain and headache continues.  No loss of bowel or bladder function.  Is having some constipation but she is passing flatus.    -Pain management did recommend if headache remained refractory on Saturday that a trial cosyntropin dose (0.5mg in 1 L LRS  6-8hr infusion) could be initiated.  It does not appear that this was discussed with patient yesterday or offered.  I did review the anesthesiology pain management recommendations and with shared decision making I reviewed risks with regard to possible allergic reaction, anaphylaxis, arrhythmias as noted in literature review.  Discussed with patient and mother and they have declined to utilize.  Patient and mother both very concerned as approximately 3 to 4 weeks ago she had a significant allergic reaction which involved hives facial edema and issues with swallowing and airway which required epinephrine and steroid administration along with her history of asthma.  Patient states she is very sensitive to medications and has multiple medication allergies which is confirmed in EMR review.  -Patient at this point is wanting to undergo blood patch as she has had previous experience with this.  She said the biggest issue she had with her prior blood patch was significant pain associated with it and so she is somewhat anxious regarding that.  She states that the patch worked well in controlling her CSF leak and her symptoms.  Is very frustrated about her continued pain and symptoms.  - she has noticed worsening GERD because of her need to position flat.  Will temporarily increase her H2 blocker Pepcid to 40 mg twice daily along with continuation of her PPI daily.  Add as needed Tums  -Continue her Topamax, gabapentin, fluoxetine,  for chronic migraines and her levothyroxine..      DVT prophylaxis  SCDs    Discharge: To be determined    Expected discharge date/ time  has not been documented.    Discussed with patient, family, and nursing staff, and Dr. Franklin Foley, [facility]

## 2024-02-26 NOTE — PROGRESS NOTES
"DOS: 2024  NAME: Maame Wallace   : 1990  PCP: Sirisha Aparicio MD  Chief Complaint   Patient presents with    Post-op Problem     Post op problem, numbness and tingling in back and both legs       Chief complaint: headache  Subjective: Patient gets her headache is 7 out of 10.  It has not improved over the weekend.  Headache is at present lying down but also worsens when she sits up.  She gets up to go the bathroom and headache is worse.    Objective:  Vital signs: /70 (BP Location: Right arm, Patient Position: Lying)   Pulse 82   Temp 97.8 °F (36.6 °C) (Oral)   Resp 16   Ht 167.6 cm (66\")   Wt 120 kg (263 lb 7.2 oz)   LMP 2024   SpO2 97%   BMI 42.52 kg/m²    Gen: NAD, vitals reviewed  MS: oriented x3, recent/remote memory intact, normal attention/concentration, language intact, no neglect.  CN: visual acuity grossly normal, PERRL, EOMI, no facial droop, no dysarthria  Motor: 5/5 throughout upper and lower extremities, normal tone  Sensory: intact to light touch all 4 ext.    ROS:  No weakness, numbness  No fevers, chills      Laboratory results:  Lab Results   Component Value Date    GLUCOSE 115 (H) 2024    CALCIUM 8.1 (L) 2024     2024    K 3.6 2024    CO2 20.2 (L) 2024     (H) 2024    BUN 7 2024    CREATININE 0.65 2024    EGFRIFAFRI >60 2021    EGFRIFNONA 101 2021    BCR 10.8 2024    ANIONGAP 10.8 2024     Lab Results   Component Value Date    WBC 7.53 2024    HGB 12.7 2024    HCT 38.3 2024    MCV 88.7 2024     2024     Lab Results   Component Value Date     (H) 2021            Review of labs:     Review and interpretation of imaging:   L spine MRI    IMPRESSION:  1.  The conus is at L1.  2.  The patient has undergone bilateral laminotomies at L5-S1. A small  fluid collection is noted posterior to the thecal sac measuring  approximately 2.3 " x 1.5 x 1.2 cm in size. This is located 7-8 mm  posterior to the thecal sac. A larger more superficial fluid collection  is identified measuring 6 x 6 x 7.5 cm in size. It extends from the  level of L3-L4 inferiorly to S1. These fluid collections are nonspecific  and likely represent seromas. Infected collections cannot be entirely  excluded.  3.  A broad-based disc osteophyte complex is present at L5-S1 which is  slightly more prominent to the left. It abuts the traversing S1 nerve  root slightly, more so on the left where there is mild lateral recess  narrowing.  Workup to date:    Diagnoses:  1 low pressure headache  2 abnormal L spine MRI  3 h/o chiari malfomation  4 h/o migraine headaches    Impression: 33-year-old right-handed female with history of chronic migraines on Nurtec and Botox injections, IIH on Topamax, Chiari I malformation SP decompression July 2023 and and tethered cord status post cord release with L5/S1 and S1-S2 laminectomy January 29, 2024 comes to the ED with progressive low back pain and radiculopathy and headache that is of low pressure in quality.  MRI L-spine showed a small fluid collection posterior to the thecal sac, seroma, less likely infection given clinical status.  In conjunction with her positional headache suspected CSF leak.  She was started on symptomatic treatment.  If no improvement will proceed with blood patch      She endorses no improvement after 3 days of conservative management with regular caffeine tablets and IVF's.  Headache still of low pressure quality worse on upright positioning.  Anticipate she will need blood patch per anesthesiology note.  Neurology team will be following along with you peripherally      Thank you for this consultation.  Discussed above plan with neuro attending, Dr. Kelsey who agrees with above plan.  Neurology team is available for concerns or questions.

## 2024-02-26 NOTE — PLAN OF CARE
Goal Outcome Evaluation:  Plan of Care Reviewed With: patient        Progress: improving  Outcome Evaluation: Pt stated that the scheduled Cafcit and the PRN  Fioricet she feels like are not helping with her headache at all. Pt takes all her PRN pain meds whenever she can have them.

## 2024-02-27 ENCOUNTER — ANESTHESIA (OUTPATIENT)
Dept: PAIN MEDICINE | Facility: HOSPITAL | Age: 34
End: 2024-02-27
Payer: COMMERCIAL

## 2024-02-27 ENCOUNTER — APPOINTMENT (OUTPATIENT)
Dept: PAIN MEDICINE | Facility: HOSPITAL | Age: 34
End: 2024-02-27
Payer: COMMERCIAL

## 2024-02-27 ENCOUNTER — APPOINTMENT (OUTPATIENT)
Dept: GENERAL RADIOLOGY | Facility: HOSPITAL | Age: 34
End: 2024-02-27
Payer: COMMERCIAL

## 2024-02-27 ENCOUNTER — ANESTHESIA EVENT (OUTPATIENT)
Dept: PAIN MEDICINE | Facility: HOSPITAL | Age: 34
End: 2024-02-27
Payer: COMMERCIAL

## 2024-02-27 PROCEDURE — 3E0R3GC INTRODUCTION OF OTHER THERAPEUTIC SUBSTANCE INTO SPINAL CANAL, PERCUTANEOUS APPROACH: ICD-10-PCS | Performed by: HOSPITALIST

## 2024-02-27 PROCEDURE — 25010000002 HYDROMORPHONE PER 4 MG: Performed by: NURSE PRACTITIONER

## 2024-02-27 PROCEDURE — 25010000002 MIDAZOLAM PER 1 MG: Performed by: ANESTHESIOLOGY

## 2024-02-27 PROCEDURE — 25810000003 SODIUM CHLORIDE 0.9 % SOLUTION: Performed by: NURSE PRACTITIONER

## 2024-02-27 PROCEDURE — 25010000002 ONDANSETRON PER 1 MG: Performed by: NURSE PRACTITIONER

## 2024-02-27 PROCEDURE — 77003 FLUOROGUIDE FOR SPINE INJECT: CPT

## 2024-02-27 RX ORDER — LIDOCAINE HYDROCHLORIDE 10 MG/ML
1 INJECTION, SOLUTION INFILTRATION; PERINEURAL ONCE
Status: DISCONTINUED | OUTPATIENT
Start: 2024-02-27 | End: 2024-02-29 | Stop reason: HOSPADM

## 2024-02-27 RX ORDER — MIDAZOLAM HYDROCHLORIDE 1 MG/ML
1 INJECTION INTRAMUSCULAR; INTRAVENOUS ONCE AS NEEDED
Status: COMPLETED | OUTPATIENT
Start: 2024-02-27 | End: 2024-02-27

## 2024-02-27 RX ORDER — FENTANYL CITRATE 50 UG/ML
50 INJECTION, SOLUTION INTRAMUSCULAR; INTRAVENOUS ONCE
Status: DISCONTINUED | OUTPATIENT
Start: 2024-02-27 | End: 2024-02-29

## 2024-02-27 RX ADMIN — MIDAZOLAM 2 MG: 1 INJECTION INTRAMUSCULAR; INTRAVENOUS at 12:44

## 2024-02-27 RX ADMIN — HYDROMORPHONE HYDROCHLORIDE 0.5 MG: 1 INJECTION, SOLUTION INTRAMUSCULAR; INTRAVENOUS; SUBCUTANEOUS at 15:41

## 2024-02-27 RX ADMIN — Medication 10 ML: at 20:48

## 2024-02-27 RX ADMIN — Medication 400 MG: at 20:47

## 2024-02-27 RX ADMIN — CAFFEINE 200 MG: 200 TABLET ORAL at 20:47

## 2024-02-27 RX ADMIN — HYDROMORPHONE HYDROCHLORIDE 0.5 MG: 1 INJECTION, SOLUTION INTRAMUSCULAR; INTRAVENOUS; SUBCUTANEOUS at 01:38

## 2024-02-27 RX ADMIN — HYDROCODONE BITARTRATE AND ACETAMINOPHEN 1 TABLET: 5; 325 TABLET ORAL at 17:38

## 2024-02-27 RX ADMIN — HYDROCODONE BITARTRATE AND ACETAMINOPHEN 1 TABLET: 5; 325 TABLET ORAL at 08:30

## 2024-02-27 RX ADMIN — GABAPENTIN 300 MG: 300 CAPSULE ORAL at 08:30

## 2024-02-27 RX ADMIN — HYDROMORPHONE HYDROCHLORIDE 0.5 MG: 1 INJECTION, SOLUTION INTRAMUSCULAR; INTRAVENOUS; SUBCUTANEOUS at 11:34

## 2024-02-27 RX ADMIN — CAFFEINE 200 MG: 200 TABLET ORAL at 08:31

## 2024-02-27 RX ADMIN — HYDROMORPHONE HYDROCHLORIDE 0.5 MG: 1 INJECTION, SOLUTION INTRAMUSCULAR; INTRAVENOUS; SUBCUTANEOUS at 20:46

## 2024-02-27 RX ADMIN — TOPIRAMATE 50 MG: 50 TABLET, FILM COATED ORAL at 08:30

## 2024-02-27 RX ADMIN — ONDANSETRON HYDROCHLORIDE 4 MG: 2 INJECTION, SOLUTION INTRAMUSCULAR; INTRAVENOUS at 01:38

## 2024-02-27 RX ADMIN — HYDROCODONE BITARTRATE AND ACETAMINOPHEN 1 TABLET: 5; 325 TABLET ORAL at 23:04

## 2024-02-27 RX ADMIN — SODIUM CHLORIDE 100 ML/HR: 9 INJECTION, SOLUTION INTRAVENOUS at 04:58

## 2024-02-27 RX ADMIN — FLUOXETINE HYDROCHLORIDE 40 MG: 20 CAPSULE ORAL at 08:31

## 2024-02-27 RX ADMIN — TOPIRAMATE 50 MG: 50 TABLET, FILM COATED ORAL at 20:47

## 2024-02-27 RX ADMIN — ALPRAZOLAM 0.5 MG: 0.5 TABLET ORAL at 08:31

## 2024-02-27 RX ADMIN — GABAPENTIN 300 MG: 300 CAPSULE ORAL at 15:39

## 2024-02-27 RX ADMIN — HYDROMORPHONE HYDROCHLORIDE 0.5 MG: 1 INJECTION, SOLUTION INTRAMUSCULAR; INTRAVENOUS; SUBCUTANEOUS at 04:58

## 2024-02-27 RX ADMIN — FAMOTIDINE 40 MG: 20 TABLET, FILM COATED ORAL at 16:09

## 2024-02-27 RX ADMIN — GABAPENTIN 300 MG: 300 CAPSULE ORAL at 20:47

## 2024-02-27 RX ADMIN — ONDANSETRON HYDROCHLORIDE 4 MG: 2 INJECTION, SOLUTION INTRAMUSCULAR; INTRAVENOUS at 20:47

## 2024-02-27 RX ADMIN — SENNOSIDES 2 TABLET: 8.6 TABLET, FILM COATED ORAL at 08:30

## 2024-02-27 RX ADMIN — Medication 10 ML: at 08:33

## 2024-02-27 NOTE — NURSING NOTE
Pt resting in recovery at this time post epidural blood patch, waiting for transport to take her back to her room.  She is reporting her low back pain and headache is unchanged at this time and rating it a 6 on pain scale.  Report called to Cass on 7th floor. Discussed that we had placed an 18 gauge IV in pt's right AC and we will leave that in due to patient being a difficult stick.  VS are WNL and pt is currently eating ice chips.

## 2024-02-27 NOTE — PLAN OF CARE
Goal Outcome Evaluation:  Plan of Care Reviewed With: patient        Progress: improving  Outcome Evaluation: VSS. Pt is up ad selvin and PRN meds given as per order. Educated on falls precaution and medication management. NPO since midnight for possible blood patch procedure today. will take note of any changes.

## 2024-02-27 NOTE — H&P
CHIEF COMPLAINT: headache      HISTORY OF PRESENT ILLNESS:  Admitted to the hospital for back and leg pain.  She had undergone a previous L5-S2 laminectomy in [place].  She presented with worsening back and leg pain.  She was found to have a fluid collection which could be a seroma or CSF leak.  We have been consulted to perform a blood patch  PAST MEDICAL HISTORY:    No current facility-administered medications on file prior to encounter.     Current Outpatient Medications on File Prior to Encounter   Medication Sig Dispense Refill    albuterol sulfate  (90 Base) MCG/ACT inhaler Every 6 (Six) Hours.      ALPRAZolam (XANAX) 0.5 MG tablet Take 1 tablet by mouth 2 (Two) Times a Day As Needed for Anxiety.      cetirizine (zyrTEC) 10 MG tablet Take 1 tablet by mouth Daily.      cyanocobalamin 1000 MCG/ML injection Inject 1 mL under the skin into the appropriate area as directed. (Patient not taking: Reported on 11/30/2023)      Dextromethorphan-buPROPion ER (Auvelity)  MG tablet controlled-release Take 1 tablet by mouth.      diclofenac (VOLTAREN) 50 MG EC tablet Take 1 tablet by mouth.      Emgality 120 MG/ML auto-injector pen Inject 1 mL under the skin into the appropriate area as directed Every 30 (Thirty) Days for 180 days. 1 mL 5    famotidine (PEPCID) 20 MG tablet TAKE 1 TABLET BY MOUTH EVERY DAY 90 tablet 1    FLUoxetine (PROzac) 40 MG capsule Take 1 capsule by mouth Daily.      gabapentin (NEURONTIN) 300 MG capsule Take 1 capsule by mouth 3 (Three) Times a Day.      HYDROcodone-acetaminophen (NORCO) 5-325 MG per tablet Take 1 tablet by mouth Every 4 (Four) Hours As Needed.      ibuprofen (ADVIL,MOTRIN) 600 MG tablet Take 1 tablet by mouth Every 6 (Six) Hours As Needed for Mild Pain.      lansoprazole (PREVACID) 30 MG capsule 1 CAPSULE BY MOUTH TWICE DAILY 60 capsule 3    levothyroxine (SYNTHROID, LEVOTHROID) 88 MCG tablet 1 tablet.      ondansetron (ZOFRAN) 4 MG tablet 1 tablet.      Rimegepant  "Sulfate (Nurtec) 75 MG tablet dispersible tablet Take 1 tablet by mouth As Needed (Migraine headache, max 1 dose in 24 hours.) for up to 180 days. 8 tablet 5    topiramate (Topamax) 50 MG tablet Take 1 tablet by mouth 2 (Two) Times a Day for 180 days. 180 tablet 1       Past Medical History:   Diagnosis Date    Anxiety     Asthma     Cholelithiasis     Depression     Fatty liver     Fatty liver 01/18/2021    Fibromyalgia     Fibromyalgia, primary     GERD (gastroesophageal reflux disease)     Headache, tension-type     Hypothyroidism     Irritable bowel disease     Migraines     Suicide attempt 04/02/2021    purposeful overdose on home medications       SOCIAL HISTORY:  No tobacco    REVIEW OF SYSTEMS:  No hematologic infectious or constitutional symptoms    PHYSICAL EXAM:  /87 (BP Location: Right arm, Patient Position: Lying)   Pulse 105   Temp 36.9 °C (98.4 °F) (Oral)   Resp 16   Ht 167.6 cm (66\")   Wt 120 kg (263 lb 7.2 oz)   LMP 02/11/2024   SpO2 94%   BMI 42.52 kg/m²   Well-developed well-nourished no acute distress  Malampatti class 2 airway    DIAGNOSIS:  Post dural puncture headache    PLAN:  Epidural blood patch.  Risks benefits and alternatives were discussed with the patient and they agreed to proceed.  We also discussed conservative therapy such as hydration rest, pain medications and caffeine.      "

## 2024-02-27 NOTE — PROGRESS NOTES
Name: Maame Wallace ADMIT: 2024   : 1990  PCP: Sirisha Aparicio MD    MRN: [id number] LOS: 3 days   AGE/SEX: 33 y.o. female  ROOM: [facility] the [facility]     Subjective   Subjective   Patient obese, generally weak, relatively comfortable, no apparent distress.  Still with complaints of low mid back pain.  Constipation resolved with last bowel movements x3 today--2024.   Positive flatulence & nausea.  Denies photophobia, abdominal pain, or bladder dysfunction.  Tingling in bilateral lower extremity;  however, able to ambulate short distance yet challenging due to low mid back pain.  Plan blood patch today.  States Fioricet ineffective for described as global headache.         Objective   Objective   Vital Signs  Temp:  [98 °F (36.7 °C)-98.4 °F (36.9 °C)] 98.1 °F (36.7 °C)  Heart Rate:  [] 103  Resp:  [14-17] 16  BP: ()/(50-87) 120/71  SpO2:  [94 %-97 %] 96 %  on   ;   Device (Oxygen Therapy): room air  Body mass index is 42.52 kg/m².    Physical Exam  Constitutional:       General: She is not in acute distress.     Appearance: She is obese. She is not toxic-appearing.   Cardiovascular:      Rate and Rhythm: Tachycardia present.      Heart sounds: Normal heart sounds.   Pulmonary:      Effort: Pulmonary effort is normal.      Breath sounds: Normal breath sounds.   Abdominal:      General: Bowel sounds are normal.      Palpations: Abdomen is soft.   Musculoskeletal:      Right lower leg: No edema.      Left lower leg: No edema.   Skin:     General: Skin is warm and dry.   Neurological:      Mental Status: She is alert and oriented to person, place, and time.     *Physical exam performed on 2024 as per above    Results Review     I reviewed the patient's new clinical results.  Results from last 7 days   Lab Units 24  0514 24  0554 24  0517 24  0835   WBC 10*3/mm3 7.53 7.09 10.30 10.84*   HEMOGLOBIN g/dL 12.7 12.2 12.8 12.4   PLATELETS 10*3/mm3 264 253 263 282  "    Results from last 7 days   Lab Units 02/25/24  0514 02/24/24  0554 02/23/24  0517 02/21/24  0835   SODIUM mmol/L 140 138 140 138   POTASSIUM mmol/L 3.6 3.9 4.1 4.1   CHLORIDE mmol/L 109* 109* 110* 106   CO2 mmol/L 20.2* 19.1* 20.0* 19.8*   BUN mg/dL 7 7 9 13   CREATININE mg/dL 0.65 0.69 0.83 0.83   GLUCOSE mg/dL 115* 121* 99 90   EGFR mL/min/1.73 120.9 119.2 96.8 96.8     Results from last 7 days   Lab Units 02/21/24  0429   ALBUMIN g/dL 4.0   BILIRUBIN mg/dL <0.2   ALK PHOS U/L 62   AST (SGOT) U/L 27   ALT (SGPT) U/L 42*     Results from last 7 days   Lab Units 02/25/24  0514 02/24/24  0554 02/23/24  0517 02/21/24  0835 02/21/24  0429   CALCIUM mg/dL 8.1* 8.0* 8.3* 8.7 8.8   ALBUMIN g/dL  --   --   --   --  4.0   MAGNESIUM mg/dL 2.1  --   --   --   --        No results found for: \"HGBA1C\", \"POCGLU\"    No radiology results for the last day    I have personally reviewed all medications:  Scheduled Medications  caffeine, 200 mg, Oral, Q12H  famotidine, 40 mg, Oral, BID AC  fentanyl, 50 mcg, Intravenous, Once  FLUoxetine, 40 mg, Oral, Daily  gabapentin, 300 mg, Oral, TID  levothyroxine, 88 mcg, Oral, Q AM  lidocaine, 1 mL, Intradermal, Once  pantoprazole, 40 mg, Oral, Q AM  polyethylene glycol, 17 g, Oral, Daily  senna, 2 tablet, Oral, BID  sodium chloride, 10 mL, Intravenous, Q12H  topiramate, 50 mg, Oral, BID  Vitamin B-2, 400 mg, Oral, Daily    Infusions  sodium chloride, 100 mL/hr, Last Rate: 100 mL/hr (02/27/24 0458)    Diet  NPO Diet NPO Type: Strict NPO    I have personally reviewed:  [x]  Laboratory   []  Microbiology   [x]  Radiology   []  EKG/Telemetry  []  Cardiology/Vascular   []  Pathology    []  Records       Assessment/Plan     Active Hospital Problems    Diagnosis  POA    **Fluid collection at surgical site, initial encounter [T88.8XXA]  Yes    Tethered cord [Q06.8]  Not Applicable    Arnold-Chiari malformation, type I [G93.5]  Yes    Anxiety [F41.9]  Yes    Fatty liver [K76.0]  Yes    " Hypothyroidism [E03.9]  Yes    Fibromyalgia [M79.7]  Yes    Benign intracranial hypertension [G93.2]  Yes    Asthma [J45.909]  Yes      Resolved Hospital Problems   No resolved problems to display.       33 y.o. female with history of obesity, chronic migraine headaches, idiopathic intracranial hypertension, Chiari I malformation and tethered cord status post decompression with new onset headache following back surgery approximately 3 weeks ago.  Patient underwent S1 and S2 laminectomy in [place] with neurosurgeon --Dr. Newby--& developed headache symptoms aggravated by position and different in character from migraine and admitted with Fluid collection at surgical site, initial encounter.    MRI confirms small fluid collection L-spine concerning for possible CSF leak.  Neurosurgery evaluated and signed off recommending follow-up with previous neurosurgeon (who reportedly recommended conservative management of headache at this point and also did not believe surgical intervention necessary per patient correspondence on EHR) no plans for surgical intervention here.  Neurology following--consulted pain management service for epidural blood patch planned on 2/27/2024.    Global headache aggravated by movement and use in the setting of chronic migraine unchanged with Fioricet as needed.  Continue Topamax, gabapentin, fluoxetine.  Continue riboflavin 400 mg PO daily, mag-ox 400 mg PO daily, & RVP negative.    GERD treated with PPI & famotidine here as Prevacid none formulary.    Low back pain and headache continues. No loss of bladder function; however, constipation resolved with last BM on 2/27/2024 following prior BM 2/20/2024.  Ambulate as often as possibly tolerated.  Bowel regimen effevtive.  IV fluids infusing.  Encourage optimal daily water hydration / intake.  Avoid narcotics if possible.    SCDs for DVT prophylaxis.  Full code.  Discussed with patient and nursing staff   Anticipate discharge home with family  in 1-2 days. Pending back pain tolerance, neurology recommendation, & headache following anticipated blood patch on ? 2/27/2024      FIDE Javier  [facility]  02/27/24  15:18 EST

## 2024-02-27 NOTE — ANESTHESIA PROCEDURE NOTES
Blood Patch      Patient reassessed immediately prior to procedure    Patient location during procedure: pain clinic  Indications for Blood Patch: headache  Staff  Anesthesiologist: Selvin Wright MD  Preanesthetic Checklist  Completed: patient identified and risks and benefits discussed  Blood Patch Prep  Patient position: prone  Sterile Tech: gloves, cap, mask and sterile barrier.  Prep: ChloraPrep  Patient monitoring: blood pressure monitoring, continuous pulse ox and EKG  Location: L5-S1  Blood Patch Procedure  Sedation:yes    Approach: midline   Guidance: fluoroscopy  Needle Gauge 20 G  Solution used: autologous blood  Loss of Resistance Medium: saline  Blood Patch Source:venous    Amount injected: 20 mL  Assessment  Patient tolerance:patient tolerated the procedure well with no apparent complications  Complications:no  Additional Notes  Her in situ IV did not drawback so under ultrasound guidance after Betadine and alcohol prep I placed an 18-gauge IV in the right forearm under sterile conditions.  Autologous blood drawn by RN.  Loss of resistance to saline through a 20 gauge epidural needle    Under fluoroscopic guidance the above level was identified and accessed, confirmed by loss of resistance.    Addition Versed 2 mg  Sedation time 12:38 PM to 12:56 PM

## 2024-02-27 NOTE — CASE MANAGEMENT/SOCIAL WORK
Continued Stay Note  [facility]     Patient Name: Maame Wallace  MRN: [id number]  Today's Date: 2/27/2024    Admit Date: 2/21/2024    Plan: home with family- CCP will follow   Discharge Plan       Row Name 02/27/24 1353       Plan    Plan home with family- CCP will follow    Patient/Family in Agreement with Plan yes    Plan Comments Attempted to speak with patient x2, but she was off the floor. Plan remains home at this time. CCP will follow.                   Discharge Codes    No documentation.                 Expected Discharge Date and Time       Expected Discharge Date Expected Discharge Time    Feb 29, 2024               Ekaterina Loya RN

## 2024-02-28 LAB
ANION GAP SERPL CALCULATED.3IONS-SCNC: 10 MMOL/L (ref 5–15)
BUN SERPL-MCNC: 8 MG/DL (ref 6–20)
BUN/CREAT SERPL: 9.2 (ref 7–25)
CALCIUM SPEC-SCNC: 8.3 MG/DL (ref 8.6–10.5)
CHLORIDE SERPL-SCNC: 109 MMOL/L (ref 98–107)
CO2 SERPL-SCNC: 19 MMOL/L (ref 22–29)
CREAT SERPL-MCNC: 0.87 MG/DL (ref 0.57–1)
DEPRECATED RDW RBC AUTO: 44.1 FL (ref 37–54)
EGFRCR SERPLBLD CKD-EPI 2021: 91.5 ML/MIN/1.73
ERYTHROCYTE [DISTWIDTH] IN BLOOD BY AUTOMATED COUNT: 14 % (ref 12.3–15.4)
GLUCOSE SERPL-MCNC: 106 MG/DL (ref 65–99)
HCT VFR BLD AUTO: 40.2 % (ref 34–46.6)
HGB BLD-MCNC: 13.4 G/DL (ref 12–15.9)
MCH RBC QN AUTO: 29.1 PG (ref 26.6–33)
MCHC RBC AUTO-ENTMCNC: 33.3 G/DL (ref 31.5–35.7)
MCV RBC AUTO: 87.4 FL (ref 79–97)
PLATELET # BLD AUTO: 315 10*3/MM3 (ref 140–450)
PMV BLD AUTO: 9.2 FL (ref 6–12)
POTASSIUM SERPL-SCNC: 3.7 MMOL/L (ref 3.5–5.2)
QT INTERVAL: 405 MS
QTC INTERVAL: 507 MS
RBC # BLD AUTO: 4.6 10*6/MM3 (ref 3.77–5.28)
SODIUM SERPL-SCNC: 138 MMOL/L (ref 136–145)
WBC NRBC COR # BLD AUTO: 10.01 10*3/MM3 (ref 3.4–10.8)

## 2024-02-28 PROCEDURE — 85027 COMPLETE CBC AUTOMATED: CPT | Performed by: NURSE PRACTITIONER

## 2024-02-28 PROCEDURE — 93005 ELECTROCARDIOGRAM TRACING: CPT | Performed by: NURSE PRACTITIONER

## 2024-02-28 PROCEDURE — 25010000002 ONDANSETRON PER 1 MG: Performed by: NURSE PRACTITIONER

## 2024-02-28 PROCEDURE — 63710000001 PROMETHAZINE PER 25 MG: Performed by: NURSE PRACTITIONER

## 2024-02-28 PROCEDURE — 93010 ELECTROCARDIOGRAM REPORT: CPT | Performed by: INTERNAL MEDICINE

## 2024-02-28 PROCEDURE — 25810000003 SODIUM CHLORIDE 0.9 % SOLUTION: Performed by: NURSE PRACTITIONER

## 2024-02-28 PROCEDURE — 25010000002 HYDROMORPHONE PER 4 MG: Performed by: NURSE PRACTITIONER

## 2024-02-28 PROCEDURE — 99233 SBSQ HOSP IP/OBS HIGH 50: CPT | Performed by: NURSE PRACTITIONER

## 2024-02-28 PROCEDURE — 80048 BASIC METABOLIC PNL TOTAL CA: CPT | Performed by: NURSE PRACTITIONER

## 2024-02-28 RX ORDER — HYDROMORPHONE HYDROCHLORIDE 1 MG/ML
0.25 INJECTION, SOLUTION INTRAMUSCULAR; INTRAVENOUS; SUBCUTANEOUS 2 TIMES DAILY PRN
Status: DISCONTINUED | OUTPATIENT
Start: 2024-02-28 | End: 2024-02-28

## 2024-02-28 RX ORDER — HYDROCODONE BITARTRATE AND ACETAMINOPHEN 10; 325 MG/1; MG/1
1 TABLET ORAL EVERY 6 HOURS PRN
Status: DISCONTINUED | OUTPATIENT
Start: 2024-02-28 | End: 2024-02-29 | Stop reason: HOSPADM

## 2024-02-28 RX ORDER — HYDROMORPHONE HYDROCHLORIDE 1 MG/ML
0.25 INJECTION, SOLUTION INTRAMUSCULAR; INTRAVENOUS; SUBCUTANEOUS EVERY 4 HOURS PRN
Status: DISCONTINUED | OUTPATIENT
Start: 2024-02-28 | End: 2024-02-28

## 2024-02-28 RX ORDER — OLANZAPINE 5 MG/1
5 TABLET ORAL NIGHTLY
Status: DISCONTINUED | OUTPATIENT
Start: 2024-02-28 | End: 2024-02-29 | Stop reason: HOSPADM

## 2024-02-28 RX ADMIN — FLUOXETINE HYDROCHLORIDE 40 MG: 20 CAPSULE ORAL at 08:40

## 2024-02-28 RX ADMIN — Medication 10 ML: at 21:14

## 2024-02-28 RX ADMIN — GABAPENTIN 300 MG: 300 CAPSULE ORAL at 16:05

## 2024-02-28 RX ADMIN — HYDROCODONE BITARTRATE AND ACETAMINOPHEN 1 TABLET: 5; 325 TABLET ORAL at 07:23

## 2024-02-28 RX ADMIN — FAMOTIDINE 40 MG: 20 TABLET, FILM COATED ORAL at 07:23

## 2024-02-28 RX ADMIN — HYDROMORPHONE HYDROCHLORIDE 0.5 MG: 1 INJECTION, SOLUTION INTRAMUSCULAR; INTRAVENOUS; SUBCUTANEOUS at 00:19

## 2024-02-28 RX ADMIN — HYDROMORPHONE HYDROCHLORIDE 0.5 MG: 1 INJECTION, SOLUTION INTRAMUSCULAR; INTRAVENOUS; SUBCUTANEOUS at 04:46

## 2024-02-28 RX ADMIN — HYDROCODONE BITARTRATE AND ACETAMINOPHEN 1 TABLET: 5; 325 TABLET ORAL at 02:56

## 2024-02-28 RX ADMIN — CAFFEINE 200 MG: 200 TABLET ORAL at 08:41

## 2024-02-28 RX ADMIN — ALPRAZOLAM 0.5 MG: 0.5 TABLET ORAL at 22:39

## 2024-02-28 RX ADMIN — GABAPENTIN 300 MG: 300 CAPSULE ORAL at 21:11

## 2024-02-28 RX ADMIN — PROMETHAZINE HYDROCHLORIDE 25 MG: 25 TABLET ORAL at 00:34

## 2024-02-28 RX ADMIN — PANTOPRAZOLE SODIUM 40 MG: 40 TABLET, DELAYED RELEASE ORAL at 07:23

## 2024-02-28 RX ADMIN — Medication 10 ML: at 08:42

## 2024-02-28 RX ADMIN — PROMETHAZINE HYDROCHLORIDE 25 MG: 25 TABLET ORAL at 16:05

## 2024-02-28 RX ADMIN — FAMOTIDINE 40 MG: 20 TABLET, FILM COATED ORAL at 18:58

## 2024-02-28 RX ADMIN — CAFFEINE 200 MG: 200 TABLET ORAL at 21:11

## 2024-02-28 RX ADMIN — HYDROMORPHONE HYDROCHLORIDE 0.5 MG: 1 INJECTION, SOLUTION INTRAMUSCULAR; INTRAVENOUS; SUBCUTANEOUS at 08:39

## 2024-02-28 RX ADMIN — Medication 400 MG: at 08:40

## 2024-02-28 RX ADMIN — TOPIRAMATE 50 MG: 50 TABLET, FILM COATED ORAL at 08:40

## 2024-02-28 RX ADMIN — GABAPENTIN 300 MG: 300 CAPSULE ORAL at 08:40

## 2024-02-28 RX ADMIN — ANTACID TABLETS 1 TABLET: 500 TABLET, CHEWABLE ORAL at 10:32

## 2024-02-28 RX ADMIN — ONDANSETRON HYDROCHLORIDE 4 MG: 2 INJECTION, SOLUTION INTRAMUSCULAR; INTRAVENOUS at 04:46

## 2024-02-28 RX ADMIN — SENNOSIDES 2 TABLET: 8.6 TABLET, FILM COATED ORAL at 21:12

## 2024-02-28 RX ADMIN — BUTALBITAL, ACETAMINOPHEN AND CAFFEINE 1 TABLET: 325; 50; 40 TABLET ORAL at 12:47

## 2024-02-28 RX ADMIN — SODIUM CHLORIDE 100 ML/HR: 9 INJECTION, SOLUTION INTRAVENOUS at 00:34

## 2024-02-28 RX ADMIN — TOPIRAMATE 50 MG: 50 TABLET, FILM COATED ORAL at 21:11

## 2024-02-28 RX ADMIN — HYDROCODONE BITARTRATE AND ACETAMINOPHEN 1 TABLET: 10; 325 TABLET ORAL at 21:11

## 2024-02-28 RX ADMIN — HYDROCODONE BITARTRATE AND ACETAMINOPHEN 1 TABLET: 10; 325 TABLET ORAL at 14:36

## 2024-02-28 RX ADMIN — LEVOTHYROXINE SODIUM 88 MCG: 88 TABLET ORAL at 07:23

## 2024-02-28 NOTE — PLAN OF CARE
Goal Outcome Evaluation:         Patient with complaints of pain moderate to severe throughout most of the day. Patient up ad selvin and ambulating independently. Nausea controlled with ordered oral medications.

## 2024-02-28 NOTE — PLAN OF CARE
Goal Outcome Evaluation:  Plan of Care Reviewed With: patient        Progress: no change  Outcome Evaluation: Pt cryin, srated procedure did not gave relief she's hoping for, pt stated she have more headache now than before, Pt been asking for pain meds every 2 hours asneeded

## 2024-02-28 NOTE — PROGRESS NOTES
Name: Maame Wallace ADMIT: 2024   : 1990  PCP: Sirisha Aparicio MD    MRN: [id number] LOS: 4 days   AGE/SEX: 33 y.o. female  ROOM: [facility]     Subjective   Subjective   Patient obese, generally weak, relatively comfortable, no apparent distress.  Still with complaints of low mid back pain despite blood patch on 2024 & now unable to ambulate per patient.  Constipation resolved with last bowel movements x3 today--2024.   Denies nausea & low appetite.  Denies photophobia, abdominal pain, or bowel / bladder dysfunction.  Recent tingling in bilateral lower extremity; however, previously able to ambulate short distance yet challenging due to low mid back pain.  Blood patch yesterday.  States Fioricet ineffective for described as global headache.    Mom at bedside.       Objective   Objective   Vital Signs  Temp:  [97.6 °F (36.4 °C)-98.4 °F (36.9 °C)] 98.3 °F (36.8 °C)  Heart Rate:  [] 96  Resp:  [16] 16  BP: ()/(61-72) 101/67  SpO2:  [93 %-99 %] 96 %  on   ;   Device (Oxygen Therapy): room air  Body mass index is 42.52 kg/m².    Physical Exam  Constitutional:       General: She is not in acute distress.     Appearance: She is obese. She is not toxic-appearing.   Cardiovascular:      Rate and Rhythm: Normal rate.      Heart sounds: Normal heart sounds.   Pulmonary:      Effort: Pulmonary effort is normal.      Breath sounds: Normal breath sounds.   Abdominal:      General: Bowel sounds are normal.      Palpations: Abdomen is soft.   Musculoskeletal:      Right lower leg: No edema.      Left lower leg: No edema.   Skin:     General: Skin is warm and dry.   Neurological:      Mental Status: She is alert and oriented to person, place, and time.     *Physical exam performed on 2024 as per above    Results Review     I reviewed the patient's new clinical results.  Results from last 7 days   Lab Units 24  0556 24  0514 24  0554 24  0517   WBC 10*3/mm3  "10.01 7.53 7.09 10.30   HEMOGLOBIN g/dL 13.4 12.7 12.2 12.8   PLATELETS 10*3/mm3 315 264 253 263     Results from last 7 days   Lab Units 02/28/24  0556 02/25/24  0514 02/24/24  0554 02/23/24  0517   SODIUM mmol/L 138 140 138 140   POTASSIUM mmol/L 3.7 3.6 3.9 4.1   CHLORIDE mmol/L 109* 109* 109* 110*   CO2 mmol/L 19.0* 20.2* 19.1* 20.0*   BUN mg/dL 8 7 7 9   CREATININE mg/dL 0.87 0.65 0.69 0.83   GLUCOSE mg/dL 106* 115* 121* 99   EGFR mL/min/1.73 91.5 120.9 119.2 96.8           Results from last 7 days   Lab Units 02/28/24  0556 02/25/24  0514 02/24/24  0554 02/23/24  0517   CALCIUM mg/dL 8.3* 8.1* 8.0* 8.3*   MAGNESIUM mg/dL  --  2.1  --   --        No results found for: \"HGBA1C\", \"POCGLU\"    No radiology results for the last day    I have personally reviewed all medications:  Scheduled Medications  caffeine, 200 mg, Oral, Q12H  famotidine, 40 mg, Oral, BID AC  fentanyl, 50 mcg, Intravenous, Once  FLUoxetine, 40 mg, Oral, Daily  gabapentin, 300 mg, Oral, TID  levothyroxine, 88 mcg, Oral, Q AM  lidocaine, 1 mL, Intradermal, Once  pantoprazole, 40 mg, Oral, Q AM  polyethylene glycol, 17 g, Oral, Daily  senna, 2 tablet, Oral, BID  sodium chloride, 10 mL, Intravenous, Q12H  topiramate, 50 mg, Oral, BID  Vitamin B-2, 400 mg, Oral, Daily    Infusions     Diet  Diet: Regular/House Diet; Texture: Regular Texture (IDDSI 7); Fluid Consistency: Thin (IDDSI 0)    I have personally reviewed:  [x]  Laboratory   []  Microbiology   []  Radiology   []  EKG/Telemetry  []  Cardiology/Vascular   []  Pathology    []  Records       Assessment/Plan     Active Hospital Problems    Diagnosis  POA    **Fluid collection at surgical site, initial encounter [T88.8XXA]  Yes    Tethered cord [Q06.8]  Not Applicable    Arnold-Chiari malformation, 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       33 y.o. female with history of obesity, chronic migraine headaches, idiopathic intracranial hypertension, Chiari I malformation and tethered cord status post decompression with new onset headache following back surgery approximately 3 weeks ago.  Patient underwent S1 and S2 laminectomy in [place] with neurosurgeon--Dr. Newby--& developed headache symptoms aggravated by position and different in character from migraine and admitted with Fluid collection at surgical site, initial encounter.    MRI confirms small fluid collection L-spine concerning for possible CSF leak.  Neurosurgery evaluated and signed off recommending follow-up with previous neurosurgeon (who reportedly recommended conservative management of headache at this point and also did not believe surgical intervention necessary per patient correspondence on EHR) no plans for surgical intervention here.  Neurology following--consulted pain management service for epidural blood patch planned on 2/27/2024--patient states no relief today.  Diet ordered & plan to increase strength hydrocodone PRN & reduce strength & frequency of IV dilaudid PRN breakthrough only--discussed with patient & RN.    Global headache consistently without photophobia & aggravated by movement and use in the setting of chronic migraine unchanged with Fioricet as needed.  Continue Topamax, gabapentin, fluoxetine.  Continue riboflavin 400 mg PO daily, mag-ox 400 mg PO daily, & RVP negative.  Defer to neurology for further management.    GERD treated with PPI & famotidine here as Prevacid none formulary.    Low back pain >  headache (appears tolerated) continues. No loss of bowel or bladder function as constipation resolved with last BM on 2/27/2024 following prior BM 2/27/2024.  Ambulate as often as possibly tolerated.  Bowel regimen effective.  IV fluids discontinued.  Encourage optimal daily water hydration /  intake.  Avoid narcotics if possible.    SCDs for DVT prophylaxis.  Full code.  Discussed with patient and nursing staff   Anticipate discharge home with family in 1-2 days. Pending back pain tolerance, neurology recommendation, & response to blood patch       FIDE Javier  [facility]  02/28/24  14:23 EST

## 2024-02-28 NOTE — PROGRESS NOTES
DOS: 2024  NAME: Maame Wallace   : 1990  PCP: Sirisha Aparicio MD    Chief Complaint   Patient presents with    Post-op Problem     Post op problem, numbness and tingling in back and both legs        Subjective: Pt seen in follow up, however the problem is new to me.  Patient lying in bed awake, pleasant, no acute distress.  States that the blood patch did not help her pain at all.  She denies any new neurological symptoms.  States she spoke with her neurosurgeon on the phone who recommended she obtain x-rays of her hip because of her back pain.  She has been able to get up and walk to the bathroom.    Objective:  Vital signs:      Vitals:    24 0151 24 0550 24 1001 24 1413   BP: 97/61 111/68 96/63 101/67   BP Location: Left arm Right arm Right arm Right arm   Patient Position: Lying Lying Lying Lying   Pulse: 104 95 91 96   Resp: 16 16 16 16   Temp: 98 °F (36.7 °C) 97.6 °F (36.4 °C) 97.8 °F (36.6 °C) 98.3 °F (36.8 °C)   TempSrc: Oral Oral Oral Oral   SpO2: 95% 95% 99% 96%   Weight:       Height:           Current Facility-Administered Medications:     acetaminophen (TYLENOL) tablet 650 mg, 650 mg, Oral, Q4H PRN, 650 mg at 24 0928 **OR** acetaminophen (TYLENOL) 160 MG/5ML oral solution 650 mg, 650 mg, Oral, Q4H PRN **OR** acetaminophen (TYLENOL) suppository 650 mg, 650 mg, Rectal, Q4H PRN, Cass Man, FIDE    ALPRAZolam (XANAX) tablet 0.5 mg, 0.5 mg, Oral, BID PRN, Farhad Arrieta MD, 0.5 mg at 24 0831    sennosides-docusate (PERICOLACE) 8.6-50 MG per tablet 2 tablet, 2 tablet, Oral, BID PRN **AND** polyethylene glycol (MIRALAX) packet 17 g, 17 g, Oral, Daily **AND** bisacodyl (DULCOLAX) EC tablet 5 mg, 5 mg, Oral, Daily PRN **AND** bisacodyl (DULCOLAX) suppository 10 mg, 10 mg, Rectal, Daily PRN, Zafar Catalan, APRN    butalbital-acetaminophen-caffeine (FIORICET, ESGIC) -40 MG per tablet 1 tablet, 1 tablet, Oral, Q4H PRN, Farhad Arrieta MD, 1  tablet at 02/28/24 1247    caffeine tablet 200 mg, 200 mg, Oral, Q12H, Nimesh Kelsey MD, 200 mg at 02/28/24 0841    calcium carbonate (TUMS) chewable tablet 500 mg (200 mg elemental), 1 tablet, Oral, TID PRN, Shefali Foley APRN, 1 tablet at 02/28/24 1032    famotidine (PEPCID) tablet 40 mg, 40 mg, Oral, BID AC, Shefali Foley APRN, 40 mg at 02/28/24 0723    fentaNYL citrate (PF) (SUBLIMAZE) injection 50 mcg, 50 mcg, Intravenous, Once, Selvin Wright MD    FLUoxetine (PROzac) capsule 40 mg, 40 mg, Oral, Daily, Farhad Arrieta MD, 40 mg at 02/28/24 0840    gabapentin (NEURONTIN) capsule 300 mg, 300 mg, Oral, TID, Farhad Arrieta MD, 300 mg at 02/28/24 0840    HYDROcodone-acetaminophen (NORCO)  MG per tablet 1 tablet, 1 tablet, Oral, Q6H PRN, Zafar Catalan APRN, 1 tablet at 02/28/24 1436    HYDROmorphone (DILAUDID) injection 0.25 mg, 0.25 mg, Intravenous, BID PRN, Zafar Catalan APRN    levothyroxine (SYNTHROID, LEVOTHROID) tablet 88 mcg, 88 mcg, Oral, Q AM, Farhad Arrieta MD, 88 mcg at 02/28/24 0723    lidocaine (XYLOCAINE) 1 % injection 1 mL, 1 mL, Intradermal, Once, Selvin Wright MD    ondansetron (ZOFRAN) injection 4 mg, 4 mg, Intravenous, Q6H PRN, Cass Man APRN, 4 mg at 02/28/24 0446    pantoprazole (PROTONIX) EC tablet 40 mg, 40 mg, Oral, Q AM, Farhad Arrieta MD, 40 mg at 02/28/24 0723    promethazine (PHENERGAN) tablet 25 mg, 25 mg, Oral, Q4H PRN, Cass Man APRN, 25 mg at 02/28/24 0034    senna (SENOKOT) tablet 2 tablet, 2 tablet, Oral, BID, Zafar Catalan APRN, 2 tablet at 02/27/24 0830    sodium chloride 0.9 % flush 10 mL, 10 mL, Intravenous, Q12H, Cass Man APRN, 10 mL at 02/28/24 0842    sodium chloride 0.9 % flush 10 mL, 10 mL, Intravenous, PRN, Cass Man APRN    sodium chloride 0.9 % infusion 40 mL, 40 mL, Intravenous, PRN, Cass Man APRVINITA    topiramate (TOPAMAX) tablet 50 mg, 50 mg, Oral, BID, Farhad Arrieta MD, 50 mg at  02/28/24 0840    Vitamin B-2 (RIBOFLAVIN) tablet 400 mg, 400 mg, Oral, Daily, Zafar Catalan APRN, 400 mg at 02/28/24 0840    PRN meds    acetaminophen **OR** acetaminophen **OR** acetaminophen    ALPRAZolam    senna-docusate sodium **AND** polyethylene glycol **AND** bisacodyl **AND** bisacodyl    butalbital-acetaminophen-caffeine    calcium carbonate    HYDROcodone-acetaminophen    HYDROmorphone    ondansetron    promethazine    sodium chloride    sodium chloride    No current facility-administered medications on file prior to encounter.     Current Outpatient Medications on File Prior to Encounter   Medication Sig    albuterol sulfate  (90 Base) MCG/ACT inhaler Every 6 (Six) Hours.    ALPRAZolam (XANAX) 0.5 MG tablet Take 1 tablet by mouth 2 (Two) Times a Day As Needed for Anxiety.    cetirizine (zyrTEC) 10 MG tablet Take 1 tablet by mouth Daily.    cyanocobalamin 1000 MCG/ML injection Inject 1 mL under the skin into the appropriate area as directed. (Patient not taking: Reported on 11/30/2023)    Dextromethorphan-buPROPion ER (Auvelity)  MG tablet controlled-release Take 1 tablet by mouth.    diclofenac (VOLTAREN) 50 MG EC tablet Take 1 tablet by mouth.    Emgality 120 MG/ML auto-injector pen Inject 1 mL under the skin into the appropriate area as directed Every 30 (Thirty) Days for 180 days.    famotidine (PEPCID) 20 MG tablet TAKE 1 TABLET BY MOUTH EVERY DAY    FLUoxetine (PROzac) 40 MG capsule Take 1 capsule by mouth Daily.    gabapentin (NEURONTIN) 300 MG capsule Take 1 capsule by mouth 3 (Three) Times a Day.    HYDROcodone-acetaminophen (NORCO) 5-325 MG per tablet Take 1 tablet by mouth Every 4 (Four) Hours As Needed.    ibuprofen (ADVIL,MOTRIN) 600 MG tablet Take 1 tablet by mouth Every 6 (Six) Hours As Needed for Mild Pain.    lansoprazole (PREVACID) 30 MG capsule 1 CAPSULE BY MOUTH TWICE DAILY    levothyroxine (SYNTHROID, LEVOTHROID) 88 MCG tablet 1 tablet.    ondansetron (ZOFRAN) 4 MG  "tablet 1 tablet.    Rimegepant Sulfate (Nurtec) 75 MG tablet dispersible tablet Take 1 tablet by mouth As Needed (Migraine headache, max 1 dose in 24 hours.) for up to 180 days.    topiramate (Topamax) 50 MG tablet Take 1 tablet by mouth 2 (Two) Times a Day for 180 days.       General appearance: Young obese female, NAD, alert and cooperative, well groomed  HEENT: Normocephalic, atraumatic, PERRL  Resp: Even and unlabored  Skin: warm, dry    Neurological:   MS: oriented x3, normal attention/concentration, language intact, no neglect, normal fund of knowledge  CN: visual fields full, EOMI, facial sensation equal, no facial droop, shoulder shrug equal, tongue midline  Motor: 5/5 in all 4 ext., normal tone  Sensory: light touch sensation intact in all 4 ext.  Coordination: Normal finger to nose test  Gait and station: Deferred  Rapid alternating movements: normal finger to thumb tap    Laboratory results:  Lab Results   Component Value Date    TSH 2.080 04/27/2021     Lab Results   Component Value Date    HGBA1C 5.60 04/27/2021     No results found for: \"[id number]\"  Lab Results   Component Value Date    CHOL 225 (H) 04/27/2021     Lab Results   Component Value Date    TRIG 191 (H) 04/27/2021     Lab Results   Component Value Date    HDL 35 (L) 04/27/2021     Lab Results   Component Value Date     (H) 04/27/2021     Lab Results   Component Value Date    WBC 10.01 02/28/2024    HGB 13.4 02/28/2024    HCT 40.2 02/28/2024    MCV 87.4 02/28/2024     02/28/2024     Lab Results   Component Value Date    GLUCOSE 106 (H) 02/28/2024    BUN 8 02/28/2024    CREATININE 0.87 02/28/2024    EGFRIFNONA 101 04/27/2021    EGFRIFAFRI >60 03/03/2021    BCR 9.2 02/28/2024    K 3.7 02/28/2024    CO2 19.0 (L) 02/28/2024    CALCIUM 8.3 (L) 02/28/2024    PROTENTOTREF 7.1 06/09/2020    ALBUMIN 4.0 02/21/2024    LABIL2 1.5 06/09/2020    AST 27 02/21/2024    ALT 42 (H) 02/21/2024     Lab Results   Component Value Date    PTT 27.7 " 12/29/2022     Lab Results   Component Value Date    INR 0.81 11/22/2023    INR 0.94 12/29/2022    INR 0.94 12/04/2020    PROTIME 9.9 11/22/2023    PROTIME 10.9 12/29/2022    PROTIME 12.4 12/04/2020     Brief Urine Lab Results  (Last result in the past 365 days)        Color   Clarity   Blood   Leuk Est   Nitrite   Protein   CREAT   Urine HCG        11/22/23 1200 YELLOW   CLEAR   TRACE   NEGATIVE   NEGATIVE   NEGATIVE                   Review and interpretation of imaging:  CT Head Without Contrast    Result Date: 2/23/2024  Electronically signed by Jennifer Reagan MD on 02-23-24 at 2248    MRI Lumbar Spine With & Without Contrast    Result Date: 2/21/2024  1.  The conus is at L1. 2.  The patient has undergone bilateral laminotomies at L5-S1. A small fluid collection is noted posterior to the thecal sac measuring approximately 2.3 x 1.5 x 1.2 cm in size. This is located 7-8 mm posterior to the thecal sac. A larger more superficial fluid collection is identified measuring 6 x 6 x 7.5 cm in size. It extends from the level of L3-L4 inferiorly to S1. These fluid collections are nonspecific and likely represent seromas. Infected collections cannot be entirely excluded. 3.  A broad-based disc osteophyte complex is present at L5-S1 which is slightly more prominent to the left. It abuts the traversing S1 nerve root slightly, more so on the left where there is mild lateral recess narrowing.  This report was finalized on 2/21/2024 2:21 PM by Dr. Leroy Anguiano M.D on Workstation: [id number]      CT Lumbar Spine Without Contrast    Result Date: 2/21/2024   1. Postoperative changes noted at L5-S1 and S1-S2. There is a fluid collection identified within the posterior soft tissues. Full assessment is limited in the absence of contrast material. Patient does have degenerative changes most significantly at L5-S1. Consider further evaluation with MRI of the lumbar spine without and with contrast, particularly given history of recent  surgery.  Radiation dose reduction techniques were utilized, including automated exposure control and exposure modulation based on body size.   This report was finalized on 2/21/2024 5:12 AM by Dr. Siena Childers M.D on Workstation: [id number]         Impression/Assessment:  This is a 33-year-old female with past medical history of migraine headache on Nurtec, Emgality, and Botox, hypothyroidism, fibromyalgia, anxiety and depression, Chiari I malformation tethered cord status post decompression in 2023 at L5-S1, recent S1-S2 laminectomy in January 2024 who presented to the hospital on 2/21/2024 with complaints of worsening low back pain and radiculopathy, and positional headache.  She had MRI L-spine with and without performed on arrival that revealed a small fluid collection noted posterior to the thecal sac and a larger more superficial fluid collection noted there as well.    Since admission she has received caffeine tablets, IV fluid resuscitation, IV Dilaudid, hydrocodone, Fioricet, Gabapentin, continued her home dose topiramate, Riboflavin, and most recently blood patch on 2/27.  Today she reports headache continues despite the above treatment.  She has been able to get up and ambulate, and reports headache worsens with sitting upright.  She was evaluated by neurosurgery who did not feel surgical intervention was warranted at this time and recommended pain management and that she follow-up with her neurosurgeon in [place].    Diagnosis:  Chronic daily headache, suspected low intracranial pressure headache contribution  Abnormal MRI L-spine with fluid collection  History of Chiari malformation s/p decompression  Tethered cord status post cord release and S1-S2 laminectomy    Plan:  -Patient reports no improvement despite blood patch.  She has been able to ambulate but still feels headache worsens sitting upright.  She appears in no acute distress.  -Noted that the patient has been receiving every 2 hour  IV Dilaudid as well as every 4 hour hydrocodone for several days.  -At this point she may be having contributing overuse headache from excessive narcotic use.  Would recommend tapering off of both medications and titrating up on her topiramate for a total of 100 mg twice daily.  She should increase by 25 mg in the morning every 7 days until she reaches 100 mg and then start increasing by 25 mg on her nightly dose until she reaches 100 mg.  -Also recommend she resume her Nurtec use and obtain a loading dose of her Emgality through [facility] Doe who saw her last on 2/13 for migraine headache in our office.  -She reports allergy to DHE so considered Trudhesa however given allergy would avoid this.  -Will defer further treatment options to Neurosurgery.   We will sign off, will see again per request.    Case discussed with patient, Justin ELIZALDE, and Dr. Kelsey, and he agrees with plan above.     FIDE Andersen

## 2024-02-29 ENCOUNTER — READMISSION MANAGEMENT (OUTPATIENT)
Dept: CALL CENTER | Facility: HOSPITAL | Age: 34
End: 2024-02-29
Payer: COMMERCIAL

## 2024-02-29 VITALS
OXYGEN SATURATION: 97 % | HEART RATE: 91 BPM | DIASTOLIC BLOOD PRESSURE: 63 MMHG | WEIGHT: 263.45 LBS | HEIGHT: 66 IN | BODY MASS INDEX: 42.34 KG/M2 | RESPIRATION RATE: 16 BRPM | TEMPERATURE: 97.9 F | SYSTOLIC BLOOD PRESSURE: 96 MMHG

## 2024-02-29 LAB
ANION GAP SERPL CALCULATED.3IONS-SCNC: 13 MMOL/L (ref 5–15)
BUN SERPL-MCNC: 8 MG/DL (ref 6–20)
BUN/CREAT SERPL: 8.6 (ref 7–25)
CALCIUM SPEC-SCNC: 9 MG/DL (ref 8.6–10.5)
CHLORIDE SERPL-SCNC: 107 MMOL/L (ref 98–107)
CO2 SERPL-SCNC: 18 MMOL/L (ref 22–29)
CREAT SERPL-MCNC: 0.93 MG/DL (ref 0.57–1)
EGFRCR SERPLBLD CKD-EPI 2021: 84.4 ML/MIN/1.73
GLUCOSE SERPL-MCNC: 115 MG/DL (ref 65–99)
POTASSIUM SERPL-SCNC: 3.8 MMOL/L (ref 3.5–5.2)
SODIUM SERPL-SCNC: 138 MMOL/L (ref 136–145)

## 2024-02-29 PROCEDURE — 80048 BASIC METABOLIC PNL TOTAL CA: CPT | Performed by: NURSE PRACTITIONER

## 2024-02-29 PROCEDURE — 99231 SBSQ HOSP IP/OBS SF/LOW 25: CPT | Performed by: NURSE PRACTITIONER

## 2024-02-29 PROCEDURE — 63710000001 PROMETHAZINE PER 25 MG: Performed by: NURSE PRACTITIONER

## 2024-02-29 PROCEDURE — 97162 PT EVAL MOD COMPLEX 30 MIN: CPT

## 2024-02-29 PROCEDURE — 97530 THERAPEUTIC ACTIVITIES: CPT

## 2024-02-29 RX ORDER — HYDROCODONE BITARTRATE AND ACETAMINOPHEN 7.5; 325 MG/1; MG/1
1 TABLET ORAL EVERY 6 HOURS PRN
Qty: 12 TABLET | Refills: 0 | Status: SHIPPED | OUTPATIENT
Start: 2024-02-29 | End: 2024-03-03

## 2024-02-29 RX ORDER — NALOXONE HYDROCHLORIDE 4 MG/.1ML
SPRAY NASAL
Qty: 2 EACH | Refills: 0 | Status: SHIPPED | OUTPATIENT
Start: 2024-02-29

## 2024-02-29 RX ORDER — HYDROCODONE BITARTRATE AND ACETAMINOPHEN 5; 325 MG/1; MG/1
1 TABLET ORAL ONCE
Status: COMPLETED | OUTPATIENT
Start: 2024-02-29 | End: 2024-02-29

## 2024-02-29 RX ADMIN — FLUOXETINE HYDROCHLORIDE 40 MG: 20 CAPSULE ORAL at 08:55

## 2024-02-29 RX ADMIN — PANTOPRAZOLE SODIUM 40 MG: 40 TABLET, DELAYED RELEASE ORAL at 06:26

## 2024-02-29 RX ADMIN — Medication 10 ML: at 08:59

## 2024-02-29 RX ADMIN — HYDROCODONE BITARTRATE AND ACETAMINOPHEN 1 TABLET: 5; 325 TABLET ORAL at 02:17

## 2024-02-29 RX ADMIN — HYDROCODONE BITARTRATE AND ACETAMINOPHEN 1 TABLET: 10; 325 TABLET ORAL at 06:26

## 2024-02-29 RX ADMIN — CAFFEINE 200 MG: 200 TABLET ORAL at 08:59

## 2024-02-29 RX ADMIN — PROMETHAZINE HYDROCHLORIDE 25 MG: 25 TABLET ORAL at 06:26

## 2024-02-29 RX ADMIN — LEVOTHYROXINE SODIUM 88 MCG: 88 TABLET ORAL at 06:26

## 2024-02-29 RX ADMIN — Medication 400 MG: at 08:55

## 2024-02-29 RX ADMIN — GABAPENTIN 300 MG: 300 CAPSULE ORAL at 08:55

## 2024-02-29 RX ADMIN — TOPIRAMATE 50 MG: 50 TABLET, FILM COATED ORAL at 08:59

## 2024-02-29 RX ADMIN — FAMOTIDINE 40 MG: 20 TABLET, FILM COATED ORAL at 08:55

## 2024-02-29 NOTE — PLAN OF CARE
Goal Outcome Evaluation:  Plan of Care Reviewed With: patient        Progress: no change  Outcome Evaluation: Pt requested additional pain medicine. Stated its not for her headache but more for her back pain thatshe needed the pain medicine. On call PA was paged and got a one time order of pain medicine.

## 2024-02-29 NOTE — PROGRESS NOTES
Continued Stay Note  [facility]     Patient Name: Maame Wallace  MRN: [id number]  Today's Date: 2/29/2024    Admit Date: 2/21/2024    Plan: home with family- CCP will follow   Discharge Plan       Row Name 02/29/24 1502       Plan    Final Discharge Disposition Code 01 - home or self-care    Final Note D/c home                   Discharge Codes    No documentation.                 Expected Discharge Date and Time       Expected Discharge Date Expected Discharge Time    Feb 29, 2024               Jenn Cowan RN

## 2024-02-29 NOTE — DISCHARGE SUMMARY
Patient Name: Maame Wallace  : 1990  MRN: [id number]    Date of Admission: 2024  Date of Discharge:  2024  Primary Care Physician: Sirisha Aparicio MD      Chief Complaint:   Post-op Problem (Post op problem, numbness and tingling in back and both legs)      Discharge Diagnoses     Active Hospital Problems    Diagnosis  POA    **Fluid collection at surgical site, initial encounter [T88.8XXA]  Yes    Tethered cord [Q06.8]  Not Applicable    Arnold-Chiari malformation, 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        Hospital Course     Ms. Wallace is a 33 y.o. female with a history of obesity, migraines, purposeful suicidal attempt with overdose home medications 2021, fibromyalgia, GERD, depression who presented to [facility] initially complaining of postoperative problem, numbness, tingling and back and both legs.  Please see the admitting history and physical for further details.  She was found to have small fluid collection at surgical site and was admitted to the hospital for further evaluation and treatment.      Tethered cord decompression a few weeks ago in [place].  Neurosurgery evaluated & did not recommend any further intervention other than conservative measures.  Neurology evaluated for headache suspecting potential spinal headache from leak at the surgical site.  Anesthesia performed blood patch on 2024.  Occasional headache following blood patch and suspect complication from IV Dilaudid; therefore, IV Dilaudid discontinued and hydrocodone as needed provided for the duration of hospital course.      Patient tolerating meals, ambulation to bathroom/up ad selvin., and retain bowel bladder function with last bowel movement on  2/29/2024; therefore, appears medically stable for discharge home and advised to follow-up with neurosurgery in [place] for further management.    PT consult placed on 2/28/2024 for possible home health services.    Patient's mother at bedside request reconsult neurosurgery to discuss small fluid collection at surgical site--questioning possible aspiration.    Day of Discharge       Physical Exam:  Temp:  [98.1 °F (36.7 °C)-98.3 °F (36.8 °C)] 98.2 °F (36.8 °C)  Heart Rate:  [] 92  Resp:  [16] 16  BP: (101-110)/(62-76) 102/69  Body mass index is 42.52 kg/m².    Physical Exam  Constitutional:       General: She is not in acute distress.     Appearance: She is obese. She is not toxic-appearing.   HENT:      Head: Normocephalic and atraumatic.   Eyes:      Extraocular Movements: Extraocular movements intact.      Conjunctiva/sclera: Conjunctivae normal.   Cardiovascular:      Rate and Rhythm: Normal rate and regular rhythm.   Pulmonary:      Effort: Pulmonary effort is normal.      Comments: Chest rise symmetrical  Abdominal:      General: There is no distension.      Palpations: Abdomen is soft.      Tenderness: There is no guarding.   Musculoskeletal:         General: Tenderness (lumbar spine region--post-operative, expected finding) present.      Cervical back: Normal range of motion and neck supple.      Right lower leg: No edema.      Left lower leg: No edema.   Skin:     General: Skin is warm and dry.   Neurological:      Mental Status: She is alert and oriented to person, place, and time.      Cranial Nerves: No cranial nerve deficit.   Psychiatric:         Behavior: Behavior normal.         Thought Content: Thought content normal.         Consultants     Consult Orders (all) (From admission, onward)       Start     Ordered    02/29/24 0929  Inpatient Neurosurgery Consult  Once        Specialty:  Neurosurgery  Provider:  (Not yet assigned)    02/29/24 0930    02/23/24 1317  Inpatient Anesthesia Pain  Management Clinic Consult  Once        Specialty:  Pain Medicine  Provider:  (Not yet assigned)    02/23/24 1317    02/23/24 0950  Inpatient Neurology Consult General  Once        Specialty:  Neurology  Provider:  Nimesh Kelsey MD    02/23/24 0951    02/21/24 0959  Inpatient Neurosurgery Consult  Once        Specialty:  Neurosurgery  Provider:  Xavier Benavides MD    02/21/24 1000    02/21/24 0524  [facility] (on-call MD unless specified) Details  Once        Specialty:  Hospitalist  Provider:  (Not yet assigned)    02/21/24 0524                  Procedures     * Surgery not found *    Imaging Results (All)       Procedure Component Value Units Date/Time    FL Guided Pain Management Spine [[id number]] Resulted: 02/27/24 1255     Updated: 02/27/24 1255    Narrative:      This procedure was auto-finalized with no dictation required.    CT Head Without Contrast [[id number]] Collected: 02/23/24 2249     Updated: 02/23/24 2249    Narrative:        Patient: DEAN MARSHALL  Time Out: 22:48  Exam(s): CT HEAD Without Contrast     EXAM:    CT Head Without Intravenous Contrast    CLINICAL HISTORY:    Headache.    TECHNIQUE:    Axial computed tomography images of the head brain without intravenous   contrast.  CTDI is 55.96 mGy and DLP is 984.1 mGy-cm.  This CT exam was   performed according to the principle of ALARA (As Low As Reasonably   Achievable) by using one or more of the following dose reduction   techniques: automated exposure control, adjustment of the mA and or kV   according to patient size, and or use of iterative reconstruction   technique.    COMPARISON:    No relevant prior studies available.    FINDINGS:    Brain:  Unremarkable.  No hemorrhage.  No significant white matter   disease.  No edema.    Ventricles:  Unremarkable.  No ventriculomegaly.    Bones joints:  Unremarkable.  No acute fracture.    Soft tissues:  Unremarkable.    Sinuses:  Unremarkable as visualized.  No acute sinusitis.    Mastoid air cells:   Unremarkable as visualized.  No mastoid effusion.    IMPRESSION:         Normal head brain CT.      Impression:          Electronically signed by Jennifer Reagan MD on 02-23-24 at 2248    MRI Lumbar Spine With & Without Contrast [[id number]] Collected: 02/21/24 1148     Updated: 02/21/24 1424    Narrative:      MRI EXAMINATION OF THE LUMBAR SPINE WITHOUT CONTRAST     HISTORY: Back pain with bilateral radiculopathy, postop. Tethered cord  release.     COMPARISON: MRI of the lumbar spine 08/31/2023 and CT examination of the  lumbar spine 02/21/2024.     FINDINGS: The alignment of the lumbar spine is within normal limits.  There is mild loss of disc height and disc desiccation at L5-S1. The  conus is at L1.     L1-L2: There is no evidence of disc bulge or herniation.     L2-L3: There is no evidence of disc bulge or herniation.     L3-L4: There is no evidence of disc bulge or herniation. Mild foraminal  stenosis is present on the left secondary to extension of a small disc  bulge into the neural foramen.     L4-L5: Mild facet degenerative disease is present bilaterally. A very  mild central disc bulge is present with no evidence of herniation.     L5-S1: Transitional anatomy is appreciated consisting of partial  sacralization of L5. A mild central and left paracentral disc bulge is  present. A decompressive laminectomy is noted. There is no evidence of  central canal stenosis. A mild broad-based disc osteophyte complex is  present which abuts but does not displace the S1 nerve roots. It is  slightly more prominent to the left. This is similar in appearance as  compared to the MRI examination of 08/31/2023.     A small collection of fluid is identified approximately 7-8 mm posterior  to the thecal sac along the operative bed at L5-S1 at the midline  measuring 2.3 cm in the AP dimension, 1.5 cm in the transverse dimension  and approximately 12 mm in the craniocaudal dimension. A larger fluid  collection is noted within the  more superficial subcutaneous tissues  extending to the surface of the skin. This measures approximately 7.5 cm  in the craniocaudal dimension and is larger more superiorly where it  measures approximately 6 cm in the AP dimension and 6.2 cm in the  transverse dimension.     After contrast administration there was no evidence of abnormal  enhancement.       Impression:      1.  The conus is at L1.  2.  The patient has undergone bilateral laminotomies at L5-S1. A small  fluid collection is noted posterior to the thecal sac measuring  approximately 2.3 x 1.5 x 1.2 cm in size. This is located 7-8 mm  posterior to the thecal sac. A larger more superficial fluid collection  is identified measuring 6 x 6 x 7.5 cm in size. It extends from the  level of L3-L4 inferiorly to S1. These fluid collections are nonspecific  and likely represent seromas. Infected collections cannot be entirely  excluded.  3.  A broad-based disc osteophyte complex is present at L5-S1 which is  slightly more prominent to the left. It abuts the traversing S1 nerve  root slightly, more so on the left where there is mild lateral recess  narrowing.     This report was finalized on 2/21/2024 2:21 PM by Dr. Leroy Anguiano M.D  on Workstation: [id number]       MRI Outside Films [[id number]] Resulted: 02/21/24 0851     Updated: 02/21/24 0851    Narrative:      This procedure was auto-finalized with no dictation required.    CT Lumbar Spine Without Contrast [[id number]] Collected: 02/21/24 0503     Updated: 02/21/24 0515    Narrative:      CT OF THE LUMBAR SPINE     HISTORY: Pins-and-needles sensation in lower extremities following  tethered cord surgery.     COMPARISON: None available.     TECHNIQUE: Axial CT imaging was obtained through the lumbar spine.  Coronal and sagittal reformatted images were obtained.     FINDINGS:  No acute fracture or subluxation of the lumbar spine is seen. There is  very minimal intervertebral disc space narrowing noted at L5-S1.  There  does appear to be some very minimal lumbar scoliosis, with convexity to  the left. Patient has undergone bilateral laminectomy at L5-S1. Further  laminectomy changes noted at S1-S2. The patient is noted to have a fluid  collection within the posterior soft tissues. This measures up to 6.1 x  5.9 cm. It measures up to 7.2 cm in craniocaudal dimensions. It may  represent a postoperative seroma. It does not have any bubbles of gas  within it to suggest superimposed infection, although this is difficult  to assess in the absence of contrast material. It extends from the level  of L3-L4 to the level of S1-S2.     T12-L1: There is no canal stenosis or neuroforaminal narrowing.  L1-L2: There is no canal stenosis or neuroforaminal narrowing.  L2-L3: There is no canal stenosis or neuroforaminal narrowing.  L3-L4: There is diffuse disc bulge, although more significant on the  left. There is no significant canal stenosis. There may be some minimal  neuroforaminal narrowing on the left.  L4-L5: There is disc bulge. There is some mild canal stenosis. There is  mild bilateral neuroforaminal narrowing.  L5-S1: There is diffuse disc bulge. Patient appears to have moderate  canal narrowing. There is severe neuroforaminal narrowing on the left  and moderate narrowing on the right.       Impression:         1. Postoperative changes noted at L5-S1 and S1-S2. There is a fluid  collection identified within the posterior soft tissues. Full assessment  is limited in the absence of contrast material. Patient does have  degenerative changes most significantly at L5-S1. Consider further  evaluation with MRI of the lumbar spine without and with contrast,  particularly given history of recent surgery.     Radiation dose reduction techniques were utilized, including automated  exposure control and exposure modulation based on body size.        This report was finalized on 2/21/2024 5:12 AM by Dr. Siena Childers M.D on Workstation:  "[id number]                 Pertinent Labs     Results from last 7 days   Lab Units 02/28/24  0556 02/25/24  0514 02/24/24  0554 02/23/24  0517   WBC 10*3/mm3 10.01 7.53 7.09 10.30   HEMOGLOBIN g/dL 13.4 12.7 12.2 12.8   PLATELETS 10*3/mm3 315 264 253 263     Results from last 7 days   Lab Units 02/29/24  0544 02/28/24  0556 02/25/24  0514 02/24/24  0554   SODIUM mmol/L 138 138 140 138   POTASSIUM mmol/L 3.8 3.7 3.6 3.9   CHLORIDE mmol/L 107 109* 109* 109*   CO2 mmol/L 18.0* 19.0* 20.2* 19.1*   BUN mg/dL 8 8 7 7   CREATININE mg/dL 0.93 0.87 0.65 0.69   GLUCOSE mg/dL 115* 106* 115* 121*   EGFR mL/min/1.73 84.4 91.5 120.9 119.2       Results from last 7 days   Lab Units 02/29/24  0544 02/28/24  0556 02/25/24  0514 02/24/24  0554   CALCIUM mg/dL 9.0 8.3* 8.1* 8.0*   MAGNESIUM mg/dL  --   --  2.1  --                Invalid input(s): \"LDLCALC\"      Results from last 7 days   Lab Units 02/26/24  1005   COVID19  Not Detected       Test Results Pending at Discharge       Discharge Details        Discharge Medications        New Medications        Instructions Start Date   HYDROcodone-acetaminophen 7.5-325 MG per tablet  Commonly known as: NORCO  Replaces: HYDROcodone-acetaminophen 5-325 MG per tablet   1 tablet, Oral, Every 6 Hours PRN      naloxone 4 MG/0.1ML nasal spray  Commonly known as: NARCAN   Call 911. Don't prime. [place] in 1 nostril for overdose. Repeat in 2-3 minutes in other nostril if no or minimal breathing/responsiveness.      Vitamin B-2 100 MG tablet tablet  Commonly known as: RIBOFLAVIN   400 mg, Oral, Daily   Start Date: March 1, 2024            Continue These Medications        Instructions Start Date   albuterol sulfate  (90 Base) MCG/ACT inhaler  Commonly known as: PROVENTIL HFA;VENTOLIN HFA;PROAIR HFA   Every 6 Hours Scheduled      ALPRAZolam 0.5 MG tablet  Commonly known as: XANAX   0.5 mg, Oral, 2 Times Daily PRN      Auvelity  MG tablet controlled-release  Generic drug: " Dextromethorphan-buPROPion ER   1 tablet, Oral      cetirizine 10 MG tablet  Commonly known as: zyrTEC   10 mg, Oral, Daily      diclofenac 50 MG EC tablet  Commonly known as: VOLTAREN   50 mg, Oral      Emgality 120 MG/ML auto-injector pen  Generic drug: galcanezumab-gnlm   120 mg, Subcutaneous, Every 30 Days      famotidine 20 MG tablet  Commonly known as: PEPCID   TAKE 1 TABLET BY MOUTH EVERY DAY      FLUoxetine 40 MG capsule  Commonly known as: PROzac   40 mg, Oral, Daily      gabapentin 300 MG capsule  Commonly known as: NEURONTIN   300 mg, Oral, 3 Times Daily      ibuprofen 600 MG tablet  Commonly known as: ADVIL,MOTRIN   600 mg, Oral, Every 6 Hours PRN      lansoprazole 30 MG capsule  Commonly known as: PREVACID   1 CAPSULE BY MOUTH TWICE DAILY      levothyroxine 88 MCG tablet  Commonly known as: SYNTHROID, LEVOTHROID   88 mcg      Nurtec 75 MG dispersible tablet  Generic drug: Rimegepant Sulfate   75 mg, Oral, As Needed      ondansetron 4 MG tablet  Commonly known as: ZOFRAN   4 mg      topiramate 50 MG tablet  Commonly known as: Topamax   50 mg, Oral, 2 Times Daily             Stop These Medications      cyanocobalamin 1000 MCG/ML injection     HYDROcodone-acetaminophen 5-325 MG per tablet  Commonly known as: NORCO  Replaced by: HYDROcodone-acetaminophen 7.5-325 MG per tablet              Allergies   Allergen Reactions    Acetazolamide Paresthesia    Adhesive Tape Other (See Comments)     blisters    Duloxetine Hcl Other (See Comments)     Other reaction(s): Other (See Comments)  Suicidal thoughts    Codeine Nausea And Vomiting and Rash    Oxycodone Nausea And Vomiting    Chlorhexidine Rash    Dhea [Nutritional Supplements] Other (See Comments)     Severe headache, very low BP    Imitrex [Sumatriptan] Other (See Comments)     Intensified a migraine    Morphine Other (See Comments)     Severe headache       Discharge Disposition:  Home or Self Care      Discharge Diet:  Diet Order   Procedures    Diet:  Regular/House Diet; Texture: Regular Texture (IDDSI 7); Fluid Consistency: Thin (IDDSI 0)       Discharge Activity:   Activity Instructions       Activity as Tolerated      Driving Restrictions      Type of Restriction: Driving    Driving Restrictions: No Driving Until Next Appointment    Up WIth Assist              CODE STATUS:    Code Status and Medical Interventions:   Ordered at: 02/21/24 0545     Code Status (Patient has no pulse and is not breathing):    CPR (Attempt to Resuscitate)     Medical Interventions (Patient has pulse or is breathing):    Full Support       Future Appointments   Date Time Provider Department Center   4/11/2024  3:30 PM Zonia Terry APRN [facility]     Additional Instructions for the Follow-ups that You Need to Schedule       Discharge Follow-up with PCP   As directed       Currently Documented PCP:    Sirisha Aparicio MD    PCP Phone Number:    [phone]     Follow Up Details: Please call to schedule a 1 week or earliest available follow-up appointment PCP; BP & post-op pain               Follow-up Information       Sirisha Aparicio MD .    Specialty: Nurse Practitioner  Why: Please call to schedule a 1 week or earliest available follow-up appointment PCP; BP & post-op pain  Contact information:  [address] [facility] [address]  [phone]                             Additional Instructions for the Follow-ups that You Need to Schedule       Discharge Follow-up with PCP   As directed       Currently Documented PCP:    Sirisha Aparicio MD    PCP Phone Number:    [phone]     Follow Up Details: Please call to schedule a 1 week or earliest available follow-up appointment PCP; BP & post-op pain            Time Spent on Discharge:  Greater than 30 minutes      FIDE Javier  [facility]  02/29/24  09:46 EST

## 2024-02-29 NOTE — CONSULTS
[facility] THORACIC/LUMBAR NEUROSURGERY PROGRESS NOTE      CC: Low back pain      Subjective     Interval History: Patient is status post tethered cord release with L5-S1, S1-S2 laminectomy by Dr. Angel in [place] performed 1/29/2024.  Patient is to be discharged today, however we were reconsulted for some questions that the patient and her mother had.  Patient reports that she is still having low back pain that does radiate down both of her legs left greater than right that has been ongoing since approximately 1 week after her surgery.  Patient is voiding and has had a bowel movement.  She has a televisit postop appointment scheduled with her surgeon in [place] next month.        Objective     Vital signs in last 24 hours:  Temp:  [97.9 °F (36.6 °C)-98.3 °F (36.8 °C)] 97.9 °F (36.6 °C)  Heart Rate:  [] 91  Resp:  [16] 16  BP: ()/(62-76) 96/63    Intake/Output this shift:  No intake/output data recorded.    LABS:  Results from last 7 days   Lab Units 02/28/24  0556 02/25/24  0514 02/24/24  0554   WBC 10*3/mm3 10.01 7.53 7.09   HEMOGLOBIN g/dL 13.4 12.7 12.2   HEMATOCRIT % 40.2 38.3 36.5   PLATELETS 10*3/mm3 315 264 253     Results from last 7 days   Lab Units 02/29/24  0544 02/28/24  0556 02/25/24  0514   SODIUM mmol/L 138 138 140   POTASSIUM mmol/L 3.8 3.7 3.6   CHLORIDE mmol/L 107 109* 109*   CO2 mmol/L 18.0* 19.0* 20.2*   BUN mg/dL 8 8 7   CREATININE mg/dL 0.93 0.87 0.65   GLUCOSE mg/dL 115* 106* 115*   CALCIUM mg/dL 9.0 8.3* 8.1*         IMAGING STUDIES:  No new imaging to review    I personally viewed the patient's chart, it was also reviewed by and discussed with Dr Kennedy Benito reviewed/changed: Yes  Pepcid 40 mg p.o. twice daily  Prozac 40 mg p.o. daily  Neurontin 300 mg p.o. 3 times daily  Synthroid 88 mcg p.o. every morning  Zyprexa 5 mg p.o. nightly  Protonix 40 mg p.o. every morning  Senokot 2 tablets p.o. twice daily  Topamax 50 mg p.o. twice daily  Hydrocodone 10-3 25 1 p.o. every 6 hours  as needed  Dilaudid 0.5 mg IV every 3 hours as needed    Physical Exam:    General:  Awake & alert  Back: Midline lower lumbar surgical incision is well-appearing, well-approximated, no surrounding erythema, swelling or drainage.  No tenderness to palpation  Motor:  Normal muscle strength, bulk and tone in lower extremities.  No fasciculations, rigidity, spasticity, or abnormal movements  Sensation:  Normal to light touch bilateral LE's  Station and Gait:  Per PT note 2/29-Pt BLE strength WNL, coordination WNL, ROM WNL and light touch sensation in tact. Pt stood with SBA and ambulated into the hallway for 60' without AD demoing a slow pace, LUE arm swing only and antalgic gait mechanics. Following gait pt denied increase in HA, no increase in N/T or pins/needles, no dizziness, no SOB, and no sweating w/ mobility       Assessment & Plan     ASSESSMENT:      Fluid collection at surgical site, initial encounter    Fibromyalgia    Hypothyroidism    Fatty liver    Anxiety    Arnold-Chiari malformation, type I    Asthma    Benign intracranial hypertension    Tethered cord    33-year-old female s/p tethered cord release with L5-S1, S1-S2 laminectomy by Dr. Agnel in [place] performed 1/29/2024.  Originally seen in consult by our service on 2/21/2024 for superficial fluid collection extending from level of L3-L4-S1 suspected to be a seroma seen on MRI of the lumbar spine.  Patient is scheduled for discharge today however we were asked to reconsult due to patient and mother having a few questions.  Mother was wondering if we could do a repeat MRI of the lumbar spine to make sure that these fluid collection was getting smaller, explained to her that imaging was performed 2/21/2024 and there would likely be very little change at this time, do not feel that a repeat MRI is warranted at this time.  Patient has had improvement in her back pain, is working with PT and has been able to ambulate.  She has had no urinary or bowel  "incontinence while in the hospital. Exam without red flags.   Patient does have a scheduled televisit follow-up with her surgeon in [place] next month.  Did advise patient to contact their office with any questions or need for any changes in her medication.  No surgical intervention warranted at this time.  Neurosurgery to sign off today, please feel free to call with any questions or concerns.    PLAN:   -DALY to sign off  -Pt to keep scheduled appointment with DALY in [place]     I discussed the patient's findings and my recommendations with patient, family, and Dr Benavides    During patient visit, I utilized appropriate personal protective equipment including gloves.  Appropriate PPE was worn during the entire visit.  Hand hygiene was completed before and after.      LOS: 5 days       Isabel Medina, APRN  2/29/2024  12:26 EST    \"Dictated utilizing Dragon dictation\".      "

## 2024-02-29 NOTE — THERAPY DISCHARGE NOTE
Patient Name: Maame Wallace  : 1990    MRN: [id number]                              Today's Date: 2024       Admit Date: 2024    Visit Dx:     ICD-10-CM ICD-9-CM   1. Fluid collection at surgical site, initial encounter  T88.8XXA 998.13   2. Paresthesia of both legs  R20.2 782.0   3. Follow-up exam  Z09 V67.9   4. Lumbar spine pain  M54.50 724.2     Patient Active Problem List   Diagnosis    Hemiplegic migraine, intractable    Fibromyalgia    Major depressive disorder, recurrent, severe without psychotic features    Vaginal yeast infection    Leukocytosis    Hyponatremia    Hypothyroidism    Irritable bowel disease    Obesity (BMI 30-39.9)    Suicide attempt by drug ingestion    Obesity, Class III, BMI 40-49.9 (morbid obesity)    Fatty liver    Elevated lithium level    Polysubstance overdose    COVID-19 virus detected    Major depressive disorder, recurrent severe without psychotic features    History of COVID-19    Anxiety    Arnold-Chiari malformation, type I    OA (osteoarthritis)    Asthma    Bilateral myopia    Chronic interstitial cystitis    Gastroesophageal reflux disease    Benign intracranial hypertension    Migraine without aura    Seasonal allergies    Tethered cord    Vitamin D deficiency    Vitreous floaters    Chronic neck pain    Fluid collection at surgical site, initial encounter     Past Medical History:   Diagnosis Date    Anxiety     Asthma     Cholelithiasis     Depression     Fatty liver     Fatty liver 2021    Fibromyalgia     Fibromyalgia, primary     GERD (gastroesophageal reflux disease)     Headache, tension-type     Hypothyroidism     Irritable bowel disease     Migraines     Suicide attempt 2021    purposeful overdose on home medications     Past Surgical History:   Procedure Laterality Date    APPENDECTOMY      laparoscopic    BRAIN SURGERY      CHOLECYSTECTOMY      COLONOSCOPY      CRANIOTOMY  23 and 23    Chiari decompression    KNEE  ARTHROSCOPY Left     KNEE SURGERY Left     LAMINECTOMY  1/11/23 and 7/24/23    Chiari decompression    PATELLA FEMORAL LIGAMENT RECONSTRUCTION Left     SHOULDER SURGERY Left     SPINE SURGERY      TONSILLECTOMY AND ADENOIDECTOMY      UPPER GASTROINTESTINAL ENDOSCOPY        General Information       Row Name 02/29/24 1028          Physical Therapy Time and Intention    Document Type evaluation;discharge evaluation/summary  -MG     Mode of Treatment individual therapy;physical therapy  -MG       Row Name 02/29/24 1028          General Information    Patient Profile Reviewed yes  -MG     Prior Level of Function independent:  No AD. Owns RW, BSC  -MG     Existing Precautions/Restrictions fall;spinal  -MG     Barriers to Rehab none identified  -MG       Row Name 02/29/24 1028          Living Environment    People in Home parent(s);sibling(s)  Brother and 4y/o nephew.  -MG       Row Name 02/29/24 1028          Home Main Entrance    Number of Stairs, Main Entrance three;four  -MG     Stair Railings, Main Entrance railings safe and in good condition;railing on left side (ascending)  -MG       Row Name 02/29/24 1028          Stairs Within Home, Primary    Stairs, Within Home, Primary Down to basement where her bed/bath are located. States she has been sleeping in recliner on the main floor.  -MG     Number of Stairs, Within Home, Primary twelve  -MG     Stair Railings, Within Home, Primary railing on right side (ascending);railings safe and in good condition  -MG       Row Name 02/29/24 1028          Cognition    Orientation Status (Cognition) oriented x 4  -MG       Row Name 02/29/24 1028          Safety Issues, Functional Mobility    Impairments Affecting Function (Mobility) pain  -MG     Comment, Safety Issues/Impairments (Mobility) Gait belt and non-skid socks donned.  -MG               User Key  (r) = Recorded By, (t) = Taken By, (c) = Cosigned By      Initials Name Provider Type    MG Nanette Harrell, PT Physical  "Therapist                   Mobility       Row Name 02/29/24 1030          Bed Mobility    Bed Mobility rolling left;sidelying-sit  -MG     Rolling Left [place] (Bed Mobility) modified independence  -MG     Sidelying-Sit [place] (Bed Mobility) modified independence  -MG     Assistive Device (Bed Mobility) bed rails  -MG     Comment, (Bed Mobility) L log roll to sitting EOB. Increased HA and some pain while EOB. No dizziness, sweating, SOB.  -MG       Row Name 02/29/24 1030          Transfers    Comment, (Transfers) Pt states she sits/stands for few minutes prior to mobility to \"get her bearings\".  -MG       Row Name 02/29/24 1030          Sit-Stand Transfer    Sit-Stand [place] (Transfers) standby assist  -MG     Assistive Device (Sit-Stand Transfers) other (see comments)  None  -MG     Comment, (Sit-Stand Transfer) Increased HA on standing. No dizziness, SOB or sweating.  -MG       Row Name 02/29/24 1030          Gait/Stairs (Locomotion)    [place] Level (Gait) standby assist  -MG     Assistive Device (Gait) other (see comments)  None  -MG     Distance in Feet (Gait) 60  -MG     Deviations/Abnormal Patterns (Gait) antalgic;gait speed decreased  -MG     Comment, (Gait/Stairs) Pt ambulating into hallway demoing slow pace, antalgic mechanics and arm swing only w/ LUE. Denies increase in HA, no increase in N/T or pins/needles, no dizziness, no SOB, and no sweating w/ mobility. States only having an increase in pain. No knee buckling or LOB. Once back in room pt stating pain increases from low back at incision site to down into her bilateral buttocks. No radiating down her LEs.  -MG               User Key  (r) = Recorded By, (t) = Taken By, (c) = Cosigned By      Initials Name Provider Type    MG Nanette Harrell, PT Physical Therapist                   Obj/Interventions       Row Name 02/29/24 1035          Range of Motion Comprehensive    General Range of Motion no range of motion deficits identified "  -MG       Row Name 02/29/24 1035          Strength Comprehensive (MMT)    General Manual Muscle Testing (MMT) Assessment no strength deficits identified  -MG       Row Name 02/29/24 1035          Motor Skills    Motor Skills coordination  -MG     Coordination WNL;bilateral;lower extremity;heel to shin  and LE DAVID  -MG       Row Name 02/29/24 1035          Balance    Balance Assessment sitting static balance;sitting dynamic balance;standing static balance;standing dynamic balance  -MG     Static Sitting Balance independent  -MG     Dynamic Sitting Balance modified independence  -MG     Position, Sitting Balance unsupported;sitting edge of bed  -MG     Static Standing Balance standby assist  -MG     Dynamic Standing Balance standby assist  -MG     Position/Device Used, Standing Balance unsupported  -MG     Comment, Balance No knee buckling or LOB.  -MG       Row Name 02/29/24 1035          Sensory Assessment (Somatosensory)    Sensory Assessment (Somatosensory) sensation intact  States having tingling waist to toes while resting in supine. Denies any N/T/P/N with ambulation.  -MG               User Key  (r) = Recorded By, (t) = Taken By, (c) = Cosigned By      Initials Name Provider Type    MG Nanette Harrell, PT Physical Therapist                   Goals/Plan    No documentation.                  Clinical Impression       Row Name 02/29/24 1036          Pain    Pretreatment Pain Rating 8/10  -MG     Posttreatment Pain Rating 9/10  -MG     Pain Location - Side/Orientation Bilateral  -MG     Pain Location lower  -MG     Pain Location - back  -MG     Pain Intervention(s) Medication (See MAR);Ambulation/increased activity;Repositioned;Rest  -MG       Row Name 02/29/24 1036          Plan of Care Review    Plan of Care Reviewed With patient;mother  -MG     Outcome Evaluation Pt is a 32 y/o F with h/o OA, Chiari malformation, decompression 7/2023 and tethered cord release w/ L5-S2 lami on 1/29/2024 who presented to ED with  "c/o N/T, pins/needles at tailbone and radiating down BLEs (L>R). Per neurosx \"Imaging reveals large superficial fluid collection extending from level of L3-L4 inferiorly to S1, suspected to be seroma\" vs CSF leak (neurology). During admission pt had \"onset positional headache that gets worse by sitting and standing up\". S/p 2/27 Blood Patch for headaches. Pt reports being independent at baseline since her Lumbar sx on 1/29, does not use an AD, lives with her parents/brother/nephew in a house with 3-4 VANESSA, sleeps on the main floor in a recliner and owns a RW and BSC. On arrival pt was L sidelying with reports of constant tingling from waist to her toes and constant headache that worsens with sitting up; states blood patch did not help with HA. Pt was educated on the log roll technique to minimize twisting at her low back, proceeded to perform to tsf to sitting EOB w/ SBA. On sitting pt c/o increased HA, but no dizziness. Pt BLE strength WNL, coordination WNL, ROM WNL and light touch sensation in tact. Pt stood with SBA and ambulated into the hallway for 60' without AD demoing a slow pace, LUE arm swing only and antalgic gait mechanics. Following gait pt denied increase in HA, no increase in N/T or pins/needles, no dizziness, no SOB, and no sweating w/ mobility. States only having an increase in pain from low back incision sites down into her bilateral buttocks, no radiating down her LEs.. No knee buckling or LOB. Discussed w/ pt and her Mom safety with mobility and ascending/descending stairs w/ use of gait belt, benefit of OP PT when given okay by her surgeon and use of RW for longer ambulation distances; they v/u. Pt reports she has been up ad-selvin/ind in her room and states having no balance, strength or gait deficits; only having pain w/ mobility. Pt and her Mom report no concerns or further questions for home. As pt is at her baseline mobility and with no questions/concerns for home, PT will sign-off. MARLEEN w/ RN " following session. Rec home w/ assist and OP PT services once cleared by her surgeon.  -MG       Row Name 02/29/24 1036          Therapy Assessment/Plan (PT)    Criteria for Skilled Interventions Met (PT) no;does not meet criteria for skilled intervention  -MG     Therapy Frequency (PT) evaluation only  -MG       Row Name 02/29/24 1036          Vital Signs    O2 Delivery Pre Treatment room air  -MG     O2 Delivery Intra Treatment room air  -MG     O2 Delivery Post Treatment room air  -MG     Pre Patient Position Side Lying  -MG     Intra Patient Position Standing  -MG     Post Patient Position Sitting  -MG       Row Name 02/29/24 1036          Positioning and Restraints    Pre-Treatment Position in bed  -MG     Post Treatment Position bed  -MG     In Bed notified nsg;sitting EOB;call light within reach;encouraged to call for assist;with family/caregiver;side rails up x2  -MG               User Key  (r) = Recorded By, (t) = Taken By, (c) = Cosigned By      Initials Name Provider Type    Nanette Sauceda PT Physical Therapist                   Outcome Measures       Row Name 02/29/24 1046          How much help from another person do you currently need...    Turning from your back to your side while in flat bed without using bedrails? 4  -MG     Moving from lying on back to sitting on the side of a flat bed without bedrails? 4  -MG     Moving to and from a bed to a chair (including a wheelchair)? 4  -MG     Standing up from a chair using your arms (e.g., wheelchair, bedside chair)? 4  -MG     Climbing 3-5 steps with a railing? 3  -MG     To walk in hospital room? 4  -MG     AM-PAC 6 Clicks Score (PT) 23  -MG     Highest Level of Mobility Goal 7 --> Walk 25 feet or more  -MG               User Key  (r) = Recorded By, (t) = Taken By, (c) = Cosigned By      Initials Name Provider Type    Nanette Sauceda PT Physical Therapist                  Physical Therapy Education       Title: PT OT SLP Therapies (In Progress)   "     Topic: Physical Therapy (In Progress)       Point: Mobility training (Done)       Learning Progress Summary             Patient Acceptance, E,D, VU by MG at 2/29/2024 1047                         Point: Home exercise program (Not Started)       Learner Progress:  Not documented in this visit.              Point: Body mechanics (Done)       Learning Progress Summary             Patient Acceptance, E,D, VU by MG at 2/29/2024 1047                         Point: Precautions (Done)       Learning Progress Summary             Patient Acceptance, E,D, VU by MG at 2/29/2024 1047                                         User Key       Initials Effective Dates Name Provider Type Discipline     05/24/22 -  Nanette Harrell, PT Physical Therapist PT                  PT Recommendation and Plan     Plan of Care Reviewed With: patient, mother  Outcome Evaluation: Pt is a 32 y/o F with h/o OA, Chiari malformation, decompression 7/2023 and tethered cord release w/ L5-S2 lami on 1/29/2024 who presented to ED with c/o N/T, pins/needles at tailbone and radiating down BLEs (L>R). Per neurosx \"Imaging reveals large superficial fluid collection extending from level of L3-L4 inferiorly to S1, suspected to be seroma\" vs CSF leak (neurology). During admission pt had \"onset positional headache that gets worse by sitting and standing up\". S/p 2/27 Blood Patch for headaches. Pt reports being independent at baseline since her Lumbar sx on 1/29, does not use an AD, lives with her parents/brother/nephew in a house with 3-4 VANESSA, sleeps on the main floor in a recliner and owns a  and BSC. On arrival pt was L sidelying with reports of constant tingling from waist to her toes and constant headache that worsens with sitting up; states blood patch did not help with HA. Pt was educated on the log roll technique to minimize twisting at her low back, proceeded to perform to tsf to sitting EOB w/ SBA. On sitting pt c/o increased HA, but no dizziness. " Pt BLE strength WNL, coordination WNL, ROM WNL and light touch sensation in tact. Pt stood with SBA and ambulated into the hallway for 60' without AD demoing a slow pace, LUE arm swing only and antalgic gait mechanics. Following gait pt denied increase in HA, no increase in N/T or pins/needles, no dizziness, no SOB, and no sweating w/ mobility. States only having an increase in pain from low back incision sites down into her bilateral buttocks, no radiating down her LEs.. No knee buckling or LOB. Discussed w/ pt and her Mom safety with mobility and ascending/descending stairs w/ use of gait belt, benefit of OP PT when given okay by her surgeon and use of RW for longer ambulation distances; they v/u. Pt reports she has been up ad-selvin/ind in her room and states having no balance, strength or gait deficits; only having pain w/ mobility. Pt and her Mom report no concerns or further questions for home. As pt is at her baseline mobility and with no questions/concerns for home, PT will sign-off. MARLEEN w/ RN following session. Rec home w/ assist and OP PT services once cleared by her surgeon.     Time Calculation:         PT Charges       Row Name 02/29/24 1047             Time Calculation    Start Time 1005  -MG      Stop Time 1022  -MG      Time Calculation (min) 17 min  -MG      PT Received On 02/29/24  -MG         Time Calculation- PT    Total Timed Code Minutes- PT 10 minute(s)  -MG                User Key  (r) = Recorded By, (t) = Taken By, (c) = Cosigned By      Initials Name Provider Type    MG Nanette Harrell, PT Physical Therapist                  Therapy Charges for Today       Code Description Service Date Service Provider Modifiers Qty    67069730197 HC PT EVAL MOD COMPLEXITY 2 2/29/2024 Nanette Harrell, PT GP 1    34558110140 HC PT THERAPEUTIC ACT EA 15 MIN 2/29/2024 Nanette Harrell, PT GP 1            PT G-Codes  AM-PAC 6 Clicks Score (PT): 23    PT Discharge Summary  Anticipated Discharge Disposition (PT): home  with assist, home with outpatient therapy services    Nanette Harrell, PT  2/29/2024

## 2024-02-29 NOTE — PLAN OF CARE
"Goal Outcome Evaluation:  Plan of Care Reviewed With: patient, mother           Outcome Evaluation: Pt is a 32 y/o F with h/o OA, Chiari malformation, decompression 7/2023 and tethered cord release w/ L5-S2 lami on 1/29/2024 who presented to ED with c/o N/T, pins/needles at tailbone and radiating down BLEs (L>R). Per neurosx \"Imaging reveals large superficial fluid collection extending from level of L3-L4 inferiorly to S1, suspected to be seroma\" vs CSF leak (neurology). During admission pt had \"onset positional headache that gets worse by sitting and standing up\". S/p 2/27 Blood Patch for headaches. Pt reports being independent at baseline since her Lumbar sx on 1/29, does not use an AD, lives with her parents/brother/nephew in a house with 3-4 VANESSA, sleeps on the main floor in a recliner and owns a RW and BSC. On arrival pt was L sidelying with reports of constant tingling from waist to her toes and constant headache that worsens with sitting up; states blood patch did not help with HA. Pt was educated on the log roll technique to minimize twisting at her low back, proceeded to perform to tsf to sitting EOB w/ SBA. On sitting pt c/o increased HA, but no dizziness. Pt BLE strength WNL, coordination WNL, ROM WNL and light touch sensation in tact. Pt stood with SBA and ambulated into the hallway for 60' without AD demoing a slow pace, LUE arm swing only and antalgic gait mechanics. Following gait pt denied increase in HA, no increase in N/T or pins/needles, no dizziness, no SOB, and no sweating w/ mobility. States only having an increase in pain from low back incision sites down into her bilateral buttocks, no radiating down her LEs.. No knee buckling or LOB. Discussed w/ pt and her Mom safety with mobility and ascending/descending stairs w/ use of gait belt, benefit of OP PT when given okay by her surgeon and use of RW for longer ambulation distances; they v/u. Pt reports she has been up ad-selvin/ind in her room and " states having no balance, strength or gait deficits; only having pain w/ mobility. Pt and her Mom report no concerns or further questions for home. As pt is at her baseline mobility and with no questions/concerns for home, PT will sign-off. SBAR w/ RN following session. Rec home w/ assist and OP PT services once cleared by her surgeon.      Anticipated Discharge Disposition (PT): home with assist, home with outpatient therapy services

## 2024-02-29 NOTE — PLAN OF CARE
Goal Outcome Evaluation:      Patient to discharge home via private transportation. IV removed intact.

## 2024-03-01 NOTE — OUTREACH NOTE
Prep Survey      Flowsheet Row Responses   [facility] facility patient discharged from? [place]   Is LACE score < 7 ? No   Eligibility Readm Mgmt   Discharge diagnosis *Fluid collection at surgical site,   Does the patient have one of the following disease processes/diagnoses(primary or secondary)? General Surgery   Does the patient have Home health ordered? No   Is there a DME ordered? No   Prep survey completed? Yes            ANGIE QUEVEDO - Registered Nurse

## 2024-03-04 ENCOUNTER — READMISSION MANAGEMENT (OUTPATIENT)
Dept: CALL CENTER | Facility: HOSPITAL | Age: 34
End: 2024-03-04
Payer: COMMERCIAL

## 2024-03-04 NOTE — OUTREACH NOTE
General Surgery Week 1 Survey      Flowsheet Row Responses   [facility] patient discharged from? [place]   Does the patient have one of the following disease processes/diagnoses(primary or secondary)? General Surgery   Week 1 attempt successful? Yes   Call start time 1135   Call end time 1145   Meds reviewed with patient/caregiver? Yes   Is the patient having any side effects they believe may be caused by any medication additions or changes? No   Does the patient have all medications related to this admission filled (includes all antibiotics, pain medications, etc.) Yes   Is the patient taking all medications as directed (includes completed medication regime)? Yes   Does the patient have a follow up appointment scheduled with their surgeon? Yes   Has the patient kept scheduled appointments due by today? N/A   Comments Surgeon appt in 2.5 weeks. Encouraged to make one week f/u appt with PCP.   Has home health visited the patient within 72 hours of discharge? N/A   Psychosocial issues? No   Psychosocial comments States has family available to assist her if needed.   Did the patient receive a copy of their discharge instructions? Yes   Nursing interventions Reviewed instructions with patient   What is the patient's perception of their health status since discharge? Same   Nursing interventions Nurse provided patient education   Is the patient /caregiver able to teach back basic post-op care? Continue use of incentive spirometry at least 1 week post discharge, Practice 'cough and deep breath', Drive as instructed by MD in discharge instructions, Take showers only when approved by MD-sponge bathe until then, No tub bath, swimming, or hot tub until instructed by MD, Keep incision areas clean,dry and protected, Do not remove steri-strips, Lifting as instructed by MD in discharge instructions   Is the patient/caregiver able to teach back signs and symptoms of incisional infection? Increased redness, swelling or  pain at the incisonal site, Increased drainage or bleeding, Incisional warmth, Pus or odor from incision, Fever   Is the patient/caregiver able to teach back steps to recovery at home? Set small, achievable goals for return to baseline health, Rest and rebuild strength, gradually increase activity, Practice good oral hygiene, Eat a well-balance diet   If the patient is a current smoker, are they able to teach back resources for cessation? Not a smoker   Is the patient/caregiver able to teach back the hierarchy of who to call/visit for symptoms/problems? PCP, Specialist, Home health nurse, Urgent Care, ED, 911 Yes   Week 1 call completed? Yes   Is the patient interested in additional calls from an ambulatory ? No   Would this patient benefit from a Referral to Amb Social Work? No   Wrap up additional comments Patient states is about the same since discharge. States mobility unchanged, but still decreased. States only able to ambulate from bathroom to couch. Reports temp was 100.0 yesterday, and has not checked yet today. Reports that incision feels slightly more swollen. Also reports may need additional pain medication-has only 4 tabs remaining. Encouraged to notify surgeon for evaluation. Voiced understanding.   Call end time 1145            Laura BAUTISTA - Registered Nurse

## 2024-03-13 ENCOUNTER — HOSPITAL ENCOUNTER (EMERGENCY)
Facility: HOSPITAL | Age: 34
Discharge: HOME | End: 2024-03-13
Attending: STUDENT IN AN ORGANIZED HEALTH CARE EDUCATION/TRAINING PROGRAM | Admitting: STUDENT IN AN ORGANIZED HEALTH CARE EDUCATION/TRAINING PROGRAM
Payer: COMMERCIAL

## 2024-03-13 ENCOUNTER — APPOINTMENT (OUTPATIENT)
Dept: CT IMAGING | Facility: HOSPITAL | Age: 34
End: 2024-03-13
Payer: COMMERCIAL

## 2024-03-13 VITALS
TEMPERATURE: 97 F | OXYGEN SATURATION: 94 % | WEIGHT: 255 LBS | DIASTOLIC BLOOD PRESSURE: 83 MMHG | HEIGHT: 66 IN | RESPIRATION RATE: 16 BRPM | HEART RATE: 96 BPM | SYSTOLIC BLOOD PRESSURE: 135 MMHG | BODY MASS INDEX: 40.98 KG/M2

## 2024-03-13 DIAGNOSIS — M54.41 ACUTE MIDLINE LOW BACK PAIN WITH BILATERAL SCIATICA: Primary | ICD-10-CM

## 2024-03-13 DIAGNOSIS — M54.42 ACUTE MIDLINE LOW BACK PAIN WITH BILATERAL SCIATICA: Primary | ICD-10-CM

## 2024-03-13 DIAGNOSIS — G89.18 POSTOPERATIVE PAIN: ICD-10-CM

## 2024-03-13 LAB
ALBUMIN SERPL-MCNC: 4.3 G/DL (ref 3.5–5.2)
ALBUMIN/GLOB SERPL: 1.4 G/DL
ALP SERPL-CCNC: 81 U/L (ref 39–117)
ALT SERPL W P-5'-P-CCNC: 20 U/L (ref 1–33)
ANION GAP SERPL CALCULATED.3IONS-SCNC: 14.2 MMOL/L (ref 5–15)
AST SERPL-CCNC: 15 U/L (ref 1–32)
B-HCG UR QL: NEGATIVE
BACTERIA UR QL AUTO: ABNORMAL /HPF
BASOPHILS # BLD AUTO: 0.16 10*3/MM3 (ref 0–0.2)
BASOPHILS NFR BLD AUTO: 1 % (ref 0–1.5)
BILIRUB SERPL-MCNC: <0.2 MG/DL (ref 0–1.2)
BILIRUB UR QL STRIP: NEGATIVE
BUN SERPL-MCNC: 13 MG/DL (ref 6–20)
BUN/CREAT SERPL: 16 (ref 7–25)
CALCIUM SPEC-SCNC: 8.8 MG/DL (ref 8.6–10.5)
CHLORIDE SERPL-SCNC: 103 MMOL/L (ref 98–107)
CLARITY UR: CLEAR
CO2 SERPL-SCNC: 20.8 MMOL/L (ref 22–29)
COLOR UR: YELLOW
CREAT SERPL-MCNC: 0.81 MG/DL (ref 0.57–1)
D-LACTATE SERPL-SCNC: 2.1 MMOL/L (ref 0.5–2)
DEPRECATED RDW RBC AUTO: 43 FL (ref 37–54)
EGFRCR SERPLBLD CKD-EPI 2021: 99.7 ML/MIN/1.73
EOSINOPHIL # BLD AUTO: 0.12 10*3/MM3 (ref 0–0.4)
EOSINOPHIL NFR BLD AUTO: 0.8 % (ref 0.3–6.2)
ERYTHROCYTE [DISTWIDTH] IN BLOOD BY AUTOMATED COUNT: 13.7 % (ref 12.3–15.4)
GLOBULIN UR ELPH-MCNC: 3.1 GM/DL
GLUCOSE SERPL-MCNC: 88 MG/DL (ref 65–99)
GLUCOSE UR STRIP-MCNC: NEGATIVE MG/DL
HCT VFR BLD AUTO: 44.2 % (ref 34–46.6)
HGB BLD-MCNC: 14.5 G/DL (ref 12–15.9)
HGB UR QL STRIP.AUTO: ABNORMAL
HYALINE CASTS UR QL AUTO: ABNORMAL /LPF
IMM GRANULOCYTES # BLD AUTO: 0.09 10*3/MM3 (ref 0–0.05)
IMM GRANULOCYTES NFR BLD AUTO: 0.6 % (ref 0–0.5)
KETONES UR QL STRIP: NEGATIVE
LEUKOCYTE ESTERASE UR QL STRIP.AUTO: NEGATIVE
LYMPHOCYTES # BLD AUTO: 5.8 10*3/MM3 (ref 0.7–3.1)
LYMPHOCYTES NFR BLD AUTO: 37.2 % (ref 19.6–45.3)
MCH RBC QN AUTO: 28.2 PG (ref 26.6–33)
MCHC RBC AUTO-ENTMCNC: 32.8 G/DL (ref 31.5–35.7)
MCV RBC AUTO: 85.8 FL (ref 79–97)
MONOCYTES # BLD AUTO: 0.84 10*3/MM3 (ref 0.1–0.9)
MONOCYTES NFR BLD AUTO: 5.4 % (ref 5–12)
NEUTROPHILS NFR BLD AUTO: 55 % (ref 42.7–76)
NEUTROPHILS NFR BLD AUTO: 8.58 10*3/MM3 (ref 1.7–7)
NITRITE UR QL STRIP: NEGATIVE
NRBC BLD AUTO-RTO: 0 /100 WBC (ref 0–0.2)
PH UR STRIP.AUTO: 5.5 [PH] (ref 5–8)
PLATELET # BLD AUTO: 432 10*3/MM3 (ref 140–450)
PMV BLD AUTO: 9.5 FL (ref 6–12)
POTASSIUM SERPL-SCNC: 3.8 MMOL/L (ref 3.5–5.2)
PROT SERPL-MCNC: 7.4 G/DL (ref 6–8.5)
PROT UR QL STRIP: NEGATIVE
RBC # BLD AUTO: 5.15 10*6/MM3 (ref 3.77–5.28)
RBC # UR STRIP: ABNORMAL /HPF
REF LAB TEST METHOD: ABNORMAL
SODIUM SERPL-SCNC: 138 MMOL/L (ref 136–145)
SP GR UR STRIP: 1.02 (ref 1–1.03)
SQUAMOUS #/AREA URNS HPF: ABNORMAL /HPF
UROBILINOGEN UR QL STRIP: ABNORMAL
WBC # UR STRIP: ABNORMAL /HPF
WBC NRBC COR # BLD AUTO: 15.59 10*3/MM3 (ref 3.4–10.8)

## 2024-03-13 PROCEDURE — 81025 URINE PREGNANCY TEST: CPT | Performed by: PHYSICIAN ASSISTANT

## 2024-03-13 PROCEDURE — 85025 COMPLETE CBC W/AUTO DIFF WBC: CPT | Performed by: PHYSICIAN ASSISTANT

## 2024-03-13 PROCEDURE — 80053 COMPREHEN METABOLIC PANEL: CPT | Performed by: PHYSICIAN ASSISTANT

## 2024-03-13 PROCEDURE — 81001 URINALYSIS AUTO W/SCOPE: CPT | Performed by: PHYSICIAN ASSISTANT

## 2024-03-13 PROCEDURE — 96375 TX/PRO/DX INJ NEW DRUG ADDON: CPT

## 2024-03-13 PROCEDURE — 83605 ASSAY OF LACTIC ACID: CPT | Performed by: PHYSICIAN ASSISTANT

## 2024-03-13 PROCEDURE — 96374 THER/PROPH/DIAG INJ IV PUSH: CPT

## 2024-03-13 PROCEDURE — 72131 CT LUMBAR SPINE W/O DYE: CPT

## 2024-03-13 PROCEDURE — 25010000002 DEXAMETHASONE PER 1 MG: Performed by: PHYSICIAN ASSISTANT

## 2024-03-13 PROCEDURE — 25010000002 ONDANSETRON PER 1 MG: Performed by: PHYSICIAN ASSISTANT

## 2024-03-13 PROCEDURE — 99284 EMERGENCY DEPT VISIT MOD MDM: CPT

## 2024-03-13 PROCEDURE — 96376 TX/PRO/DX INJ SAME DRUG ADON: CPT

## 2024-03-13 PROCEDURE — 25010000002 HYDROMORPHONE PER 4 MG: Performed by: STUDENT IN AN ORGANIZED HEALTH CARE EDUCATION/TRAINING PROGRAM

## 2024-03-13 RX ORDER — HYDROMORPHONE HYDROCHLORIDE 1 MG/ML
0.5 INJECTION, SOLUTION INTRAMUSCULAR; INTRAVENOUS; SUBCUTANEOUS ONCE
Status: COMPLETED | OUTPATIENT
Start: 2024-03-13 | End: 2024-03-13

## 2024-03-13 RX ORDER — ONDANSETRON 2 MG/ML
4 INJECTION INTRAMUSCULAR; INTRAVENOUS ONCE
Status: COMPLETED | OUTPATIENT
Start: 2024-03-13 | End: 2024-03-13

## 2024-03-13 RX ORDER — DEXAMETHASONE SODIUM PHOSPHATE 10 MG/ML
10 INJECTION INTRAMUSCULAR; INTRAVENOUS ONCE
Status: COMPLETED | OUTPATIENT
Start: 2024-03-13 | End: 2024-03-13

## 2024-03-13 RX ORDER — SODIUM CHLORIDE 0.9 % (FLUSH) 0.9 %
10 SYRINGE (ML) INJECTION AS NEEDED
Status: DISCONTINUED | OUTPATIENT
Start: 2024-03-13 | End: 2024-03-13 | Stop reason: HOSPADM

## 2024-03-13 RX ADMIN — HYDROMORPHONE HYDROCHLORIDE 0.5 MG: 1 INJECTION, SOLUTION INTRAMUSCULAR; INTRAVENOUS; SUBCUTANEOUS at 18:21

## 2024-03-13 RX ADMIN — HYDROMORPHONE HYDROCHLORIDE 0.5 MG: 1 INJECTION, SOLUTION INTRAMUSCULAR; INTRAVENOUS; SUBCUTANEOUS at 15:55

## 2024-03-13 RX ADMIN — ONDANSETRON 4 MG: 2 INJECTION INTRAMUSCULAR; INTRAVENOUS at 15:55

## 2024-03-13 RX ADMIN — DEXAMETHASONE SODIUM PHOSPHATE 10 MG: 10 INJECTION INTRAMUSCULAR; INTRAVENOUS at 15:55

## 2024-03-13 RX ADMIN — ONDANSETRON 4 MG: 2 INJECTION INTRAMUSCULAR; INTRAVENOUS at 16:45

## 2024-03-13 NOTE — ED PROVIDER NOTES
EMERGENCY DEPARTMENT ENCOUNTER    Room Number:  01/01  Date of encounter:  3/13/2024  PCP: Sirisha Aparicio MD  Historian: Patient, mother  Chronic or social conditions impacting care (social determinants of health): None    HPI:  Chief Complaint: Low back pain  A complete HPI/ROS/PMH/PSH/SH/FH are unobtainable due to: Nothing    Context: Maame Wallace is a 33 y.o. female with a history of fibromyalgia, depression, obesity.  She presents to the ED c/o acute low back pain which has been worse since surgery.  Patient had a tethered cord release on 1/29/2023 at a hospital in [place].  She states since that time she did well for approximately 1 week however has had continued pain, bilateral leg numbness, intermittent incontinence.  She reports that her incontinence seems to occur when she cannot get to the bathroom in time.  She denies any true incontinence.  Patient was seen and admitted here for similar complaints from 2/21/2024 through 2/29/2024.  At that time an MRI did show a large fluid collection thought to be a seroma.  Patient has not actually seen her neurosurgeon in follow-up.    Review of prior external notes (non-ED):   I reviewed admission from 2/21/2024 through 2/29/2024.  Patient admitted for pain control, fluid collection at the surgical site.    Review of prior external test results outside of this encounter:  I reviewed MRI from 2/21/2024.  This showed postoperative changes, as well as a 6 x 6 x 7.5 centimeters superficial fluid collection at the level of L3-4 down to S1.    PAST MEDICAL HISTORY  Active Ambulatory Problems     Diagnosis Date Noted    Hemiplegic migraine, intractable 04/25/2016    Fibromyalgia 04/29/2016    Major depressive disorder, recurrent, severe without psychotic features 02/19/2020    Vaginal yeast infection 02/20/2020    Leukocytosis 02/20/2020    Hyponatremia 02/20/2020    Hypothyroidism 02/20/2020    Irritable bowel disease 02/20/2020    Obesity (BMI 30-39.9)  02/20/2020    Suicide attempt by drug ingestion 03/18/2020    Obesity, Class III, BMI 40-49.9 (morbid obesity) 03/18/2020    Fatty liver 01/18/2021    Elevated lithium level 04/03/2021    Polysubstance overdose 04/03/2021    COVID-19 virus detected 04/03/2021    Major depressive disorder, recurrent severe without psychotic features 04/27/2021    History of COVID-19 04/27/2021    Anxiety 04/19/2022    Arnold-Chiari malformation, type I 06/21/2023    OA (osteoarthritis) 01/10/2024    Asthma 05/07/2012    Bilateral myopia 05/09/2023    Chronic interstitial cystitis 01/10/2024    Gastroesophageal reflux disease 06/14/2012    Benign intracranial hypertension 06/14/2012    Migraine without aura 10/05/2012    Seasonal allergies 01/10/2024    Tethered cord 09/05/2023    Vitamin D deficiency 03/04/2021    Vitreous floaters 05/09/2023    Chronic neck pain 01/10/2024    Fluid collection at surgical site, initial encounter 02/21/2024     Resolved Ambulatory Problems     Diagnosis Date Noted    No Resolved Ambulatory Problems     Past Medical History:   Diagnosis Date    Cholelithiasis     Depression     Fibromyalgia, primary     GERD (gastroesophageal reflux disease)     Headache, tension-type     Migraines     Suicide attempt 04/02/2021         PAST SURGICAL HISTORY  Past Surgical History:   Procedure Laterality Date    APPENDECTOMY      laparoscopic    BRAIN SURGERY      CHOLECYSTECTOMY      COLONOSCOPY      CRANIOTOMY  1/11/23 and 7/24/23    Chiari decompression    KNEE ARTHROSCOPY Left     KNEE SURGERY Left     LAMINECTOMY  1/11/23 and 7/24/23    Chiari decompression    PATELLA FEMORAL LIGAMENT RECONSTRUCTION Left     SHOULDER SURGERY Left     SPINE SURGERY      TONSILLECTOMY AND ADENOIDECTOMY      UPPER GASTROINTESTINAL ENDOSCOPY           FAMILY HISTORY  Family History   Problem Relation Age of Onset    Irritable bowel syndrome Mother     Celiac disease Mother     Migraines Mother     Crohn's disease Brother     Alcohol  abuse Paternal Grandfather     Colon cancer Neg Hx     Colon polyps Neg Hx     Ulcerative colitis Neg Hx          SOCIAL HISTORY  Social History     Socioeconomic History    Marital status: Single   Tobacco Use    Smoking status: Never     Passive exposure: Never    Smokeless tobacco: Never   Vaping Use    Vaping status: Never Used   Substance and Sexual Activity    Alcohol use: Yes     Alcohol/week: 1.0 standard drink of alcohol     Types: 1 Cans of beer per week     Comment: Social drinker    Drug use: Yes     Frequency: 5.0 times per week     Types: Marijuana    Sexual activity: Not Currently     Partners: Male     Birth control/protection: Condom, Coitus interruptus         ALLERGIES  Acetazolamide, Adhesive tape, Duloxetine hcl, Codeine, Oxycodone, Chlorhexidine, Dhea [nutritional supplements], Imitrex [sumatriptan], and Morphine        REVIEW OF SYSTEMS  All systems reviewed and negative except for those discussed in HPI.       PHYSICAL EXAM    I have reviewed the triage vital signs and nursing notes.    ED Triage Vitals   Temp Heart Rate Resp BP SpO2   03/13/24 1400 03/13/24 1400 03/13/24 1400 03/13/24 1452 03/13/24 1400   97 °F (36.1 °C) (!) 124 18 121/74 96 %      Temp src Heart Rate Source Patient Position BP Location FiO2 (%)   03/13/24 1400 03/13/24 1400 -- -- --   Tympanic Monitor          Physical Exam  GENERAL: Alert, oriented, well-appearing, not distressed  HENT: head atraumatic, no nuchal rigidity  EYES: no scleral icterus, EOMI  CV: regular rhythm, regular rate, no murmur  RESPIRATORY: normal effort, CTA  ABDOMEN: soft, nontender  MUSCULOSKELETAL: Mildly decreased range of motion of lumbar spine.  Well-healed surgical incision without evidence of dehiscence.  NEURO: alert, 5/5 strength in bilateral lower extremities.  Sensation grossly intact.  Patient ambulates well independently.  SKIN: warm, dry        LAB RESULTS  Recent Results (from the past 24 hour(s))   Comprehensive Metabolic Panel     Collection Time: 03/13/24  3:40 PM    Specimen: Blood   Result Value Ref Range    Glucose 88 65 - 99 mg/dL    BUN 13 6 - 20 mg/dL    Creatinine 0.81 0.57 - 1.00 mg/dL    Sodium 138 136 - 145 mmol/L    Potassium 3.8 3.5 - 5.2 mmol/L    Chloride 103 98 - 107 mmol/L    CO2 20.8 (L) 22.0 - 29.0 mmol/L    Calcium 8.8 8.6 - 10.5 mg/dL    Total Protein 7.4 6.0 - 8.5 g/dL    Albumin 4.3 3.5 - 5.2 g/dL    ALT (SGPT) 20 1 - 33 U/L    AST (SGOT) 15 1 - 32 U/L    Alkaline Phosphatase 81 39 - 117 U/L    Total Bilirubin <0.2 0.0 - 1.2 mg/dL    Globulin 3.1 gm/dL    A/G Ratio 1.4 g/dL    BUN/Creatinine Ratio 16.0 7.0 - 25.0    Anion Gap 14.2 5.0 - 15.0 mmol/L    eGFR 99.7 >60.0 mL/min/1.73   CBC Auto Differential    Collection Time: 03/13/24  3:40 PM    Specimen: Blood   Result Value Ref Range    WBC 15.59 (H) 3.40 - 10.80 10*3/mm3    RBC 5.15 3.77 - 5.28 10*6/mm3    Hemoglobin 14.5 12.0 - 15.9 g/dL    Hematocrit 44.2 34.0 - 46.6 %    MCV 85.8 79.0 - 97.0 fL    MCH 28.2 26.6 - 33.0 pg    MCHC 32.8 31.5 - 35.7 g/dL    RDW 13.7 12.3 - 15.4 %    RDW-SD 43.0 37.0 - 54.0 fl    MPV 9.5 6.0 - 12.0 fL    Platelets 432 140 - 450 10*3/mm3    Neutrophil % 55.0 42.7 - 76.0 %    Lymphocyte % 37.2 19.6 - 45.3 %    Monocyte % 5.4 5.0 - 12.0 %    Eosinophil % 0.8 0.3 - 6.2 %    Basophil % 1.0 0.0 - 1.5 %    Immature Grans % 0.6 (H) 0.0 - 0.5 %    Neutrophils, Absolute 8.58 (H) 1.70 - 7.00 10*3/mm3    Lymphocytes, Absolute 5.80 (H) 0.70 - 3.10 10*3/mm3    Monocytes, Absolute 0.84 0.10 - 0.90 10*3/mm3    Eosinophils, Absolute 0.12 0.00 - 0.40 10*3/mm3    Basophils, Absolute 0.16 0.00 - 0.20 10*3/mm3    Immature Grans, Absolute 0.09 (H) 0.00 - 0.05 10*3/mm3    nRBC 0.0 0.0 - 0.2 /100 WBC   Urinalysis With Microscopic If Indicated (No Culture) - Urine, Clean Catch    Collection Time: 03/13/24  3:58 PM    Specimen: Urine, Clean Catch   Result Value Ref Range    Color, UA Yellow Yellow, Straw    Appearance, UA Clear Clear    pH, UA 5.5 5.0 - 8.0     Specific Gravity, UA 1.021 1.005 - 1.030    Glucose, UA Negative Negative    Ketones, UA Negative Negative    Bilirubin, UA Negative Negative    Blood, UA Large (3+) (A) Negative    Protein, UA Negative Negative    Leuk Esterase, UA Negative Negative    Nitrite, UA Negative Negative    Urobilinogen, UA 0.2 E.U./dL 0.2 - 1.0 E.U./dL   Urinalysis, Microscopic Only - Urine, Clean Catch    Collection Time: 03/13/24  3:58 PM    Specimen: Urine, Clean Catch   Result Value Ref Range    RBC, UA Too Numerous to Count (A) None Seen, 0-2 /HPF    WBC, UA 6-10 (A) None Seen, 0-2 /HPF    Bacteria, UA None Seen None Seen /HPF    Squamous Epithelial Cells, UA 0-2 None Seen, 0-2 /HPF    Hyaline Casts, UA None Seen None Seen /LPF    Methodology Automated Microscopy    Pregnancy, Urine - Urine, Clean Catch    Collection Time: 03/13/24  3:58 PM    Specimen: Urine, Clean Catch   Result Value Ref Range    HCG, Urine QL Negative Negative   Lactic Acid, Plasma    Collection Time: 03/13/24  4:22 PM    Specimen: Blood   Result Value Ref Range    Lactate 2.1 (C) 0.5 - 2.0 mmol/L       Ordered the above labs and independently reviewed the results.        RADIOLOGY  CT Lumbar Spine Without Contrast    Result Date: 3/13/2024  CT LUMBAR SPINE WITHOUT CONTRAST  HISTORY: Low back pain, history of large postoperative fluid collection  TECHNIQUE: Radiation dose reduction techniques were utilized, including automated exposure control and exposure modulation based on body size. CT of the lumbar spine was obtained without IV contrast.  COMPARISON: 2/21/2024 FINDINGS: The fluid collection centered in the subcutaneous fat of the lower lumbar spine has significantly decreased in size now measuring about 2.9 x 2.5 cm, previously measuring about 6.0 x 5.9 cm. Bilateral laminectomy at L5-S1 is again demonstrated. The small fluid collection adjacent to the laminectomy defect does not appear significantly changed in size and appearance and is better appreciated  on the comparison MRI. The vertebral body heights, alignment and disc spaces are all stable. No acute fractures are identified.      The fluid collection in the subcutaneous fat of the lower lumbar spine has significantly decreased in size now measuring about 2.9 cm in greatest dimension, previously about 6.0 cm. The additional small fluid collection adjacent to the L5-S1 laminectomy defect does not appear significantly changed although is better appreciated on the comparison MRI and further evaluation of this collection is needed then would recommend MRI evaluation.  Radiation dose reduction techniques were utilized, including automated exposure control and exposure modulation based on body size.   This report was finalized on 3/13/2024 6:03 PM by Dr. Jeff Wallace M.D on Workstation: [id number]       I ordered the above noted radiological studies. Reviewed by me and discussed with radiologist.  See dictation for official radiology interpretation.      MEDICATIONS GIVEN IN ER    Medications   dexAMETHasone (DECADRON) injection 10 mg (10 mg Intravenous Given 3/13/24 1555)   ondansetron (ZOFRAN) injection 4 mg (4 mg Intravenous Given 3/13/24 1555)   HYDROmorphone (DILAUDID) injection 0.5 mg (0.5 mg Intravenous Given 3/13/24 1555)   ondansetron (ZOFRAN) injection 4 mg (4 mg Intravenous Given 3/13/24 1645)   HYDROmorphone (DILAUDID) injection 0.5 mg (0.5 mg Intravenous Given 3/13/24 1821)         ADDITIONAL ORDERS CONSIDERED BUT NOT ORDERED:  Considered admission however patient is fairly neurologically intact.  She is ambulating.  The symptoms have been present for the past 6 weeks.      PROGRESS, DATA ANALYSIS, CONSULTS, AND MEDICAL DECISION MAKING    All labs have been independently interpreted by myself.  All radiology studies have been independently interpreted by myself and discussed with radiologist dictating the report.   EKG's independently interpreted by myself.  Discussion below represents my analysis of  pertinent findings related to patient's condition, differential diagnosis, treatment plan and final disposition.    I have discussed case with Dr. Greenberg, emergency room physician.  He has performed his own bedside examination and agrees with treatment plan.    ED Course as of 03/13/24 2331   Wed Mar 13, 2024   1453 First look: Patient presents emergency department with low back pain and history of tethered cord decompression in January.  Reports she has had fluctuating pain since the surgery but it became severe this morning.  Has had stable urinary incontinence that was present prior to surgery.  Will initiate workup with basic labs and pain control. [MP]   1605 WBC(!): 15.59  Patient on steroids at this time.  She is afebrile. [EE]   1623 Creatinine: 0.81 [EE]   1726 Lactate(!!): 2.1 [EE]   1754 CT lumbar interpreted myself:  Small seroma at the midline incision without evidence of compression or other complication [FS]   1937 Recheck of patient.  She is able to ambulate without difficulty.  She has no weakness, true incontinence.  She is able to ambulate here.  I see no reason for urgent admission.  I encouraged her to follow-up with her neurosurgeon in [place].  She does have pain medicine at home.  She just finished steroids today.  No evidence of infection or significant surgical abnormality. [EE]      ED Course User Index  [EE] Darin Herron PA  [FS] Raymundo Greenberg MD  [MP] Ashia Narayan PA-C       AS OF 23:31 EDT VITALS:    BP - 135/83  HR - 96  TEMP - 97 °F (36.1 °C) (Tympanic)  O2 SATS - 94%        DIAGNOSIS  Final diagnoses:   Acute midline low back pain with bilateral sciatica   Postoperative pain         DISPOSITION  Discharged    Admission was considered but after careful review of the patient's presentation, physical examination, diagnostic results, and response to treatment the patient may be safely discharged with outpatient follow-up.         Dictated utilizing Dragon dictation     Gopal  CAREY Walker  03/13/24 2620

## 2024-03-14 ENCOUNTER — READMISSION MANAGEMENT (OUTPATIENT)
Dept: CALL CENTER | Facility: HOSPITAL | Age: 34
End: 2024-03-14
Payer: COMMERCIAL

## 2024-03-14 NOTE — ED PROVIDER NOTES
MD ATTESTATION NOTE    The BLAINE and I have discussed this patient's history, physical exam, and treatment plan.  I have reviewed the documentation and personally had a face to face interaction with the patient. I affirm the documentation and agree with the treatment and plan.  The attached note describes my personal findings.      I provided a substantive portion of the care of the patient.  I personally performed the physical exam in its entirety, and below are my findings.        Brief HPI: Patient presented emergency department with low back pain.  Recent surgery in [place] for tethered cord, has known fluid collection around the operative site.  Still is having pain and bilateral leg numbness.  No other recent illness.  No fevers or chills.    PHYSICAL EXAM  ED Triage Vitals   Temp Heart Rate Resp BP SpO2   03/13/24 1400 03/13/24 1400 03/13/24 1400 03/13/24 1452 03/13/24 1400   97 °F (36.1 °C) (!) 124 18 121/74 96 %      Temp src Heart Rate Source Patient Position BP Location FiO2 (%)   03/13/24 1400 03/13/24 1400 -- -- --   Tympanic Monitor            GENERAL: no acute distress  HENT: nares patent  EYES: no scleral icterus  CV: regular rhythm, normal rate  RESPIRATORY: normal effort  ABDOMEN: soft  MUSCULOSKELETAL: no deformity  NEURO: alert, moves all extremities, follows commands  PSYCH:  calm, cooperative  SKIN: warm, dry    Vital signs and nursing notes reviewed.        Plan: Patient presented emergency department with nonspecific low back pain after recent surgery, otherwise well-appearing, vitals otherwise stable.  Severe today, waxing and waning since procedure.  Labs and imaging otherwise reassuring.  Fluid collection appears to be improved.  Will discharge with close follow-up with her surgeon.  Given return precautions and discharged home.    ED Course as of 03/13/24 2256   Wed Mar 13, 2024   1457 First look: Patient presents emergency department with low back pain and history of tethered cord  decompression in January.  Reports she has had fluctuating pain since the surgery but it became severe this morning.  Has had stable urinary incontinence that was present prior to surgery.  Will initiate workup with basic labs and pain control. [MP]   1605 WBC(!): 15.59  Patient on steroids at this time.  She is afebrile. [EE]   1623 Creatinine: 0.81 [EE]   1726 Lactate(!!): 2.1 [EE]   1754 CT lumbar interpreted myself:  Small seroma at the midline incision without evidence of compression or other complication [FS]   1937 Recheck of patient.  She is able to ambulate without difficulty.  She has no weakness, true incontinence.  She is able to ambulate here.  I see no reason for urgent admission.  I encouraged her to follow-up with her neurosurgeon in [place].  She does have pain medicine at home.  She just finished steroids today.  No evidence of infection or significant surgical abnormality. [EE]      ED Course User Index  [EE] Darin Herron, PA  [FS] Raymundo Greenberg MD  [MP] Ashia Narayan PA-C       SHARED VISIT: This visit was performed by BOTH a physician and an APC. The substantive portion of the medical decision making was performed by this attesting physician who made or approved the management plan and takes responsibility for patient management. All studies in the APC note (if performed) were independently interpreted by me.        Raymundo Greenberg MD  03/13/24 5940

## 2024-03-14 NOTE — OUTREACH NOTE
General Surgery Week 2 Survey      Flowsheet Row Responses   [facility] patient discharged from? [facility]   Does the patient have one of the following disease processes/diagnoses(primary or secondary)? General Surgery   Week 2 attempt successful? Yes   Call start time 1521   Call end time 1524   Discharge diagnosis *Fluid collection at surgical site,   Meds reviewed with patient/caregiver? Yes   Is the patient having any side effects they believe may be caused by any medication additions or changes? No   Does the patient have all medications related to this admission filled (includes all antibiotics, pain medications, etc.) Yes   Is the patient taking all medications as directed (includes completed medication regime)? Yes   Does the patient have a follow up appointment scheduled with their surgeon? Yes   Has the patient kept scheduled appointments due by today? Yes   Comments Has had PCP follow up   Has home health visited the patient within 72 hours of discharge? N/A   Psychosocial issues? No   Did the patient receive a copy of their discharge instructions? Yes   Nursing interventions Reviewed instructions with patient   What is the patient's perception of their health status since discharge? Same  [Patient reports she was in ED yesterday for back pain, was given IV steroids with some improvement.]   Nursing interventions Nurse provided patient education   Is the patient/caregiver able to teach back signs and symptoms of incisional infection? Increased redness, swelling or pain at the incisonal site, Increased drainage or bleeding, Incisional warmth, Pus or odor from incision, Fever   Is the patient/caregiver able to teach back steps to recovery at home? Set small, achievable goals for return to baseline health, Rest and rebuild strength, gradually increase activity, Eat a well-balance diet   Week 2 call completed? Yes   Is the patient interested in additional calls from an ambulatory ? No    Would this patient benefit from a Referral to [facility] Social Work? No   Call end time 8026            Hilary TERAN - Registered Nurse

## 2024-03-19 ENCOUNTER — HOSPITAL ENCOUNTER (OUTPATIENT)
Facility: HOSPITAL | Age: 34
Setting detail: OBSERVATION
Discharge: HOME | End: 2024-03-21
Attending: EMERGENCY MEDICINE | Admitting: INTERNAL MEDICINE
Payer: COMMERCIAL

## 2024-03-19 ENCOUNTER — READMISSION MANAGEMENT (OUTPATIENT)
Dept: CALL CENTER | Facility: HOSPITAL | Age: 34
End: 2024-03-19
Payer: COMMERCIAL

## 2024-03-19 DIAGNOSIS — G43.709 CHRONIC MIGRAINE W/O AURA, NOT INTRACTABLE, W/O STAT MIGR: ICD-10-CM

## 2024-03-19 DIAGNOSIS — M54.9 INTRACTABLE BACK PAIN: Primary | ICD-10-CM

## 2024-03-19 DIAGNOSIS — R20.2 NUMBNESS AND TINGLING OF FOOT: ICD-10-CM

## 2024-03-19 DIAGNOSIS — I62.00 SUBDURAL HEMORRHAGE: ICD-10-CM

## 2024-03-19 DIAGNOSIS — M54.9 BACK PAIN WITH HISTORY OF SPINAL SURGERY: ICD-10-CM

## 2024-03-19 DIAGNOSIS — T88.8XXA FLUID COLLECTION AT SURGICAL SITE, INITIAL ENCOUNTER: ICD-10-CM

## 2024-03-19 DIAGNOSIS — R20.0 NUMBNESS AND TINGLING OF FOOT: ICD-10-CM

## 2024-03-19 DIAGNOSIS — Z98.890 BACK PAIN WITH HISTORY OF SPINAL SURGERY: ICD-10-CM

## 2024-03-19 PROBLEM — R53.1 WEAKNESS: Status: ACTIVE | Noted: 2024-03-19

## 2024-03-19 LAB
ALBUMIN SERPL-MCNC: 4.3 G/DL (ref 3.5–5.2)
ALBUMIN/GLOB SERPL: 1.2 G/DL
ALP SERPL-CCNC: 93 U/L (ref 39–117)
ALT SERPL W P-5'-P-CCNC: 68 U/L (ref 1–33)
ANION GAP SERPL CALCULATED.3IONS-SCNC: 11.9 MMOL/L (ref 5–15)
AST SERPL-CCNC: 30 U/L (ref 1–32)
BASOPHILS # BLD AUTO: 0.1 10*3/MM3 (ref 0–0.2)
BASOPHILS NFR BLD AUTO: 0.9 % (ref 0–1.5)
BILIRUB SERPL-MCNC: <0.2 MG/DL (ref 0–1.2)
BILIRUB UR QL STRIP: NEGATIVE
BUN SERPL-MCNC: 8 MG/DL (ref 6–20)
BUN/CREAT SERPL: 9.9 (ref 7–25)
CALCIUM SPEC-SCNC: 9 MG/DL (ref 8.6–10.5)
CHLORIDE SERPL-SCNC: 105 MMOL/L (ref 98–107)
CLARITY UR: CLEAR
CO2 SERPL-SCNC: 21.1 MMOL/L (ref 22–29)
COLOR UR: YELLOW
CREAT SERPL-MCNC: 0.81 MG/DL (ref 0.57–1)
CRP SERPL-MCNC: 0.42 MG/DL (ref 0–0.5)
DEPRECATED RDW RBC AUTO: 45.4 FL (ref 37–54)
EGFRCR SERPLBLD CKD-EPI 2021: 99.7 ML/MIN/1.73
EOSINOPHIL # BLD AUTO: 0.16 10*3/MM3 (ref 0–0.4)
EOSINOPHIL NFR BLD AUTO: 1.4 % (ref 0.3–6.2)
ERYTHROCYTE [DISTWIDTH] IN BLOOD BY AUTOMATED COUNT: 14.1 % (ref 12.3–15.4)
ERYTHROCYTE [SEDIMENTATION RATE] IN BLOOD: 26 MM/HR (ref 0–20)
GLOBULIN UR ELPH-MCNC: 3.5 GM/DL
GLUCOSE SERPL-MCNC: 96 MG/DL (ref 65–99)
GLUCOSE UR STRIP-MCNC: NEGATIVE MG/DL
HCG SERPL QL: NEGATIVE
HCT VFR BLD AUTO: 42.8 % (ref 34–46.6)
HGB BLD-MCNC: 14.3 G/DL (ref 12–15.9)
HGB UR QL STRIP.AUTO: NEGATIVE
IMM GRANULOCYTES # BLD AUTO: 0.05 10*3/MM3 (ref 0–0.05)
IMM GRANULOCYTES NFR BLD AUTO: 0.4 % (ref 0–0.5)
KETONES UR QL STRIP: NEGATIVE
LEUKOCYTE ESTERASE UR QL STRIP.AUTO: NEGATIVE
LYMPHOCYTES # BLD AUTO: 3.34 10*3/MM3 (ref 0.7–3.1)
LYMPHOCYTES NFR BLD AUTO: 28.9 % (ref 19.6–45.3)
MCH RBC QN AUTO: 29.5 PG (ref 26.6–33)
MCHC RBC AUTO-ENTMCNC: 33.4 G/DL (ref 31.5–35.7)
MCV RBC AUTO: 88.4 FL (ref 79–97)
MONOCYTES # BLD AUTO: 0.65 10*3/MM3 (ref 0.1–0.9)
MONOCYTES NFR BLD AUTO: 5.6 % (ref 5–12)
NEUTROPHILS NFR BLD AUTO: 62.8 % (ref 42.7–76)
NEUTROPHILS NFR BLD AUTO: 7.25 10*3/MM3 (ref 1.7–7)
NITRITE UR QL STRIP: NEGATIVE
NRBC BLD AUTO-RTO: 0 /100 WBC (ref 0–0.2)
PH UR STRIP.AUTO: 7.5 [PH] (ref 5–8)
PLATELET # BLD AUTO: 324 10*3/MM3 (ref 140–450)
PMV BLD AUTO: 9.4 FL (ref 6–12)
POTASSIUM SERPL-SCNC: 4 MMOL/L (ref 3.5–5.2)
PROT SERPL-MCNC: 7.8 G/DL (ref 6–8.5)
PROT UR QL STRIP: NEGATIVE
RBC # BLD AUTO: 4.84 10*6/MM3 (ref 3.77–5.28)
SODIUM SERPL-SCNC: 138 MMOL/L (ref 136–145)
SP GR UR STRIP: 1.01 (ref 1–1.03)
UROBILINOGEN UR QL STRIP: NORMAL
WBC NRBC COR # BLD AUTO: 11.55 10*3/MM3 (ref 3.4–10.8)

## 2024-03-19 PROCEDURE — 96374 THER/PROPH/DIAG INJ IV PUSH: CPT

## 2024-03-19 PROCEDURE — 85025 COMPLETE CBC W/AUTO DIFF WBC: CPT | Performed by: EMERGENCY MEDICINE

## 2024-03-19 PROCEDURE — G0378 HOSPITAL OBSERVATION PER HR: HCPCS

## 2024-03-19 PROCEDURE — 80053 COMPREHEN METABOLIC PANEL: CPT | Performed by: EMERGENCY MEDICINE

## 2024-03-19 PROCEDURE — 25010000002 HYDROMORPHONE PER 4 MG: Performed by: INTERNAL MEDICINE

## 2024-03-19 PROCEDURE — 84703 CHORIONIC GONADOTROPIN ASSAY: CPT | Performed by: EMERGENCY MEDICINE

## 2024-03-19 PROCEDURE — 81003 URINALYSIS AUTO W/O SCOPE: CPT | Performed by: EMERGENCY MEDICINE

## 2024-03-19 PROCEDURE — 25810000003 SODIUM CHLORIDE 0.9 % SOLUTION: Performed by: INTERNAL MEDICINE

## 2024-03-19 PROCEDURE — 99285 EMERGENCY DEPT VISIT HI MDM: CPT

## 2024-03-19 PROCEDURE — 96375 TX/PRO/DX INJ NEW DRUG ADDON: CPT

## 2024-03-19 PROCEDURE — 25010000002 ONDANSETRON PER 1 MG: Performed by: EMERGENCY MEDICINE

## 2024-03-19 PROCEDURE — 25810000003 LACTATED RINGERS SOLUTION: Performed by: EMERGENCY MEDICINE

## 2024-03-19 PROCEDURE — 85652 RBC SED RATE AUTOMATED: CPT | Performed by: EMERGENCY MEDICINE

## 2024-03-19 PROCEDURE — 86140 C-REACTIVE PROTEIN: CPT | Performed by: EMERGENCY MEDICINE

## 2024-03-19 PROCEDURE — 96376 TX/PRO/DX INJ SAME DRUG ADON: CPT

## 2024-03-19 PROCEDURE — 25010000002 HYDROMORPHONE PER 4 MG: Performed by: EMERGENCY MEDICINE

## 2024-03-19 RX ORDER — ALBUTEROL SULFATE 2.5 MG/3ML
2.5 SOLUTION RESPIRATORY (INHALATION) EVERY 6 HOURS PRN
Status: DISCONTINUED | OUTPATIENT
Start: 2024-03-19 | End: 2024-03-21 | Stop reason: HOSPADM

## 2024-03-19 RX ORDER — SODIUM CHLORIDE 9 MG/ML
75 INJECTION, SOLUTION INTRAVENOUS CONTINUOUS
Status: DISCONTINUED | OUTPATIENT
Start: 2024-03-19 | End: 2024-03-20

## 2024-03-19 RX ORDER — FLUOXETINE HYDROCHLORIDE 20 MG/1
40 CAPSULE ORAL DAILY
Status: DISCONTINUED | OUTPATIENT
Start: 2024-03-20 | End: 2024-03-21 | Stop reason: HOSPADM

## 2024-03-19 RX ORDER — ONDANSETRON 2 MG/ML
4 INJECTION INTRAMUSCULAR; INTRAVENOUS ONCE
Status: COMPLETED | OUTPATIENT
Start: 2024-03-19 | End: 2024-03-19

## 2024-03-19 RX ORDER — ACETAMINOPHEN 325 MG/1
650 TABLET ORAL EVERY 4 HOURS PRN
Status: DISCONTINUED | OUTPATIENT
Start: 2024-03-19 | End: 2024-03-21 | Stop reason: HOSPADM

## 2024-03-19 RX ORDER — POLYETHYLENE GLYCOL 3350 17 G/17G
17 POWDER, FOR SOLUTION ORAL DAILY PRN
Status: DISCONTINUED | OUTPATIENT
Start: 2024-03-19 | End: 2024-03-21 | Stop reason: HOSPADM

## 2024-03-19 RX ORDER — AMOXICILLIN 250 MG
2 CAPSULE ORAL 2 TIMES DAILY PRN
Status: DISCONTINUED | OUTPATIENT
Start: 2024-03-19 | End: 2024-03-21 | Stop reason: HOSPADM

## 2024-03-19 RX ORDER — HYDROMORPHONE HYDROCHLORIDE 1 MG/ML
0.5 INJECTION, SOLUTION INTRAMUSCULAR; INTRAVENOUS; SUBCUTANEOUS EVERY 4 HOURS PRN
Status: DISCONTINUED | OUTPATIENT
Start: 2024-03-19 | End: 2024-03-20

## 2024-03-19 RX ORDER — HYDROMORPHONE HYDROCHLORIDE 1 MG/ML
0.5 INJECTION, SOLUTION INTRAMUSCULAR; INTRAVENOUS; SUBCUTANEOUS ONCE
Status: COMPLETED | OUTPATIENT
Start: 2024-03-19 | End: 2024-03-19

## 2024-03-19 RX ORDER — RIBOFLAVIN (VITAMIN B2) 100 MG
400 TABLET ORAL DAILY
Status: DISCONTINUED | OUTPATIENT
Start: 2024-03-20 | End: 2024-03-21 | Stop reason: HOSPADM

## 2024-03-19 RX ORDER — ACETAMINOPHEN 650 MG/1
650 SUPPOSITORY RECTAL EVERY 4 HOURS PRN
Status: DISCONTINUED | OUTPATIENT
Start: 2024-03-19 | End: 2024-03-21 | Stop reason: HOSPADM

## 2024-03-19 RX ORDER — FAMOTIDINE 20 MG/1
20 TABLET, FILM COATED ORAL DAILY
Status: DISCONTINUED | OUTPATIENT
Start: 2024-03-20 | End: 2024-03-21 | Stop reason: HOSPADM

## 2024-03-19 RX ORDER — TOPIRAMATE 50 MG/1
50 TABLET, FILM COATED ORAL 2 TIMES DAILY
Status: DISCONTINUED | OUTPATIENT
Start: 2024-03-19 | End: 2024-03-21 | Stop reason: HOSPADM

## 2024-03-19 RX ORDER — BISACODYL 10 MG
10 SUPPOSITORY, RECTAL RECTAL DAILY PRN
Status: DISCONTINUED | OUTPATIENT
Start: 2024-03-19 | End: 2024-03-21 | Stop reason: HOSPADM

## 2024-03-19 RX ORDER — ACETAMINOPHEN 160 MG/5ML
650 SOLUTION ORAL EVERY 4 HOURS PRN
Status: DISCONTINUED | OUTPATIENT
Start: 2024-03-19 | End: 2024-03-21 | Stop reason: HOSPADM

## 2024-03-19 RX ORDER — ONDANSETRON 2 MG/ML
4 INJECTION INTRAMUSCULAR; INTRAVENOUS EVERY 6 HOURS PRN
Status: DISCONTINUED | OUTPATIENT
Start: 2024-03-19 | End: 2024-03-21

## 2024-03-19 RX ORDER — GABAPENTIN 300 MG/1
300 CAPSULE ORAL 3 TIMES DAILY
Status: DISCONTINUED | OUTPATIENT
Start: 2024-03-19 | End: 2024-03-21 | Stop reason: HOSPADM

## 2024-03-19 RX ORDER — BISACODYL 5 MG/1
5 TABLET, DELAYED RELEASE ORAL DAILY PRN
Status: DISCONTINUED | OUTPATIENT
Start: 2024-03-19 | End: 2024-03-21 | Stop reason: HOSPADM

## 2024-03-19 RX ORDER — LEVOTHYROXINE SODIUM 88 UG/1
88 TABLET ORAL
Status: DISCONTINUED | OUTPATIENT
Start: 2024-03-20 | End: 2024-03-21 | Stop reason: HOSPADM

## 2024-03-19 RX ORDER — SODIUM CHLORIDE 0.9 % (FLUSH) 0.9 %
10 SYRINGE (ML) INJECTION AS NEEDED
Status: DISCONTINUED | OUTPATIENT
Start: 2024-03-19 | End: 2024-03-21 | Stop reason: HOSPADM

## 2024-03-19 RX ADMIN — ONDANSETRON 4 MG: 2 INJECTION INTRAMUSCULAR; INTRAVENOUS at 18:30

## 2024-03-19 RX ADMIN — GABAPENTIN 300 MG: 300 CAPSULE ORAL at 22:14

## 2024-03-19 RX ADMIN — TOPIRAMATE 50 MG: 50 TABLET, FILM COATED ORAL at 23:29

## 2024-03-19 RX ADMIN — SODIUM CHLORIDE 75 ML/HR: 9 INJECTION, SOLUTION INTRAVENOUS at 22:14

## 2024-03-19 RX ADMIN — HYDROMORPHONE HYDROCHLORIDE 0.5 MG: 1 INJECTION, SOLUTION INTRAMUSCULAR; INTRAVENOUS; SUBCUTANEOUS at 18:30

## 2024-03-19 RX ADMIN — SODIUM CHLORIDE, POTASSIUM CHLORIDE, SODIUM LACTATE AND CALCIUM CHLORIDE 1000 ML: 600; 310; 30; 20 INJECTION, SOLUTION INTRAVENOUS at 18:29

## 2024-03-19 RX ADMIN — HYDROMORPHONE HYDROCHLORIDE 0.5 MG: 1 INJECTION, SOLUTION INTRAMUSCULAR; INTRAVENOUS; SUBCUTANEOUS at 22:18

## 2024-03-19 NOTE — ED NOTES
"Nursing report ED to floor  Maame Wallace  33 y.o.  female    HPI :  HPI (Adult)  Stated Reason for Visit: back pain shooting down legs to groin, nausea, loss of bowels x1 (a few days ago)  History Obtained From: patient    Chief Complaint  Chief Complaint   Patient presents with    Back Pain       Admitting doctor:   Airam Hunter MD    Admitting diagnosis:   The primary encounter diagnosis was Intractable back pain. Diagnoses of Numbness and tingling of foot-bilateral and Back pain with history of spinal surgery were also pertinent to this visit.    Code status:   Current Code Status       Date Active Code Status Order ID Comments User Context       3/19/2024 1825 CPR (Attempt to Resuscitate) [id number]  Airam Hunter MD ED        Question Answer    Code Status (Patient has no pulse and is not breathing) CPR (Attempt to Resuscitate)    Medical Interventions (Patient has pulse or is breathing) Full Support                    Allergies:   Acetazolamide, Adhesive tape, Duloxetine hcl, Codeine, Oxycodone, Chlorhexidine, Dhea [nutritional supplements], Imitrex [sumatriptan], and Morphine    Isolation:   No active isolations    Intake and Output  No intake or output data in the 24 hours ending 03/19/24 1831    Weight:       03/19/24  1746   Weight: 116 kg (255 lb 11.7 oz)       Most recent vitals:   Vitals:    03/19/24 1701 03/19/24 1746 03/19/24 1754 03/19/24 1756   BP: 143/74  114/51 116/73   BP Location: Left arm      Patient Position: Sitting      Pulse: (!) 126  116 108   Resp:    18   Temp:       TempSrc:       SpO2:   99% 96%   Weight:  116 kg (255 lb 11.7 oz)     Height:  167.6 cm (66\")         Active LDAs/IV Access:   Lines, Drains & Airways       Active LDAs       Name Placement date Placement time Site Days    Peripheral IV 03/19/24 1828 Anterior;Left Forearm 03/19/24 1828  Forearm  less than 1                    Labs (abnormal labs have a star):   Labs Reviewed   COMPREHENSIVE METABOLIC " PANEL   HCG, SERUM, QUALITATIVE   URINALYSIS W/ MICROSCOPIC IF INDICATED (NO CULTURE)   SEDIMENTATION RATE   C-REACTIVE PROTEIN   CBC WITH AUTO DIFFERENTIAL   CBC AND DIFFERENTIAL    Narrative:     The following orders were created for panel order CBC & Differential.  Procedure                               Abnormality         Status                     ---------                               -----------         ------                     CBC Auto Differential[[id number]]                                                         Please view results for these tests on the individual orders.       EKG:   No orders to display       Meds given in ED:   Medications   sodium chloride 0.9 % flush 10 mL (has no administration in time range)   lactated ringers bolus 1,000 mL (1,000 mL Intravenous New Bag 3/19/24 1829)   acetaminophen (TYLENOL) tablet 650 mg (has no administration in time range)     Or   acetaminophen (TYLENOL) 160 MG/5ML oral solution 650 mg (has no administration in time range)     Or   acetaminophen (TYLENOL) suppository 650 mg (has no administration in time range)   ondansetron (ZOFRAN) injection 4 mg (has no administration in time range)   sennosides-docusate (PERICOLACE) 8.6-50 MG per tablet 2 tablet (has no administration in time range)     And   polyethylene glycol (MIRALAX) packet 17 g (has no administration in time range)     And   bisacodyl (DULCOLAX) EC tablet 5 mg (has no administration in time range)     And   bisacodyl (DULCOLAX) suppository 10 mg (has no administration in time range)   ondansetron (ZOFRAN) injection 4 mg (4 mg Intravenous Given 3/19/24 1830)   HYDROmorphone (DILAUDID) injection 0.5 mg (0.5 mg Intravenous Given 3/19/24 1830)       Imaging results:  No radiology results for the last day    Ambulatory status:   - assist    Social issues:   Social History     Socioeconomic History    Marital status: Single   Tobacco Use    Smoking status: Never     Passive exposure: Never     Smokeless tobacco: Never   Vaping Use    Vaping status: Never Used   Substance and Sexual Activity    Alcohol use: Yes     Alcohol/week: 1.0 standard drink of alcohol     Types: 1 Cans of beer per week     Comment: Social drinker    Drug use: Yes     Frequency: 5.0 times per week     Types: Marijuana    Sexual activity: Not Currently     Partners: Male     Birth control/protection: Condom, Coitus interruptus       Peripheral Neurovascular       Neuro Cognitive  Neuro Cognitive (Adult)  Cognitive/Neuro/Behavioral WDL: WDL    Learning  Learning Assessment (Adult)  Learning Readiness and Ability: no barriers identified    Respiratory       Abdominal Pain       Pain Assessments  Pain (Adult)  (0-10) Pain Rating: Rest: 10    NIH Stroke Scale       Adry Milan, RN  03/19/24 18:31 EDT

## 2024-03-19 NOTE — ED NOTES
Pt to Er via PV with c/o back pain. Pt states she recently had surgery and has been experiencing increasing pain since. States pain shoots down both her legs, with tingling to feet and some pain to groin. Pt also reports a few days ago she had x1 instance of bowel incontinence when going from sitting to standing.     Pt seen recently for same pain.

## 2024-03-19 NOTE — ED PROVIDER NOTES
EMERGENCY DEPARTMENT ENCOUNTER    Room Number:  12/12  Date seen:  3/19/2024  PCP: Sirisha Aparicio MD  Historian: Patient      HPI:  Chief Complaint: Back pain  Context: Maame Wallace is a 33 y.o. female who presents to the ED c/o increasing back pain since surgery for a tethered cord that was performed in [place] in February.  The patient was admitted to our hospital at the end of February with a fluid collection in her postoperative region that required no acute intervention.  She is felt to have a spinal headache and received a blood patch.  Patient states that since that admission she still has not done well.  She was seen back here approximate 1 week ago and discharged.  She has been in contact with her surgeon in [place] who wants her now to get an MRI of her T-spine and an EMG.  The patient states that several days ago when she stood up that she lost control of her bowels.  She denies any bladder incontinence.  She does states she has had some numbness and tingling to both feet but has been able to walk.  She states she has had some saddle paresthesias as well.  She denies fevers or chills.  Patient states she spoke with her surgeon in [place] again today and feels she needs further workup at this time      PAST MEDICAL HISTORY  Active Ambulatory Problems     Diagnosis Date Noted    Hemiplegic migraine, intractable 04/25/2016    Fibromyalgia 04/29/2016    Major depressive disorder, recurrent, severe without psychotic features 02/19/2020    Vaginal yeast infection 02/20/2020    Leukocytosis 02/20/2020    Hyponatremia 02/20/2020    Hypothyroidism 02/20/2020    Irritable bowel disease 02/20/2020    Obesity (BMI 30-39.9) 02/20/2020    Suicide attempt by drug ingestion 03/18/2020    Obesity, Class III, BMI 40-49.9 (morbid obesity) 03/18/2020    Fatty liver 01/18/2021    Elevated lithium level 04/03/2021    Polysubstance overdose 04/03/2021    COVID-19 virus detected 04/03/2021    Major depressive  disorder, recurrent severe without psychotic features 04/27/2021    History of COVID-19 04/27/2021    Anxiety 04/19/2022    Arnold-Chiari malformation, type I 06/21/2023    OA (osteoarthritis) 01/10/2024    Asthma 05/07/2012    Bilateral myopia 05/09/2023    Chronic interstitial cystitis 01/10/2024    Gastroesophageal reflux disease 06/14/2012    Benign intracranial hypertension 06/14/2012    Migraine without aura 10/05/2012    Seasonal allergies 01/10/2024    Tethered cord 09/05/2023    Vitamin D deficiency 03/04/2021    Vitreous floaters 05/09/2023    Chronic neck pain 01/10/2024    Fluid collection at surgical site, initial encounter 02/21/2024     Resolved Ambulatory Problems     Diagnosis Date Noted    No Resolved Ambulatory Problems     Past Medical History:   Diagnosis Date    Cholelithiasis     Depression     Fibromyalgia, primary     GERD (gastroesophageal reflux disease)     Headache, tension-type     Migraines     Suicide attempt 04/02/2021         REVIEW OF SYSTEMS  All systems reviewed and negative except for those discussed in HPI.       PAST SURGICAL HISTORY  Past Surgical History:   Procedure Laterality Date    APPENDECTOMY      laparoscopic    BRAIN SURGERY      CHOLECYSTECTOMY      COLONOSCOPY      CRANIOTOMY  1/11/23 and 7/24/23    Chiari decompression    KNEE ARTHROSCOPY Left     KNEE SURGERY Left     LAMINECTOMY  1/11/23 and 7/24/23    Chiari decompression    PATELLA FEMORAL LIGAMENT RECONSTRUCTION Left     SHOULDER SURGERY Left     SPINE SURGERY      TONSILLECTOMY AND ADENOIDECTOMY      UPPER GASTROINTESTINAL ENDOSCOPY           FAMILY HISTORY  Family History   Problem Relation Age of Onset    Irritable bowel syndrome Mother     Celiac disease Mother     Migraines Mother     Crohn's disease Brother     Alcohol abuse Paternal Grandfather     Colon cancer Neg Hx     Colon polyps Neg Hx     Ulcerative colitis Neg Hx          SOCIAL HISTORY  Social History     Socioeconomic History    Marital  status: Single   Tobacco Use    Smoking status: Never     Passive exposure: Never    Smokeless tobacco: Never   Vaping Use    Vaping status: Never Used   Substance and Sexual Activity    Alcohol use: Yes     Alcohol/week: 1.0 standard drink of alcohol     Types: 1 Cans of beer per week     Comment: Social drinker    Drug use: Yes     Frequency: 5.0 times per week     Types: Marijuana    Sexual activity: Not Currently     Partners: Male     Birth control/protection: Condom, Coitus interruptus         ALLERGIES  Acetazolamide, Adhesive tape, Duloxetine hcl, Codeine, Oxycodone, Chlorhexidine, Dhea [nutritional supplements], Imitrex [sumatriptan], and Morphine      PHYSICAL EXAM  ED Triage Vitals   Temp Heart Rate Resp BP SpO2   03/19/24 1657 03/19/24 1657 03/19/24 1657 03/19/24 1701 03/19/24 1657   98.8 °F (37.1 °C) (!) 141 18 143/74 96 %      Temp src Heart Rate Source Patient Position BP Location FiO2 (%)   03/19/24 1657 -- 03/19/24 1701 03/19/24 1701 --   Tympanic  Sitting Left arm        Physical Exam      GENERAL: 33-year-old female in no acute distress  HENT: NCAT: nares patent: Neck supple  EYES: no scleral icterus  CV: regular rhythm, normal rate  RESPIRATORY: normal effort  ABDOMEN: soft, NTND: Bowel sounds positive  MUSCULOSKELETAL: no deformity: No pedal edema or calf tenderness  NEURO: alert oriented x 3: 5/5 strength in bilateral lower extremities in proximal and distal muscles.  She has decreased sensation bilateral feet.  Patient denies paresthesias and in her thighs.  Currently patient is able to control her bowel and bladder.  PSYCH:  calm, cooperative  SKIN: warm, dry    Vital signs and nursing notes reviewed.      LAB RESULTS  Recent Results (from the past 24 hour(s))   Comprehensive Metabolic Panel    Collection Time: 03/19/24  6:26 PM    Specimen: Blood   Result Value Ref Range    Glucose 96 65 - 99 mg/dL    BUN 8 6 - 20 mg/dL    Creatinine 0.81 0.57 - 1.00 mg/dL    Sodium 138 136 - 145 mmol/L     Potassium 4.0 3.5 - 5.2 mmol/L    Chloride 105 98 - 107 mmol/L    CO2 21.1 (L) 22.0 - 29.0 mmol/L    Calcium 9.0 8.6 - 10.5 mg/dL    Total Protein 7.8 6.0 - 8.5 g/dL    Albumin 4.3 3.5 - 5.2 g/dL    ALT (SGPT) 68 (H) 1 - 33 U/L    AST (SGOT) 30 1 - 32 U/L    Alkaline Phosphatase 93 39 - 117 U/L    Total Bilirubin <0.2 0.0 - 1.2 mg/dL    Globulin 3.5 gm/dL    A/G Ratio 1.2 g/dL    BUN/Creatinine Ratio 9.9 7.0 - 25.0    Anion Gap 11.9 5.0 - 15.0 mmol/L    eGFR 99.7 >60.0 mL/min/1.73   hCG, Serum, Qualitative    Collection Time: 03/19/24  6:26 PM    Specimen: Blood   Result Value Ref Range    HCG Qualitative Negative Negative   Sedimentation Rate    Collection Time: 03/19/24  6:26 PM    Specimen: Blood   Result Value Ref Range    Sed Rate 26 (H) 0 - 20 mm/hr   C-reactive Protein    Collection Time: 03/19/24  6:26 PM    Specimen: Blood   Result Value Ref Range    C-Reactive Protein 0.42 0.00 - 0.50 mg/dL   CBC Auto Differential    Collection Time: 03/19/24  6:26 PM    Specimen: Blood   Result Value Ref Range    WBC 11.55 (H) 3.40 - 10.80 10*3/mm3    RBC 4.84 3.77 - 5.28 10*6/mm3    Hemoglobin 14.3 12.0 - 15.9 g/dL    Hematocrit 42.8 34.0 - 46.6 %    MCV 88.4 79.0 - 97.0 fL    MCH 29.5 26.6 - 33.0 pg    MCHC 33.4 31.5 - 35.7 g/dL    RDW 14.1 12.3 - 15.4 %    RDW-SD 45.4 37.0 - 54.0 fl    MPV 9.4 6.0 - 12.0 fL    Platelets 324 140 - 450 10*3/mm3    Neutrophil % 62.8 42.7 - 76.0 %    Lymphocyte % 28.9 19.6 - 45.3 %    Monocyte % 5.6 5.0 - 12.0 %    Eosinophil % 1.4 0.3 - 6.2 %    Basophil % 0.9 0.0 - 1.5 %    Immature Grans % 0.4 0.0 - 0.5 %    Neutrophils, Absolute 7.25 (H) 1.70 - 7.00 10*3/mm3    Lymphocytes, Absolute 3.34 (H) 0.70 - 3.10 10*3/mm3    Monocytes, Absolute 0.65 0.10 - 0.90 10*3/mm3    Eosinophils, Absolute 0.16 0.00 - 0.40 10*3/mm3    Basophils, Absolute 0.10 0.00 - 0.20 10*3/mm3    Immature Grans, Absolute 0.05 0.00 - 0.05 10*3/mm3    nRBC 0.0 0.0 - 0.2 /100 WBC       Ordered the above labs and reviewed  the results.        RADIOLOGY  No Radiology Exams Resulted Within Past 24 Hours    Ordered the above noted radiological studies. Reviewed by me in PACS.            PROCEDURES  Procedures          MEDICATIONS GIVEN IN ER  Medications   sodium chloride 0.9 % flush 10 mL (has no administration in time range)   acetaminophen (TYLENOL) tablet 650 mg (has no administration in time range)     Or   acetaminophen (TYLENOL) 160 MG/5ML oral solution 650 mg (has no administration in time range)     Or   acetaminophen (TYLENOL) suppository 650 mg (has no administration in time range)   ondansetron (ZOFRAN) injection 4 mg (has no administration in time range)   sennosides-docusate (PERICOLACE) 8.6-50 MG per tablet 2 tablet (has no administration in time range)     And   polyethylene glycol (MIRALAX) packet 17 g (has no administration in time range)     And   bisacodyl (DULCOLAX) EC tablet 5 mg (has no administration in time range)     And   bisacodyl (DULCOLAX) suppository 10 mg (has no administration in time range)   lactated ringers bolus 1,000 mL (1,000 mL Intravenous New Bag 3/19/24 1829)   ondansetron (ZOFRAN) injection 4 mg (4 mg Intravenous Given 3/19/24 1830)   HYDROmorphone (DILAUDID) injection 0.5 mg (0.5 mg Intravenous Given 3/19/24 1830)             MEDICAL DECISION MAKING, PROGRESS, and CONSULTS    All labs have been independently reviewed by me.  All radiology studies have been reviewed by me and I have also reviewed the radiology report.   EKG's independently viewed and interpreted by me.  Discussion below represents my analysis of pertinent findings related to patient's condition, differential diagnosis, treatment plan and final disposition.      Additional sources:    - External (non-ED) record review: I reviewed the patient's admission in the hospital from 2/21 through 2/29 where she was seen for a fluid collection at the surgical site after having a tethered cord decompression in [place] several weeks prior.   Neurosurgery saw the patient did not feel she needed any surgical intervention other than conservative measures.    - Chronic or social conditions impacting care: Patient lives here in [place]    - Shared decision making: After shared decision-making discussion with myself and the patient we agree she needs to be admitted to the hospital for further evaluation and care      Orders placed during this visit:  Orders Placed This Encounter   Procedures    Comprehensive Metabolic Panel    hCG, Serum, Qualitative    Urinalysis With Microscopic If Indicated (No Culture) - Urine, Clean Catch    Sedimentation Rate    C-reactive Protein    CBC Auto Differential    Basic Metabolic Panel    CBC Auto Differential    Diet: Regular/House; Fluid Consistency: Thin (IDDSI 0)    Vital Signs    Up With Assistance    Intake & Output    Oral Care    Place Sequential Compression Device    Maintain Sequential Compression Device    Code Status and Medical Interventions:    LHA (on-call MD unless specified) Details    Insert Peripheral IV    Initiate Observation Status    CBC & Differential         Differential diagnosis:  My differential diagnosis includes but is not limited to postoperative complication, seroma, hematoma, epidural abscess, chronic back pain or chronic neurologic changes      Independent interpretation of labs, radiology studies, and discussions with consultants:  ED Course as of 03/19/24 1931   Tue Mar 19, 2024   1805 I will treat the patient's pain and obtain labs, however I believe she will need to be admitted to the hospital for MRI of the T and L-spine with and without contrast for further evaluation and care.  Thus I will consult the hospitalist who admitted her last month. [GP]   1812 I discussed the case with Dr. Hunter from [facility].  I reviewed the patient's history, recent surgery and recent admission.  I advised her the patient spoken with her surgeon in [place] today and needs admission to the hospital for an  MRI of the T and L-spine with and without contrast and possible EMG with neurology consultation.  She will admit the patient to the hospital for further evaluation and care. [GP]   1911 The patient's white count is 11.5 with a mildly elevated sed rate at 26 and normal CRP.  She is aware that she has been admitted to the hospital for further evaluation and care. [GP]   1930 Dr. Hunter is in the emergency room to see the patient and assume care [GP]      ED Course User Index  [GP] Elliot Amos MD               DIAGNOSIS  Final diagnoses:   Intractable back pain   Numbness and tingling of foot-bilateral   Back pain with history of spinal surgery         DISPOSITION  ADMISSION    Discussed treatment plan and reason for admission with pt/family and admitting physician.  Pt/family voiced understanding of the plan for admission for further testing/treatment as needed.            Latest Documented Vital Signs:  As of 19:31 EDT  BP- 116/73 HR- 108 Temp- 98.8 °F (37.1 °C) (Tympanic) O2 sat- 96%--      --------------------  Please note that portions of this were completed with a voice recognition program.       Note Disclaimer: At [facility], we believe that sharing information builds trust and better relationships. You are receiving this note because you are receiving care at [facility] or recently visited. It is possible you will see health information before a provider has talked with you about it. This kind of information can be easy to misunderstand. To help you fully understand what it means for your health, we urge you to discuss this note with your provider.             Elliot Amos MD  03/19/24 1913       Elliot Amos MD  03/19/24 1931

## 2024-03-20 ENCOUNTER — APPOINTMENT (OUTPATIENT)
Dept: MRI IMAGING | Facility: HOSPITAL | Age: 34
End: 2024-03-20
Payer: COMMERCIAL

## 2024-03-20 LAB
ANION GAP SERPL CALCULATED.3IONS-SCNC: 13 MMOL/L (ref 5–15)
BASOPHILS # BLD AUTO: 0.08 10*3/MM3 (ref 0–0.2)
BASOPHILS NFR BLD AUTO: 1.1 % (ref 0–1.5)
BUN SERPL-MCNC: 7 MG/DL (ref 6–20)
BUN/CREAT SERPL: 9.7 (ref 7–25)
CALCIUM SPEC-SCNC: 7.9 MG/DL (ref 8.6–10.5)
CHLORIDE SERPL-SCNC: 109 MMOL/L (ref 98–107)
CO2 SERPL-SCNC: 16 MMOL/L (ref 22–29)
CREAT SERPL-MCNC: 0.72 MG/DL (ref 0.57–1)
DEPRECATED RDW RBC AUTO: 43.3 FL (ref 37–54)
EGFRCR SERPLBLD CKD-EPI 2021: 114.8 ML/MIN/1.73
EOSINOPHIL # BLD AUTO: 0.21 10*3/MM3 (ref 0–0.4)
EOSINOPHIL NFR BLD AUTO: 2.8 % (ref 0.3–6.2)
ERYTHROCYTE [DISTWIDTH] IN BLOOD BY AUTOMATED COUNT: 13.2 % (ref 12.3–15.4)
GLUCOSE SERPL-MCNC: 94 MG/DL (ref 65–99)
HCT VFR BLD AUTO: 39.2 % (ref 34–46.6)
HGB BLD-MCNC: 12.4 G/DL (ref 12–15.9)
IMM GRANULOCYTES # BLD AUTO: 0.04 10*3/MM3 (ref 0–0.05)
IMM GRANULOCYTES NFR BLD AUTO: 0.5 % (ref 0–0.5)
LYMPHOCYTES # BLD AUTO: 3.23 10*3/MM3 (ref 0.7–3.1)
LYMPHOCYTES NFR BLD AUTO: 43.2 % (ref 19.6–45.3)
MCH RBC QN AUTO: 28.2 PG (ref 26.6–33)
MCHC RBC AUTO-ENTMCNC: 31.6 G/DL (ref 31.5–35.7)
MCV RBC AUTO: 89.3 FL (ref 79–97)
MONOCYTES # BLD AUTO: 0.58 10*3/MM3 (ref 0.1–0.9)
MONOCYTES NFR BLD AUTO: 7.8 % (ref 5–12)
NEUTROPHILS NFR BLD AUTO: 3.33 10*3/MM3 (ref 1.7–7)
NEUTROPHILS NFR BLD AUTO: 44.6 % (ref 42.7–76)
NRBC BLD AUTO-RTO: 0 /100 WBC (ref 0–0.2)
PLATELET # BLD AUTO: 186 10*3/MM3 (ref 140–450)
PMV BLD AUTO: 10.5 FL (ref 6–12)
POTASSIUM SERPL-SCNC: 4.5 MMOL/L (ref 3.5–5.2)
RBC # BLD AUTO: 4.39 10*6/MM3 (ref 3.77–5.28)
SODIUM SERPL-SCNC: 138 MMOL/L (ref 136–145)
WBC NRBC COR # BLD AUTO: 7.47 10*3/MM3 (ref 3.4–10.8)

## 2024-03-20 PROCEDURE — 96376 TX/PRO/DX INJ SAME DRUG ADON: CPT

## 2024-03-20 PROCEDURE — 36415 COLL VENOUS BLD VENIPUNCTURE: CPT | Performed by: INTERNAL MEDICINE

## 2024-03-20 PROCEDURE — 25010000002 HYDROMORPHONE PER 4 MG: Performed by: INTERNAL MEDICINE

## 2024-03-20 PROCEDURE — 0 GADOBENATE DIMEGLUMINE 529 MG/ML SOLUTION: Performed by: INTERNAL MEDICINE

## 2024-03-20 PROCEDURE — G0378 HOSPITAL OBSERVATION PER HR: HCPCS

## 2024-03-20 PROCEDURE — 72158 MRI LUMBAR SPINE W/O & W/DYE: CPT

## 2024-03-20 PROCEDURE — 25010000002 ONDANSETRON PER 1 MG: Performed by: INTERNAL MEDICINE

## 2024-03-20 PROCEDURE — A9577 INJ MULTIHANCE: HCPCS | Performed by: INTERNAL MEDICINE

## 2024-03-20 PROCEDURE — 80048 BASIC METABOLIC PNL TOTAL CA: CPT | Performed by: INTERNAL MEDICINE

## 2024-03-20 PROCEDURE — 72157 MRI CHEST SPINE W/O & W/DYE: CPT

## 2024-03-20 PROCEDURE — 85025 COMPLETE CBC W/AUTO DIFF WBC: CPT | Performed by: INTERNAL MEDICINE

## 2024-03-20 RX ORDER — MELATONIN
2000 DAILY
Status: DISCONTINUED | OUTPATIENT
Start: 2024-03-20 | End: 2024-03-21 | Stop reason: HOSPADM

## 2024-03-20 RX ORDER — CELECOXIB 200 MG/1
200 CAPSULE ORAL EVERY 12 HOURS SCHEDULED
Status: DISCONTINUED | OUTPATIENT
Start: 2024-03-20 | End: 2024-03-21

## 2024-03-20 RX ORDER — HYDROCODONE BITARTRATE AND ACETAMINOPHEN 10; 325 MG/1; MG/1
1 TABLET ORAL EVERY 4 HOURS PRN
Status: DISCONTINUED | OUTPATIENT
Start: 2024-03-20 | End: 2024-03-21 | Stop reason: HOSPADM

## 2024-03-20 RX ORDER — HYDROMORPHONE HYDROCHLORIDE 1 MG/ML
0.25 INJECTION, SOLUTION INTRAMUSCULAR; INTRAVENOUS; SUBCUTANEOUS EVERY 4 HOURS PRN
Status: DISCONTINUED | OUTPATIENT
Start: 2024-03-20 | End: 2024-03-21

## 2024-03-20 RX ADMIN — HYDROMORPHONE HYDROCHLORIDE 0.5 MG: 1 INJECTION, SOLUTION INTRAMUSCULAR; INTRAVENOUS; SUBCUTANEOUS at 03:02

## 2024-03-20 RX ADMIN — CELECOXIB 200 MG: 200 CAPSULE ORAL at 20:21

## 2024-03-20 RX ADMIN — GABAPENTIN 300 MG: 300 CAPSULE ORAL at 08:24

## 2024-03-20 RX ADMIN — Medication 400 MG: at 08:24

## 2024-03-20 RX ADMIN — HYDROCODONE BITARTRATE AND ACETAMINOPHEN 1 TABLET: 10; 325 TABLET ORAL at 19:35

## 2024-03-20 RX ADMIN — ONDANSETRON 4 MG: 2 INJECTION INTRAMUSCULAR; INTRAVENOUS at 19:37

## 2024-03-20 RX ADMIN — GABAPENTIN 300 MG: 300 CAPSULE ORAL at 16:55

## 2024-03-20 RX ADMIN — TOPIRAMATE 50 MG: 50 TABLET, FILM COATED ORAL at 08:25

## 2024-03-20 RX ADMIN — ONDANSETRON 4 MG: 2 INJECTION INTRAMUSCULAR; INTRAVENOUS at 07:41

## 2024-03-20 RX ADMIN — LEVOTHYROXINE SODIUM 88 MCG: 88 TABLET ORAL at 05:32

## 2024-03-20 RX ADMIN — FAMOTIDINE 20 MG: 20 TABLET, FILM COATED ORAL at 08:25

## 2024-03-20 RX ADMIN — HYDROMORPHONE HYDROCHLORIDE 0.25 MG: 1 INJECTION, SOLUTION INTRAMUSCULAR; INTRAVENOUS; SUBCUTANEOUS at 23:01

## 2024-03-20 RX ADMIN — HYDROMORPHONE HYDROCHLORIDE 0.5 MG: 1 INJECTION, SOLUTION INTRAMUSCULAR; INTRAVENOUS; SUBCUTANEOUS at 07:33

## 2024-03-20 RX ADMIN — FLUOXETINE HYDROCHLORIDE 40 MG: 20 CAPSULE ORAL at 08:25

## 2024-03-20 RX ADMIN — TOPIRAMATE 50 MG: 50 TABLET, FILM COATED ORAL at 20:21

## 2024-03-20 RX ADMIN — GABAPENTIN 300 MG: 300 CAPSULE ORAL at 20:21

## 2024-03-20 RX ADMIN — CELECOXIB 200 MG: 200 CAPSULE ORAL at 09:54

## 2024-03-20 RX ADMIN — HYDROCODONE BITARTRATE AND ACETAMINOPHEN 1 TABLET: 10; 325 TABLET ORAL at 11:42

## 2024-03-20 RX ADMIN — HYDROCODONE BITARTRATE AND ACETAMINOPHEN 1 TABLET: 10; 325 TABLET ORAL at 15:31

## 2024-03-20 RX ADMIN — Medication 2000 UNITS: at 08:24

## 2024-03-20 RX ADMIN — GADOBENATE DIMEGLUMINE 20 ML: 529 INJECTION, SOLUTION INTRAVENOUS at 18:36

## 2024-03-20 NOTE — PLAN OF CARE
Goal Outcome Evaluation:  Plan of Care Reviewed With: patient        Progress: no change  Outcome Evaluation: VSS. c/o low back pain.IV dilaudid given.  Neuro consult for today.  MRI ordered, screening sheet faxed last night.  IVF infusing. Labs ordered this am

## 2024-03-20 NOTE — CONSULTS
Discussed with Dr. Frazier. I will defer this patient's evaluation and management to neurosurgery for now. I spoke to that team earlier today. I am happy to see her if they think we can add to her evaluation.

## 2024-03-20 NOTE — CASE MANAGEMENT/SOCIAL WORK
Discharge Planning Assessment  [facility]     Patient Name: Maame Wallace  MRN: [id number]  Today's Date: 3/20/2024    Admit Date: 3/19/2024    Plan: Home with family pending hospital course   Discharge Needs Assessment       Row Name 03/20/24 1044       Living Environment    People in Home parent(s);sibling(s)    Current Living Arrangements home    Potentially Unsafe Housing Conditions none    In the past 12 months has the electric, gas, oil, or water company threatened to shut off services in your home? No    Primary Care Provided by self    Provides Primary Care For no one    Family Caregiver if Needed parent(s)    Family Caregiver Names Verónica [phone]    Quality of Family Relationships involved;helpful;supportive    Able to Return to Prior Arrangements yes       Resource/Environmental Concerns    Resource/Environmental Concerns none       Transportation Needs    In the past 12 months, has lack of transportation kept you from medical appointments or from getting medications? no    In the past 12 months, has lack of transportation kept you from meetings, work, or from getting things needed for daily living? No       Food Insecurity    Within the past 12 months, you worried that your food would run out before you got the money to buy more. Never true    Within the past 12 months, the food you bought just didn't last and you didn't have money to get more. Never true       Transition Planning    Patient/Family Anticipates Transition to home with family    Patient/Family Anticipated Services at Transition none    Transportation Anticipated family or friend will provide       Discharge Needs Assessment    Readmission Within the Last 30 Days no previous admission in last 30 days    Equipment Currently Used at Home none    Concerns to be Addressed discharge planning    Anticipated Changes Related to Illness none    Equipment Needed After Discharge none                   Discharge Plan       Row Name 03/20/24  1045       Plan    Plan Home with family pending hospital course    Patient/Family in Agreement with Plan yes    Plan Comments Met with patient and mother-Verónica [phone] at bedside, introduced self and explained CCP role. Verified facesheet and PCP-Sirisha Aparicio. Patient lives at home with parents, brother and nephew. Patient was ind with ADLS prior to admissoin, however had been having decreased mobility recently. Home has 3-4 steps to enter. Patient denies h/o HH/SNF and currently uses no DME. Patient uses [facility] on [address] Patient reports no difficulty affording medications. Family will transport at DC. Patient is open to SNF vs HH if needed at DC. CCP to follow. Bishnu ANDRADE RN                  Continued Care and Services - Admitted Since 3/19/2024    No active coordination exists for this encounter.       Expected Discharge Date and Time       Expected Discharge Date Expected Discharge Time    Mar 22, 2024            Demographic Summary       Row Name 03/20/24 1044       General Information    Admission Type observation    Referral Source admission list    Reason for Consult discharge planning    Preferred Language English                   Functional Status       Row Name 03/20/24 1044       Functional Status    Usual Activity Tolerance moderate    Current Activity Tolerance moderate       Functional Status, IADL    Medications independent    Meal Preparation independent    Housekeeping independent    Laundry independent    Shopping assistive person       Mental Status    General Appearance WDL WDL                   Psychosocial    No documentation.                  Abuse/Neglect    No documentation.                  Legal    No documentation.                  Substance Abuse    No documentation.                  Patient Forms    No documentation.                     Bishnu Willard RN

## 2024-03-20 NOTE — H&P
HISTORY AND PHYSICAL   [facility]        Date of Admission: 3/19/2024  Patient Identification:  Name: Maame Wallace  Age: 33 y.o.  Sex: female  :  1990  MRN: [id number]                     Primary Care Physician: Sirisha Aparicio MD    Chief Complaint:  33 year old female presented to the emergency room with back pain after recent back surgery; she has had one episode of fecal incontinence; no fever or chills; no injury; the pain radiates down her legs and to her groin; he surgery was done in [place]; she was admitted here at the end of last month; she spoke with her surgeon who advised her to come to the hospital for further workup    History of Present Illness:   As above    Past Medical History:  Past Medical History:   Diagnosis Date    Anxiety     Asthma     Cholelithiasis     Depression     Fatty liver     Fatty liver 2021    Fibromyalgia     Fibromyalgia, primary     GERD (gastroesophageal reflux disease)     Headache, tension-type     Hypothyroidism     Irritable bowel disease     Migraines     Suicide attempt 2021    purposeful overdose on home medications     Past Surgical History:  Past Surgical History:   Procedure Laterality Date    APPENDECTOMY      laparoscopic    BRAIN SURGERY      CHOLECYSTECTOMY      COLONOSCOPY      CRANIOTOMY  23 and 23    Chiari decompression    KNEE ARTHROSCOPY Left     KNEE SURGERY Left     LAMINECTOMY  23 and 23    Chiari decompression    PATELLA FEMORAL LIGAMENT RECONSTRUCTION Left     SHOULDER SURGERY Left     SPINE SURGERY      TONSILLECTOMY AND ADENOIDECTOMY      UPPER GASTROINTESTINAL ENDOSCOPY        Home Meds:  Medications Prior to Admission   Medication Sig Dispense Refill Last Dose    albuterol sulfate  (90 Base) MCG/ACT inhaler Every 6 (Six) Hours.       ALPRAZolam (XANAX) 0.5 MG tablet Take 1 tablet by mouth 2 (Two) Times a Day As Needed for Anxiety.       cetirizine (zyrTEC) 10 MG tablet Take  1 tablet by mouth Daily.       Dextromethorphan-buPROPion ER (Auvelity)  MG tablet controlled-release Take 1 tablet by mouth.       diclofenac (VOLTAREN) 50 MG EC tablet Take 1 tablet by mouth 2 (Two) Times a Day As Needed (back pain).       Emgality 120 MG/ML auto-injector pen Inject 1 mL under the skin into the appropriate area as directed Every 30 (Thirty) Days for 180 days. (Patient taking differently: Inject 1 mL under the skin into the appropriate area as directed Every 30 (Thirty) Days. Last dose was 03/09/2024) 1 mL 5     famotidine (PEPCID) 20 MG tablet TAKE 1 TABLET BY MOUTH EVERY DAY 90 tablet 1     FLUoxetine (PROzac) 40 MG capsule Take 1 capsule by mouth Daily.       gabapentin (NEURONTIN) 300 MG capsule Take 1 capsule by mouth 3 (Three) Times a Day.       ibuprofen (ADVIL,MOTRIN) 600 MG tablet Take 1 tablet by mouth Every 6 (Six) Hours As Needed for Mild Pain.       lansoprazole (PREVACID) 30 MG capsule 1 CAPSULE BY MOUTH TWICE DAILY 60 capsule 3     levothyroxine (SYNTHROID, LEVOTHROID) 88 MCG tablet Take 1 tablet by mouth Every Morning.       naloxone (NARCAN) 4 MG/0.1ML nasal spray Call 911. Don't prime. [place] in 1 nostril for overdose. Repeat in 2-3 minutes in other nostril if no or minimal breathing/responsiveness. 2 each 0     ondansetron (ZOFRAN) 4 MG tablet Take 1 tablet by mouth Every 6 (Six) Hours As Needed for Nausea or Vomiting.       Rimegepant Sulfate (Nurtec) 75 MG tablet dispersible tablet Take 1 tablet by mouth As Needed (Migraine headache, max 1 dose in 24 hours.) for up to 180 days. 8 tablet 5     topiramate (Topamax) 50 MG tablet Take 1 tablet by mouth 2 (Two) Times a Day for 180 days. 180 tablet 1     Vitamin B-2 (RIBOFLAVIN) 100 MG tablet tablet Take 4 tablets by mouth Daily for 30 days. 120 tablet 0        Allergies:  Allergies   Allergen Reactions    Acetazolamide Paresthesia    Adhesive Tape Other (See Comments)     blisters    Duloxetine Hcl Other (See Comments)      Other reaction(s): Other (See Comments)  Suicidal thoughts    Codeine Nausea And Vomiting and Rash    Oxycodone Nausea And Vomiting    Chlorhexidine Rash    Dhea [Nutritional Supplements] Other (See Comments)     Severe headache, very low BP    Imitrex [Sumatriptan] Other (See Comments)     Intensified a migraine    Morphine Other (See Comments)     Severe headache     Immunizations:  Immunization History   Administered Date(s) Administered    COVID-19 (MODERNA) 1st,2nd,3rd Dose Monovalent 01/11/2021, 02/10/2021    COVID-19 (PFIZER) BIVALENT 12+YRS 12/02/2022    COVID-19 (PFIZER) Purple Cap Monovalent 12/21/2021     Social History:   Social History     Social History Narrative    Not on file     Social History     Socioeconomic History    Marital status: Single   Tobacco Use    Smoking status: Never     Passive exposure: Never    Smokeless tobacco: Never   Vaping Use    Vaping status: Never Used   Substance and Sexual Activity    Alcohol use: Yes     Alcohol/week: 1.0 standard drink of alcohol     Types: 1 Cans of beer per week     Comment: Social drinker    Drug use: Yes     Frequency: 5.0 times per week     Types: Marijuana    Sexual activity: Not Currently     Partners: Male     Birth control/protection: Condom, Coitus interruptus       Family History:  Family History   Problem Relation Age of Onset    Irritable bowel syndrome Mother     Celiac disease Mother     Migraines Mother     Crohn's disease Brother     Alcohol abuse Paternal Grandfather     Colon cancer Neg Hx     Colon polyps Neg Hx     Ulcerative colitis Neg Hx         Review of Systems  See history of present illness and past medical history.  Patient denies headache, dizziness, syncope, falls, trauma, change in vision, change in hearing, change in taste, changes in weight, changes in appetite,   Patient denies chest pain, palpitations, dyspnea, orthopnea, PND, cough, sinus pressure, rhinorrhea, epistaxis, hemoptysis, nausea, vomiting,hematemesis,  "diarrhea, constipation or hematochezia.  Denies cold or heat intolerance, polydipsia, polyuria, polyphagia. Denies hematuria, pyuria, dysuria, hesitancy, frequency or urgency. Denies consumption of raw and under cooked meats foods or change in water source.       Objective:  T Max 24 hrs: Temp (24hrs), Av.4 °F (36.9 °C), Min:97.9 °F (36.6 °C), Max:98.8 °F (37.1 °C)    Vitals Ranges:   Temp:  [97.9 °F (36.6 °C)-98.8 °F (37.1 °C)] 97.9 °F (36.6 °C)  Heart Rate:  [108-141] 108  Resp:  [18] 18  BP: (114-143)/(51-87) 143/87      Exam:  /87 (BP Location: Left arm, Patient Position: Sitting)   Pulse 108   Temp 97.9 °F (36.6 °C) (Oral)   Resp 18   Ht 167.6 cm (65.98\")   Wt 119 kg (261 lb 11 oz)   LMP 2024   SpO2 98%   BMI 42.26 kg/m²     General Appearance:    Alert, cooperative, no distress, appears stated age   Head:    Normocephalic, without obvious abnormality, atraumatic   Eyes:    PERRL, conjunctivae/corneas clear, EOM's intact, both eyes   Ears:    Normal external ear canals, both ears   Nose:   Nares normal, septum midline, mucosa normal, no drainage    or sinus tenderness   Throat:   Lips, mucosa, and tongue normal   Neck:   Supple, symmetrical, trachea midline, no adenopathy;     thyroid:  no enlargement/tenderness/nodules; no carotid    bruit or JVD   Back:     Symmetric, no curvature, ROM normal, no CVA tenderness   Lungs:     Clear to auscultation bilaterally, respirations unlabored   Chest Wall:    No tenderness or deformity    Heart:    Regular rate and rhythm, S1 and S2 normal, no murmur, rub   or gallop   Abdomen:     Soft, nontender, bowel sounds active all four quadrants,     no masses, no hepatomegaly, no splenomegaly   Extremities:   Extremities normal, atraumatic, no cyanosis or edema      .    Data Review:  Labs in chart were reviewed.  WBC   Date Value Ref Range Status   2024 11.55 (H) 3.40 - 10.80 10*3/mm3 Final     Hemoglobin   Date Value Ref Range Status   2024 " 14.3 12.0 - 15.9 g/dL Final     Hematocrit   Date Value Ref Range Status   03/19/2024 42.8 34.0 - 46.6 % Final     Platelets   Date Value Ref Range Status   03/19/2024 324 140 - 450 10*3/mm3 Final     Sodium   Date Value Ref Range Status   03/19/2024 138 136 - 145 mmol/L Final     Potassium   Date Value Ref Range Status   03/19/2024 4.0 3.5 - 5.2 mmol/L Final     Chloride   Date Value Ref Range Status   03/19/2024 105 98 - 107 mmol/L Final     CO2   Date Value Ref Range Status   03/19/2024 21.1 (L) 22.0 - 29.0 mmol/L Final     BUN   Date Value Ref Range Status   03/19/2024 8 6 - 20 mg/dL Final     Creatinine   Date Value Ref Range Status   03/19/2024 0.81 0.57 - 1.00 mg/dL Final     Glucose   Date Value Ref Range Status   03/19/2024 96 65 - 99 mg/dL Final     Calcium   Date Value Ref Range Status   03/19/2024 9.0 8.6 - 10.5 mg/dL Final                Imaging Results (All)       None              Assessment:  Active Hospital Problems    Diagnosis  POA    **Weakness [R53.1]  Yes      Resolved Hospital Problems   No resolved problems to display.   Back pain  Obesity  Hypothyroidism  Hypertension  asthma    Plan:  Mri t and l spine ordered  Ask neurology to see her  Monitor bp and trend labs  Dw patient and ed provider    Airam Hunter MD  3/19/2024  22:04 EDT

## 2024-03-20 NOTE — PLAN OF CARE
Goal Outcome Evaluation:  Plan of Care Reviewed With: patient        Progress: no change  Outcome Evaluation: VSS, c/o low back pain, treated w/ dilaudid and norco, MRI to be completed

## 2024-03-20 NOTE — OUTREACH NOTE
General Surgery Week 3 Survey      Flowsheet Row Responses   [facility] patient discharged from? [employer]   Does the patient have one of the following disease processes/diagnoses(primary or secondary)? General Surgery   Week 3 attempt successful? No   Unsuccessful attempts Attempt 1   Revoke Readmitted            MAJOR KABA - Registered Nurse

## 2024-03-21 ENCOUNTER — READMISSION MANAGEMENT (OUTPATIENT)
Dept: CALL CENTER | Facility: HOSPITAL | Age: 34
End: 2024-03-21
Payer: COMMERCIAL

## 2024-03-21 VITALS
OXYGEN SATURATION: 93 % | HEART RATE: 80 BPM | WEIGHT: 261.69 LBS | SYSTOLIC BLOOD PRESSURE: 96 MMHG | RESPIRATION RATE: 18 BRPM | DIASTOLIC BLOOD PRESSURE: 56 MMHG | BODY MASS INDEX: 42.06 KG/M2 | HEIGHT: 66 IN | TEMPERATURE: 97.3 F

## 2024-03-21 PROBLEM — Z98.890 BACK PAIN WITH HISTORY OF SPINAL SURGERY: Status: ACTIVE | Noted: 2024-03-21

## 2024-03-21 PROBLEM — I62.00 SUBDURAL HEMORRHAGE: Status: ACTIVE | Noted: 2024-03-21

## 2024-03-21 PROBLEM — M54.9 INTRACTABLE BACK PAIN: Status: ACTIVE | Noted: 2024-03-21

## 2024-03-21 LAB
ANION GAP SERPL CALCULATED.3IONS-SCNC: 11.8 MMOL/L (ref 5–15)
BUN SERPL-MCNC: 11 MG/DL (ref 6–20)
BUN/CREAT SERPL: 15.7 (ref 7–25)
CALCIUM SPEC-SCNC: 8.6 MG/DL (ref 8.6–10.5)
CHLORIDE SERPL-SCNC: 107 MMOL/L (ref 98–107)
CO2 SERPL-SCNC: 20.2 MMOL/L (ref 22–29)
CREAT SERPL-MCNC: 0.7 MG/DL (ref 0.57–1)
DEPRECATED RDW RBC AUTO: 44.6 FL (ref 37–54)
EGFRCR SERPLBLD CKD-EPI 2021: 118.7 ML/MIN/1.73
ERYTHROCYTE [DISTWIDTH] IN BLOOD BY AUTOMATED COUNT: 13.7 % (ref 12.3–15.4)
GLUCOSE SERPL-MCNC: 103 MG/DL (ref 65–99)
HCT VFR BLD AUTO: 38 % (ref 34–46.6)
HGB BLD-MCNC: 12.7 G/DL (ref 12–15.9)
MCH RBC QN AUTO: 29.9 PG (ref 26.6–33)
MCHC RBC AUTO-ENTMCNC: 33.4 G/DL (ref 31.5–35.7)
MCV RBC AUTO: 89.4 FL (ref 79–97)
PLATELET # BLD AUTO: 298 10*3/MM3 (ref 140–450)
PMV BLD AUTO: 9.3 FL (ref 6–12)
POTASSIUM SERPL-SCNC: 4.1 MMOL/L (ref 3.5–5.2)
RBC # BLD AUTO: 4.25 10*6/MM3 (ref 3.77–5.28)
SODIUM SERPL-SCNC: 139 MMOL/L (ref 136–145)
WBC NRBC COR # BLD AUTO: 8.27 10*3/MM3 (ref 3.4–10.8)

## 2024-03-21 PROCEDURE — 96375 TX/PRO/DX INJ NEW DRUG ADDON: CPT

## 2024-03-21 PROCEDURE — 99231 SBSQ HOSP IP/OBS SF/LOW 25: CPT | Performed by: NEUROLOGICAL SURGERY

## 2024-03-21 PROCEDURE — 25010000002 PROCHLORPERAZINE 10 MG/2ML SOLUTION: Performed by: NURSE PRACTITIONER

## 2024-03-21 PROCEDURE — 25010000002 HYDROMORPHONE PER 4 MG: Performed by: INTERNAL MEDICINE

## 2024-03-21 PROCEDURE — G0378 HOSPITAL OBSERVATION PER HR: HCPCS

## 2024-03-21 PROCEDURE — 96376 TX/PRO/DX INJ SAME DRUG ADON: CPT

## 2024-03-21 PROCEDURE — 85027 COMPLETE CBC AUTOMATED: CPT | Performed by: INTERNAL MEDICINE

## 2024-03-21 PROCEDURE — 80048 BASIC METABOLIC PNL TOTAL CA: CPT | Performed by: INTERNAL MEDICINE

## 2024-03-21 RX ORDER — ACETAMINOPHEN 325 MG/1
650 TABLET ORAL EVERY 4 HOURS PRN
Start: 2024-03-21

## 2024-03-21 RX ORDER — HYDROCODONE BITARTRATE AND ACETAMINOPHEN 7.5; 325 MG/1; MG/1
1 TABLET ORAL EVERY 4 HOURS PRN
Qty: 18 TABLET | Refills: 0 | Status: SHIPPED | OUTPATIENT
Start: 2024-03-21

## 2024-03-21 RX ORDER — GALCANEZUMAB 120 MG/ML
120 INJECTION, SOLUTION SUBCUTANEOUS
Qty: 1 ML | Refills: 5 | Status: SHIPPED | OUTPATIENT
Start: 2024-03-21 | End: 2024-09-17

## 2024-03-21 RX ORDER — PROCHLORPERAZINE EDISYLATE 5 MG/ML
5 INJECTION INTRAMUSCULAR; INTRAVENOUS EVERY 6 HOURS PRN
Status: DISCONTINUED | OUTPATIENT
Start: 2024-03-21 | End: 2024-03-21 | Stop reason: HOSPADM

## 2024-03-21 RX ADMIN — HYDROCODONE BITARTRATE AND ACETAMINOPHEN 1 TABLET: 10; 325 TABLET ORAL at 04:17

## 2024-03-21 RX ADMIN — FLUOXETINE HYDROCHLORIDE 40 MG: 20 CAPSULE ORAL at 08:24

## 2024-03-21 RX ADMIN — GABAPENTIN 300 MG: 300 CAPSULE ORAL at 08:23

## 2024-03-21 RX ADMIN — Medication 2000 UNITS: at 08:23

## 2024-03-21 RX ADMIN — FAMOTIDINE 20 MG: 20 TABLET, FILM COATED ORAL at 08:23

## 2024-03-21 RX ADMIN — HYDROMORPHONE HYDROCHLORIDE 0.25 MG: 1 INJECTION, SOLUTION INTRAMUSCULAR; INTRAVENOUS; SUBCUTANEOUS at 06:56

## 2024-03-21 RX ADMIN — TOPIRAMATE 50 MG: 50 TABLET, FILM COATED ORAL at 08:24

## 2024-03-21 RX ADMIN — Medication 400 MG: at 08:24

## 2024-03-21 RX ADMIN — HYDROCODONE BITARTRATE AND ACETAMINOPHEN 1 TABLET: 10; 325 TABLET ORAL at 08:23

## 2024-03-21 RX ADMIN — LEVOTHYROXINE SODIUM 88 MCG: 88 TABLET ORAL at 05:39

## 2024-03-21 RX ADMIN — PROCHLORPERAZINE EDISYLATE 5 MG: 5 INJECTION INTRAMUSCULAR; INTRAVENOUS at 00:17

## 2024-03-21 NOTE — PLAN OF CARE
Goal Outcome Evaluation:  Plan of Care Reviewed With: patient        Progress: no change  Outcome Evaluation: MRI completed, Norco given x2, Dilaudid given x1, c/o of nausea and x1 bout of emesis- IV Zofran and IV Compazine given, up ad selvin, saline locked, resting between care

## 2024-03-21 NOTE — PROGRESS NOTES
[facility] Medicine Services  PROGRESS NOTE    Patient Name: Maame Wallace  : 1990  MRN: [id number]    Date of Admission: 3/19/2024  Primary Care Physician: Sirisha Aparicio MD    Subjective   Subjective     CC:  Fecal incontinence, back pain with leg radiation    Subjective: Patient feels little better today.  Still complains of back pain and leg radiation.  No further fecal incontinence    Review of Systems  No current fevers or chills  No current shortness of breath or cough  No current nausea, vomiting, or diarrhea  No current chest pain or palpitations       Objective   Objective     Vital Signs:   Temp:  [97.2 °F (36.2 °C)-97.5 °F (36.4 °C)] 97.3 °F (36.3 °C)  Heart Rate:  [77-80] 80  Resp:  [18] 18  BP: (90-96)/(55-58) 96/56        Physical Exam:  Constitutional:Awake, alert, no acute distress  HENT: NCAT, mucous membranes moist, neck supple  Respiratory: No cough or wheezes, normal respirations, nonlabored breathing   Cardiovascular: Pulse rate is normal, palpable radial pulses  Gastrointestinal:  soft, nontender, nondistended  Musculoskeletal: Somewhat chronically debilitated in appearance, no lower extremity edema, BMI 42 morbidly obese  Psychiatric: Mildly anxious affect, cooperative, conversational  Neurologic: No slurred speech or facial droop, follows commands  Skin: Somewhat pale, no rashes or jaundice, warm      Results Reviewed:  Results from last 7 days   Lab Units 24  0817 24  1826   WBC 10*3/mm3 7.47 11.55*   HEMOGLOBIN g/dL 12.4 14.3   HEMATOCRIT % 39.2 42.8   PLATELETS 10*3/mm3 186 324     Results from last 7 days   Lab Units 24  0817 24  1826   SODIUM mmol/L 138 138   POTASSIUM mmol/L 4.5 4.0   CHLORIDE mmol/L 109* 105   CO2 mmol/L 16.0* 21.1*   BUN mg/dL 7 8   CREATININE mg/dL 0.72 0.81   GLUCOSE mg/dL 94 96   CALCIUM mg/dL 7.9* 9.0   ALK PHOS U/L  --  93   ALT (SGPT) U/L  --  68*   AST (SGOT) U/L  --  30     Estimated Creatinine Clearance: 148.7  mL/min (by C-G formula based on SCr of 0.72 mg/dL).    Microbiology Results Abnormal       None            Imaging Results (Last 24 Hours)       Procedure Component Value Units Date/Time    MRI Thoracic Spine With & Without Contrast [[id number]] Collected: 03/20/24 2026     Updated: 03/20/24 2026    Narrative:      MRI OF THE THORACIC AND LUMBAR SPINE ON 03/20/2024     CLINICAL HISTORY: Paresthesias and lower extremity weakness, history of  tethered cord surgery 01/29/2024.                     MRI OF THE THORACIC SPINE TECHNIQUE: Sagittal T1, proton density and  fat-suppressed T2 and postcontrast sagittal T1-weighted images were  obtained of the thoracic spine. In addition axial T1 and T2 and  postcontrast axial T1-weighted images were obtained from C6-T8 and then  from T8-12.     COMPARISON; There are no prior MRIs of the thoracic spine for  comparison.     FINDINGS: The thoracic cord is normal in signal intensity. There are  Schmorl's nodes indenting the endplates at all levels from the level of  the inferior endplate of T6 to the superior endplate of T10 and then  also at the endplates at T11-12. There is some mild disc base narrowing  and disc desiccation from T5-T9 with some mild intermittent endplate  spurring. There is very minimal posterior endplate spurring at T6-7 and  T8-9. There is some mild anterior wedging of the T8 vertebrae and a mild  exaggeration of the normal thoracic kyphosis and I think the findings  are consistent with mild Scheuermann's involving the mid and lower  thoracic spine. Overall no thoracic disc herniation, no thoracic central  canal or foraminal narrowing is identified. No abnormal enhancement is  seen in the thoracic spine.     There is a thin rind of T1 hyperintense subacute subdural hemorrhage  tracking anterior to the posterior dura posterior to the thoracic cord  from the T8 thoracic level down to the T12 thoracic level that maximally  measures 1 to 2 mm in anterior posterior  thickness and has no mass  effect on the thoracic cord that is best seen on axial T1-weighted  images 1 through 22 sequence 14.       Impression:      1. There are Schmorl's nodes indenting all the endplates from the level  of the inferior endplate of T6 and superior endplate of T10 and also  indenting the endplates at T11-12 and there is mild disc space  narrowing, disc desiccation and degenerative endplate changes from  T6-T10. There is slight loss of anterior vertebral body height at T8  with exaggeration of the normal thoracic kyphosis from T6-T10 and I  think the findings are consistent with mild Scheuermann's involving the  mid to lower thoracic spine.     2. There is a thin rind of T1 hyperintense subacute subdural hemorrhage  anterior to the posterior dura posterior to the thoracic cord that  tracks from T8 thoracic level down to T12 thoracic level that maximally  measures 1 to 2 mm in anterior posterior thickness and has no mass  effect on the thoracic cord. The remainder of the thoracic spine MRI is  normal     MRI OF THE LUMBAR SPINE TECHNIQUE: Sagittal T1, proton density and  fat-suppressed T2 and postcontrast sagittal T1-weighted images were  obtained of the lumbar spine. In addition axial T2 weighted images were  obtained from T12-S3 and thin-cut axial pre and postcontrast T1-weighted  images were obtained angled through the interspaces from L3-S1.     COMPARISON: This is correlated to a preoperative prior outside MRI of  the lumbar spine on 08/31/2023 and a postoperative MRI of the lumbar  spine on 02/21/2024.      FINDINGS: The distal thoracic cord and the conus is normal in signal  intensity. The conus terminates at the L1-2 interspace level which is  normal. At T12-L1, L1-L2, L2-L3, L3-L4 and L4-L5 the disc spaces and  facets are normal with no canal or foraminal narrowing.     At L5-S1 there have been previous laminectomies. There is diffuse  posterior disc bulge eccentrically bulging or minimally  protruding left  paracentral posterolaterally that abuts the anteromedial aspect of the  traversing left S1 nerve root. There has been laminectomies at S1 as  well, by history the laminectomies were for tethered cord surgery. There  is a small subcutaneous fluid collection in the posterior subcutaneous  fat of the back of the L5 lumbar level that measures maximally 3.2 x 2  cm in size. There is also a pocket of fluid just posterior to the  laminectomy defects at L5 between the posterior paravertebral  musculature at the L5-S1 level that measures 17 x 10 mm in size likely  postoperative seroma. These collections are smaller than they were on  02/21/2024.     Since the MRI of the lumbar spine on 02/21/2024 there has been interval  development of a rind of T1 hyperintense, T2 and proton density  hyperintense fluid along the margins of the posterior dura that appears  to be anterior to the posterior dura and may be in the subdural space  and thus is felt to be due to its T1 hyperintensity as subacute subdural  hematoma.  It tracks 7.2 cm in craniocaudal dimension and maximally  measures only 3 mm in anterior posterior thickness, tracks from the  horizontal level of the mid body of L1 down to the mid body of the L3  lumbar level. There may be a very thin component tracking inferior to  this. It exerts no significant mass effect and does not result in any  significant narrowing of the thecal sac.     IMPRESSION:  1. On 01/29/2024 the patient underwent by history surgery to release a  tethered cord. There is bilateral partial laminectomy defects at L5 and  bilateral laminectomy defects at S1. There is a small pocket of fluid in  the midline of the posterior paravertebral musculature, 10 mm posterior  to the posterior dura along the course of the S1 vertebrae. This pocket  of fluid measures 17 x 10 x 22 mm in size and has decreased since the  postoperative MRI 02/21/2024 where it measured 20 x 15 x 25 mm in  size.  Furthermore there has been a decrease in size of the posterior  subcutaneous fatty fluid collection with residual fluid collection in  the posterior subcutaneous fat at the L5 lumbar level that measures 3.2  x 2 x 4 cm in size and on 02/21/2022 it measured 5 x 2.5 x 7.8 cm in  size and has significantly decreased. There remains diffuse posterior  bulging or protruding disc material at L5-S1 that extends eccentric left  paracentral posterolaterally and abuts the anteromedial aspect of the  traversing left S1 nerve root slightly narrowing the left lateral  recess. Otherwise the lumbar disc spaces and facets are normal with no  canal or foraminal narrowing from T11-L5     2. Since postoperative MRI 02/21/2024 there has been interval  development of a T1 and proton density hyperintense collection along the  posterior dura that appears to be anterior to the posterior dura and may  be in the subdural space and tracks at least 7.2 cm in craniocaudal  dimension but only measures between 1 and 3 mm in anterior posterior  thickness on sagittal image 8, on all sequences it is seen tracking from  the horizontal level of the mid body of L1 to the mid body of L3 lumbar  level and there may be a thinner less than 1 mm thick component that  tracks along the ventral aspect of the posterior dura all the way down  to the L5 lumbar level. This likely represents a thin subacute subdural  hematoma. It exerts no significant mass effect on the contents in the  thecal sac.     The results were communicated to Joce Salamanca by telephone on 03/20/2024  at 7:30. I also called the results to Dr. Frazier the hospitalist taking  care of the patient on 03/20/2024 at 7:30 p.m.       MRI Lumbar Spine With & Without Contrast [[id number]] Collected: 03/20/24 2026     Updated: 03/20/24 2026    Narrative:      MRI OF THE THORACIC AND LUMBAR SPINE ON 03/20/2024     CLINICAL HISTORY: Paresthesias and lower extremity weakness, history of  tethered cord  surgery 01/29/2024.                     MRI OF THE THORACIC SPINE TECHNIQUE: Sagittal T1, proton density and  fat-suppressed T2 and postcontrast sagittal T1-weighted images were  obtained of the thoracic spine. In addition axial T1 and T2 and  postcontrast axial T1-weighted images were obtained from C6-T8 and then  from T8-12.     COMPARISON; There are no prior MRIs of the thoracic spine for  comparison.     FINDINGS: The thoracic cord is normal in signal intensity. There are  Schmorl's nodes indenting the endplates at all levels from the level of  the inferior endplate of T6 to the superior endplate of T10 and then  also at the endplates at T11-12. There is some mild disc base narrowing  and disc desiccation from T5-T9 with some mild intermittent endplate  spurring. There is very minimal posterior endplate spurring at T6-7 and  T8-9. There is some mild anterior wedging of the T8 vertebrae and a mild  exaggeration of the normal thoracic kyphosis and I think the findings  are consistent with mild Scheuermann's involving the mid and lower  thoracic spine. Overall no thoracic disc herniation, no thoracic central  canal or foraminal narrowing is identified. No abnormal enhancement is  seen in the thoracic spine.     There is a thin rind of T1 hyperintense subacute subdural hemorrhage  tracking anterior to the posterior dura posterior to the thoracic cord  from the T8 thoracic level down to the T12 thoracic level that maximally  measures 1 to 2 mm in anterior posterior thickness and has no mass  effect on the thoracic cord that is best seen on axial T1-weighted  images 1 through 22 sequence 14.       Impression:      1. There are Schmorl's nodes indenting all the endplates from the level  of the inferior endplate of T6 and superior endplate of T10 and also  indenting the endplates at T11-12 and there is mild disc space  narrowing, disc desiccation and degenerative endplate changes from  T6-T10. There is slight loss of  anterior vertebral body height at T8  with exaggeration of the normal thoracic kyphosis from T6-T10 and I  think the findings are consistent with mild Scheuermann's involving the  mid to lower thoracic spine.     2. There is a thin rind of T1 hyperintense subacute subdural hemorrhage  anterior to the posterior dura posterior to the thoracic cord that  tracks from T8 thoracic level down to T12 thoracic level that maximally  measures 1 to 2 mm in anterior posterior thickness and has no mass  effect on the thoracic cord. The remainder of the thoracic spine MRI is  normal     MRI OF THE LUMBAR SPINE TECHNIQUE: Sagittal T1, proton density and  fat-suppressed T2 and postcontrast sagittal T1-weighted images were  obtained of the lumbar spine. In addition axial T2 weighted images were  obtained from T12-S3 and thin-cut axial pre and postcontrast T1-weighted  images were obtained angled through the interspaces from L3-S1.     COMPARISON: This is correlated to a preoperative prior outside MRI of  the lumbar spine on 08/31/2023 and a postoperative MRI of the lumbar  spine on 02/21/2024.      FINDINGS: The distal thoracic cord and the conus is normal in signal  intensity. The conus terminates at the L1-2 interspace level which is  normal. At T12-L1, L1-L2, L2-L3, L3-L4 and L4-L5 the disc spaces and  facets are normal with no canal or foraminal narrowing.     At L5-S1 there have been previous laminectomies. There is diffuse  posterior disc bulge eccentrically bulging or minimally protruding left  paracentral posterolaterally that abuts the anteromedial aspect of the  traversing left S1 nerve root. There has been laminectomies at S1 as  well, by history the laminectomies were for tethered cord surgery. There  is a small subcutaneous fluid collection in the posterior subcutaneous  fat of the back of the L5 lumbar level that measures maximally 3.2 x 2  cm in size. There is also a pocket of fluid just posterior to the  laminectomy  defects at L5 between the posterior paravertebral  musculature at the L5-S1 level that measures 17 x 10 mm in size likely  postoperative seroma. These collections are smaller than they were on  02/21/2024.     Since the MRI of the lumbar spine on 02/21/2024 there has been interval  development of a rind of T1 hyperintense, T2 and proton density  hyperintense fluid along the margins of the posterior dura that appears  to be anterior to the posterior dura and may be in the subdural space  and thus is felt to be due to its T1 hyperintensity as subacute subdural  hematoma.  It tracks 7.2 cm in craniocaudal dimension and maximally  measures only 3 mm in anterior posterior thickness, tracks from the  horizontal level of the mid body of L1 down to the mid body of the L3  lumbar level. There may be a very thin component tracking inferior to  this. It exerts no significant mass effect and does not result in any  significant narrowing of the thecal sac.     IMPRESSION:  1. On 01/29/2024 the patient underwent by history surgery to release a  tethered cord. There is bilateral partial laminectomy defects at L5 and  bilateral laminectomy defects at S1. There is a small pocket of fluid in  the midline of the posterior paravertebral musculature, 10 mm posterior  to the posterior dura along the course of the S1 vertebrae. This pocket  of fluid measures 17 x 10 x 22 mm in size and has decreased since the  postoperative MRI 02/21/2024 where it measured 20 x 15 x 25 mm in size.  Furthermore there has been a decrease in size of the posterior  subcutaneous fatty fluid collection with residual fluid collection in  the posterior subcutaneous fat at the L5 lumbar level that measures 3.2  x 2 x 4 cm in size and on 02/21/2022 it measured 5 x 2.5 x 7.8 cm in  size and has significantly decreased. There remains diffuse posterior  bulging or protruding disc material at L5-S1 that extends eccentric left  paracentral posterolaterally and abuts the  anteromedial aspect of the  traversing left S1 nerve root slightly narrowing the left lateral  recess. Otherwise the lumbar disc spaces and facets are normal with no  canal or foraminal narrowing from T11-L5     2. Since postoperative MRI 02/21/2024 there has been interval  development of a T1 and proton density hyperintense collection along the  posterior dura that appears to be anterior to the posterior dura and may  be in the subdural space and tracks at least 7.2 cm in craniocaudal  dimension but only measures between 1 and 3 mm in anterior posterior  thickness on sagittal image 8, on all sequences it is seen tracking from  the horizontal level of the mid body of L1 to the mid body of L3 lumbar  level and there may be a thinner less than 1 mm thick component that  tracks along the ventral aspect of the posterior dura all the way down  to the L5 lumbar level. This likely represents a thin subacute subdural  hematoma. It exerts no significant mass effect on the contents in the  thecal sac.     The results were communicated to Joce Salamanca by telephone on 03/20/2024  at 7:30. I also called the results to Dr. Frazier the hospitalist taking  care of the patient on 03/20/2024 at 7:30 p.m.                   I have reviewed the medications:  Scheduled Meds:celecoxib, 200 mg, Oral, Q12H  cholecalciferol, 2,000 Units, Oral, Daily  famotidine, 20 mg, Oral, Daily  FLUoxetine, 40 mg, Oral, Daily  gabapentin, 300 mg, Oral, TID  levothyroxine, 88 mcg, Oral, Q AM  topiramate, 50 mg, Oral, BID  Vitamin B-2, 400 mg, Oral, Daily      Continuous Infusions:   PRN Meds:.  acetaminophen **OR** acetaminophen **OR** acetaminophen    albuterol    senna-docusate sodium **AND** polyethylene glycol **AND** bisacodyl **AND** bisacodyl    HYDROcodone-acetaminophen    HYDROmorphone    prochlorperazine    [COMPLETED] Insert Peripheral IV **AND** sodium chloride    Assessment & Plan   Assessment & Plan     Active Hospital Problems    Diagnosis   POA    **Weakness [R53.1]  Yes    OA (osteoarthritis) [M19.90]  Yes    Seasonal allergies [J30.2]  Yes    Arnold-Chiari malformation, type I [G93.5]  Yes    Anxiety [F41.9]  Yes    Vitamin D deficiency [E55.9]  Yes    Obesity, Class III, BMI 40-49.9 (morbid obesity) [E66.01]  Yes    Hypothyroidism [E03.9]  Yes    Fibromyalgia [M79.7]  Yes    Gastroesophageal reflux disease [K21.9]  Yes    Asthma [J45.909]  Yes      Resolved Hospital Problems   No resolved problems to display.        Brief Hospital Course to date:  Maame Wallace is a 33 y.o. female     MRI ordered  Case discussed with neurosurgery consult team and they are following and plan to address MRI results.  Strength is intact in the lower extremity and equal per my examination.  Add Celebrex for pain control  H2 blocker for PPI.  Fluoxetine for anxiety/depression which is reasonably stable  Continue topiramate for history of migraines  Continue Synthroid for history of hypothyroid  Continue gabapentin for polyneuropathy  Vitamin B2 for migraine prophylaxis  Strongly recommend improved diet and exercise as possible and gradual healthy weight loss if possible in the future as well as improved general conditioning.  Albuterol as needed for asthma which is currently stable  Case discussed with neurology who will defer to neurosurgery at this point.  I discussed with patient and possible discharge home tomorrow pending clearance for discharge with neurosurgery.    Treatment plan discussed with patient who is in agreement.    CODE STATUS:   Code Status and Medical Interventions:   Ordered at: 03/19/24 1825     Code Status (Patient has no pulse and is not breathing):    CPR (Attempt to Resuscitate)     Medical Interventions (Patient has pulse or is breathing):    Full Support       Leroy Frazier MD  03/21/24

## 2024-03-21 NOTE — PROGRESS NOTES
Case Management Discharge Note      Final Note: Discharged home. Rylie Post RN         Selected Continued Care - Discharged on 3/21/2024 Admission date: 3/19/2024 - Discharge disposition: Home or Self Care     Transportation Services  Private: Car    Final Discharge Disposition Code: 01 - home or self-care

## 2024-03-21 NOTE — DISCHARGE SUMMARY
"    [facility] Medicine Services  DISCHARGE SUMMARY    Patient Name: Maame Wallace  : 1990  MRN: [id number]    Date of Admission: 3/19/2024  5:42 PM  Date of Discharge:  3/21/2024   Primary Care Physician: Sirisha Aparicio MD    Consults       Date and Time Order Name Status Description    3/20/2024  6:47 AM Inpatient Neurosurgery Consult      3/19/2024 10:10 PM Inpatient Neurology Consult General Completed     3/19/2024  6:04 PM [facility] (on-call MD unless specified) Details      2024  9:30 AM Inpatient Neurosurgery Consult Completed     2024  9:51 AM Inpatient Neurology Consult General Completed     2024 10:00 AM Inpatient Neurosurgery Consult Completed             Hospital Course       Active Hospital Problems    Diagnosis  POA    **Weakness [R53.1]  Yes    Back pain with history of spinal surgery [M54.9, Z98.890]  Yes    Subdural hemorrhage, at T1 [I62.00]  Yes    Fluid collection at surgical site, initial encounter [T88.8XXA]  Yes    OA (osteoarthritis) [M19.90]  Yes    Seasonal allergies [J30.2]  Yes    Arnold-Chiari malformation, type I [G93.5]  Yes    Anxiety [F41.9]  Yes    Vitamin D deficiency [E55.9]  Yes    Obesity, Class III, BMI 40-49.9 (morbid obesity) [E66.01]  Yes    Hypothyroidism [E03.9]  Yes    Fibromyalgia [M79.7]  Yes    Gastroesophageal reflux disease [K21.9]  Yes    Asthma [J45.909]  Yes      Resolved Hospital Problems   No resolved problems to display.      History of present illness as written on 3/19/2024 the day of presentation  \"33 year old female presented to the emergency room with back pain after recent back surgery; she has had one episode of fecal incontinence; no fever or chills; no injury; the pain radiates down her legs and to her groin; he surgery was done in [place]; she was admitted here at the end of last month; she spoke with her surgeon (in [place]) who advised her to come to the hospital for further workup\"     Hospital Course:  Maame PERSON" Rodrigo is a 33 y.o. female presents the hospital with complaint of back pain and 1 episode of fecal incontinence and radicular pain radiating down her leg.  She reports the back pain and the radicular pain have been present since close to the type of surgery dating back to January.  The pain has been somewhat intermittent but also somewhat stable per reports.  Patient received supportive care and symptom treatment.  She did require pain medication to help control her pain while she was hospitalized.  She received consultation from neurology and neurosurgery.  The neurologist felt this was more of a neurosurgical issue and spoke with the neurosurgeon who was ultimately the team that want up seeing her in consult.  They were previously familiar with her from a previous  hospital presentation.  An MRI of the spine was completed.  Please see full report below.  Patient had a small subdural hematoma at the thoracic region.  I spoke with radiologist at great length regarding the findings radiologist did not feel there was any mass effect from this hematoma.  Case was discussed with neurosurgical team today.  Patient does not have any further bowel or bladder incontinence or new saddle paresthesia.  Her lower extremity strength is decently intact and she is ambulating relatively well.  Neurosurgery team did not feel patient had any emergent indication for surgical intervention at this point and recommended she follow-up with her surgeon.  They have recommended that she could drive to her surgeon today or tomorrow preferably today.  She is to have outpatient clinic follow-up.  If this is not a possibility they recommend discussing with her surgeon to see if there is a local specialist that her outpatient surgery would recommend.  Also they recommend patient could consider speaking to [facility] or [facility] facilities to see if there is a neurosurgeon that would like to follow her clinically outpatient since her  surgical team is [place] of state.  Patient said she would think about this and definitely call her outpatient surgical team today.  She is agreement with plan for discharge and is requesting medication to continue to control her pain.  I also recommend she spoke to her outpatient clinic about their recommendations for long-term pain control  And whether she may need an evaluation from interventional pain clinic.  She agrees to do this  I discussed the risks, benefits, and alternatives to narcotic therapies.  After counseling, due to pain severity, the patient feels narcotics are needed to adequately controlled their pain.  They agreed to only take the medication as prescribed, to hold with any sign of sedation, and to avoid driving while actively using narcotics.    At the time of discharge patient was told to take all medications as prescribed, keep all follow-up appointments, and call their doctor or return to the hospital with any worsening or concerning symptoms.    Please note that this note was made using Dragon voice recognition software            Day of Discharge     Subjective: Pain is decently controlled with current pain medicine.  Patient is requesting pain medication at discharge.  She does agree to call [place] neurosurgery clinic today.  She denies any further bowel or bladder incontinence.  She notes she is walking around the room relatively well.  She is frustrated that there is no quick fix from her chronic pain issues.  She agrees with plan for discharge today.  No other new complaints reported     No current fevers or chills  No current shortness of breath or cough  No current nausea, vomiting, or diarrhea  No current chest pain or palpitations    Vital Signs:   Temp:  [97.3 °F (36.3 °C)-97.5 °F (36.4 °C)] 97.3 °F (36.3 °C)  Heart Rate:  [80] 80  Resp:  [18] 18  BP: (95-96)/(55-56) 96/56     Physical Exam:  Constitutional:Awake, alert, no acute distress  HENT: NCAT, mucous membranes moist,  neck supple  Respiratory: No cough or wheezes, normal respirations, nonlabored breathing   Cardiovascular: Pulse rate is normal, palpable radial pulses  Gastrointestinal:  soft, nontender, nondistended  Musculoskeletal: Ambulating, leg strength intact, somewhat chronically debilitated in appearance, no lower extremity edema, BMI 42 morbidly obese  Psychiatric: Less anxious affect, cooperative, conversational  Neurologic: No slurred speech or facial droop, follows commands  Skin: Somewhat pale, no rashes or jaundice, warm  Examination relatively stable,    Pertinent  and/or Most Recent Results     Results from last 7 days   Lab Units 03/21/24  0648 03/20/24  0817 03/19/24  1826   WBC 10*3/mm3 8.27 7.47 11.55*   HEMOGLOBIN g/dL 12.7 12.4 14.3   HEMATOCRIT % 38.0 39.2 42.8   PLATELETS 10*3/mm3 298 186 324   SODIUM mmol/L 139 138 138   POTASSIUM mmol/L 4.1 4.5 4.0   CHLORIDE mmol/L 107 109* 105   CO2 mmol/L 20.2* 16.0* 21.1*   BUN mg/dL 11 7 8   CREATININE mg/dL 0.70 0.72 0.81   GLUCOSE mg/dL 103* 94 96   CALCIUM mg/dL 8.6 7.9* 9.0     Results from last 7 days   Lab Units 03/19/24  1826   BILIRUBIN mg/dL <0.2   ALK PHOS U/L 93   ALT (SGPT) U/L 68*   AST (SGOT) U/L 30     Imaging Results (All)       Procedure Component Value Units Date/Time    MRI Thoracic Spine With & Without Contrast [[id number]] Collected: 03/20/24 2026     Updated: 03/20/24 2026    Narrative:      MRI OF THE THORACIC AND LUMBAR SPINE ON 03/20/2024     CLINICAL HISTORY: Paresthesias and lower extremity weakness, history of  tethered cord surgery 01/29/2024.                     MRI OF THE THORACIC SPINE TECHNIQUE: Sagittal T1, proton density and  fat-suppressed T2 and postcontrast sagittal T1-weighted images were  obtained of the thoracic spine. In addition axial T1 and T2 and  postcontrast axial T1-weighted images were obtained from C6-T8 and then  from T8-12.     COMPARISON; There are no prior MRIs of the thoracic spine for  comparison.     FINDINGS:  The thoracic cord is normal in signal intensity. There are  Schmorl's nodes indenting the endplates at all levels from the level of  the inferior endplate of T6 to the superior endplate of T10 and then  also at the endplates at T11-12. There is some mild disc base narrowing  and disc desiccation from T5-T9 with some mild intermittent endplate  spurring. There is very minimal posterior endplate spurring at T6-7 and  T8-9. There is some mild anterior wedging of the T8 vertebrae and a mild  exaggeration of the normal thoracic kyphosis and I think the findings  are consistent with mild Scheuermann's involving the mid and lower  thoracic spine. Overall no thoracic disc herniation, no thoracic central  canal or foraminal narrowing is identified. No abnormal enhancement is  seen in the thoracic spine.     There is a thin rind of T1 hyperintense subacute subdural hemorrhage  tracking anterior to the posterior dura posterior to the thoracic cord  from the T8 thoracic level down to the T12 thoracic level that maximally  measures 1 to 2 mm in anterior posterior thickness and has no mass  effect on the thoracic cord that is best seen on axial T1-weighted  images 1 through 22 sequence 14.       Impression:      1. There are Schmorl's nodes indenting all the endplates from the level  of the inferior endplate of T6 and superior endplate of T10 and also  indenting the endplates at T11-12 and there is mild disc space  narrowing, disc desiccation and degenerative endplate changes from  T6-T10. There is slight loss of anterior vertebral body height at T8  with exaggeration of the normal thoracic kyphosis from T6-T10 and I  think the findings are consistent with mild Scheuermann's involving the  mid to lower thoracic spine.     2. There is a thin rind of T1 hyperintense subacute subdural hemorrhage  anterior to the posterior dura posterior to the thoracic cord that  tracks from T8 thoracic level down to T12 thoracic level that  maximally  measures 1 to 2 mm in anterior posterior thickness and has no mass  effect on the thoracic cord. The remainder of the thoracic spine MRI is  normal     MRI OF THE LUMBAR SPINE TECHNIQUE: Sagittal T1, proton density and  fat-suppressed T2 and postcontrast sagittal T1-weighted images were  obtained of the lumbar spine. In addition axial T2 weighted images were  obtained from T12-S3 and thin-cut axial pre and postcontrast T1-weighted  images were obtained angled through the interspaces from L3-S1.     COMPARISON: This is correlated to a preoperative prior outside MRI of  the lumbar spine on 08/31/2023 and a postoperative MRI of the lumbar  spine on 02/21/2024.      FINDINGS: The distal thoracic cord and the conus is normal in signal  intensity. The conus terminates at the L1-2 interspace level which is  normal. At T12-L1, L1-L2, L2-L3, L3-L4 and L4-L5 the disc spaces and  facets are normal with no canal or foraminal narrowing.     At L5-S1 there have been previous laminectomies. There is diffuse  posterior disc bulge eccentrically bulging or minimally protruding left  paracentral posterolaterally that abuts the anteromedial aspect of the  traversing left S1 nerve root. There has been laminectomies at S1 as  well, by history the laminectomies were for tethered cord surgery. There  is a small subcutaneous fluid collection in the posterior subcutaneous  fat of the back of the L5 lumbar level that measures maximally 3.2 x 2  cm in size. There is also a pocket of fluid just posterior to the  laminectomy defects at L5 between the posterior paravertebral  musculature at the L5-S1 level that measures 17 x 10 mm in size likely  postoperative seroma. These collections are smaller than they were on  02/21/2024.     Since the MRI of the lumbar spine on 02/21/2024 there has been interval  development of a rind of T1 hyperintense, T2 and proton density  hyperintense fluid along the margins of the posterior dura that  appears  to be anterior to the posterior dura and may be in the subdural space  and thus is felt to be due to its T1 hyperintensity as subacute subdural  hematoma.  It tracks 7.2 cm in craniocaudal dimension and maximally  measures only 3 mm in anterior posterior thickness, tracks from the  horizontal level of the mid body of L1 down to the mid body of the L3  lumbar level. There may be a very thin component tracking inferior to  this. It exerts no significant mass effect and does not result in any  significant narrowing of the thecal sac.     IMPRESSION:  1. On 01/29/2024 the patient underwent by history surgery to release a  tethered cord. There is bilateral partial laminectomy defects at L5 and  bilateral laminectomy defects at S1. There is a small pocket of fluid in  the midline of the posterior paravertebral musculature, 10 mm posterior  to the posterior dura along the course of the S1 vertebrae. This pocket  of fluid measures 17 x 10 x 22 mm in size and has decreased since the  postoperative MRI 02/21/2024 where it measured 20 x 15 x 25 mm in size.  Furthermore there has been a decrease in size of the posterior  subcutaneous fatty fluid collection with residual fluid collection in  the posterior subcutaneous fat at the L5 lumbar level that measures 3.2  x 2 x 4 cm in size and on 02/21/2022 it measured 5 x 2.5 x 7.8 cm in  size and has significantly decreased. There remains diffuse posterior  bulging or protruding disc material at L5-S1 that extends eccentric left  paracentral posterolaterally and abuts the anteromedial aspect of the  traversing left S1 nerve root slightly narrowing the left lateral  recess. Otherwise the lumbar disc spaces and facets are normal with no  canal or foraminal narrowing from T11-L5     2. Since postoperative MRI 02/21/2024 there has been interval  development of a T1 and proton density hyperintense collection along the  posterior dura that appears to be anterior to the posterior  dura and may  be in the subdural space and tracks at least 7.2 cm in craniocaudal  dimension but only measures between 1 and 3 mm in anterior posterior  thickness on sagittal image 8, on all sequences it is seen tracking from  the horizontal level of the mid body of L1 to the mid body of L3 lumbar  level and there may be a thinner less than 1 mm thick component that  tracks along the ventral aspect of the posterior dura all the way down  to the L5 lumbar level. This likely represents a thin subacute subdural  hematoma. It exerts no significant mass effect on the contents in the  thecal sac.     The results were communicated to Joce Salamanca by telephone on 03/20/2024  at 7:30. I also called the results to Dr. Frazier the hospitalist taking  care of the patient on 03/20/2024 at 7:30 p.m.       MRI Lumbar Spine With & Without Contrast [[id number]] Collected: 03/20/24 2026     Updated: 03/20/24 2026    Narrative:      MRI OF THE THORACIC AND LUMBAR SPINE ON 03/20/2024     CLINICAL HISTORY: Paresthesias and lower extremity weakness, history of  tethered cord surgery 01/29/2024.                     MRI OF THE THORACIC SPINE TECHNIQUE: Sagittal T1, proton density and  fat-suppressed T2 and postcontrast sagittal T1-weighted images were  obtained of the thoracic spine. In addition axial T1 and T2 and  postcontrast axial T1-weighted images were obtained from C6-T8 and then  from T8-12.     COMPARISON; There are no prior MRIs of the thoracic spine for  comparison.     FINDINGS: The thoracic cord is normal in signal intensity. There are  Schmorl's nodes indenting the endplates at all levels from the level of  the inferior endplate of T6 to the superior endplate of T10 and then  also at the endplates at T11-12. There is some mild disc base narrowing  and disc desiccation from T5-T9 with some mild intermittent endplate  spurring. There is very minimal posterior endplate spurring at T6-7 and  T8-9. There is some mild anterior  wedging of the T8 vertebrae and a mild  exaggeration of the normal thoracic kyphosis and I think the findings  are consistent with mild Scheuermann's involving the mid and lower  thoracic spine. Overall no thoracic disc herniation, no thoracic central  canal or foraminal narrowing is identified. No abnormal enhancement is  seen in the thoracic spine.     There is a thin rind of T1 hyperintense subacute subdural hemorrhage  tracking anterior to the posterior dura posterior to the thoracic cord  from the T8 thoracic level down to the T12 thoracic level that maximally  measures 1 to 2 mm in anterior posterior thickness and has no mass  effect on the thoracic cord that is best seen on axial T1-weighted  images 1 through 22 sequence 14.       Impression:      1. There are Schmorl's nodes indenting all the endplates from the level  of the inferior endplate of T6 and superior endplate of T10 and also  indenting the endplates at T11-12 and there is mild disc space  narrowing, disc desiccation and degenerative endplate changes from  T6-T10. There is slight loss of anterior vertebral body height at T8  with exaggeration of the normal thoracic kyphosis from T6-T10 and I  think the findings are consistent with mild Scheuermann's involving the  mid to lower thoracic spine.     2. There is a thin rind of T1 hyperintense subacute subdural hemorrhage  anterior to the posterior dura posterior to the thoracic cord that  tracks from T8 thoracic level down to T12 thoracic level that maximally  measures 1 to 2 mm in anterior posterior thickness and has no mass  effect on the thoracic cord. The remainder of the thoracic spine MRI is  normal     MRI OF THE LUMBAR SPINE TECHNIQUE: Sagittal T1, proton density and  fat-suppressed T2 and postcontrast sagittal T1-weighted images were  obtained of the lumbar spine. In addition axial T2 weighted images were  obtained from T12-S3 and thin-cut axial pre and postcontrast T1-weighted  images were  obtained angled through the interspaces from L3-S1.     COMPARISON: This is correlated to a preoperative prior outside MRI of  the lumbar spine on 08/31/2023 and a postoperative MRI of the lumbar  spine on 02/21/2024.      FINDINGS: The distal thoracic cord and the conus is normal in signal  intensity. The conus terminates at the L1-2 interspace level which is  normal. At T12-L1, L1-L2, L2-L3, L3-L4 and L4-L5 the disc spaces and  facets are normal with no canal or foraminal narrowing.     At L5-S1 there have been previous laminectomies. There is diffuse  posterior disc bulge eccentrically bulging or minimally protruding left  paracentral posterolaterally that abuts the anteromedial aspect of the  traversing left S1 nerve root. There has been laminectomies at S1 as  well, by history the laminectomies were for tethered cord surgery. There  is a small subcutaneous fluid collection in the posterior subcutaneous  fat of the back of the L5 lumbar level that measures maximally 3.2 x 2  cm in size. There is also a pocket of fluid just posterior to the  laminectomy defects at L5 between the posterior paravertebral  musculature at the L5-S1 level that measures 17 x 10 mm in size likely  postoperative seroma. These collections are smaller than they were on  02/21/2024.     Since the MRI of the lumbar spine on 02/21/2024 there has been interval  development of a rind of T1 hyperintense, T2 and proton density  hyperintense fluid along the margins of the posterior dura that appears  to be anterior to the posterior dura and may be in the subdural space  and thus is felt to be due to its T1 hyperintensity as subacute subdural  hematoma.  It tracks 7.2 cm in craniocaudal dimension and maximally  measures only 3 mm in anterior posterior thickness, tracks from the  horizontal level of the mid body of L1 down to the mid body of the L3  lumbar level. There may be a very thin component tracking inferior to  this. It exerts no significant  mass effect and does not result in any  significant narrowing of the thecal sac.     IMPRESSION:  1. On 01/29/2024 the patient underwent by history surgery to release a  tethered cord. There is bilateral partial laminectomy defects at L5 and  bilateral laminectomy defects at S1. There is a small pocket of fluid in  the midline of the posterior paravertebral musculature, 10 mm posterior  to the posterior dura along the course of the S1 vertebrae. This pocket  of fluid measures 17 x 10 x 22 mm in size and has decreased since the  postoperative MRI 02/21/2024 where it measured 20 x 15 x 25 mm in size.  Furthermore there has been a decrease in size of the posterior  subcutaneous fatty fluid collection with residual fluid collection in  the posterior subcutaneous fat at the L5 lumbar level that measures 3.2  x 2 x 4 cm in size and on 02/21/2022 it measured 5 x 2.5 x 7.8 cm in  size and has significantly decreased. There remains diffuse posterior  bulging or protruding disc material at L5-S1 that extends eccentric left  paracentral posterolaterally and abuts the anteromedial aspect of the  traversing left S1 nerve root slightly narrowing the left lateral  recess. Otherwise the lumbar disc spaces and facets are normal with no  canal or foraminal narrowing from T11-L5     2. Since postoperative MRI 02/21/2024 there has been interval  development of a T1 and proton density hyperintense collection along the  posterior dura that appears to be anterior to the posterior dura and may  be in the subdural space and tracks at least 7.2 cm in craniocaudal  dimension but only measures between 1 and 3 mm in anterior posterior  thickness on sagittal image 8, on all sequences it is seen tracking from  the horizontal level of the mid body of L1 to the mid body of L3 lumbar  level and there may be a thinner less than 1 mm thick component that  tracks along the ventral aspect of the posterior dura all the way down  to the L5 lumbar level.  This likely represents a thin subacute subdural  hematoma. It exerts no significant mass effect on the contents in the  thecal sac.     The results were communicated to Joce Salamanca by telephone on 03/20/2024  at 7:30. I also called the results to Dr. Frazier the hospitalist taking  care of the patient on 03/20/2024 at 7:30 p.m.                     Discharge Details        Discharge Medications        New Medications        Instructions Start Date   acetaminophen 325 MG tablet  Commonly known as: TYLENOL   650 mg, Oral, Every 4 Hours PRN      HYDROcodone-acetaminophen 7.5-325 MG per tablet  Commonly known as: Norco   1 tablet, Oral, Every 4 Hours PRN             Continue These Medications        Instructions Start Date   albuterol sulfate  (90 Base) MCG/ACT inhaler  Commonly known as: PROVENTIL HFA;VENTOLIN HFA;PROAIR HFA   Every 6 Hours Scheduled      ALPRAZolam 0.5 MG tablet  Commonly known as: XANAX   0.5 mg, Oral, 2 Times Daily PRN      Auvelity  MG tablet controlled-release  Generic drug: Dextromethorphan-buPROPion ER   1 tablet, Oral      cetirizine 10 MG tablet  Commonly known as: zyrTEC   10 mg, Oral, Daily      diclofenac 50 MG EC tablet  Commonly known as: VOLTAREN   50 mg, Oral, 2 Times Daily PRN      Emgality 120 MG/ML auto-injector pen  Generic drug: galcanezumab-gnlm   120 mg, Subcutaneous, Every 30 Days, Last dose was 03/09/2024      famotidine 20 MG tablet  Commonly known as: PEPCID   TAKE 1 TABLET BY MOUTH EVERY DAY      FLUoxetine 40 MG capsule  Commonly known as: PROzac   40 mg, Oral, Daily      gabapentin 300 MG capsule  Commonly known as: NEURONTIN   300 mg, Oral, 3 Times Daily      lansoprazole 30 MG capsule  Commonly known as: PREVACID   1 CAPSULE BY MOUTH TWICE DAILY      levothyroxine 88 MCG tablet  Commonly known as: SYNTHROID, LEVOTHROID   88 mcg, Oral, Every Early Morning      naloxone 4 MG/0.1ML nasal spray  Commonly known as: NARCAN   Call 911. Don't prime. [place] in 1  nostril for overdose. Repeat in 2-3 minutes in other nostril if no or minimal breathing/responsiveness.      Nurtec 75 MG dispersible tablet  Generic drug: Rimegepant Sulfate   75 mg, Oral, As Needed      ondansetron 4 MG tablet  Commonly known as: ZOFRAN   4 mg, Oral, Every 6 Hours PRN      topiramate 50 MG tablet  Commonly known as: Topamax   50 mg, Oral, 2 Times Daily      Vitamin B-2 100 MG tablet tablet  Commonly known as: RIBOFLAVIN   400 mg, Oral, Daily             Stop These Medications      ibuprofen 600 MG tablet  Commonly known as: ADVIL,MOTRIN              Allergies   Allergen Reactions    Acetazolamide Paresthesia    Adhesive Tape Other (See Comments)     blisters    Duloxetine Hcl Other (See Comments)     Other reaction(s): Other (See Comments)  Suicidal thoughts    Codeine Nausea And Vomiting and Rash    Oxycodone Nausea And Vomiting    Chlorhexidine Rash    Dhea [Nutritional Supplements] Other (See Comments)     Severe headache, very low BP    Imitrex [Sumatriptan] Other (See Comments)     Intensified a migraine    Morphine Other (See Comments)     Severe headache         Discharge Disposition:  Home or Self Care    Diet:  Hospital:  Diet Order   Procedures    Diet: Regular/House; Fluid Consistency: Thin (IDDSI 0)       Activity:  Activity Instructions       Activity as Tolerated                   CODE STATUS:    Code Status and Medical Interventions:   Ordered at: 03/19/24 1825     Code Status (Patient has no pulse and is not breathing):    CPR (Attempt to Resuscitate)     Medical Interventions (Patient has pulse or is breathing):    Full Support       Future Appointments   Date Time Provider Department Center   4/11/2024  3:30 PM Zonia Terry APRN [facility]           Additional Instructions for the Follow-ups that You Need to Schedule       Discharge Follow-up with PCP   As directed       Currently Documented PCP:    Sirisha Aparicio MD    PCP Phone Number:    [phone]     Follow  Up Details: 1 week, call today        Discharge Follow-up with Specified Provider: Your Neurosurgeon as discussed; 1 Day   As directed      To: Your Neurosurgeon as discussed   Follow Up: 1 Day   Follow Up Details: Call today               Follow-up Information       Sirisha Aparicio MD .    Specialty: Nurse Practitioner  Why: 1 week, call today  Contact information:  [address] [facility] [address]  [phone]                                 Leroy Frazier MD  03/21/24      Time Spent on Discharge:  I spent greater than 35 minutes on this discharge activity which included: face-to-face encounter with the patient, reviewing the data in the system, coordination of the care with the nursing staff as well as consultants, documentation, and entering orders.      no

## 2024-03-21 NOTE — PROGRESS NOTES
Continued Stay Note  [facility]     Patient Name: Maame Wallace  MRN: [id number]  Today's Date: 3/21/2024    Admit Date: 3/19/2024    Plan: Home with family assist   Discharge Plan       Row Name 03/21/24 1030       Plan    Plan Home with family assist    Plan Comments Discharge order noted. Met with patient who confirmed DC plan is to return home. Stated her mother will assist as needed and will provide transportation at DC. Denies any needs/equipment                   Discharge Codes    No documentation.                 Expected Discharge Date and Time       Expected Discharge Date Expected Discharge Time    Mar 21, 2024               Rylie Post RN

## 2024-03-21 NOTE — NURSING NOTE
Discharge instructions provided. Patient given CD of yesterday's MRI to bring to her primary surgeon. She has already called to follow up. Received Rx for [place] prior to leaving.

## 2024-03-21 NOTE — PROGRESS NOTES
LOS: 0 days   Patient Care Team:  Sirisha Aparicio MD as PCP - General (Nurse Practitioner)  Joy Castro APRN as Nurse Practitioner (Gastroenterology)  Quinton Funes MD as Consulting Physician (Gastroenterology)    Chief Complaint: Postop pain in the back and legs with intermittent incontinence    Subjective     The patient feels a little bit better today.  She is still having quite a bit of pain in her back with radiation into her legs.    Interval History:     History taken from: patient chart    Objective      She has good movement of both lower extremities    Vital Signs  Temp:  [97.2 °F (36.2 °C)-97.5 °F (36.4 °C)] 97.3 °F (36.3 °C)  Heart Rate:  [77-80] 80  Resp:  [18] 18  BP: (90-96)/(55-58) 96/56       Results Review:     I reviewed the patient's new clinical results.  I reviewed her MRI of the thoracic and the lumbar spine which were done last night.  The thoracic spine for the most part looks okay.  There is no evidence of spinal cord compression at any level there is no change in the spinal cord signal.  The lumbar MRI does show a small 3 mm subdural more likely due to settling of blood products from the recent surgery.  There is no compression of the neural elements in the area of the subdural.  There is no enhancement in the cauda equina.  The subcutaneous and epidural fluid collections are smaller than they were on February 21.      Assessment & Plan       Weakness    Fibromyalgia    Hypothyroidism    Obesity, Class III, BMI 40-49.9 (morbid obesity)    Anxiety    Arnold-Chiari malformation, type I    OA (osteoarthritis)    Asthma    Gastroesophageal reflux disease    Seasonal allergies    Vitamin D deficiency    Fluid collection at surgical site, initial encounter    Back pain with history of spinal surgery      I told the patient that from my point of view there is nothing I have to offer her surgically at this point.  I think it is critically important that she get back up to  see her primary surgeon.  I recommended that she get in the car and go there today.  If you will not see her in the office then she should go to the emergency room at the hospital where he practices.  I suggested that she take the MRIs with her.  I am confident there is no emergent indication for surgery.  I think most of her problem is inflammatory and related to the surgery that she had but I would defer to her primary surgeon for further treatment regarding that.  I told her she certainly could try going to a [facility] or [facility] facility here in [place] but I think it is unlikely anyone is going to take on her case when she just had surgery 6 weeks ago.  From my point of view she can be discharged at any time.      Xavier Benavides MD  03/21/24  07:51 EDT

## 2024-03-22 DIAGNOSIS — R20.0 LEG NUMBNESS: Primary | ICD-10-CM

## 2024-03-22 NOTE — OUTREACH NOTE
Prep Survey      Flowsheet Row Responses   [facility] facility patient discharged from? [place]   Is LACE score < 7 ? No   Eligibility Readm Mgmt   Discharge diagnosis Weakness   Does the patient have one of the following disease processes/diagnoses(primary or secondary)? Other   Does the patient have Home health ordered? No   Is there a DME ordered? No   Prep survey completed? Yes            ANGIE QUEVEDO - Registered Nurse

## 2024-03-26 ENCOUNTER — READMISSION MANAGEMENT (OUTPATIENT)
Dept: CALL CENTER | Facility: HOSPITAL | Age: 34
End: 2024-03-26
Payer: COMMERCIAL

## 2024-03-26 NOTE — OUTREACH NOTE
Medical Week 1 Survey      Flowsheet Row Responses   [facility] patient discharged from? [place]   Does the patient have one of the following disease processes/diagnoses(primary or secondary)? Other   Week 1 attempt successful? No   Unsuccessful attempts Attempt 1  [Pt states it wasn't good time to talk and to call back another time.]            Joanne GARCIA - Licensed Nurse

## 2024-04-03 ENCOUNTER — READMISSION MANAGEMENT (OUTPATIENT)
Dept: CALL CENTER | Facility: HOSPITAL | Age: 34
End: 2024-04-03
Payer: COMMERCIAL

## 2024-04-03 NOTE — OUTREACH NOTE
Medical Week 2 Survey      Flowsheet Row Responses   [facility] patient discharged from? [place]   Does the patient have one of the following disease processes/diagnoses(primary or secondary)? Other   Week 2 attempt successful? Yes   Call start time 1146   Discharge diagnosis Weakness, back pain with hx of spinal surgery (In [place])   Call end time 1154   Person spoke with today (if not patient) and relationship Patient   Meds reviewed with patient/caregiver? Yes   Does the patient have all medications ordered at discharge? Yes   Is the patient taking all medications as directed (includes completed medication regime)? Yes   Medication comments Patient reports Janet in [place] had made some medication adjustments since discharge.   Does the patient have a primary care provider?  Yes   Comments regarding PCP Follow up with Sirisha CARDOZA. Patient reports that she has had contact with her PCP.   Has the patient kept scheduled appointments due by today? Yes   Has home health visited the patient within 72 hours of discharge? N/A   Psychosocial issues? No   Comments Patient reports that she was in PT, but they are not comfortable now with doing therapy.   Did the patient receive a copy of their discharge instructions? Yes   What is the patient's perception of their health status since discharge? Same  [Patient reports same or very mild improvement.]   Is the patient/caregiver able to teach back signs and symptoms related to disease process for when to call PCP? Yes   Is the patient/caregiver able to teach back signs and symptoms related to disease process for when to call 911? Yes   Is the patient/caregiver able to teach back the hierarchy of who to call/visit for symptoms/problems? PCP, Specialist, Home health nurse, Urgent Care, ED, 911 Yes   If the patient is a current smoker, are they able to teach back resources for cessation? Not a smoker   Week 2 Call Completed? Yes   Revoked No further  contact(revokes)-requires comment   Is the patient interested in additional calls from an ambulatory ? No   Would this patient benefit from a Referral to [facility] Social Work? No   Graduated/Revoked comments Patient reports that [facility] have informed her that they are not comfortable with any surgical intervention that she needs to follow with her DR's in [place] as they did her previous surgery. Patient also reports pain management will not treat her as this is surgical issue and not a chronic pain problem. [facility] and denies any needs from nurse today.   Call end time 1154            ANGIE PHIPPS - Registered Nurse

## 2024-04-11 ENCOUNTER — PROCEDURE VISIT (OUTPATIENT)
Dept: NEUROLOGY | Facility: CLINIC | Age: 34
End: 2024-04-11
Payer: COMMERCIAL

## 2024-04-11 DIAGNOSIS — G43.709 CHRONIC MIGRAINE W/O AURA, NOT INTRACTABLE, W/O STAT MIGR: Primary | ICD-10-CM

## 2024-04-11 RX ORDER — BUPROPION HYDROCHLORIDE 150 MG/1
TABLET ORAL
COMMUNITY

## 2024-04-11 RX ORDER — GABAPENTIN 300 MG/1
600 CAPSULE ORAL
COMMUNITY
Start: 2024-03-26 | End: 2024-04-26

## 2024-04-11 RX ORDER — TOPIRAMATE 50 MG/1
TABLET, FILM COATED ORAL
COMMUNITY

## 2024-04-11 RX ORDER — CYCLOBENZAPRINE HCL 10 MG
TABLET ORAL
COMMUNITY
Start: 2024-01-30

## 2024-04-11 NOTE — PROGRESS NOTES
Botulinum Toxin Injection Procedure Note         Procedure: Botulinum Toxin Procedure      Diagnosis: Chronic Migraines         Agents Used:  Botox or Onabotulinum toxin A         Indications/ Clinical rationale for BOTOX: Chronic Migraines         Number of headache days per month : 6-8    Baseline (if applicable):25         Number of headache hours per day: 2-4    Baseline (if applicable):12-24         (When determining number of headache days, it may be beneficial to ask the patient how many headache-free days each month the patient is experiencing.)         EMG guidance given:  N0         Procedure Details/ Discussion:  Risks, benefits, indications, potential complications and alternatives were discussed with Patient. The Patient received and read the Onabotulinum toxin A handout given and agrees to proceed with the procedure. Informed consent was obtained from the patient.    The limb(s) for injection were identified and a time out called to re-identify the correct limb for injection. After prepping the skin with alcohol overlying the following muscles, Botulinum toxin was injected intramuscularly using [place] guidance as follows.    5 units in the left     5 units in the right     5 units in the procerus    10 units in the left frontalis divided into 2 different sites    10 units in the right frontalis divided into 2 different sites    20 units in the left temporalis divided into 4 different sites    20 units in the right temporalis divided into 4 different sites    15 units in the left occipitalis divided into 3 different sites    15 units in the right occipitalis divded into 3 different sites    10 units in the left cervical paraspinal muscles divided into 2 different sites    10 units in the right cervical paraspinal muscles divided into 2 different sites    15 units in the left trapezius divided into 3 different sites    15 units in the right trapezius divided into 3 different  sites              Vials:    Botox Vial Unit: 200 unit vial, 1 vial, 155 used, 45 wasted         Impression:    Patient tolerated injection procedure well with no complications         Patient Instruction(s):     Patient was instructed that they may experience localized discomfort over the next 12- 24 hours and may take over the counter (OTC) medication as needed (PRN).         Follow-Up: Follow up in the office in 1 months   [id number]

## 2024-05-13 ENCOUNTER — TELEMEDICINE (OUTPATIENT)
Dept: NEUROLOGY | Facility: CLINIC | Age: 34
End: 2024-05-13
Payer: COMMERCIAL

## 2024-05-13 DIAGNOSIS — G93.5 CHIARI I MALFORMATION: ICD-10-CM

## 2024-05-13 DIAGNOSIS — G43.709 CHRONIC MIGRAINE W/O AURA, NOT INTRACTABLE, W/O STAT MIGR: Primary | ICD-10-CM

## 2024-05-13 DIAGNOSIS — G93.2 BIH (BENIGN INTRACRANIAL HYPERTENSION): ICD-10-CM

## 2024-05-13 DIAGNOSIS — R20.0 LEG NUMBNESS: ICD-10-CM

## 2024-05-13 PROCEDURE — 99214 OFFICE O/P EST MOD 30 MIN: CPT | Performed by: NURSE PRACTITIONER

## 2024-05-13 RX ORDER — TEMAZEPAM 15 MG/1
CAPSULE ORAL
COMMUNITY

## 2024-05-13 RX ORDER — RIBOFLAVIN (VITAMIN B2) 100 MG
4 TABLET ORAL DAILY
COMMUNITY
Start: 2024-04-15

## 2024-05-13 RX ORDER — GALCANEZUMAB 120 MG/ML
120 INJECTION, SOLUTION SUBCUTANEOUS
Qty: 1 ML | Refills: 5 | Status: SHIPPED | OUTPATIENT
Start: 2024-05-13 | End: 2024-11-09

## 2024-05-13 RX ORDER — GABAPENTIN 600 MG/1
600 TABLET ORAL 3 TIMES DAILY
COMMUNITY
Start: 2024-04-30

## 2024-05-13 NOTE — PROGRESS NOTES
"[facility] NEUROLOGY         Date of Visit: 2024    Name: Maame Wallace    :  1990    PCP: Sirisha Aparicio MD    Visit Type: follow-up         Subjective     Patient ID: Maame DONOVAN" is a 33 y.o. female.         History of Present Illness  Maame Wallace was located at home and I was located at [facility] for this telemedicine/telephone encounter. We utilized Video Options: MyChart/Zoom for the encounter and Maame Wallace and I were able to hear [and see] each other simultaneously in real time. I introduced myself and verified Maame Wallace identity. I explained how the telemedicine visit will occur. I advised Maame Wallace that technology-related delays and breaches of privacy are potential risks associated with conducting the encounter via telemedicine.    I also advised Maame Wallace that at any point she may terminate the telemedicine encounter and withdraw her consent for receiving care via telemedicine without affecting her ability to receive future care from us, and that I may also terminate the telemedicine encounter if I determine that an in-person visit is more appropriate for the condition[s] for which treatment is sought.    Patient was advised that this face to face encounter would function as a telemedicine/telehealth encounter and would be billed as such including coinsurance and deductible.    Having covered these considerations, Maame Wallace verbally acknowledged them and gave consent for the use of telemedicine in her care.  Visit started at 3:43 PM and completed at 3:55 PM for total duration of 12 minutes.    I had the pleasure of seeing your patient today. As you may know she is a 33-year-old female here today for follow-up of migraine headache with history of Chiari malformation and previous diagnosis of benign intracranial hypertension. She was previously seen by [place] neurology.    History:    Patient states she has dealt " with migraine headaches as well as diagnosis of Chiari I malformation and history of possible benign intracranial hypertension for many years. She states headache started as a child and increased in severity and frequency throughout her teenage and young adult years.    Patient was initially diagnosed with benign intracranial hypertension at the age of 18 or 19. She did have a repeat lumbar puncture done in 2016 with an opening pressure of 18. At that time she also had MRI of the brain which showed evidence for a Chiari I malformation with approximately a 6 mm herniation of the cerebellar tonsils.. A MRI C-spine flow study was completed at that time which showed some mild flow limitation in the posterior circulation but no other flow limitation was noted.    Patient states that at that time they did try to treat her headaches with prophylactic medications. She has been on topiramate, Depakote, Cymbalta, amitriptyline, gabapentin, propranolol, and Botox for migraine prevention in the past all with little improvement in headache symptoms. She had significant side effects to topiramate and Cymbalta in the past as well. She is also tried several abortive therapies including sumatriptan, rizatriptan, eletripitan all with worsening headache symptoms from medication.    Patient states that in addition to this she was sent for evaluation by neurosurgery and was about to undergo surgical decompression however was lost to follow-up and headache symptoms improved so she did not pursue any additional intervention at this time.    On 8/23/2022 patient did end up receiving repeat MRI brain and cervical spine. MRI brain and cervical did show a mild tonsillar herniation with approximately 4 to 5 mm in depth. It was not causing significant crowding with no syrinx noted in the cervical spinal cord. There was however increased Meckel's cave prominence typically associated with increased intracranial hypertension. There was also some  white matter changes noted. There is also mild degenerative changes noted in the cervical spine with no other significant abnormalities.    And did end up following up with neurosurgery they did find mild Chiari malformation as well as pseudotumor's type symptoms. Because of this they wanted to try and see if symptoms improved with Diamox. They were going to meet again in a month to discuss possible decompressive surgery versus shunt procedure depending on how patient was doing.    Patient did undergo chiari decompression in January 2023 with Dr Vallejo. She was evaluated by Dr Vallejo the beginning of April where he recommended an ophthalmology evaluation, which she is unable to obtain until June. He did end up seeing ophthalmology over the weekend when she was in the hospital which showed no optic nerve swelling. She is scheduled to see the [facility] eye clinic in 2 weeks.. She underwent an LP on 4/12, opening pressure 26, 20ml drained, closing pressure 15, and this did not help. She underwent venous manometry with Dr Ortega on 4/14, which was normal. She did end up again in the hospital for headaches in May due to severity of headache symptoms. Her evaluation with ophthalmology was also normal. They did end up giving her several medications and headache cocktail which did reduce the severity of symptoms however patient is still experiencing frequent headache.     Patient did end up receiving her first Botox injection on 7/6/2023 for chronic migraine headache symptoms.    She then subsequently underwent additional decompressive surgery in [place] on 7/24/2023.  Postoperative MRI was stable.  Patient did undergo tethered cord surgery on 1/29/2024.  She did have some minor postop complications of an allergic reaction to adhesive tape and is reporting some increased pain and paresthesias of her lower extremities radiating from her back.  She is post to follow back up with the surgeon on Friday about  this.    Current:    Patient did receive Botox at her last appointment.  She states that she has had some increase in her migraines this last month however thinks that the possibility this is somewhat weather related and stress related due to some continued pain and complication she is having from her tethered cord surgery.  She states that the Nurtec is not always effective in aborting headache symptoms and states that often times she feels that she needs a second dosage however cannot repeat doses the time has not passed for allotted repeat dosage.  Patient states that otherwise headaches in general are still better controlled on the Botox.  She denies any new symptoms with the headaches.  She is following up with [place] neurosurgery for follow-up on her tethered cord procedure.  Patient is still taking Emgality and topiramate in addition to this.  Emgality once monthly and Topamax 50 mg once daily.  No other new neurological complaints at today's visit.        The following portions of the patient's history were reviewed and updated as appropriate: allergies, current medications, past family history, past medical history, past social history, past surgical history, and problem list.                 Review of Systems   Constitutional:  Negative for activity change, appetite change, fatigue and unexpected weight change.   HENT:  Negative for hearing loss, tinnitus and trouble swallowing.    Eyes:  Negative for photophobia, pain and visual disturbance.   Respiratory:  Negative for chest tightness and shortness of breath.    Cardiovascular:  Negative for palpitations.   Gastrointestinal:  Negative for nausea and vomiting.   Genitourinary:  Positive for frequency and urgency.   Musculoskeletal:  Positive for back pain, gait problem and neck pain.   Neurological:  Positive for dizziness, numbness and headaches. Negative for syncope, facial asymmetry, speech difficulty, weakness and light-headedness.    Psychiatric/Behavioral:  Positive for sleep disturbance. Negative for confusion.             Current Medications:    Current Outpatient Medications   Medication Instructions    acetaminophen (TYLENOL) 650 mg, Oral, Every 4 Hours PRN    albuterol sulfate  (90 Base) MCG/ACT inhaler Every 6 Hours Scheduled    cetirizine (ZYRTEC) 10 mg, Oral, Daily    Dextromethorphan-buPROPion ER (Auvelity)  MG tablet controlled-release 1 tablet, Oral    diclofenac (VOLTAREN) 50 mg, Oral, 2 Times Daily PRN    Emgality 120 mg, Subcutaneous, Every 30 Days, Last dose was 03/09/2024    famotidine (PEPCID) 20 MG tablet TAKE 1 TABLET BY MOUTH EVERY DAY    FLUoxetine (PROZAC) 40 mg, Oral, Daily    gabapentin (NEURONTIN) 600 mg, Oral, 3 Times Daily    lansoprazole (PREVACID) 30 MG capsule 1 CAPSULE BY MOUTH TWICE DAILY    levothyroxine (SYNTHROID, LEVOTHROID) 88 mcg, Oral, Every Early Morning    Nurtec 75 mg, Oral, As Needed    ondansetron (ZOFRAN) 4 mg, Oral, Every 6 Hours PRN    temazepam (RESTORIL) 15 MG capsule     topiramate (TOPAMAX) 50 MG tablet     ubrogepant (UBRELVY) 100 mg, Oral, As Needed    Vitamin B-2 (RIBOFLAVIN) 100 MG tablet tablet 4 tablets, Oral, Daily          There were no vitals taken for this visit.               Objective     Neurological Exam  Mental Status  Awake, alert and oriented to person, place and time. Speech is normal. Language is fluent with no aphasia.    Cranial Nerves  CN II: Visual fields full to confrontation.  CN III, IV, VI: Extraocular movements intact bilaterally. Normal lids and orbits bilaterally. Pupils equal round and reactive to light bilaterally.  CN VII: Full and symmetric facial movement.    Motor  Normal muscle bulk throughout. No abnormal involuntary movements.    Gait  Normal casual, toe, heel and tandem gait.      Physical Exam  Constitutional:       Appearance: Normal appearance. She is normal weight.   Eyes:      General: Lids are normal.      Extraocular Movements:  Extraocular movements intact.      Pupils: Pupils are equal, round, and reactive to light.   Pulmonary:      Effort: Pulmonary effort is normal.   Skin:     General: Skin is warm.   Psychiatric:         Mood and Affect: Mood normal.         Speech: Speech normal.         Behavior: Behavior normal.                     Assessment & Plan     Diagnoses and all orders for this visit:    1. Chronic migraine w/o aura, not intractable, w/o stat migr (Primary)  -     Destruction By Neurolytic Agent; Future  -     ubrogepant (Ubrelvy) 100 MG tablet; Take 1 tablet by mouth As Needed (at onset of headache. may repeat dose in 2 hours. Max dose of 2 in 24 hours.) for up to 2 days.  Dispense: 2 tablet; Refill: 0  -     Emgality 120 MG/ML auto-injector pen; Inject 1 mL under the skin into the appropriate area as directed Every 30 (Thirty) Days for 180 days. Last dose was 03/09/2024  Dispense: 1 mL; Refill: 5    2. Leg numbness    3. Chiari I malformation    4. BIH (benign intracranial hypertension)           At this time we will continue Botox and emgality for headache prophylaxis.    We will go ahead and continue topiramate for benign intracranial hypertension.    Patient will  samples of Ubrelvy to try for abortive treatment to see if this is more effective than Nurtec for her.    Follow-up for Botox.         Zonia ELIZALDE    Neurology    [facility] [place]    Phone: [phone]    5/13/2024 , 20:14 EDT

## 2024-05-28 ENCOUNTER — TELEPHONE (OUTPATIENT)
Dept: NEUROLOGY | Facility: CLINIC | Age: 34
End: 2024-05-28
Payer: COMMERCIAL

## 2024-05-28 DIAGNOSIS — G43.709 CHRONIC MIGRAINE W/O AURA, NOT INTRACTABLE, W/O STAT MIGR: Primary | ICD-10-CM

## 2024-05-28 NOTE — TELEPHONE ENCOUNTER
Pt would like to get Rx for ubrelvy please sign off on as it wasn't in her med list-      Jayjay pt asking status of botox please advise

## 2024-06-12 ENCOUNTER — PROCEDURE VISIT (OUTPATIENT)
Dept: NEUROLOGY | Facility: CLINIC | Age: 34
End: 2024-06-12
Payer: COMMERCIAL

## 2024-06-12 VITALS
SYSTOLIC BLOOD PRESSURE: 134 MMHG | OXYGEN SATURATION: 98 % | BODY MASS INDEX: 42.26 KG/M2 | DIASTOLIC BLOOD PRESSURE: 90 MMHG | HEIGHT: 66 IN | HEART RATE: 87 BPM

## 2024-06-12 DIAGNOSIS — R20.0 LEG NUMBNESS: ICD-10-CM

## 2024-06-12 PROCEDURE — 95909 NRV CNDJ TST 5-6 STUDIES: CPT | Performed by: PSYCHIATRY & NEUROLOGY

## 2024-06-12 PROCEDURE — 95886 MUSC TEST DONE W/N TEST COMP: CPT | Performed by: PSYCHIATRY & NEUROLOGY

## 2024-06-12 NOTE — PROGRESS NOTES
EMG and Nerve Conduction Studies      History: 33-year-old woman with numbness and tingling in the lower extremities.      Results: Nerve conduction studies were performed on both lower extremities.  Bilateral sural sensory nerve action potentials were normal throughout.  Bilateral fibular and tibial compound motor action potentials were normal throughout.  Bilateral fibular and tibial F waves were present, symmetric, and normal in latency.  Bilateral tibial H reflexes were present and symmetric.    EMG needle examination of selected muscles of both lower extremities and lumbosacral paraspinal muscles revealed no abnormal insertional activity, spontaneous activity, or motor unit remodeling.  Please see accompanying data for details.    Impression: This is a normal nerve conduction study and EMG needle examination of both lower extremities.    Chandu Cooley M.D.              Dictated utilizing Dragon dictation.

## 2024-06-21 ENCOUNTER — OFFICE VISIT (OUTPATIENT)
Dept: NEUROLOGY | Facility: CLINIC | Age: 34
End: 2024-06-21
Payer: COMMERCIAL

## 2024-06-21 VITALS
OXYGEN SATURATION: 98 % | DIASTOLIC BLOOD PRESSURE: 78 MMHG | SYSTOLIC BLOOD PRESSURE: 134 MMHG | HEIGHT: 66 IN | BODY MASS INDEX: 41.95 KG/M2 | WEIGHT: 261 LBS | HEART RATE: 115 BPM

## 2024-06-21 DIAGNOSIS — G93.5 CHIARI I MALFORMATION: ICD-10-CM

## 2024-06-21 DIAGNOSIS — G93.2 BIH (BENIGN INTRACRANIAL HYPERTENSION): ICD-10-CM

## 2024-06-21 DIAGNOSIS — G43.709 CHRONIC MIGRAINE W/O AURA, NOT INTRACTABLE, W/O STAT MIGR: Primary | ICD-10-CM

## 2024-06-21 RX ORDER — FREMANEZUMAB-VFRM 225 MG/1.5ML
225 INJECTION SUBCUTANEOUS
Start: 2024-06-21 | End: 2024-12-18

## 2024-06-21 RX ORDER — ZOLPIDEM TARTRATE 5 MG
TABLET ORAL
COMMUNITY

## 2024-06-21 NOTE — PROGRESS NOTES
"[facility] NEUROLOGY         Date of Visit: 2024    Name: Maame Wallace    :  1990    PCP: Sirisha Aparicio MD    Visit Type: follow-up         Subjective     Patient ID: Maame DONOVAN" is a 33 y.o. female.         History of Present Illness  I had the pleasure of seeing your patient today. As you may know she is a 33-year-old female here today for follow-up of migraine headache with history of Chiari malformation and previous diagnosis of benign intracranial hypertension. She was previously seen by [place] [facility].    History:    Patient states she has dealt with migraine headaches as well as diagnosis of Chiari I malformation and history of possible benign intracranial hypertension for many years. She states headache started as a child and increased in severity and frequency throughout her teenage and young adult years.    Patient was initially diagnosed with benign intracranial hypertension at the age of 18 or 19. She did have a repeat lumbar puncture done in  with an opening pressure of 18. At that time she also had MRI of the brain which showed evidence for a Chiari I malformation with approximately a 6 mm herniation of the cerebellar tonsils.. A MRI C-spine flow study was completed at that time which showed some mild flow limitation in the posterior circulation but no other flow limitation was noted.    Patient states that at that time they did try to treat her headaches with prophylactic medications. She has been on topiramate, Depakote, Cymbalta, amitriptyline, gabapentin, propranolol, and Botox for migraine prevention in the past all with little improvement in headache symptoms. She had significant side effects to topiramate and Cymbalta in the past as well. She is also tried several abortive therapies including sumatriptan, rizatriptan, eletripitan all with worsening headache symptoms from medication.    Patient states that in addition to this she was sent for " evaluation by neurosurgery and was about to undergo surgical decompression however was lost to follow-up and headache symptoms improved so she did not pursue any additional intervention at this time.    On 8/23/2022 patient did end up receiving repeat MRI brain and cervical spine. MRI brain and cervical did show a mild tonsillar herniation with approximately 4 to 5 mm in depth. It was not causing significant crowding with no syrinx noted in the cervical spinal cord. There was however increased Meckel's cave prominence typically associated with increased intracranial hypertension. There was also some white matter changes noted. There is also mild degenerative changes noted in the cervical spine with no other significant abnormalities.    And did end up following up with neurosurgery they did find mild Chiari malformation as well as pseudotumor's type symptoms. Because of this they wanted to try and see if symptoms improved with Diamox. They were going to meet again in a month to discuss possible decompressive surgery versus shunt procedure depending on how patient was doing.    Patient did undergo chiari decompression in January 2023 with Dr Vallejo. She was evaluated by Dr Vallejo the beginning of April where he recommended an ophthalmology evaluation, which she is unable to obtain until June. He did end up seeing ophthalmology over the weekend when she was in the hospital which showed no optic nerve swelling. She is scheduled to see the [facility] in 2 weeks.. She underwent an LP on 4/12, opening pressure 26, 20ml drained, closing pressure 15, and this did not help. She underwent venous manometry with Dr Ortega on 4/14, which was normal. She did end up again in the hospital for headaches in May due to severity of headache symptoms. Her evaluation with ophthalmology was also normal. They did end up giving her several medications and headache cocktail which did reduce the severity of symptoms however patient is still  experiencing frequent headache.     Patient did end up receiving her first Botox injection on 7/6/2023 for chronic migraine headache symptoms.    She then subsequently underwent additional decompressive surgery in [place] on 7/24/2023.  Postoperative MRI was stable.  Patient did undergo tethered cord surgery on 1/29/2024.  She did have some minor postop complications of an allergic reaction to adhesive tape and is reporting some increased pain and paresthesias of her lower extremities radiating from her back.  She is post to follow back up with the surgeon on Friday about this.    Current:    Patient has been receiving Botox and Emgality for headache prophylaxis.  She is also been on a low-dose of topiramate now for continued monitoring for benign intracranial hypertension.  Patient states that over the last couple of months she has noticed increase in headache frequency.  She is reporting approximately 12-16 headache days per month all of which are migrainous in nature.  They do respond well to Ubrelvy as an abortive therapy however frequency is remaining elevated.  She is not aware of any changes that have occurred that would trigger some increased headache symptoms.  She states they do feel more migrainous in nature and less like increased pressure headache.  She does report headaches seem to be worse a couple weeks after her Emgality injection.  She does feel that immediately post Botox symptoms are better however as it gets closer to her next Botox injection headaches again started to  in frequency.  She denies new symptoms with the headaches.    Patient is also seen [place] neurosurgery for follow-up on previous tethered cord surgery.  She is going to be undergoing an injection in her sacroiliac joint with pain management soon.  No other new neurological complaints at today's visit.        The following portions of the patient's history were reviewed and updated as appropriate: allergies, current  "medications, past family history, past medical history, past social history, past surgical history, and problem list.                 Review of Systems   Constitutional:  Negative for activity change, appetite change, fatigue and unexpected weight change.   HENT:  Negative for hearing loss, tinnitus and trouble swallowing.    Eyes:  Negative for photophobia, pain and visual disturbance.   Respiratory:  Negative for chest tightness and shortness of breath.    Cardiovascular:  Negative for palpitations.   Gastrointestinal:  Negative for nausea and vomiting.   Genitourinary:  Positive for frequency and urgency.   Musculoskeletal:  Positive for back pain, gait problem and neck pain.   Neurological:  Positive for dizziness, numbness and headaches. Negative for syncope, facial asymmetry, speech difficulty, weakness and light-headedness.   Psychiatric/Behavioral:  Positive for sleep disturbance. Negative for confusion.             Current Medications:    Current Outpatient Medications   Medication Instructions    acetaminophen (TYLENOL) 650 mg, Oral, Every 4 Hours PRN    Ajovy 225 mg, Subcutaneous, Every 30 Days    albuterol sulfate  (90 Base) MCG/ACT inhaler Every 6 Hours Scheduled    Ambien 5 MG tablet     cetirizine (ZYRTEC) 10 mg, Oral, Daily    Dextromethorphan-buPROPion ER (Auvelity)  MG tablet controlled-release 1 tablet, Oral    famotidine (PEPCID) 20 MG tablet TAKE 1 TABLET BY MOUTH EVERY DAY    FLUoxetine (PROZAC) 40 mg, Oral, Daily    gabapentin (NEURONTIN) 600 mg, Oral, 3 Times Daily    lansoprazole (PREVACID) 30 MG capsule 1 CAPSULE BY MOUTH TWICE DAILY    levothyroxine (SYNTHROID, LEVOTHROID) 88 mcg, Oral, Every Early Morning    ondansetron (ZOFRAN) 4 mg, Oral, Every 6 Hours PRN    topiramate (TOPAMAX) 50 MG tablet     ubrogepant (UBRELVY) 100 mg, Oral, As Needed    Vitamin B-2 (RIBOFLAVIN) 100 MG tablet tablet 4 tablets, Oral, Daily          /78   Pulse 115   Ht 167.6 cm (65.98\")   Wt " 118 kg (261 lb)   SpO2 98%   BMI 42.15 kg/m²                Objective     Neurological Exam  Mental Status  Awake, alert and oriented to person, place and time. Speech is normal. Language is fluent with no aphasia.    Cranial Nerves  CN II: Visual fields full to confrontation.  CN III, IV, VI: Extraocular movements intact bilaterally. Normal lids and orbits bilaterally. Pupils equal round and reactive to light bilaterally.  CN VII: Full and symmetric facial movement.    Motor  Normal muscle bulk throughout. No abnormal involuntary movements.    Gait  Normal casual, toe, heel and tandem gait.      Physical Exam  Constitutional:       Appearance: Normal appearance. She is normal weight.   Eyes:      General: Lids are normal.      Extraocular Movements: Extraocular movements intact.      Pupils: Pupils are equal, round, and reactive to light.   Pulmonary:      Effort: Pulmonary effort is normal.   Skin:     General: Skin is warm.   Psychiatric:         Mood and Affect: Mood normal.         Speech: Speech normal.         Behavior: Behavior normal.                     Assessment & Plan     Diagnoses and all orders for this visit:    1. Chronic migraine w/o aura, not intractable, w/o stat migr (Primary)  -     Fremanezumab-vfrm (Ajovy) 225 MG/1.5ML solution auto-injector; Inject 225 mg under the skin into the appropriate area as directed Every 30 (Thirty) Days for 180 days.    2. Chiari I malformation    3. BIH (benign intracranial hypertension)             At this time we will continue Botox.  Patient's next injection will be a sample dose that she will be uninsured for the month of June as she is changing jobs.    I would like to try switching her Emgality to Ajovy.  She is not due for next dose till mid July so I gave her sample today which will get her through until August when she is insured again.    In addition we will for now increase topiramate to twice daily from once daily to see if this helps reduce some of  the headache symptoms.  In the past she has been unable to tolerate very high doses of topiramate due to mental fogginess.    Follow-up will be in August as she will be reestablished with insurance for approval on medications.  We will go ahead and do Botox in July as planned.         Zonia ELIZALDE    Neurology    [facility] [place]    Phone: [phone]    6/21/2024 , 11:34 EDT

## 2024-06-26 DIAGNOSIS — G43.709 CHRONIC MIGRAINE W/O AURA, NOT INTRACTABLE, W/O STAT MIGR: Primary | ICD-10-CM

## 2024-06-26 RX ORDER — ONDANSETRON 4 MG/1
4 TABLET, FILM COATED ORAL EVERY 6 HOURS PRN
Qty: 20 TABLET | Refills: 1 | Status: SHIPPED | OUTPATIENT
Start: 2024-06-26

## 2024-07-25 ENCOUNTER — TELEPHONE (OUTPATIENT)
Dept: NEUROLOGY | Facility: CLINIC | Age: 34
End: 2024-07-25

## 2024-07-25 ENCOUNTER — PROCEDURE VISIT (OUTPATIENT)
Dept: NEUROLOGY | Facility: CLINIC | Age: 34
End: 2024-07-25

## 2024-07-25 DIAGNOSIS — G43.709 CHRONIC MIGRAINE W/O AURA, NOT INTRACTABLE, W/O STAT MIGR: Primary | ICD-10-CM

## 2024-07-25 NOTE — PROGRESS NOTES
Botulinum Toxin Injection Procedure Note         Procedure: Botulinum Toxin Procedure      Diagnosis: Chronic Migraines         Agents Used:  Botox or Onabotulinum toxin A         Indications/ Clinical rationale for BOTOX: Chronic Migraines         Number of headache days per month : 12-16    Baseline (if applicable):25-30         Number of headache hours per day: 1-4    Baseline (if applicable):12-24         (When determining number of headache days, it may be beneficial to ask the patient how many headache-free days each month the patient is experiencing.)         EMG guidance given:  N0         Procedure Details/ Discussion:  Risks, benefits, indications, potential complications and alternatives were discussed with Patient. The Patient received and read the Onabotulinum toxin A handout given and agrees to proceed with the procedure. Informed consent was obtained from the patient.    The limb(s) for injection were identified and a time out called to re-identify the correct limb for injection. After prepping the skin with alcohol overlying the following muscles, Botulinum toxin was injected intramuscularly using [place] guidance as follows.    5 units in the left     5 units in the right     5 units in the procerus    10 units in the left frontalis divided into 2 different sites    10 units in the right frontalis divided into 2 different sites    20 units in the left temporalis divided into 4 different sites    20 units in the right temporalis divided into 4 different sites    15 units in the left occipitalis divided into 3 different sites    15 units in the right occipitalis divded into 3 different sites    10 units in the left cervical paraspinal muscles divided into 2 different sites    10 units in the right cervical paraspinal muscles divided into 2 different sites    15 units in the left trapezius divided into 3 different sites    15 units in the right trapezius divided into 3 different  sites              Vials:    Botox Vial Unit: 200 unit vial, 1 vial, 155 used, 45 wasted         Impression:    Patient tolerated injection procedure well with no complications         Patient Instruction(s):     Patient was instructed that they may experience localized discomfort over the next 12- 24 hours and may take over the counter (OTC) medication as needed (PRN).         Follow-Up: Follow up in the office in 1 months

## 2024-08-16 DIAGNOSIS — G43.709 CHRONIC MIGRAINE W/O AURA, NOT INTRACTABLE, W/O STAT MIGR: ICD-10-CM

## 2024-08-16 RX ORDER — FREMANEZUMAB-VFRM 225 MG/1.5ML
225 INJECTION SUBCUTANEOUS
Qty: 1.5 ML | Refills: 5 | Status: CANCELLED
Start: 2024-08-16 | End: 2025-02-12

## 2024-08-21 ENCOUNTER — TELEPHONE (OUTPATIENT)
Dept: NEUROLOGY | Facility: CLINIC | Age: 34
End: 2024-08-21
Payer: COMMERCIAL

## 2024-08-21 DIAGNOSIS — G43.709 CHRONIC MIGRAINE W/O AURA, NOT INTRACTABLE, W/O STAT MIGR: ICD-10-CM

## 2024-08-22 NOTE — TELEPHONE ENCOUNTER
LVM with our number for patient to call back and reschedule 8/27 appointment due to provider out of office. Also sent My Chart Message.

## 2024-08-22 NOTE — TELEPHONE ENCOUNTER
arturo Montiel just saw this. Here is the verbatim from the patient. I was seeing about my Ajovy and Ubrelvy if she was able to work on getting that filled with my insurance or if I’d be able to get a sample. My Ajovy was due on the 16th

## 2024-08-23 RX ORDER — FREMANEZUMAB-VFRM 225 MG/1.5ML
225 INJECTION SUBCUTANEOUS
Qty: 1.5 ML | Refills: 5 | Status: SHIPPED | OUTPATIENT
Start: 2024-08-23 | End: 2025-02-19

## 2024-08-23 NOTE — TELEPHONE ENCOUNTER
"Appears Rxs haven't been sent in they were sent as a \"no Print\"    Resending rxs- notifying pt that she can come grab samples  "

## 2024-08-30 ENCOUNTER — PRIOR AUTHORIZATION (OUTPATIENT)
Dept: NEUROLOGY | Facility: CLINIC | Age: 34
End: 2024-08-30
Payer: COMMERCIAL

## 2024-08-30 NOTE — TELEPHONE ENCOUNTER
PA has been started on cover my meds for ajovy . Awaiting determination.     Was notified that I needed to call [facility] at [phone]      Revewed chart saw ajovy and ubrelvy were approved- lvm with pharmacy

## 2024-09-19 ENCOUNTER — PRIOR AUTHORIZATION (OUTPATIENT)
Dept: NEUROLOGY | Facility: CLINIC | Age: 34
End: 2024-09-19
Payer: COMMERCIAL

## 2024-09-26 ENCOUNTER — OFFICE VISIT (OUTPATIENT)
Dept: NEUROLOGY | Facility: CLINIC | Age: 34
End: 2024-09-26
Payer: COMMERCIAL

## 2024-09-26 VITALS
BODY MASS INDEX: 41.46 KG/M2 | HEIGHT: 66 IN | OXYGEN SATURATION: 99 % | WEIGHT: 258 LBS | SYSTOLIC BLOOD PRESSURE: 128 MMHG | DIASTOLIC BLOOD PRESSURE: 76 MMHG | HEART RATE: 86 BPM

## 2024-09-26 DIAGNOSIS — G93.2 BIH (BENIGN INTRACRANIAL HYPERTENSION): ICD-10-CM

## 2024-09-26 DIAGNOSIS — G93.5 CHIARI I MALFORMATION: ICD-10-CM

## 2024-09-26 DIAGNOSIS — G43.709 CHRONIC MIGRAINE W/O AURA, NOT INTRACTABLE, W/O STAT MIGR: Primary | ICD-10-CM

## 2024-09-26 RX ORDER — BUPROPION HYDROCHLORIDE 300 MG/1
TABLET ORAL
COMMUNITY
Start: 2024-09-22

## 2024-11-04 ENCOUNTER — TELEPHONE (OUTPATIENT)
Dept: NEUROLOGY | Facility: CLINIC | Age: 34
End: 2024-11-04
Payer: COMMERCIAL

## 2024-11-04 NOTE — TELEPHONE ENCOUNTER
Patient requesting a hospital follow up. Pt was at [facility] 10/30-11/3 & given 10 days of Keppra after. Pt requested to see provider on 11/4. Provider out of office on 11/4. Advised patient I would contact her to schedule follow up once consulting with provider. First available is Dec 3rd. Annamaria has np slots on 11/22. Should I schedule her for November or schedule as non emergent first available in Dec?

## 2024-11-05 NOTE — TELEPHONE ENCOUNTER
Lvm asking for a call back to schedule f/u ask to speak with Oralia-    Sending [facility] message as well

## 2024-11-05 NOTE — TELEPHONE ENCOUNTER
Can we schedule her for an appointment toward the end of November.  I can send in enough Keppra until that appointment.  Can you find out what dose and how frequently she is taking it.  I really do not have anywhere else to put her in right now.  She did not have any seizures so she is okay for a follow-up in a couple of weeks.

## 2024-11-06 DIAGNOSIS — G43.709 CHRONIC MIGRAINE W/O AURA, NOT INTRACTABLE, W/O STAT MIGR: Primary | ICD-10-CM

## 2024-11-06 RX ORDER — LEVETIRACETAM 750 MG/1
750 TABLET ORAL 2 TIMES DAILY
Qty: 60 TABLET | Refills: 0 | Status: SHIPPED | OUTPATIENT
Start: 2024-11-06 | End: 2024-11-08 | Stop reason: SDUPTHER

## 2024-11-08 DIAGNOSIS — G43.709 CHRONIC MIGRAINE W/O AURA, NOT INTRACTABLE, W/O STAT MIGR: ICD-10-CM

## 2024-11-08 RX ORDER — LEVETIRACETAM 750 MG/1
750 TABLET ORAL 2 TIMES DAILY
Qty: 180 TABLET | Refills: 0 | Status: SHIPPED | OUTPATIENT
Start: 2024-11-08 | End: 2025-02-06

## 2024-11-15 ENCOUNTER — OFFICE VISIT (OUTPATIENT)
Dept: NEUROLOGY | Facility: CLINIC | Age: 34
End: 2024-11-15
Payer: COMMERCIAL

## 2024-11-15 VITALS
BODY MASS INDEX: 42.75 KG/M2 | HEIGHT: 66 IN | OXYGEN SATURATION: 98 % | SYSTOLIC BLOOD PRESSURE: 122 MMHG | DIASTOLIC BLOOD PRESSURE: 76 MMHG | HEART RATE: 126 BPM | WEIGHT: 266 LBS

## 2024-11-15 DIAGNOSIS — G93.2 BIH (BENIGN INTRACRANIAL HYPERTENSION): ICD-10-CM

## 2024-11-15 DIAGNOSIS — G93.5 CHIARI I MALFORMATION: Primary | ICD-10-CM

## 2024-11-15 DIAGNOSIS — G43.709 CHRONIC MIGRAINE W/O AURA, NOT INTRACTABLE, W/O STAT MIGR: ICD-10-CM

## 2024-11-15 PROBLEM — G96.810 INTRACRANIAL HYPOTENSION: Status: ACTIVE | Noted: 2024-11-11

## 2024-11-15 PROBLEM — H53.8 BLURRY VISION: Status: ACTIVE | Noted: 2024-11-08

## 2024-11-15 RX ORDER — DEXTROMETHORPHAN HYDROBROMIDE, BUPROPION HYDROCHLORIDE 105; 45 MG/1; MG/1
1 TABLET, MULTILAYER, EXTENDED RELEASE ORAL
COMMUNITY

## 2024-11-15 RX ORDER — LEVETIRACETAM 500 MG/1
750 TABLET ORAL 2 TIMES DAILY
Qty: 270 TABLET | Refills: 1 | Status: SHIPPED | OUTPATIENT
Start: 2024-11-15 | End: 2025-05-14

## 2024-11-15 NOTE — LETTER
November 15, 2024     Patient: Maame Wallace   YOB: 1990   Date of Visit: 11/15/2024       To Whom It May Concern:    It is my medical opinion that Maame Wallace may return to work on 11/25/2024 .           Sincerely,        FIDE Delong    CC: No Recipients

## 2024-11-15 NOTE — PROGRESS NOTES
"[facility] NEUROLOGY         Date of Visit: 11/15/2024    Name: Maame Wallace    :  1990    PCP: Sirisha Aparicio MD    Visit Type: follow-up         Subjective     Patient ID: Maame DONOVAN" is a 34 y.o. female.         History of Present Illness  I had the pleasure of seeing your patient today. As you may know she is a 33-year-old female here today for follow-up of migraine headache with history of Chiari malformation and previous diagnosis of benign intracranial hypertension. She was previously seen by [facility].    History:    Patient states she has dealt with migraine headaches as well as diagnosis of Chiari I malformation and history of possible benign intracranial hypertension for many years. She states headache started as a child and increased in severity and frequency throughout her teenage and young adult years.    Patient was initially diagnosed with benign intracranial hypertension at the age of 18 or 19. She did have a repeat lumbar puncture done in  with an opening pressure of 18. At that time she also had MRI of the brain which showed evidence for a Chiari I malformation with approximately a 6 mm herniation of the cerebellar tonsils.. A MRI C-spine flow study was completed at that time which showed some mild flow limitation in the posterior circulation but no other flow limitation was noted.    Patient states that at that time they did try to treat her headaches with prophylactic medications. She has been on topiramate, Depakote, Cymbalta, amitriptyline, gabapentin, propranolol, and Botox for migraine prevention in the past all with little improvement in headache symptoms. She had significant side effects to topiramate and Cymbalta in the past as well. She is also tried several abortive therapies including sumatriptan, rizatriptan, eletripitan all with worsening headache symptoms from medication.    Patient states that in addition to this she was sent for " evaluation by neurosurgery and was about to undergo surgical decompression however was lost to follow-up and headache symptoms improved so she did not pursue any additional intervention at this time.    On 8/23/2022 patient did end up receiving repeat MRI brain and cervical spine. MRI brain and cervical did show a mild tonsillar herniation with approximately 4 to 5 mm in depth. It was not causing significant crowding with no syrinx noted in the cervical spinal cord. There was however increased Meckel's cave prominence typically associated with increased intracranial hypertension. There was also some white matter changes noted. There is also mild degenerative changes noted in the cervical spine with no other significant abnormalities.    And did end up following up with neurosurgery they did find mild Chiari malformation as well as pseudotumor's type symptoms. Because of this they wanted to try and see if symptoms improved with Diamox. They were going to meet again in a month to discuss possible decompressive surgery versus shunt procedure depending on how patient was doing.    Patient did undergo chiari decompression in January 2023 with Dr Vallejo. She was evaluated by Dr Vallejo the beginning of April where he recommended an ophthalmology evaluation, which she is unable to obtain until June. He did end up seeing ophthalmology over the weekend when she was in the hospital which showed no optic nerve swelling. She is scheduled to see the [facility] in 2 weeks.. She underwent an LP on 4/12, opening pressure 26, 20ml drained, closing pressure 15, and this did not help. She underwent venous manometry with Dr Ortega on 4/14, which was normal. She did end up again in the hospital for headaches in May due to severity of headache symptoms. Her evaluation with ophthalmology was also normal. They did end up giving her several medications and headache cocktail which did reduce the severity of symptoms however patient is still  experiencing frequent headache.     Patient did end up receiving her first Botox injection on 7/6/2023 for chronic migraine headache symptoms.    She then subsequently underwent additional decompressive surgery in [place] on 7/24/2023.  Postoperative MRI was stable.  Patient did undergo tethered cord surgery on 1/29/2024.  She did have some minor postop complications of an allergic reaction to adhesive tape and is reporting some increased pain and paresthesias of her lower extremities radiating from her back.  She is post to follow back up with the surgeon on Friday about this.    Current:    Patient is here today for hospital follow-up.  Patient has been hospitalized twice once in October and once more recently on 11/8/2024 due to new and worsening headache symptoms.  At her hospitalization at [facility] in October patient was having acute worsening of headaches.  They did end up admitting her for workup.  Imaging came back within normal limits however they decided to go forward with lumbar puncture as patient was stating that headaches seem similar to her benign intracranial hypertension headaches.  LP was within normal limits.  Patient reports an opening pressure of 14.  They did end up starting her on Keppra 750 mg twice daily in addition to her current headache regimen for headache prophylaxis at the time.  Patient was discharged home with this having done better after an IV dose of Keppra.    Patient ended up back in the hospital on November 8 due to concerns of worsening headache, generalized weakness, blurriness of vision and vision loss.  She was also having some worsening confusion and memory issues.  They did end up again repeating imaging.  MRI did show evidence for dural thickening getting in enhancement likely related to low pressure.  No other abnormalities were noted.  They did end up performing a blood patch on 11/13/2024.  Patient is here today for continued follow-up.      The following portions of  the patient's history were reviewed and updated as appropriate: allergies, current medications, past family history, past medical history, past social history, past surgical history, and problem list.                 Review of Systems   Constitutional:  Negative for activity change, appetite change, fatigue and unexpected weight change.   HENT:  Negative for hearing loss, tinnitus and trouble swallowing.         Phonophobia   Eyes:  Positive for photophobia and visual disturbance. Negative for pain.   Respiratory:  Negative for chest tightness and shortness of breath.    Cardiovascular:  Negative for palpitations.   Gastrointestinal:  Positive for nausea. Negative for vomiting.   Genitourinary:  Positive for frequency and urgency.   Musculoskeletal:  Positive for back pain, gait problem and neck pain.   Neurological:  Positive for dizziness, weakness, numbness and headaches. Negative for syncope, facial asymmetry, speech difficulty and light-headedness.   Psychiatric/Behavioral:  Positive for sleep disturbance. Negative for confusion.             Current Medications:    Current Outpatient Medications   Medication Instructions    acetaminophen (TYLENOL) 650 mg, Oral, Every 4 Hours PRN    Ajovy 225 mg, Subcutaneous, Every 30 Days    albuterol sulfate  (90 Base) MCG/ACT inhaler Every 6 Hours Scheduled    Ambien 5 MG tablet     Auvelity  MG tablet controlled-release 1 tablet    cetirizine (ZYRTEC) 10 mg, Daily    famotidine (PEPCID) 20 MG tablet TAKE 1 TABLET BY MOUTH EVERY DAY    FLUoxetine (PROZAC) 40 mg, Daily    gabapentin (NEURONTIN) 600 mg, 3 Times Daily    lansoprazole (PREVACID) 30 MG capsule 1 CAPSULE BY MOUTH TWICE DAILY    levETIRAcetam (KEPPRA) 750 mg, Oral, 2 Times Daily    levothyroxine (SYNTHROID, LEVOTHROID) 88 mcg, Every Early Morning    ondansetron (ZOFRAN) 4 mg, Oral, Every 6 Hours PRN    topiramate (TOPAMAX) 50 MG tablet     ubrogepant (UBRELVY) 100 mg, Oral, As Needed    Vitamin B-2  "(RIBOFLAVIN) 100 MG tablet tablet 4 tablets, Daily          /76   Pulse (!) 126   Ht 167.6 cm (65.98\")   Wt 121 kg (266 lb)   SpO2 98%   BMI 42.95 kg/m²                Objective     Neurological Exam  Mental Status  Awake, alert and oriented to person, place and time. Speech is normal. Language is fluent with no aphasia.    Cranial Nerves  CN II: Visual fields full to confrontation.  CN III, IV, VI: Extraocular movements intact bilaterally. Normal lids and orbits bilaterally. Pupils equal round and reactive to light bilaterally.  CN VII: Full and symmetric facial movement.    Motor  Normal muscle bulk throughout. No abnormal involuntary movements.  Strength: Some generalized fatigability of strength however otherwise normal strength bilaterally.    Sensory  Sensation is intact to light touch, pinprick, vibration and proprioception in all four extremities.    Reflexes  Deep tendon reflexes are 2+ and symmetric in all four extremities.    Gait  Casual gait: Normal stance. Normal stride length. Antalgic gait.  Patient using walker today for ambulation.      Physical Exam  Constitutional:       Appearance: Normal appearance. She is normal weight.   Eyes:      General: Lids are normal.      Extraocular Movements: Extraocular movements intact.      Pupils: Pupils are equal, round, and reactive to light.   Pulmonary:      Effort: Pulmonary effort is normal.   Skin:     General: Skin is warm.   Neurological:      Deep Tendon Reflexes: Reflexes are normal and symmetric.   Psychiatric:         Mood and Affect: Mood normal.         Speech: Speech normal.         Behavior: Behavior normal.                     Assessment & Plan     Diagnoses and all orders for this visit:    1. Chiari I malformation (Primary)  -     Ambulatory Referral to Neurology    2. Chronic migraine w/o aura, not intractable, w/o stat migr  -     levETIRAcetam (KEPPRA) 500 MG tablet; Take 1.5 tablets by mouth 2 (Two) Times a Day for 180 days.  " Dispense: 270 tablet; Refill: 1  -     Ambulatory Referral to Neurology    3. BIH (benign intracranial hypertension)  -     Ambulatory Referral to Neurology             At this time patient is having still some low back pain symptoms from blood patch which she states was extremely painful for her at this time.  I do think it may be too soon to know how well the patches worked.  I also think she has a significant amount of irritation in the lumbar spine area as she already had some previous irritation prior due to tethered cord surgery.  Because of this I think it may take a couple more days for her to see significant relief in symptoms.    I do not notice any new or worsening significant weakness on examination today.    I do feel that some of the patient's symptoms including the increased confusion, grogginess and generalized weakness could be a side effect of the Keppra as well.  I would like to decrease her down to 500 mg twice daily with plan for further decrease down to 250 twice daily if symptoms or not improving.    Since patient continues to have frequent and significant headache symptoms despite multiple preventative therapies and her complex medical history I would like to actually refer her for second opinion with a headache clinic.  We are going to place referral for Dr. Peraza with [facility] as well as the [facility] to see which clinic could get her in sooner for additional evaluation and recommendation.    Plan will be to follow-up here in 3 months if not able to get in with one of the headache clinics prior.         Zonia ELIZALDE    Neurology    [facility] [place]    Phone: [phone]    11/15/2024 , 13:29 EST

## 2024-11-21 ENCOUNTER — TELEPHONE (OUTPATIENT)
Dept: NEUROLOGY | Facility: CLINIC | Age: 34
End: 2024-11-21
Payer: COMMERCIAL

## 2024-11-21 ENCOUNTER — TELEPHONE (OUTPATIENT)
Dept: NEUROLOGY | Facility: CLINIC | Age: 34
End: 2024-11-21

## 2024-11-21 NOTE — TELEPHONE ENCOUNTER
Spoke to pt returning sanjuana's call regarding botox - I went ahead and confirmed appt with patient and scheduled please add auth to appt notes with info.

## 2024-12-18 ENCOUNTER — PROCEDURE VISIT (OUTPATIENT)
Dept: NEUROLOGY | Facility: CLINIC | Age: 34
End: 2024-12-18
Payer: COMMERCIAL

## 2024-12-18 DIAGNOSIS — G43.709 CHRONIC MIGRAINE W/O AURA, NOT INTRACTABLE, W/O STAT MIGR: Primary | ICD-10-CM

## 2024-12-18 NOTE — PROGRESS NOTES
Botulinum Toxin Injection Procedure Note         Procedure: Botulinum Toxin Procedure      Diagnosis: Chronic Migraines         Agents Used:  Botox or Onabotulinum toxin A    Timeout was performed prior to procedure verifying correct patient and correct procedure.    Indications/ Clinical rationale for BOTOX: Chronic Migraines         Number of headache days per month : 20    Baseline (if applicable):30         Number of headache hours per day: 2-10    Baseline (if applicable):24         (When determining number of headache days, it may be beneficial to ask the patient how many headache-free days each month the patient is experiencing.)         EMG guidance given:  N0         Procedure Details/ Discussion:  Risks, benefits, indications, potential complications and alternatives were discussed with Patient. The Patient received and read the Onabotulinum toxin A handout given and agrees to proceed with the procedure. Informed consent was obtained from the patient.    The limb(s) for injection were identified and a time out called to re-identify the correct limb for injection. After prepping the skin with alcohol overlying the following muscles, Botulinum toxin was injected intramuscularly using [place] guidance as follows.    5 units in the left     5 units in the right     5 units in the procerus    10 units in the left frontalis divided into 2 different sites    10 units in the right frontalis divided into 2 different sites    20 units in the left temporalis divided into 4 different sites    20 units in the right temporalis divided into 4 different sites    15 units in the left occipitalis divided into 3 different sites    15 units in the right occipitalis divded into 3 different sites    10 units in the left cervical paraspinal muscles divided into 2 different sites    10 units in the right cervical paraspinal muscles divided into 2 different sites    15 units in the left trapezius divided into  3 different sites    15 units in the right trapezius divided into 3 different sites              Vials:    Botox Vial Unit: 200 unit vial, 1 vial, 155 used, 45 wasted         Impression:    Patient tolerated injection procedure well with no complications         Patient Instruction(s):     Patient was instructed that they may experience localized discomfort over the next 12- 24 hours and may take over the counter (OTC) medication as needed (PRN).         Follow-Up: Follow up in the office in 1 months

## 2025-03-03 ENCOUNTER — TELEPHONE (OUTPATIENT)
Dept: NEUROLOGY | Facility: CLINIC | Age: 35
End: 2025-03-03
Payer: COMMERCIAL

## 2025-03-03 NOTE — TELEPHONE ENCOUNTER
[facility] called asking if you could cancel or withdraw the prior authorization for the Botox. Patient is now a patient of Dr. Madrid and she will be receiving Botox there.    Case / Authorization # [id number]  Dates : 01/25/25 - 11/13/25